# Patient Record
Sex: FEMALE | Race: WHITE | NOT HISPANIC OR LATINO | Employment: OTHER | URBAN - METROPOLITAN AREA
[De-identification: names, ages, dates, MRNs, and addresses within clinical notes are randomized per-mention and may not be internally consistent; named-entity substitution may affect disease eponyms.]

---

## 2017-01-30 ENCOUNTER — HOSPITAL ENCOUNTER (OUTPATIENT)
Dept: OTHER | Facility: HOSPITAL | Age: 77
Discharge: HOME | End: 2017-01-30
Attending: FAMILY MEDICINE | Admitting: FAMILY MEDICINE

## 2017-03-09 ENCOUNTER — HOSPITAL ENCOUNTER (OUTPATIENT)
Dept: ORTHOPEDIC SURGERY | Facility: CLINIC | Age: 77
Discharge: HOME | End: 2017-03-09
Attending: ORTHOPAEDIC SURGERY | Admitting: ORTHOPAEDIC SURGERY

## 2017-04-27 ENCOUNTER — HOSPITAL ENCOUNTER (OUTPATIENT)
Dept: ORTHOPEDIC SURGERY | Facility: CLINIC | Age: 77
Discharge: HOME | End: 2017-04-27
Attending: PHYSICIAN ASSISTANT | Admitting: PHYSICIAN ASSISTANT

## 2017-05-03 ENCOUNTER — CONVERSION ENCOUNTER (OUTPATIENT)
Dept: FAMILY MEDICINE CLINIC | Facility: CLINIC | Age: 77
End: 2017-05-03

## 2017-05-03 LAB
ALBUMIN SERPL-MCNC: 3.8 G/DL (ref 3.6–5.1)
ALP SERPL-CCNC: 82 U/L (ref 33–130)
AST SERPL-CCNC: 23 U/L (ref 10–35)
BASOPHILS # BLD AUTO: 100 CELLS/UL (ref 0–200)
BILIRUB SERPL-MCNC: 0.4 MG/DL (ref 0.2–1.2)
BUN SERPL-MCNC: 10 MG/DL (ref 7–25)
BUN/CREAT SERPL: 16.7 (CALC) (ref 6–22)
CALCIUM SERPL-MCNC: 9.3 MG/DL (ref 8.6–10.2)
CHLORIDE SERPL-SCNC: 99 MMOL/L (ref 98–110)
CHOLEST SERPL-MCNC: 140 MG/DL (ref 125–200)
CONV CO2: 26 MMOL/L (ref 21–33)
CONV TOTAL PROTEIN: 7 G/DL (ref 6.2–8.3)
CREAT UR-MCNC: 0.6 MG/DL (ref 0.63–1.22)
EOSINOPHIL # BLD AUTO: 2 %
EOSINOPHIL # BLD AUTO: 200 CELLS/UL (ref 15–500)
ERYTHROCYTE [DISTWIDTH] IN BLOOD BY AUTOMATED COUNT: 15.1 % (ref 11–15)
GLOBULIN UR ELPH-MCNC: 3.2 MG/DL (ref 2.2–3.9)
GLUCOSE UR QL: 86 MG/DL (ref 65–99)
HCT VFR BLD AUTO: 29.7 % (ref 35–45)
HDLC SERPL-MCNC: 48 MG/DL
HGB BLD-MCNC: 9.7 G/DL (ref 11.7–15.5)
INSULIN SERPL-ACNC: 1.2 (CALC) (ref 1–2.1)
LDLC SERPL CALC-MCNC: 69 MG/DL
LYMPHOCYTES # BLD AUTO: 800 CELLS/UL (ref 850–3900)
LYMPHOCYTES NFR BLD AUTO: 11 %
MCH RBC QN AUTO: 26.5 PG (ref 27–33)
MCHC RBC AUTO-ENTMCNC: 32.5 G/DL (ref 32–36)
MCV RBC AUTO: 81.6 FL (ref 80–100)
MONOCYTES # BLD AUTO: 600 CELLS/UL (ref 200–950)
MONOCYTES NFR BLD AUTO: 9 %
NEUTROPHILS # BLD AUTO: 5400 CELLS/UL (ref 1500–7800)
NEUTROPHILS NFR BLD AUTO: 77 %
PLATELET # BLD AUTO: 546 10*3/UL (ref 140–400)
PMV BLD AUTO: 8.1 FL (ref 7.5–11.5)
POTASSIUM SERPL-SCNC: 4.4 MMOL/L (ref 3.5–5.3)
RBC # BLD AUTO: 3.64 MILLION/UL (ref 3.8–5.1)
SODIUM SERPL-SCNC: 136 MMOL/L (ref 135–146)
TRIGL SERPL-MCNC: 117 MG/DL
WBC # BLD AUTO: 7 10*3/UL (ref 3.8–10.8)

## 2017-05-05 ENCOUNTER — HOSPITAL ENCOUNTER (OUTPATIENT)
Dept: ORTHOPEDIC SURGERY | Facility: CLINIC | Age: 77
Discharge: HOME | End: 2017-05-05
Attending: ORTHOPAEDIC SURGERY | Admitting: ORTHOPAEDIC SURGERY

## 2017-05-15 ENCOUNTER — CONVERSION ENCOUNTER (OUTPATIENT)
Dept: FAMILY MEDICINE CLINIC | Facility: CLINIC | Age: 77
End: 2017-05-15

## 2017-05-16 LAB
BASOPHILS # BLD AUTO: 43 CELLS/UL (ref 0–200)
BASOPHILS NFR BLD AUTO: 0.6 %
EOSINOPHIL # BLD AUTO: 142 CELLS/UL (ref 15–500)
EOSINOPHIL # BLD AUTO: 2 %
ERYTHROCYTE [DISTWIDTH] IN BLOOD BY AUTOMATED COUNT: 13.7 % (ref 11–15)
FERRITIN SERPL-MCNC: 25 NG/ML (ref 20–288)
FOLATE SERPL-MCNC: >24 NG/ML
HCT VFR BLD AUTO: 28 % (ref 35–45)
HGB BLD-MCNC: 8.9 G/DL (ref 11.7–15.5)
IRON SATN MFR SERPL: 7 % (CALC) (ref 11–50)
IRON SERPL-MCNC: 30 MCG/DL (ref 45–160)
LYMPHOCYTES # BLD AUTO: 1143 CELLS/UL (ref 850–3900)
LYMPHOCYTES NFR BLD AUTO: 16.1 %
MCH RBC QN AUTO: 26.3 PG (ref 27–33)
MCHC RBC AUTO-ENTMCNC: 31.8 G/DL (ref 32–36)
MCV RBC AUTO: 82.6 FL (ref 80–100)
MONOCYTES # BLD AUTO: 604 CELLS/UL (ref 200–950)
MONOCYTES NFR BLD AUTO: 8.5 %
NEUTROPHILS # BLD AUTO: 5169 CELLS/UL (ref 1500–7800)
NEUTROPHILS NFR BLD AUTO: 72.8 %
PLATELET # BLD AUTO: 466 10*3/UL (ref 140–400)
PMV BLD AUTO: 9.2 FL (ref 7.5–12.5)
RBC # BLD AUTO: 3.39 MILLION/UL (ref 3.8–5.1)
RETICS/RBC NFR MANUAL: NORMAL CELLS/UL (ref 20000–80000)
RETICULOCYTES PERCENT: 1.3 %
UIBC SERPL-MCNC: 441 UG/DL (ref 250–450)
VIT B12 SERPL-MCNC: 377 PG/ML (ref 200–1100)
WBC # BLD AUTO: 7.1 10*3/UL (ref 3.8–10.8)

## 2017-07-24 ENCOUNTER — CONVERSION ENCOUNTER (OUTPATIENT)
Dept: FAMILY MEDICINE CLINIC | Facility: CLINIC | Age: 77
End: 2017-07-24

## 2017-07-24 LAB
ALBUMIN SERPL-MCNC: 4.1 G/DL (ref 3.6–5.1)
ALP SERPL-CCNC: 64 U/L (ref 33–130)
AST SERPL-CCNC: 20 U/L (ref 10–35)
BASOPHILS # BLD AUTO: 100 CELLS/UL (ref 0–200)
BILIRUB SERPL-MCNC: 0.5 MG/DL (ref 0.2–1.2)
BUN SERPL-MCNC: 15 MG/DL (ref 7–25)
BUN/CREAT SERPL: 21.4 (CALC) (ref 6–22)
CALCIUM SERPL-MCNC: 9.3 MG/DL (ref 8.6–10.2)
CHLORIDE SERPL-SCNC: 98 MMOL/L (ref 98–110)
CHOLEST SERPL-MCNC: 154 MG/DL (ref 125–200)
CONV CO2: 28 MMOL/L (ref 21–33)
CONV TOTAL PROTEIN: 6.7 G/DL (ref 6.2–8.3)
CREAT UR-MCNC: 0.7 MG/DL (ref 0.63–1.22)
EOSINOPHIL # BLD AUTO: 100 CELLS/UL (ref 15–500)
EOSINOPHIL # BLD AUTO: 3 %
ERYTHROCYTE [DISTWIDTH] IN BLOOD BY AUTOMATED COUNT: 18.6 % (ref 11–15)
GLOBULIN UR ELPH-MCNC: 2.6 MG/DL (ref 2.2–3.9)
GLUCOSE UR QL: 86 MG/DL (ref 65–99)
HCT VFR BLD AUTO: 39.8 % (ref 35–45)
HDLC SERPL-MCNC: 44 MG/DL
HGB BLD-MCNC: 12.8 G/DL (ref 11.7–15.5)
INSULIN SERPL-ACNC: 1.6 (CALC) (ref 1–2.1)
LDLC SERPL CALC-MCNC: 85 MG/DL
LYMPHOCYTES # BLD AUTO: 1100 CELLS/UL (ref 850–3900)
LYMPHOCYTES NFR BLD AUTO: 20 %
MCH RBC QN AUTO: 27.4 PG (ref 27–33)
MCHC RBC AUTO-ENTMCNC: 32.3 G/DL (ref 32–36)
MCV RBC AUTO: 84.8 FL (ref 80–100)
MONOCYTES # BLD AUTO: 400 CELLS/UL (ref 200–950)
MONOCYTES NFR BLD AUTO: 8 %
NEUTROPHILS # BLD AUTO: 3800 CELLS/UL (ref 1500–7800)
NEUTROPHILS NFR BLD AUTO: 68 %
PLATELET # BLD AUTO: 314 10*3/UL (ref 140–400)
PMV BLD AUTO: 8.8 FL (ref 7.5–11.5)
POTASSIUM SERPL-SCNC: 4.6 MMOL/L (ref 3.5–5.3)
RBC # BLD AUTO: 4.69 MILLION/UL (ref 3.8–5.1)
SODIUM SERPL-SCNC: 135 MMOL/L (ref 135–146)
TRIGL SERPL-MCNC: 127 MG/DL
WBC # BLD AUTO: 5.6 10*3/UL (ref 3.8–10.8)

## 2017-10-10 ENCOUNTER — HOSPITAL ENCOUNTER (OUTPATIENT)
Dept: ORTHOPEDIC SURGERY | Facility: CLINIC | Age: 77
Discharge: HOME | End: 2017-10-10
Attending: ORTHOPAEDIC SURGERY | Admitting: ORTHOPAEDIC SURGERY

## 2018-03-19 ENCOUNTER — HOSPITAL ENCOUNTER (OUTPATIENT)
Dept: GENERAL RADIOLOGY | Facility: HOSPITAL | Age: 78
Discharge: HOME | End: 2018-03-19
Attending: FAMILY MEDICINE | Admitting: FAMILY MEDICINE

## 2018-09-18 ENCOUNTER — HOSPITAL ENCOUNTER (OUTPATIENT)
Dept: GENERAL RADIOLOGY | Facility: HOSPITAL | Age: 78
Discharge: HOME | End: 2018-09-18
Attending: FAMILY MEDICINE | Admitting: FAMILY MEDICINE

## 2018-09-26 ENCOUNTER — HOSPITAL ENCOUNTER (OUTPATIENT)
Dept: OTHER | Facility: HOSPITAL | Age: 78
Discharge: HOME | End: 2018-09-26
Attending: FAMILY MEDICINE | Admitting: FAMILY MEDICINE

## 2018-10-15 ENCOUNTER — CONVERSION ENCOUNTER (OUTPATIENT)
Dept: FAMILY MEDICINE CLINIC | Facility: CLINIC | Age: 78
End: 2018-10-15

## 2018-10-16 LAB
ALBUMIN SERPL-MCNC: 4.1 G/DL (ref 3.6–5.1)
ALP SERPL-CCNC: 55 U/L (ref 33–130)
ALT SERPL-CCNC: 13 U/L (ref 6–29)
AST SERPL-CCNC: 22 U/L (ref 10–35)
BILIRUB SERPL-MCNC: 0.9 MG/DL (ref 0.2–1.2)
BUN SERPL-MCNC: 18 MG/DL (ref 7–25)
BUN/CREAT SERPL: ABNORMAL (CALC) (ref 6–22)
CALCIUM SERPL-MCNC: 9.5 MG/DL (ref 8.6–10.4)
CHLORIDE SERPL-SCNC: 98 MMOL/L (ref 98–110)
CHOLEST SERPL-MCNC: 156 MG/DL
CHOLEST/HDLC SERPL: 3.3 (CALC)
CONV CO2: 27 MMOL/L (ref 20–32)
CONV TOTAL PROTEIN: 6.9 G/DL (ref 6.1–8.1)
CREAT UR-MCNC: 0.76 MG/DL (ref 0.6–0.93)
GLOBULIN UR ELPH-MCNC: 2.8 MG/DL (ref 1.9–3.7)
GLUCOSE UR QL: 87 MG/DL (ref 65–99)
HBA1C MFR BLD: 5.6 %
HDLC SERPL-MCNC: 47 MG/DL
INSULIN SERPL-ACNC: 1.5 (CALC) (ref 1–2.5)
LDLC SERPL CALC-MCNC: 88 MG/DL
NONHDLC SERPL-MCNC: 109 MG/DL
POTASSIUM SERPL-SCNC: 4.6 MMOL/L (ref 3.5–5.3)
SODIUM SERPL-SCNC: 134 MMOL/L (ref 135–146)
TRIGL SERPL-MCNC: 118 MG/DL

## 2018-10-31 ENCOUNTER — HOSPITAL ENCOUNTER (OUTPATIENT)
Dept: OTHER | Facility: HOSPITAL | Age: 78
Discharge: HOME | End: 2018-10-31
Attending: FAMILY MEDICINE | Admitting: FAMILY MEDICINE

## 2018-11-19 ENCOUNTER — EPISODE CHANGES (OUTPATIENT)
Dept: CASE MANAGEMENT | Facility: OTHER | Age: 78
End: 2018-11-19

## 2018-11-28 ENCOUNTER — PATIENT OUTREACH (OUTPATIENT)
Dept: CASE MANAGEMENT | Facility: OTHER | Age: 78
End: 2018-11-28

## 2018-11-28 NOTE — OUTREACH NOTE
Skilled Nursing Facility Discharge Flowsheet:     Skilled Nursing Facility Discharge Assessment 11/28/2018   Acute Facility Discharged From [facility]   Acute Discharge Date 11/17/2018   Name of the Skilled Nursing Facility? [facility]   Purpose of SNF Admission SN;PT   Estimated length of stay for the patient? Discharge Planning Incomplete   Who is the insurance provider or payor of patient stay? Medicare   Progression of Patient? Pending

## 2018-12-04 ENCOUNTER — HOSPITAL ENCOUNTER (OUTPATIENT)
Dept: ORTHOPEDIC SURGERY | Facility: CLINIC | Age: 78
Discharge: HOME | End: 2018-12-04
Attending: PHYSICIAN ASSISTANT | Admitting: PHYSICIAN ASSISTANT

## 2018-12-06 ENCOUNTER — PATIENT OUTREACH (OUTPATIENT)
Dept: CASE MANAGEMENT | Facility: OTHER | Age: 78
End: 2018-12-06

## 2018-12-06 NOTE — OUTREACH NOTE
Skilled Nursing Facility Discharge Flowsheet:     Skilled Nursing Facility Discharge Assessment 12/6/2018   Acute Facility Discharged From [facility]   Acute Discharge Date 11/17/2018   Name of the Skilled Nursing Facility? [facility]   Purpose of SNF Admission SN;PT   Estimated length of stay for the patient? Patient has been discharged - presumably to home   Who is the insurance provider or payor of patient stay? -   Progression of Patient? Discharged   Where was the patient discharged to? Home   Home Health Agency at discharge? (No Data)

## 2018-12-06 NOTE — OUTREACH NOTE
Documenting unsuccessful outreach attempt #1.  Patient recently d/c from [facility].  Additional outreach scheduled.

## 2018-12-19 ENCOUNTER — HOSPITAL ENCOUNTER (OUTPATIENT)
Dept: OTHER | Facility: HOSPITAL | Age: 78
Discharge: HOME | End: 2018-12-19
Attending: FAMILY MEDICINE | Admitting: FAMILY MEDICINE

## 2019-03-12 ENCOUNTER — HOSPITAL ENCOUNTER (OUTPATIENT)
Dept: OTHER | Facility: HOSPITAL | Age: 79
Discharge: HOME | End: 2019-03-12
Attending: FAMILY MEDICINE | Admitting: FAMILY MEDICINE

## 2019-04-18 ENCOUNTER — HOSPITAL ENCOUNTER (OUTPATIENT)
Dept: OTHER | Facility: HOSPITAL | Age: 79
Discharge: HOME | End: 2019-04-18
Attending: FAMILY MEDICINE | Admitting: FAMILY MEDICINE

## 2019-04-24 ENCOUNTER — HOSPITAL ENCOUNTER (OUTPATIENT)
Dept: OTHER | Facility: HOSPITAL | Age: 79
Discharge: HOME | End: 2019-04-24
Attending: FAMILY MEDICINE | Admitting: FAMILY MEDICINE

## 2019-05-14 ENCOUNTER — HOSPITAL ENCOUNTER (OUTPATIENT)
Dept: ORTHOPEDIC SURGERY | Facility: CLINIC | Age: 79
Discharge: HOME | End: 2019-05-14
Attending: ORTHOPAEDIC SURGERY | Admitting: ORTHOPAEDIC SURGERY

## 2019-06-24 DIAGNOSIS — I10 ESSENTIAL HYPERTENSION: Primary | ICD-10-CM

## 2019-06-25 RX ORDER — ENALAPRIL MALEATE 10 MG/1
TABLET ORAL
Qty: 180 TABLET | Refills: 1 | Status: SHIPPED | OUTPATIENT
Start: 2019-06-25 | End: 2019-11-13 | Stop reason: SDUPTHER

## 2019-06-25 NOTE — TELEPHONE ENCOUNTER
Future Appointments       Provider Department Center    7/22/2019 8:30 AM [facility] FAMILY MEDICINE [facility]    7/25/2019 1:00 PM Mustapha Gonzales MD [facility]    1/16/2020 1:00 PM Katherine Landa PA [facility] ORTHOPEDICS         Pt. Last seen 4-24-19, last labs 4-16-19 and 4-18-19

## 2019-07-18 RX ORDER — PANTOPRAZOLE SODIUM 40 MG/1
TABLET, DELAYED RELEASE ORAL
Qty: 90 TABLET | Refills: 3 | Status: SHIPPED | OUTPATIENT
Start: 2019-07-18 | End: 2020-05-07 | Stop reason: SDUPTHER

## 2019-07-18 RX ORDER — MONTELUKAST SODIUM 10 MG/1
TABLET ORAL
Qty: 90 TABLET | Refills: 3 | Status: SHIPPED | OUTPATIENT
Start: 2019-07-18 | End: 2020-05-07 | Stop reason: SDUPTHER

## 2019-07-22 DIAGNOSIS — R73.9 HYPERGLYCEMIA: ICD-10-CM

## 2019-07-22 DIAGNOSIS — D50.8 OTHER IRON DEFICIENCY ANEMIA: ICD-10-CM

## 2019-07-22 DIAGNOSIS — M19.90 ARTHRITIS: ICD-10-CM

## 2019-07-22 DIAGNOSIS — E78.2 MIXED HYPERLIPIDEMIA: Primary | ICD-10-CM

## 2019-07-23 LAB
ALBUMIN SERPL-MCNC: 4.3 G/DL (ref 3.5–4.8)
ALBUMIN/GLOB SERPL: 1.9 {RATIO} (ref 1.2–2.2)
ALP SERPL-CCNC: 64 IU/L (ref 39–117)
ALT SERPL-CCNC: 13 IU/L (ref 0–32)
AST SERPL-CCNC: 21 IU/L (ref 0–40)
BASOPHILS # BLD AUTO: 0 X10E3/UL (ref 0–0.2)
BASOPHILS NFR BLD AUTO: 0 %
BILIRUB SERPL-MCNC: 0.8 MG/DL (ref 0–1.2)
BUN SERPL-MCNC: 19 MG/DL (ref 8–27)
BUN/CREAT SERPL: 25 (ref 12–28)
CALCIUM SERPL-MCNC: 9.1 MG/DL (ref 8.7–10.3)
CHLORIDE SERPL-SCNC: 97 MMOL/L (ref 96–106)
CHOLEST SERPL-MCNC: 152 MG/DL (ref 100–199)
CHOLEST/HDLC SERPL: 3.5 RATIO (ref 0–4.4)
CO2 SERPL-SCNC: 25 MMOL/L (ref 20–29)
CREAT SERPL-MCNC: 0.75 MG/DL (ref 0.57–1)
CRP SERPL-MCNC: 3 MG/L (ref 0–10)
EOSINOPHIL # BLD AUTO: 0.1 X10E3/UL (ref 0–0.4)
EOSINOPHIL NFR BLD AUTO: 2 %
ERYTHROCYTE [DISTWIDTH] IN BLOOD BY AUTOMATED COUNT: 14.5 % (ref 12.3–15.4)
GLOBULIN SER CALC-MCNC: 2.3 G/DL (ref 1.5–4.5)
GLUCOSE SERPL-MCNC: 91 MG/DL (ref 65–99)
HBA1C MFR BLD: 5.9 % (ref 4.8–5.6)
HCT VFR BLD AUTO: 36.1 % (ref 34–46.6)
HDLC SERPL-MCNC: 43 MG/DL
HGB BLD-MCNC: 12.1 G/DL (ref 11.1–15.9)
IMM GRANULOCYTES # BLD AUTO: 0 X10E3/UL (ref 0–0.1)
IMM GRANULOCYTES NFR BLD AUTO: 0 %
LDLC SERPL CALC-MCNC: 90 MG/DL (ref 0–99)
LYMPHOCYTES # BLD AUTO: 1.1 X10E3/UL (ref 0.7–3.1)
LYMPHOCYTES NFR BLD AUTO: 19 %
MCH RBC QN AUTO: 28.9 PG (ref 26.6–33)
MCHC RBC AUTO-ENTMCNC: 33.5 G/DL (ref 31.5–35.7)
MCV RBC AUTO: 86 FL (ref 79–97)
MONOCYTES # BLD AUTO: 0.4 X10E3/UL (ref 0.1–0.9)
MONOCYTES NFR BLD AUTO: 8 %
NEUTROPHILS # BLD AUTO: 4 X10E3/UL (ref 1.4–7)
NEUTROPHILS NFR BLD AUTO: 71 %
PLATELET # BLD AUTO: 336 X10E3/UL (ref 150–450)
POTASSIUM SERPL-SCNC: 4.4 MMOL/L (ref 3.5–5.2)
PROT SERPL-MCNC: 6.6 G/DL (ref 6–8.5)
RBC # BLD AUTO: 4.19 X10E6/UL (ref 3.77–5.28)
SODIUM SERPL-SCNC: 137 MMOL/L (ref 134–144)
TRIGL SERPL-MCNC: 97 MG/DL (ref 0–149)
VLDLC SERPL CALC-MCNC: 19 MG/DL (ref 5–40)
WBC # BLD AUTO: 5.7 X10E3/UL (ref 3.4–10.8)

## 2019-07-25 ENCOUNTER — OFFICE VISIT (OUTPATIENT)
Dept: FAMILY MEDICINE CLINIC | Facility: CLINIC | Age: 79
End: 2019-07-25

## 2019-07-25 VITALS
HEIGHT: 66 IN | TEMPERATURE: 98.7 F | BODY MASS INDEX: 23.4 KG/M2 | HEART RATE: 85 BPM | WEIGHT: 145.6 LBS | RESPIRATION RATE: 18 BRPM | OXYGEN SATURATION: 96 % | DIASTOLIC BLOOD PRESSURE: 60 MMHG | SYSTOLIC BLOOD PRESSURE: 116 MMHG

## 2019-07-25 DIAGNOSIS — E78.2 MIXED HYPERLIPIDEMIA: ICD-10-CM

## 2019-07-25 DIAGNOSIS — D63.8 ANEMIA IN OTHER CHRONIC DISEASES CLASSIFIED ELSEWHERE: ICD-10-CM

## 2019-07-25 DIAGNOSIS — R73.9 HYPERGLYCEMIA: ICD-10-CM

## 2019-07-25 DIAGNOSIS — I10 HYPERTENSION, BENIGN: ICD-10-CM

## 2019-07-25 DIAGNOSIS — G89.4 PAIN SYNDROME, CHRONIC: ICD-10-CM

## 2019-07-25 DIAGNOSIS — Z96.649 LOOSE TOTAL HIP ARTHROPLASTY, SUBSEQUENT ENCOUNTER: ICD-10-CM

## 2019-07-25 DIAGNOSIS — T84.038D LOOSE TOTAL HIP ARTHROPLASTY, SUBSEQUENT ENCOUNTER: ICD-10-CM

## 2019-07-25 DIAGNOSIS — G56.01 CARPAL TUNNEL SYNDROME OF RIGHT WRIST: Primary | ICD-10-CM

## 2019-07-25 DIAGNOSIS — M05.79 RHEUMATOID ARTHRITIS INVOLVING MULTIPLE SITES WITH POSITIVE RHEUMATOID FACTOR (HCC): ICD-10-CM

## 2019-07-25 PROBLEM — T84.038A LOOSE TOTAL HIP ARTHROPLASTY (HCC): Status: ACTIVE | Noted: 2019-04-09

## 2019-07-25 PROBLEM — M97.02XA PERIPROSTHETIC FRACTURE AROUND INTERNAL PROSTHETIC LEFT HIP JOINT: Status: ACTIVE | Noted: 2019-07-25

## 2019-07-25 PROBLEM — T84.84XA PAIN IN HIP REGION AFTER TOTAL HIP REPLACEMENT: Status: ACTIVE | Noted: 2019-07-25

## 2019-07-25 PROBLEM — IMO0001 MYALGIA AND MYOSITIS: Status: ACTIVE | Noted: 2019-07-25

## 2019-07-25 PROBLEM — M51.369 LUMBAR DEGENERATIVE DISC DISEASE: Status: ACTIVE | Noted: 2019-07-25

## 2019-07-25 PROBLEM — I73.9 PERIPHERAL VASCULAR DISEASE: Status: ACTIVE | Noted: 2019-07-25

## 2019-07-25 PROBLEM — M48.061 SPINAL STENOSIS OF LUMBAR REGION: Status: ACTIVE | Noted: 2017-01-03

## 2019-07-25 PROBLEM — I70.219 ATHEROSCLEROTIC PERIPHERAL VASCULAR DISEASE WITH INTERMITTENT CLAUDICATION: Status: ACTIVE | Noted: 2019-07-25

## 2019-07-25 PROBLEM — M51.36 LUMBAR DEGENERATIVE DISC DISEASE: Status: ACTIVE | Noted: 2019-07-25

## 2019-07-25 PROCEDURE — 99214 OFFICE O/P EST MOD 30 MIN: CPT | Performed by: FAMILY MEDICINE

## 2019-07-25 RX ORDER — DULOXETIN HYDROCHLORIDE 30 MG/1
30 CAPSULE, DELAYED RELEASE ORAL DAILY
Qty: 30 CAPSULE | Refills: 5 | Status: SHIPPED | OUTPATIENT
Start: 2019-07-25 | End: 2019-08-19 | Stop reason: SDUPTHER

## 2019-07-25 RX ORDER — ATORVASTATIN CALCIUM 10 MG/1
1 TABLET, FILM COATED ORAL DAILY
COMMUNITY
Start: 2016-08-18 | End: 2019-11-13 | Stop reason: SDUPTHER

## 2019-07-25 RX ORDER — DIPHENHYDRAMINE HCL 25 MG
1 CAPSULE ORAL DAILY
COMMUNITY
Start: 2016-04-26

## 2019-07-25 RX ORDER — ENALAPRIL MALEATE 10 MG/1
TABLET ORAL
COMMUNITY
Start: 2019-03-07 | End: 2019-09-05 | Stop reason: SDUPTHER

## 2019-07-25 RX ORDER — ORPHENADRINE CITRATE 100 MG/1
TABLET, EXTENDED RELEASE ORAL DAILY
COMMUNITY
Start: 2018-09-18 | End: 2019-09-05

## 2019-07-25 RX ORDER — CELECOXIB 200 MG/1
200 CAPSULE ORAL DAILY
Qty: 30 CAPSULE | Refills: 5 | Status: SHIPPED | OUTPATIENT
Start: 2019-07-25 | End: 2019-08-19 | Stop reason: SDUPTHER

## 2019-07-25 RX ORDER — LORATADINE 10 MG/1
10 TABLET ORAL DAILY
COMMUNITY

## 2019-07-25 RX ORDER — BACLOFEN 10 MG/1
1 TABLET ORAL DAILY
COMMUNITY
Start: 2016-08-18 | End: 2019-09-05 | Stop reason: SDUPTHER

## 2019-07-25 RX ORDER — FLUTICASONE PROPIONATE 50 MCG
1 SPRAY, SUSPENSION (ML) NASAL DAILY
COMMUNITY
Start: 2018-10-23 | End: 2019-11-12

## 2019-07-25 RX ORDER — MELOXICAM 15 MG/1
1 TABLET ORAL DAILY
COMMUNITY
Start: 2017-06-01 | End: 2019-07-25 | Stop reason: SDUPTHER

## 2019-07-25 RX ORDER — AMITRIPTYLINE HYDROCHLORIDE 75 MG/1
1 TABLET, FILM COATED ORAL NIGHTLY
COMMUNITY
Start: 2016-08-30 | End: 2019-09-17 | Stop reason: SDUPTHER

## 2019-07-25 RX ORDER — BACLOFEN 10 MG/1
1 TABLET ORAL NIGHTLY
COMMUNITY
Start: 2016-08-30 | End: 2019-09-05

## 2019-07-25 RX ORDER — ORPHENADRINE CITRATE 100 MG/1
1 TABLET, EXTENDED RELEASE ORAL DAILY
COMMUNITY
Start: 2018-10-23 | End: 2019-09-05

## 2019-07-25 NOTE — PROGRESS NOTES
"Subjective   Vida Jamison is a 78 y.o. female.   Chief Complaint   Patient presents with   • Arthritis   • Hyperglycemia   • Anemia   • Hyperlipidemia     Left hip pain      Arthritis   Pertinent negatives include no weight loss.   Hyperglycemia   Pertinent negatives include no myalgias or nausea.   Anemia   There has been no weight loss.   Hyperlipidemia   Pertinent negatives include no myalgias.   Hand Pain    The incident occurred more than 1 week ago. There was no injury mechanism. The pain is present in the right hand. The quality of the pain is described as aching. Associated symptoms include tingling.        The following portions of the patient's history were reviewed and updated as appropriate: allergies, current medications, past family history, past medical history, past social history, past surgical history and problem list.    Review of Systems   Constitutional: Negative for unexpected weight gain and unexpected weight loss.   HENT: Negative for nosebleeds.    Gastrointestinal: Negative for blood in stool, nausea and indigestion.   Genitourinary: Negative for hematuria.   Musculoskeletal: Positive for arthritis. Negative for myalgias.   Skin: Negative for dry skin.   Neurological: Positive for tingling.       Objective   Visit Vitals  /60 (BP Location: Right arm, Patient Position: Sitting, Cuff Size: Large Adult)   Pulse 85   Temp 98.7 °F (37.1 °C) (Oral)   Resp 18   Ht 167.6 cm (66\")   Wt 66 kg (145 lb 9.6 oz)   SpO2 96%   BMI 23.50 kg/m²     Physical Exam   Constitutional: She is oriented to person, place, and time. She appears well-developed and well-nourished. She is cooperative.   HENT:   Head: Normocephalic.   Neck: Trachea normal. Neck supple. Carotid bruit is not present. No thyromegaly present.   Cardiovascular: Normal rate and regular rhythm. Exam reveals no gallop and no friction rub.   No murmur heard.  Musculoskeletal:        Arms:  Neurological: She is alert and oriented to person, " place, and time.   Skin: Skin is dry. No rash noted. Nails show no clubbing.       Assessment/Plan    Problem List Items Addressed This Visit        Cardiovascular and Mediastinum    Mixed hyperlipidemia    Current Assessment & Plan     Lipid abnormalities are improving with lifestyle modifications.  Pharmacotherapy as ordered.  Lipids will be reassessed in 6 months.         Relevant Medications    atorvastatin (LIPITOR) 10 MG tablet    Hypertension, benign    Overview     Doing well;   Encouraged to watch salt, exercise more and lose weight         Relevant Medications    metoprolol tartrate (LOPRESSOR) 25 MG tablet    enalapril (VASOTEC) 10 MG tablet       Nervous and Auditory    Carpal tunnel syndrome of right wrist - Primary    Overview     Declines more cary or treatment         Pain syndrome, chronic    Overview     Trial of cymbalta            Musculoskeletal and Integument    Loose total hip arthroplasty (CMS/HCC)    Current Assessment & Plan     Sees janell next week         Rheumatoid arthritis involving multiple sites with positive rheumatoid factor (CMS/HCC)    Current Assessment & Plan     crp is nl but she want to try something different than mobic thus we will try celebrex                Hematopoietic and Hemostatic    Anemia    Current Assessment & Plan     resolved            Other    Hyperglycemia    Current Assessment & Plan     Improved with A1c down to 5.9

## 2019-07-25 NOTE — ASSESSMENT & PLAN NOTE
Lipid abnormalities are improving with lifestyle modifications.  Pharmacotherapy as ordered.  Lipids will be reassessed in 6 months.

## 2019-07-27 ENCOUNTER — RESULTS ENCOUNTER (OUTPATIENT)
Dept: FAMILY MEDICINE CLINIC | Facility: CLINIC | Age: 79
End: 2019-07-27

## 2019-07-27 DIAGNOSIS — E78.2 MIXED HYPERLIPIDEMIA: ICD-10-CM

## 2019-07-27 DIAGNOSIS — M19.90 ARTHRITIS: ICD-10-CM

## 2019-07-27 DIAGNOSIS — D50.8 OTHER IRON DEFICIENCY ANEMIA: ICD-10-CM

## 2019-07-27 DIAGNOSIS — R73.9 HYPERGLYCEMIA: ICD-10-CM

## 2019-08-19 ENCOUNTER — OFFICE VISIT (OUTPATIENT)
Dept: FAMILY MEDICINE CLINIC | Facility: CLINIC | Age: 79
End: 2019-08-19

## 2019-08-19 VITALS
HEART RATE: 94 BPM | DIASTOLIC BLOOD PRESSURE: 74 MMHG | WEIGHT: 145.4 LBS | OXYGEN SATURATION: 97 % | BODY MASS INDEX: 25.76 KG/M2 | SYSTOLIC BLOOD PRESSURE: 139 MMHG | HEIGHT: 63 IN | RESPIRATION RATE: 18 BRPM | TEMPERATURE: 96.4 F

## 2019-08-19 DIAGNOSIS — R73.9 HYPERGLYCEMIA: ICD-10-CM

## 2019-08-19 DIAGNOSIS — D63.8 ANEMIA IN OTHER CHRONIC DISEASES CLASSIFIED ELSEWHERE: ICD-10-CM

## 2019-08-19 DIAGNOSIS — M05.79 RHEUMATOID ARTHRITIS INVOLVING MULTIPLE SITES WITH POSITIVE RHEUMATOID FACTOR (HCC): ICD-10-CM

## 2019-08-19 DIAGNOSIS — G89.4 PAIN SYNDROME, CHRONIC: Primary | ICD-10-CM

## 2019-08-19 DIAGNOSIS — E78.2 MIXED HYPERLIPIDEMIA: ICD-10-CM

## 2019-08-19 PROCEDURE — 99213 OFFICE O/P EST LOW 20 MIN: CPT | Performed by: FAMILY MEDICINE

## 2019-08-19 RX ORDER — CELECOXIB 200 MG/1
200 CAPSULE ORAL DAILY
Qty: 90 CAPSULE | Refills: 3 | Status: SHIPPED | OUTPATIENT
Start: 2019-08-19 | End: 2020-12-09 | Stop reason: SDUPTHER

## 2019-08-19 RX ORDER — DULOXETIN HYDROCHLORIDE 30 MG/1
30 CAPSULE, DELAYED RELEASE ORAL DAILY
Qty: 90 CAPSULE | Refills: 3 | Status: SHIPPED | OUTPATIENT
Start: 2019-08-19 | End: 2020-06-01

## 2019-08-19 NOTE — PROGRESS NOTES
Subjective   Vida Jamison is a 78 y.o. female.     Pain   This is a chronic problem. The current episode started more than 1 year ago. The problem occurs constantly. The problem has been gradually improving. Associated symptoms include arthralgias and joint swelling. Pertinent negatives include no chills, diaphoresis or fatigue. The symptoms are aggravated by walking. The treatment provided significant relief.   Osteoarthritis   Presents for follow-up visit. She complains of joint swelling. The symptoms have been improving. Her pain is at a severity of 6/10. Pertinent negatives include no fatigue. Her past medical history is significant for osteoarthritis. Compliance with medications is %.        The following portions of the patient's history were reviewed and updated as appropriate: allergies, current medications, past family history, past medical history, past social history, past surgical history and problem list.    Family History   Problem Relation Age of Onset   • Ovarian cancer Mother    • Arthritis Mother    • Heart failure Father    • Suicidality Brother    • Heart disease Maternal Aunt    • Cancer Other         malignant neoplasm of gastrointestinal tract- in her 50's   • Arthritis Other    • Cervical cancer Other    • Arthritis Other    • Diabetes Other        Social History     Tobacco Use   • Smoking status: Never Smoker   • Smokeless tobacco: Never Used   Substance Use Topics   • Alcohol use: No   • Drug use: No       Past Surgical History:   Procedure Laterality Date   • BREAST BIOPSY Right 1974    Dr Briseida Alonso   • CATARACT EXTRACTION W/ INTRAOCULAR LENS IMPLANT      7/20/10-rt. eye-Dr. Leon 7/13/10- Lt. eye-Dr. Leon   • FINGER SURGERY  2001    Revision of Finger joints. Dr. Brumfield w/Drs. Kleinert & Marlon   • HIP ENDOPROSTHESIS Left 05/22/2014    Osteonics endoprostehetic replacement of left hip. Dr. Reza Choudhury.   • JOINT REPLACEMENT     • KNEE ARTHROSCOPY Left     [1987] Dr Poon-torn medial  meniscus [1995] Dr. Schaffer -torn medial meniscus   • LEEP  2000    Biopsy of cervix. for MAJOR-1 by Dr. Knight   • LUMBAR DISCECTOMY  1994    Hemilaminectomy, foraminectomy & discectomy. Dr. Velasquez, L4-L5 w/posterior lateral fusion & screw fixatoin   • LUMBAR LAMINECTOMY  12/19/2011    foraminotomies, medial facetectomies L5-U5axbyn redo. left L5-S1 lumbar discectomy. [facility]-Dr. De Oliveira- 5 units of blood given to pt.   • POSTERIOR LUMBAR/THORACIC SPINE FUSION  2002    Fusion T-5 through L-1. Had T-11 burst fracture. Recuperated at [facility]   • SPINAL FUSION      Lower Back. 7/11/2011-[facility] - Hardware removal from L3-L5, Dr. De Oliveira surgeon, Dr. Booker [facility]-Fusion of spine at multi-levels 12/14/11 - [facility] - Dr Gomez and Dr. Momin, Anterior approach Spinal Fusion L5-S1 3--[facility]. Dr. De Oliveira and Dr. Dean. Failed posterior fusion with loose instrumentation. L-4 thru L-5. No complications.   • TONSILLECTOMY  1951    Dr Mcginnis   • TOTAL HIP ARTHROPLASTY Right 04/10/2017    [facility]. Dr. Reinoso   • TOTAL KNEE ARTHROPLASTY Left 2002    Dr Sue @ [facility]       Patient Active Problem List   Diagnosis   • Atherosclerotic peripheral vascular disease with intermittent claudication (CMS/HCC)   • Gastroesophageal reflux disease without esophagitis   • Pain in hip region after total hip replacement (CMS/HCC)   • Loose total hip arthroplasty (CMS/HCC)   • Lumbar degenerative disc disease   • Mixed hyperlipidemia   • Myalgia and myositis   • Hypertension, benign   • Peripheral vascular disease (CMS/HCC)   • Periprosthetic fracture around internal prosthetic left hip joint (CMS/HCC)   • Rheumatoid arthritis involving multiple sites with positive rheumatoid factor (CMS/HCC)   • S/P lumbar fusion   • Spinal stenosis of lumbar region   • Carpal tunnel syndrome of right wrist   • Anemia   • Hyperglycemia   • Pain syndrome, chronic       Current  Outpatient Medications on File Prior to Visit   Medication Sig Dispense Refill   • Acetaminophen (TYLENOL) 325 MG capsule 2 (Two) Times a Day.     • amitriptyline (ELAVIL) 75 MG tablet Take 1 tablet by mouth Every Night.     • atorvastatin (LIPITOR) 10 MG tablet Take 1 tablet by mouth Daily.     • baclofen (LIORESAL) 10 MG tablet 1 capsule Every Night.     • Calcium Carbonate-Vitamin D (CALCIUM 600+D) 600-200 MG-UNIT tablet Take 2 tablets by mouth Daily.     • celecoxib (CELEBREX) 200 MG capsule Take 1 capsule by mouth Daily. 30 capsule 5   • diphenhydrAMINE (BENADRYL ALLERGY) 25 mg capsule Take 1 capsule by mouth Daily.     • DULoxetine (CYMBALTA) 30 MG capsule Take 1 capsule by mouth Daily. 30 capsule 5   • enalapril (VASOTEC) 10 MG tablet TAKE 1 TABLET TWICE DAILY 180 tablet 1   • fluticasone ([employer] FLUTICASONE PROPIONATE) 50 MCG/ACT nasal spray 1 spray Daily.     • loratadine (CLARITIN) 10 MG tablet Take 10 mg by mouth Daily.     • metoprolol tartrate (LOPRESSOR) 25 MG tablet Take 1 tablet by mouth 2 (Two) Times a Day.     • montelukast (SINGULAIR) 10 MG tablet TAKE 1 TABLET EVERY DAY 90 tablet 3   • orphenadrine (NORFLEX) 100 MG 12 hr tablet 1 tablet Daily.     • pantoprazole (PROTONIX) 40 MG EC tablet TAKE 1 TABLET DAILY 90 tablet 3   • Psyllium 100 % powder METAMUCIL POWD     • baclofen (LIORESAL) 10 MG tablet Take 1 tablet by mouth Daily.     • enalapril (VASOTEC) 10 MG tablet Take  by mouth.     • orphenadrine (NORFLEX) 100 MG 12 hr tablet Take  by mouth Daily.       No current facility-administered medications on file prior to visit.        Allergies   Allergen Reactions   • Morphine And Related Other (See Comments) and Hallucinations     HALLUCINATIONS         Review of Systems   Constitutional: Negative for chills, diaphoresis and fatigue.   Musculoskeletal: Positive for arthralgias and joint swelling.       Objective   Visit Vitals  /74 (BP Location: Left arm, Patient Position: Sitting, Cuff Size:  "Adult)   Pulse 94   Temp 96.4 °F (35.8 °C) (Oral)   Resp 18   Ht 160 cm (63\")   Wt 66 kg (145 lb 6.4 oz)   SpO2 97%   BMI 25.76 kg/m²     Physical Exam   Constitutional: She is oriented to person, place, and time. She appears well-developed and well-nourished. She is cooperative.   HENT:   Head: Normocephalic.   Neck: Trachea normal. Neck supple. Carotid bruit is not present. No thyromegaly present.   Cardiovascular: Normal rate and regular rhythm. Exam reveals no gallop and no friction rub.   No murmur heard.  Neurological: She is alert and oriented to person, place, and time.   Skin: Skin is dry. No rash noted. Nails show no clubbing.         Assessment/Plan .  Problem List Items Addressed This Visit        Nervous and Auditory    Pain syndrome, chronic - Primary    Overview     Trial of cymbalta         Current Assessment & Plan     Greatly improved with Cymbalta            Musculoskeletal and Integument    Rheumatoid arthritis involving multiple sites with positive rheumatoid factor (CMS/HCC)    Current Assessment & Plan     Greatly improved with Cymbalta                    "

## 2019-09-05 ENCOUNTER — OFFICE VISIT (OUTPATIENT)
Dept: FAMILY MEDICINE CLINIC | Facility: CLINIC | Age: 79
End: 2019-09-05

## 2019-09-05 ENCOUNTER — HOSPITAL ENCOUNTER (OUTPATIENT)
Dept: GENERAL RADIOLOGY | Facility: HOSPITAL | Age: 79
Discharge: HOME | End: 2019-09-05
Admitting: FAMILY MEDICINE

## 2019-09-05 VITALS
WEIGHT: 147 LBS | BODY MASS INDEX: 26.05 KG/M2 | OXYGEN SATURATION: 97 % | HEIGHT: 63 IN | RESPIRATION RATE: 18 BRPM | DIASTOLIC BLOOD PRESSURE: 68 MMHG | SYSTOLIC BLOOD PRESSURE: 112 MMHG | HEART RATE: 78 BPM | TEMPERATURE: 98.3 F

## 2019-09-05 DIAGNOSIS — M54.2 NECK PAIN: Primary | ICD-10-CM

## 2019-09-05 PROCEDURE — 72050 X-RAY EXAM NECK SPINE 4/5VWS: CPT

## 2019-09-05 PROCEDURE — 99213 OFFICE O/P EST LOW 20 MIN: CPT | Performed by: FAMILY MEDICINE

## 2019-09-05 RX ORDER — BACLOFEN 20 MG/1
20 TABLET ORAL 2 TIMES DAILY
Qty: 60 TABLET | Refills: 0 | Status: SHIPPED | OUTPATIENT
Start: 2019-09-05 | End: 2019-10-09 | Stop reason: SDUPTHER

## 2019-09-05 RX ORDER — NAPROXEN 500 MG/1
500 TABLET ORAL 2 TIMES DAILY WITH MEALS
Qty: 14 TABLET | Refills: 0 | Status: SHIPPED | OUTPATIENT
Start: 2019-09-05 | End: 2019-09-12

## 2019-09-05 NOTE — PROGRESS NOTES
"Chief Complaint   Patient presents with   • Neck Pain       Subjective   Vida Jamison is a 79 y.o. female.     Patient reports that when exposed to sunlight it tends to burn more. Patient reports pain radiates into the left shoulder.       Neck Pain    This is a recurrent problem. The current episode started more than 1 month ago. The problem occurs intermittently. The problem has been gradually worsening. The pain is associated with nothing. The pain is present in the left side. The quality of the pain is described as burning. The pain is at a severity of 10/10. The pain is same all the time. Stiffness is present at night. Associated symptoms include numbness (right hand from CTS). Pertinent negatives include no chest pain, fever or trouble swallowing. She has tried acetaminophen for the symptoms. The treatment provided no relief.      Current treatment for chronic pain includes celecoxib, duloxetine and muscle relaxers (there are 2 on her medication list).  She has specifically tried Aspercreme and and tylenol.  They help some.  Neck muscle sore.  It just \"hurts.\"      She has numbness/tinging of the right hand due to CTS.  She will be getting hip surgery and CTS release in the coming weeks.      I have reviewed and updated her medications, medical history and problem list during today's office visit.     Current Outpatient Medications on File Prior to Visit   Medication Sig Dispense Refill   • Acetaminophen (TYLENOL) 325 MG capsule 2 (Two) Times a Day.     • amitriptyline (ELAVIL) 75 MG tablet Take 1 tablet by mouth Every Night.     • atorvastatin (LIPITOR) 10 MG tablet Take 1 tablet by mouth Daily.     • Calcium Carbonate-Vitamin D (CALCIUM 600+D) 600-200 MG-UNIT tablet Take 2 tablets by mouth Daily.     • celecoxib (CELEBREX) 200 MG capsule Take 1 capsule by mouth Daily. 90 capsule 3   • diphenhydrAMINE (BENADRYL ALLERGY) 25 mg capsule Take 1 capsule by mouth Daily.     • DULoxetine (CYMBALTA) 30 MG capsule " "Take 1 capsule by mouth Daily. 90 capsule 3   • enalapril (VASOTEC) 10 MG tablet TAKE 1 TABLET TWICE DAILY 180 tablet 1   • fluticasone ([employer] FLUTICASONE PROPIONATE) 50 MCG/ACT nasal spray 1 spray Daily.     • loratadine (CLARITIN) 10 MG tablet Take 10 mg by mouth Daily.     • metoprolol tartrate (LOPRESSOR) 25 MG tablet Take 1 tablet by mouth 2 (Two) Times a Day.     • montelukast (SINGULAIR) 10 MG tablet TAKE 1 TABLET EVERY DAY 90 tablet 3   • pantoprazole (PROTONIX) 40 MG EC tablet TAKE 1 TABLET DAILY 90 tablet 3   • Psyllium 100 % powder METAMUCIL POWD     • [DISCONTINUED] baclofen (LIORESAL) 10 MG tablet 1 capsule Every Night.     • [DISCONTINUED] baclofen (LIORESAL) 10 MG tablet Take 1 tablet by mouth Daily.     • [DISCONTINUED] enalapril (VASOTEC) 10 MG tablet Take  by mouth.     • [DISCONTINUED] orphenadrine (NORFLEX) 100 MG 12 hr tablet 1 tablet Daily.     • [DISCONTINUED] orphenadrine (NORFLEX) 100 MG 12 hr tablet Take  by mouth Daily.       No current facility-administered medications on file prior to visit.          Social History     Tobacco Use   • Smoking status: Never Smoker   • Smokeless tobacco: Never Used   Substance Use Topics   • Alcohol use: No       Review of Systems   Constitutional: Negative for fever.   HENT: Negative for congestion, dental problem, ear discharge, ear pain, sore throat, swollen glands and trouble swallowing.    Eyes: Negative for visual disturbance.   Cardiovascular: Negative for chest pain.   Gastrointestinal: Negative for abdominal pain and vomiting.   Musculoskeletal: Positive for arthralgias, back pain, gait problem, neck pain and neck stiffness.   Neurological: Positive for numbness (right hand from CTS). Negative for dizziness, facial asymmetry and headache.       Objective   Vitals:    09/05/19 1309   BP: 112/68   Pulse: 78   Resp: 18   Temp: 98.3 °F (36.8 °C)   TempSrc: Oral   SpO2: 97%   Weight: 66.7 kg (147 lb)   Height: 160 cm (63\")     Body mass index is 26.04 " kg/m².  Physical Exam   Constitutional: She appears well-developed and well-nourished. No distress.   HENT:   Head: Normocephalic and atraumatic.   Right Ear: External ear normal.   Left Ear: External ear normal.   Mouth/Throat: Oropharynx is clear and moist.   Eyes: Conjunctivae and EOM are normal.   Neck: Neck supple. No JVD present. Muscular tenderness present. No neck rigidity. Decreased range of motion (slight) present. No edema and no erythema present.       Cardiovascular: Normal rate and regular rhythm.   No murmur heard.  Pulmonary/Chest: Effort normal and breath sounds normal.   Musculoskeletal: She exhibits tenderness and deformity. She exhibits no edema.   Scoliosis, kyphosis and hand deformities noted.  Antalgic gait, uses cane.   Neurological: She is alert. No cranial nerve deficit.   Skin: Skin is warm and dry. Capillary refill takes less than 2 seconds. No rash noted.   Psychiatric: She has a normal mood and affect. Her behavior is normal.         Lab Results   Component Value Date    GLU 91 07/22/2019    BUN 19 07/22/2019    CREATININE 0.75 07/22/2019    EGFRIFNONA 78 07/22/2019    EGFRIFAFRI 90 07/22/2019     07/22/2019    K 4.4 07/22/2019    CL 97 07/22/2019    CALCIUM 9.1 07/22/2019    ALBUMIN 4.3 07/22/2019    BILITOT 0.8 07/22/2019    ALKPHOS 64 07/22/2019    AST 21 07/22/2019    ALT 13 07/22/2019    CHLPL 152 07/22/2019    TRIG 97 07/22/2019    HDL 43 07/22/2019    VLDL 19 07/22/2019    LDL 90 07/22/2019    WBC 5.7 07/22/2019    RBC 4.19 07/22/2019    HCT 36.1 07/22/2019    MCV 86 07/22/2019    MCH 28.9 07/22/2019          Assessment/Plan       Diagnoses and all orders for this visit:    1. Neck pain (Primary)  -     XR Spine Cervical Complete 4 or 5 View  -     baclofen (LIORESAL) 20 MG tablet; Take 1 tablet by mouth 2 (Two) Times a Day.  Dispense: 60 tablet; Refill: 0  -     naproxen (NAPROSYN) 500 MG tablet; Take 1 tablet by mouth 2 (Two) Times a Day With Meals for 7 days. Hold  celecoxib while taking naproxen.  Dispense: 14 tablet; Refill: 0  -     Ambulatory Referral to Physical Therapy Evaluate and treat    I suspect DDD.  Buring pain may be radiculopathy.  Muscle tension noted on exam.   I will temporarily increase the baclofen.  She denies any medication side effect of baclofen at her current dosage of 10 mg at bedtime.  I have asked her to watch for sedation and to reduce dosage if this occurs.  Additionally, I will have her temporarily switch her NSAID for 1 week to Naproxen.    She will start PT and f/u with Dr. Gonzales in 4-6 weeks if not improving.      Return if symptoms worsen or fail to improve.

## 2019-09-06 ENCOUNTER — OFFICE VISIT (OUTPATIENT)
Dept: PHYSICAL THERAPY | Facility: CLINIC | Age: 79
End: 2019-09-06

## 2019-09-06 DIAGNOSIS — M54.2 PAIN, NECK: Primary | ICD-10-CM

## 2019-09-06 PROCEDURE — 97140 MANUAL THERAPY 1/> REGIONS: CPT | Performed by: PHYSICAL THERAPIST

## 2019-09-06 PROCEDURE — 97162 PT EVAL MOD COMPLEX 30 MIN: CPT | Performed by: PHYSICAL THERAPIST

## 2019-09-06 PROCEDURE — G0283 ELEC STIM OTHER THAN WOUND: HCPCS | Performed by: PHYSICAL THERAPIST

## 2019-09-06 NOTE — PROGRESS NOTES
Physical Therapy Initial Evaluation and Plan of Care    Patient: Vida Jamison   : 1940  Diagnosis/ICD-10 Code:  Pain, neck [M54.2]  Referring practitioner: Gerda Zavala*  Date of Initial Visit: 2019  Today's Date: 2019  Patient seen for 1 sessions           Subjective Questionnaire: NDI: 34%      Subjective Evaluation    History of Present Illness  Mechanism of injury: Patient reports insidious onset of L lateral neck pain in the evenings several months ago.  Patient reports her neck pain was intermittent but has been worsening over the past few weeks with increased pain and a burning sensation noted.  Pain is now constant.  Patient has no complaints of NT in neck or UEs.  Patient has trouble falling asleep but pain does not wake her at night.      Quality of life: good    Pain  Current pain ratin  At best pain rating: 3  At worst pain rating: 10  Location: L lateral neck  Quality: dull ache and burning  Alleviating factors: aspercreme, left sidelying   Exacerbated by: as the day progresses.    Hand dominance: right    Patient Goals  Patient goals for therapy: decreased pain, independence with ADLs/IADLs and increased motion         Objective     Special Questions  Patient is experiencing headaches.     Additional Special Questions  No complaints of vision changes, dizziness, tinnitus      Static Posture     Head  Forward.    Postural Observations  Seated posture: poor  Standing posture: poor        Palpation   Left   No palpable tenderness to the lower trapezius.   Tenderness of the cervical paraspinals, levator scapulae, middle trapezius, suboccipitals and upper trapezius.     Active Range of Motion   Cervical/Thoracic Spine   Cervical    Flexion: WFL and with pain  Extension: with pain  Left lateral flexion: with pain  Right lateral flexion: with pain    Additional Active Range of Motion Details  Cervical extension limited by 50% limited   B lateral cervical flexion and rotation  limited by ~70%    Strength/Myotome Testing     Left Shoulder     Planes of Motion   Flexion: 3+   Abduction: 3+     Right Shoulder     Planes of Motion   Flexion: 3+   Abduction: 3+     Tests   Cervical     Left   Positive Spurling's sign.     Right   Negative Spurling's sign.     Left Shoulder   Negative active compression ([place]).     Right Shoulder   Negative active compression ([place]).     Additional Tests Details  Increased pain with cervical distraction         Assessment & Plan     Assessment  Impairments: abnormal or restricted ROM, activity intolerance, impaired physical strength, lacks appropriate home exercise program and pain with function  Assessment details: Patient is a 79 year old female with complaints of L lateral neck pain with insidious onset of symptoms several months ago.  Patient presents with tenderness to palpation of L upper trap and levator scap, decreased cervical mobility, weakness of BUEs and scapular musculature, impaired posture, and difficulty with household chores.    Prognosis: fair  Functional Limitations: lifting  Goals  Plan Goals: STG:  -Patient will report a reduction in L lateral neck pain by >/=20% for improved sleeping tolerance in 2-3 weeks.  -Patient will require less than 3 cues for correction of posture in 3 weeks.    LTG:  -Patient will report a reduction in L lateral neck pain by >/=50% for improved sleeping tolerance and ability to perform household chores by discharge.  -Patient will demonstrate improved cervical B lateral flexion and rotation by >/=25% for improved ability to turn head while driving by discharge.  -Patient will demonstrate decreased shoulder hiking when sitting and with ambulation by discharge.    Plan  Therapy options: will be seen for skilled physical therapy services  Planned modality interventions: cryotherapy, electrical stimulation/Russian stimulation, TENS, thermotherapy (hydrocollator packs), ultrasound and traction  Planned therapy  interventions: manual therapy, soft tissue mobilization, spinal/joint mobilization, strengthening, stretching, therapeutic activities, joint mobilization, IADL retraining, home exercise program, functional ROM exercises and flexibility  Plan details: Plan of care to include 30 visits in duration        Timed:         Manual Therapy:    10     mins  66421;     Therapeutic Exercise:         mins  76078;     Neuromuscular Galina:        mins  14727;    Therapeutic Activity:          mins  44139;     Gait Training:           mins  52098;     Ultrasound:          mins  38825;    Ionto                                   mins   56733  Self-care  ____ mins 51318    Un-Timed:  Electrical Stimulation:    15     mins  05436 ( );  Dry Needling          mins self-pay  Traction          mins 73307  Low Eval          Mins  95583  Mod Eval     25     Mins  80260  High Eval                            Mins  64208        Timed Treatment:   10   mins   Total Treatment:     50   mins    PT SIGNATURE: Marielena Eugene PT   [place] License # [id number]  Physical Therapist  DATE TREATMENT INITIATED: 9/6/2019    Initial Certification  Certification Period: 12/5/2019  I certify that the therapy services are furnished while this patient is under my care.  The services outlined above are required by this patient, and will be reviewed every 90 days.     PHYSICIAN: Gerda Donnelly MD      DATE:     Please sign and return via fax to  .. Thank you, [facility].

## 2019-09-10 ENCOUNTER — OFFICE VISIT (OUTPATIENT)
Dept: PHYSICAL THERAPY | Facility: CLINIC | Age: 79
End: 2019-09-10

## 2019-09-10 DIAGNOSIS — M54.2 PAIN, NECK: Primary | ICD-10-CM

## 2019-09-10 PROCEDURE — 97110 THERAPEUTIC EXERCISES: CPT | Performed by: PHYSICAL THERAPIST

## 2019-09-10 PROCEDURE — 97140 MANUAL THERAPY 1/> REGIONS: CPT | Performed by: PHYSICAL THERAPIST

## 2019-09-10 PROCEDURE — G0283 ELEC STIM OTHER THAN WOUND: HCPCS | Performed by: PHYSICAL THERAPIST

## 2019-09-10 NOTE — PROGRESS NOTES
"     Physical Therapy Daily Progress Note    Patient: Vida Jamison   : 1940  Diagnosis/ICD-10 Code:  Pain, neck [M54.2]  Referring practitioner: Gerda Zavala*  Date of Initial Visit: Type: THERAPY  Noted: 2019  Today's Date: 9/10/2019  Patient seen for 2 sessions             Subjective Patient reports she was a little sore following her evaluation but it \"wasn't too bad.\"  Patient reports the estim and heat helped her neck feel better.    Objective   See Exercise, Manual, and Modality Logs for complete treatment.       Assessment/Plan Initiated exercise today with patient reporting no increased pain with activity.  Required cues to prevent shoulder elevation during scapular retractions.  Patient continues to report TTP of L upper trap and levator scap at the muscle bellies with moderate guarding noted.    Progress per Plan of Care           Timed:         Manual Therapy:    12     mins  91279;     Therapeutic Exercise:    14     mins  13946;     Neuromuscular Galina:        mins  84886;    Therapeutic Activity:          mins  48960;     Gait Training:           mins  76384;     Ultrasound:          mins  19592;    Ionto                                   mins   83822  Self Care                            mins   47667      Un-Timed:  Electrical Stimulation:    15     mins  93513 ( );  Dry Needling          mins self-pay  Traction          mins 98026  Low Eval          Mins  59861  Mod Eval          Mins  97494  High Eval                            Mins  24466    Timed Treatment:   26   mins   Total Treatment:     41   mins    Marielena Eugene PT  [place] License # [id number]  Physical Therapist  "

## 2019-09-13 ENCOUNTER — OFFICE VISIT (OUTPATIENT)
Dept: PHYSICAL THERAPY | Facility: CLINIC | Age: 79
End: 2019-09-13

## 2019-09-13 DIAGNOSIS — M54.2 PAIN, NECK: Primary | ICD-10-CM

## 2019-09-13 PROCEDURE — G0283 ELEC STIM OTHER THAN WOUND: HCPCS | Performed by: PHYSICAL THERAPIST

## 2019-09-13 PROCEDURE — 97140 MANUAL THERAPY 1/> REGIONS: CPT | Performed by: PHYSICAL THERAPIST

## 2019-09-13 PROCEDURE — 97110 THERAPEUTIC EXERCISES: CPT | Performed by: PHYSICAL THERAPIST

## 2019-09-13 NOTE — PROGRESS NOTES
Physical Therapy Daily Progress Note      Patient: Vida Jamison   : 1940  Diagnosis/ICD-10 Code:  Pain, neck [M54.2]  Referring practitioner: Gerda Zavala*  Date of Initial Visit: Type: THERAPY  Noted: 2019  Today's Date: 2019  Patient seen for 3 sessions         Vida Jamison reports: she still has pain in the L side of her neck but it does seem to be improving with exercise and stretches. Pt. Reports she will be getting carpal tunnel release sx done but did not provide a date.    Objective   See Exercise, Manual, and Modality Logs for complete treatment.       Assessment/Plan   Pt. Tolerated treatment well. Pt has good passive mobility in scapula but minimal control over the scapular movement actively. Pt. responds well to manual intervention relaxing more progressively throughout treatment.    Progress per Plan of Care           Timed:         Manual Therapy:    22     mins  61650;     Therapeutic Exercise:    10     mins  07054;     Neuromuscular Galina:        mins  63510;    Therapeutic Activity:          mins  29814;     Gait Training:           mins  47143;     Ultrasound:          mins  08271;    Ionto                                   mins   66790  Self Care                            mins   32573  Canalith Repos                   mins  4209    Un-Timed:  Electrical Stimulation:    15     mins  70304 ( );  Dry Needling          mins self-pay  Traction          mins 32883  Low Eval          Mins  76081  Mod Eval          Mins  28635  High Eval                            Mins  46591    Timed Treatment:   32   mins   Total Treatment:     47   mins    Maty Bernard PTA  Physical Therapist Assistant License #[id number]

## 2019-09-17 ENCOUNTER — TREATMENT (OUTPATIENT)
Dept: PHYSICAL THERAPY | Facility: CLINIC | Age: 79
End: 2019-09-17

## 2019-09-17 DIAGNOSIS — M54.2 PAIN, NECK: Primary | ICD-10-CM

## 2019-09-17 PROCEDURE — 97035 APP MDLTY 1+ULTRASOUND EA 15: CPT | Performed by: PHYSICAL THERAPIST

## 2019-09-17 PROCEDURE — 97140 MANUAL THERAPY 1/> REGIONS: CPT | Performed by: PHYSICAL THERAPIST

## 2019-09-17 NOTE — PROGRESS NOTES
"     Physical Therapy Daily Progress Note    Patient: Vida Jamison   : 1940  Diagnosis/ICD-10 Code:  Pain, neck [M54.2]  Referring practitioner: Gerda Zavala*  Date of Initial Visit: Type: THERAPY  Noted: 2019  Today's Date: 2019  Patient seen for 4 sessions             Subjective Patient reports no significant change in pain levels since evaluation.  Continues to report pain and burning at L upper trap and is concerned that the issue is from the \"arthritis in her neck.\"    Objective   See Exercise, Manual, and Modality Logs for complete treatment.       Assessment/Plan Ultrasound performed today for deep heat to L upper trap with patient reporting decreased symptoms after.  Also discussed possible dry needling due to significant muscle tightness noted and no significant change in pain since evaluation.    Progress per Plan of Care           Timed:         Manual Therapy:    15     mins  09824;     Therapeutic Exercise:    4     mins  07574;     Neuromuscular Galina:        mins  62458;    Therapeutic Activity:          mins  39562;     Gait Training:           mins  23454;     Ultrasound:     10     mins  03617;    Ionto                                   mins   54811  Self Care                            mins   54450      Un-Timed:  Electrical Stimulation:         mins  78107 ( );  Dry Needling          mins self-pay  Traction          mins 21817  Low Eval          Mins  97977  Mod Eval          Mins  10247  High Eval                            Mins  07843    Timed Treatment:   29   mins   Total Treatment:     29   mins    Marielena Eugene PT  [place] License # [id number]  Physical Therapist  "

## 2019-09-18 RX ORDER — AMITRIPTYLINE HYDROCHLORIDE 75 MG/1
TABLET, FILM COATED ORAL
Qty: 90 TABLET | Refills: 0 | Status: SHIPPED | OUTPATIENT
Start: 2019-09-18 | End: 2020-01-30

## 2019-09-20 ENCOUNTER — TREATMENT (OUTPATIENT)
Dept: PHYSICAL THERAPY | Facility: CLINIC | Age: 79
End: 2019-09-20

## 2019-09-20 DIAGNOSIS — M54.2 PAIN, NECK: Primary | ICD-10-CM

## 2019-09-20 PROCEDURE — 97140 MANUAL THERAPY 1/> REGIONS: CPT | Performed by: PHYSICAL THERAPIST

## 2019-09-20 PROCEDURE — 97035 APP MDLTY 1+ULTRASOUND EA 15: CPT | Performed by: PHYSICAL THERAPIST

## 2019-09-20 PROCEDURE — G0283 ELEC STIM OTHER THAN WOUND: HCPCS | Performed by: PHYSICAL THERAPIST

## 2019-09-20 PROCEDURE — 97110 THERAPEUTIC EXERCISES: CPT | Performed by: PHYSICAL THERAPIST

## 2019-09-20 NOTE — PROGRESS NOTES
Physical Therapy Daily Progress Note      Patient: Vida Jamison   : 1940  Diagnosis/ICD-10 Code:  Pain, neck [M54.2]  Referring practitioner: Gerda Zavala*  Date of Initial Visit: Type: THERAPY  Noted: 2019  Today's Date: 2019  Patient seen for 5 sessions         Vida Jamison reports: the ultrasound took away the burning sensation int her L UT however did not relieve all of the soreness. Pt. States she would like to incorporate both exercise and ultrasound this visit rather than holding off stretches and exercise.    Objective   See Exercise, Manual, and Modality Logs for complete treatment.     Assessment/Plan   Pt. Tolerated treatment well this visit no reports of increased pain or discomfort throughout and tolerated all exercise and stretches without pain.    Progress per Plan of Care           Timed:         Manual Therapy:    15     mins  34602;     Therapeutic Exercise:    16     mins  65361;     Neuromuscular Galina:        mins  13369;    Therapeutic Activity:          mins  89864;     Gait Training:           mins  99920;     Ultrasound:     8     mins  22705;    Ionto                                   mins   02678  Self Care                            mins   49541  Canalith Repos                   mins  4209    Un-Timed:  Electrical Stimulation:    15     mins  75374 ( );  Dry Needling          mins self-pay  Traction          mins 27170  Low Eval          Mins  76409  Mod Eval          Mins  28634  High Eval                            Mins  45206    Timed Treatment:   39   mins   Total Treatment:     54   mins    Maty Bernard PTA  Physical Therapist Assistant License #[id number]

## 2019-09-24 ENCOUNTER — TREATMENT (OUTPATIENT)
Dept: PHYSICAL THERAPY | Facility: CLINIC | Age: 79
End: 2019-09-24

## 2019-09-24 DIAGNOSIS — M54.2 PAIN, NECK: Primary | ICD-10-CM

## 2019-09-24 PROCEDURE — 97140 MANUAL THERAPY 1/> REGIONS: CPT | Performed by: PHYSICAL THERAPIST

## 2019-09-24 PROCEDURE — 97035 APP MDLTY 1+ULTRASOUND EA 15: CPT | Performed by: PHYSICAL THERAPIST

## 2019-09-24 PROCEDURE — 97110 THERAPEUTIC EXERCISES: CPT | Performed by: PHYSICAL THERAPIST

## 2019-09-24 PROCEDURE — G0283 ELEC STIM OTHER THAN WOUND: HCPCS | Performed by: PHYSICAL THERAPIST

## 2019-09-24 NOTE — PROGRESS NOTES
Physical Therapy Daily Progress Note      Patient: Vida Jamison   : 1940  Diagnosis/ICD-10 Code:  Pain, neck [M54.2]  Referring practitioner: Gerda Zavala*  Date of Initial Visit: Type: THERAPY  Noted: 2019  Today's Date: 2019  Patient seen for 6 sessions         Vida Jamison reports: she is still having consistent improvement in pain and she has had no burning however the L UT is still sore and stiff and is consistently more significant In the L.    Objective   See Exercise, Manual, and Modality Logs for complete treatment.     Assessment/Plan   Pt. Continues to report reduced burning sensation in the L UT after ultrasound treatment in therapy. Pt. Continues to improve with cervical mobility however improvements are minimal.    Progress per Plan of Care           Timed:         Manual Therapy:    15     mins  58146;     Therapeutic Exercise:    15     mins  39373;     Neuromuscular Galina:        mins  23698;    Therapeutic Activity:          mins  54561;     Gait Training:           mins  59558;     Ultrasound:    8      mins  13968;    Ionto                                   mins   29621  Self Care                            mins   24942  Canalith Repos                   mins  4209    Un-Timed:  Electrical Stimulation:    15     mins  04361 ( );  Dry Needling          mins self-pay  Traction          mins 23981  Low Eval          Mins  75355  Mod Eval          Mins  23304  High Eval                            Mins  97240    Timed Treatment:   38   mins   Total Treatment:     53   mins    Maty Bernard PTA  Physical Therapist Assistant License #[id number]

## 2019-09-27 ENCOUNTER — TREATMENT (OUTPATIENT)
Dept: PHYSICAL THERAPY | Facility: CLINIC | Age: 79
End: 2019-09-27

## 2019-09-27 DIAGNOSIS — M54.2 PAIN, NECK: Primary | ICD-10-CM

## 2019-09-27 PROCEDURE — G0283 ELEC STIM OTHER THAN WOUND: HCPCS | Performed by: PHYSICAL THERAPIST

## 2019-09-27 PROCEDURE — 97035 APP MDLTY 1+ULTRASOUND EA 15: CPT | Performed by: PHYSICAL THERAPIST

## 2019-09-27 PROCEDURE — 97110 THERAPEUTIC EXERCISES: CPT | Performed by: PHYSICAL THERAPIST

## 2019-09-27 PROCEDURE — 97140 MANUAL THERAPY 1/> REGIONS: CPT | Performed by: PHYSICAL THERAPIST

## 2019-09-27 NOTE — PROGRESS NOTES
Physical Therapy Daily Progress Note      Patient: Vida Jamison   : 1940  Diagnosis/ICD-10 Code:  Pain, neck [M54.2]  Referring practitioner: Gerda Zavala*  Date of Initial Visit: Type: THERAPY  Noted: 2019  Today's Date: 2019  Patient seen for 7 sessions         Vida Jamison reports: her pain has improved and she continues to do better and she states she only has 4 more visits before she has her surgery.    Objective   See Exercise, Manual, and Modality Logs for complete treatment.       Assessment/Plan   Pt. Presents with less tension B with palpation to B UT's. Pt. Continues to improve with exercise and states she feels the best when she has exercise and stretching alongside modalities.    Progress per Plan of Care           Timed:         Manual Therapy:    15     mins  45273;     Therapeutic Exercise:    16     mins  35417;     Neuromuscular Galina:        mins  73095;    Therapeutic Activity:          mins  59481;     Gait Training:           mins  36586;     Ultrasound:    8      mins  83816;    Ionto                                   mins   76292  Self Care                            mins   74148  Canalith Repos                   mins  4209    Un-Timed:  Electrical Stimulation:    15     mins  29927 ( );  Dry Needling          mins self-pay  Traction          mins 57713  Low Eval          Mins  83088  Mod Eval          Mins  98807  High Eval                            Mins  33248    Timed Treatment:   39   mins   Total Treatment:     54   mins    Maty Bernard PTA  Physical Therapist Assistant License #[id number]

## 2019-10-02 ENCOUNTER — OFFICE VISIT (OUTPATIENT)
Dept: FAMILY MEDICINE CLINIC | Facility: CLINIC | Age: 79
End: 2019-10-02

## 2019-10-02 VITALS
SYSTOLIC BLOOD PRESSURE: 130 MMHG | OXYGEN SATURATION: 97 % | HEART RATE: 85 BPM | DIASTOLIC BLOOD PRESSURE: 70 MMHG | WEIGHT: 148.8 LBS | HEIGHT: 63 IN | BODY MASS INDEX: 26.36 KG/M2 | RESPIRATION RATE: 20 BRPM | TEMPERATURE: 97.9 F

## 2019-10-02 DIAGNOSIS — M25.552 LEFT HIP PAIN: Primary | ICD-10-CM

## 2019-10-02 DIAGNOSIS — M05.79 RHEUMATOID ARTHRITIS INVOLVING MULTIPLE SITES WITH POSITIVE RHEUMATOID FACTOR (HCC): ICD-10-CM

## 2019-10-02 DIAGNOSIS — I10 HYPERTENSION, BENIGN: ICD-10-CM

## 2019-10-02 DIAGNOSIS — I70.219 ATHEROSCLEROTIC PERIPHERAL VASCULAR DISEASE WITH INTERMITTENT CLAUDICATION (HCC): ICD-10-CM

## 2019-10-02 DIAGNOSIS — Z96.649 LOOSE TOTAL HIP ARTHROPLASTY, SUBSEQUENT ENCOUNTER: ICD-10-CM

## 2019-10-02 DIAGNOSIS — M97.02XD PERIPROSTHETIC FRACTURE AROUND INTERNAL PROSTHETIC LEFT HIP JOINT, SUBSEQUENT ENCOUNTER: ICD-10-CM

## 2019-10-02 DIAGNOSIS — T84.038D LOOSE TOTAL HIP ARTHROPLASTY, SUBSEQUENT ENCOUNTER: ICD-10-CM

## 2019-10-02 PROCEDURE — 99214 OFFICE O/P EST MOD 30 MIN: CPT | Performed by: FAMILY MEDICINE

## 2019-10-02 NOTE — ASSESSMENT & PLAN NOTE
[facility]  [facility] given.  Paperwork faxed.

## 2019-10-02 NOTE — PROGRESS NOTES
Subjective   Vida Jamison is a 79 y.o. female.     Medical clearance for surgery:  Left hip, [facility] 10/14/19.  Dr. Vergara.      Hip Pain    The incident occurred more than 1 week ago. There was no injury mechanism. The pain is present in the left hip. The quality of the pain is described as aching. The pain is at a severity of 5/10. The pain is moderate. The symptoms are aggravated by movement and weight bearing. She has tried rest for the symptoms. The treatment provided mild relief.   Hypertension   This is a chronic problem. The current episode started more than 1 year ago. The problem is unchanged. The problem is controlled. Pertinent negatives include no chest pain, palpitations or shortness of breath. There are no associated agents to hypertension. Risk factors for coronary artery disease include sedentary lifestyle. Current antihypertension treatment includes beta blockers. The current treatment provides moderate improvement. There are no compliance problems.         The following portions of the patient's history were reviewed and updated as appropriate: allergies, current medications, past family history, past medical history, past social history, past surgical history and problem list.    Family History   Problem Relation Age of Onset   • Ovarian cancer Mother    • Arthritis Mother    • Heart failure Father    • Suicidality Brother    • Heart disease Maternal Aunt    • Cancer Other         malignant neoplasm of gastrointestinal tract- in her 50's   • Arthritis Other    • Cervical cancer Other    • Arthritis Other    • Diabetes Other        Social History     Tobacco Use   • Smoking status: Never Smoker   • Smokeless tobacco: Never Used   Substance Use Topics   • Alcohol use: No   • Drug use: No       Past Surgical History:   Procedure Laterality Date   • BREAST BIOPSY Right 1974    Dr Briseida Alonso   • CATARACT EXTRACTION W/ INTRAOCULAR LENS IMPLANT      7/20/10-rt. eye-Dr. Leon 7/13/10- Lt. eye-Dr. Leon    • FINGER SURGERY  2001    Revision of Finger joints. Dr. Brumfield w/Drs. Kleinert & Marlon   • HIP ENDOPROSTHESIS Left 05/22/2014    [employer] endoprostehetic replacement of left hip. Dr. Reza Choudhury.   • JOINT REPLACEMENT     • KNEE ARTHROSCOPY Left     [1987] Dr Poon-torn medial meniscus [1995] Dr. Schaffer -torn medial meniscus   • LEEP  2000    Biopsy of cervix. for MAJOR-1 by Dr. Knight   • LUMBAR DISCECTOMY  1994    Hemilaminectomy, foraminectomy & discectomy. Dr. Velasquez, L4-L5 w/posterior lateral fusion & screw fixatoin   • LUMBAR LAMINECTOMY  12/19/2011    foraminotomies, medial facetectomies L5-Z7mmtht redo. left L5-S1 lumbar discectomy. [facility]-Dr. De Oliveira- 5 units of blood given to pt.   • POSTERIOR LUMBAR/THORACIC SPINE FUSION  2002    Fusion T-5 through L-1. Had T-11 burst fracture. Recuperated at [facility]   • SPINAL FUSION      Lower Back. 7/11/2011-[facility] - Hardware removal from L3-L5, Dr. De Oliveira surgeon, Dr. Booker [facility]-Fusion of spine at multi-levels 12/14/11 - [facility] - Dr Gomez and Dr. Momin, Anterior approach Spinal Fusion L5-S1 3--[facility]. Dr. De Oliveira and Dr. Dean. Failed posterior fusion with loose instrumentation. L-4 thru L-5. No complications.   • TONSILLECTOMY  1951    Dr Mcginnis   • TOTAL HIP ARTHROPLASTY Right 04/10/2017    [facility] Inpatient. Dr. Reinoso   • TOTAL KNEE ARTHROPLASTY Left 2002    Dr Sue @ [facility]       Patient Active Problem List   Diagnosis   • Atherosclerotic peripheral vascular disease with intermittent claudication (CMS/HCC)   • Gastroesophageal reflux disease without esophagitis   • Pain in hip region after total hip replacement (CMS/HCC)   • Loose total hip arthroplasty (CMS/HCC)   • Lumbar degenerative disc disease   • Mixed hyperlipidemia   • Myalgia and myositis   • Hypertension, benign   • Peripheral vascular disease (CMS/HCC)   • Periprosthetic fracture around internal prosthetic left hip joint  (CMS/[facility])   • Rheumatoid arthritis involving multiple sites with positive rheumatoid factor (CMS/[facility])   • S/P lumbar fusion   • Spinal stenosis of lumbar region   • Carpal tunnel syndrome of right wrist   • Anemia   • Hyperglycemia   • Pain syndrome, chronic   • Left hip pain   • Vitamin D deficiency   • 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     • amitriptyline (ELAVIL) 75 MG tablet TAKE 1 TABLET AT BEDTIME 90 tablet 0   • atorvastatin (LIPITOR) 10 MG tablet Take 1 tablet by mouth Daily.     • baclofen (LIORESAL) 20 MG tablet Take 1 tablet by mouth 2 (Two) Times a Day. 60 tablet 0   • Calcium Carbonate-Vitamin D (CALCIUM 600+D) 600-200 MG-UNIT tablet Take 2 tablets by mouth Daily.     • celecoxib (CELEBREX) 200 MG capsule Take 1 capsule by mouth Daily. 90 capsule 3   • diphenhydrAMINE (BENADRYL ALLERGY) 25 mg capsule Take 1 capsule by mouth Daily.     • DULoxetine (CYMBALTA) 30 MG capsule Take 1 capsule by mouth Daily. 90 capsule 3   • enalapril (VASOTEC) 10 MG tablet TAKE 1 TABLET TWICE DAILY 180 tablet 1   • fluticasone ([employer] FLUTICASONE PROPIONATE) 50 MCG/ACT nasal spray 1 spray Daily.     • loratadine (CLARITIN) 10 MG tablet Take 10 mg by mouth Daily.     • metoprolol tartrate (LOPRESSOR) 25 MG tablet Take 1 tablet by mouth 2 (Two) Times a Day.     • montelukast (SINGULAIR) 10 MG tablet TAKE 1 TABLET EVERY DAY 90 tablet 3   • pantoprazole (PROTONIX) 40 MG EC tablet TAKE 1 TABLET DAILY 90 tablet 3   • Psyllium 100 % powder METAMUCIL POWD       No current facility-administered medications on file prior to visit.        Allergies   Allergen Reactions   • Morphine And Related Other (See  "Comments) and Hallucinations     HALLUCINATIONS         Review of Systems   Constitutional: Negative for fatigue, unexpected weight gain and unexpected weight loss.   Respiratory: Negative for shortness of breath.    Cardiovascular: Negative for chest pain, palpitations and leg swelling.   Musculoskeletal:        Left hip pain     Neurological: Negative for headache.       Objective   Visit Vitals  /70 (BP Location: Right arm, Patient Position: Sitting, Cuff Size: Large Adult)   Pulse 85   Temp 97.9 °F (36.6 °C) (Oral)   Resp 20   Ht 160 cm (63\")   Wt 67.5 kg (148 lb 12.8 oz)   SpO2 97%   BMI 26.36 kg/m²     Physical Exam   Constitutional: She is oriented to person, place, and time. She appears well-developed and well-nourished. She is cooperative.   HENT:   Head: Normocephalic.   Neck: Trachea normal. Neck supple. Carotid bruit is not present. No thyromegaly present.   Cardiovascular: Normal rate and regular rhythm. Exam reveals no gallop and no friction rub.   No murmur heard.  Neurological: She is alert and oriented to person, place, and time.   Skin: Skin is dry. No rash noted. Nails show no clubbing.         Assessment/Plan .  Problem List Items Addressed This Visit        Cardiovascular and Mediastinum    Atherosclerotic peripheral vascular disease with intermittent claudication (CMS/HCC)    Overview     Doing well- last work up 7-23-18 at [facility] showed normal arterial studies--Follow conservatively.         Hypertension, benign    Current Assessment & Plan     Doing well, good control.  Encouraged to watch salt, exercise more and lose weight.              Nervous and Auditory    Left hip pain - Primary    Current Assessment & Plan     Pt scheduled for left hip replacement 10/14/19 at Dr. Ursula Sheikh.  Medical clearance given.  Paperwork faxed.            Musculoskeletal and Integument    Loose total hip arthroplasty (CMS/HCC)    Current Assessment & Plan     Followed by janell         Periprosthetic " fracture around internal prosthetic left hip joint (CMS/HCC)    Overview     Patient was seen by Dr. Joseph who referred to Linette Weaver.         Rheumatoid arthritis involving multiple sites with positive rheumatoid factor (CMS/HCC)    Current Assessment & Plan     Doing well

## 2019-10-03 ENCOUNTER — TREATMENT (OUTPATIENT)
Dept: PHYSICAL THERAPY | Facility: CLINIC | Age: 79
End: 2019-10-03

## 2019-10-03 ENCOUNTER — FLU SHOT (OUTPATIENT)
Dept: FAMILY MEDICINE CLINIC | Facility: CLINIC | Age: 79
End: 2019-10-03

## 2019-10-03 DIAGNOSIS — M54.2 PAIN, NECK: Primary | ICD-10-CM

## 2019-10-03 PROCEDURE — 90662 IIV NO PRSV INCREASED AG IM: CPT | Performed by: FAMILY MEDICINE

## 2019-10-03 PROCEDURE — G0008 ADMIN INFLUENZA VIRUS VAC: HCPCS | Performed by: FAMILY MEDICINE

## 2019-10-03 PROCEDURE — G0283 ELEC STIM OTHER THAN WOUND: HCPCS | Performed by: PHYSICAL THERAPIST

## 2019-10-03 PROCEDURE — 97140 MANUAL THERAPY 1/> REGIONS: CPT | Performed by: PHYSICAL THERAPIST

## 2019-10-03 PROCEDURE — 97110 THERAPEUTIC EXERCISES: CPT | Performed by: PHYSICAL THERAPIST

## 2019-10-03 NOTE — PROGRESS NOTES
Physical Therapy Daily Progress Note    Patient: Vida Jamison   : 1940  Diagnosis/ICD-10 Code:  Pain, neck [M54.2]  Referring practitioner: Gerda Zavala*  Date of Initial Visit: Type: THERAPY  Noted: 2019  Today's Date: 10/3/2019  Patient seen for 8 sessions             Subjective Patient reports the burning sensation in her neck/shoulder has resolved but continues to complain of stiffness and soreness.  Patient states she is having her hip surgery soon and will have to take a break from therapy.    Objective   See Exercise, Manual, and Modality Logs for complete treatment.       Assessment & Plan       Goals  Plan Goals: STG:  -Patient will report a reduction in L lateral neck pain by >/=20% for improved sleeping tolerance in 2-3 weeks. MET  -Patient will require less than 3 cues for correction of posture in 3 weeks. MET    LTG:  -Patient will report a reduction in L lateral neck pain by >/=50% for improved sleeping tolerance and ability to perform household chores by discharge. NOT MET  -Patient will demonstrate improved cervical B lateral flexion and rotation by >/=25% for improved ability to turn head while driving by discharge. NOT MET  -Patient will demonstrate decreased shoulder hiking when sitting and with ambulation by discharge. NOT MET      Patient currently met 2/2 STGs and is progressing slowly towards LTGs.  Patient with decreased tenderness and muscle guarding noted of L upper trap and suboccipitals today.      Progress per Plan of Care           Timed:         Manual Therapy:    16     mins  63714;     Therapeutic Exercise:    10     mins  88329;     Neuromuscular Galina:        mins  37237;    Therapeutic Activity:          mins  78001;     Gait Training:           mins  91221;     Ultrasound:          mins  48309;    Ionto                                   mins   41045  Self Care                            mins   10476      Un-Timed:  Electrical Stimulation:    15      mins  18240 ( );  Dry Needling          mins self-pay  Traction          mins 48202  Low Eval          Mins  40992  Mod Eval          Mins  14468  High Eval                            Mins  94749    Timed Treatment:   26   mins   Total Treatment:     41   mins    Marielena Eugene PT  [place] License # [id number]  Physical Therapist

## 2019-10-04 ENCOUNTER — TREATMENT (OUTPATIENT)
Dept: PHYSICAL THERAPY | Facility: CLINIC | Age: 79
End: 2019-10-04

## 2019-10-04 DIAGNOSIS — M54.2 PAIN, NECK: Primary | ICD-10-CM

## 2019-10-04 PROCEDURE — G0283 ELEC STIM OTHER THAN WOUND: HCPCS | Performed by: PHYSICAL THERAPIST

## 2019-10-04 PROCEDURE — 97035 APP MDLTY 1+ULTRASOUND EA 15: CPT | Performed by: PHYSICAL THERAPIST

## 2019-10-04 PROCEDURE — 97110 THERAPEUTIC EXERCISES: CPT | Performed by: PHYSICAL THERAPIST

## 2019-10-04 NOTE — PROGRESS NOTES
Physical Therapy Daily Progress Note    Patient: Vida Jamison   : 1940  Diagnosis/ICD-10 Code:  Pain, neck [M54.2]  Referring practitioner: Gerda Zavala*  Date of Initial Visit: Type: THERAPY  Noted: 2019  Today's Date: 10/4/2019  Patient seen for 9 sessions             Subjective Patient reports she continues to experience soreness in L lateral neck and shoulder but is encouraged the burning sensation has improved.     Objective   See Exercise, Manual, and Modality Logs for complete treatment.       Assessment/Plan Patient reported decreased pain at L upper trap following ultrasound today.  Patient required decreased verbal cueing today to prevent shoulder elevation with exercise and during ambulation.     Progress per Plan of Care           Timed:         Manual Therapy:    5     mins  99898;     Therapeutic Exercise:    14     mins  66466;     Neuromuscular Galina:        mins  03093;    Therapeutic Activity:          mins  13799;     Gait Training:           mins  29089;     Ultrasound:     10     mins  94252;    Ionto                                   mins   13831  Self Care                            mins   56812      Un-Timed:  Electrical Stimulation:    15     mins  67066 ( );  Dry Needling          mins self-pay  Traction          mins 27098  Low Eval          Mins  12038  Mod Eval          Mins  03839  High Eval                            Mins  26698    Timed Treatment:   29   mins   Total Treatment:     44   mins    Marielena Eugene PT  [place] License # [id number]  Physical Therapist

## 2019-10-08 ENCOUNTER — TREATMENT (OUTPATIENT)
Dept: PHYSICAL THERAPY | Facility: CLINIC | Age: 79
End: 2019-10-08

## 2019-10-08 DIAGNOSIS — M54.2 PAIN, NECK: Primary | ICD-10-CM

## 2019-10-08 PROCEDURE — 97110 THERAPEUTIC EXERCISES: CPT | Performed by: PHYSICAL THERAPIST

## 2019-10-08 PROCEDURE — G0283 ELEC STIM OTHER THAN WOUND: HCPCS | Performed by: PHYSICAL THERAPIST

## 2019-10-08 PROCEDURE — 97035 APP MDLTY 1+ULTRASOUND EA 15: CPT | Performed by: PHYSICAL THERAPIST

## 2019-10-08 NOTE — PROGRESS NOTES
Physical Therapy Daily Progress Note    Patient: Vida Jamison   : 1940  Diagnosis/ICD-10 Code:  Pain, neck [M54.2]  Referring practitioner: Gerda Zavala*  Date of Initial Visit: Type: THERAPY  Noted: 2019  Today's Date: 10/9/2019  Patient seen for 10 sessions             Subjective Patient reports her neck is feeling much better with decreased pain reported by ~30% and complete resolution of the burning sensation at L upper trap.  Patient     Objective       Active Range of Motion   Cervical/Thoracic Spine   Cervical    Flexion: WFL  Left lateral flexion: WFL  Left rotation: WFL  Right rotation: WFL    Additional Active Range of Motion Details  R lateral flexion ~50% limited      See Exercise, Manual, and Modality Logs for complete treatment.       Assessment & Plan       Goals  Plan Goals: STG:  -Patient will report a reduction in L lateral neck pain by >/=20% for improved sleeping tolerance in 2-3 weeks. MET  -Patient will require less than 3 cues for correction of posture in 3 weeks. MET    LTG:  -Patient will report a reduction in L lateral neck pain by >/=50% for improved sleeping tolerance and ability to perform household chores by discharge. PARTIALLY MET  -Patient will demonstrate improved cervical B lateral flexion and rotation by >/=25% for improved ability to turn head while driving by discharge. PARTIALLY MET  -Patient will demonstrate decreased shoulder hiking when sitting and with ambulation by discharge. PARTIALLY MET      Patient has attended 10 PT visits with slow but steady progress towards goals.  Patient demonstrates improved B rotation and L lateral flexion to WFL without complaints of pain but continues to demonstrate significant limitations in R lateral cervical flexion.  Patient reports pain has improved by ~30%.  Patient to discharge at next visit per patient to undergo L hip surgery with subsequent stay at a rehab facility.           Timed:         Manual  Therapy:    6     mins  91248;     Therapeutic Exercise:    14     mins  16362;     Neuromuscular Galina:        mins  55580;    Therapeutic Activity:          mins  09324;     Gait Training:           mins  70816;     Ultrasound:     10     mins  81435;    Ionto                                   mins   48392  Self Care                            mins   08638      Un-Timed:  Electrical Stimulation:    15     mins  03609 ( );  Dry Needling          mins self-pay  Traction          mins 07060  Low Eval          Mins  38940  Mod Eval          Mins  66997  High Eval                            Mins  36711    Timed Treatment:   30   mins   Total Treatment:     45   mins    Marielena Eugene PT  [place] License # [id number]  Physical Therapist

## 2019-10-09 RX ORDER — BACLOFEN 10 MG/1
TABLET ORAL
Qty: 90 TABLET | Refills: 1 | Status: SHIPPED | OUTPATIENT
Start: 2019-10-09 | End: 2019-12-10

## 2019-10-11 ENCOUNTER — TREATMENT (OUTPATIENT)
Dept: PHYSICAL THERAPY | Facility: CLINIC | Age: 79
End: 2019-10-11

## 2019-10-11 DIAGNOSIS — M54.2 PAIN, NECK: Primary | ICD-10-CM

## 2019-10-11 PROCEDURE — 97140 MANUAL THERAPY 1/> REGIONS: CPT | Performed by: PHYSICAL THERAPIST

## 2019-10-11 PROCEDURE — 97035 APP MDLTY 1+ULTRASOUND EA 15: CPT | Performed by: PHYSICAL THERAPIST

## 2019-10-11 PROCEDURE — 97110 THERAPEUTIC EXERCISES: CPT | Performed by: PHYSICAL THERAPIST

## 2019-10-11 PROCEDURE — G0283 ELEC STIM OTHER THAN WOUND: HCPCS | Performed by: PHYSICAL THERAPIST

## 2019-10-11 NOTE — PROGRESS NOTES
Physical Therapy Daily Progress Note      Patient: Vida Jamison   : 1940  Diagnosis/ICD-10 Code:  Pain, neck [M54.2]  Referring practitioner: Gerda Zavala*  Date of Initial Visit: Type: THERAPY  Noted: 2019  Today's Date: 10/11/2019  Patient seen for 11 sessions         Vida Jamison reports: she is feeling much better since she started therapy and her neck no longer has the pain like it had when she started. Pt. Reports she will consult us in the future for her therapy needs.     Objective   See Exercise, Manual, and Modality Logs for complete treatment.   NDI  = 26% disability    Assessment/Plan   Pt. Tolerated treatment well this visit with good response and relief from manual intervention, therex and modalities. Pt. appropriate for discharge to [facility] for self management at this time.    D/C at this time per Marielena Eugene PT approval of D/C.           Timed:         Manual Therapy:    14     mins  23451;     Therapeutic Exercise:    16     mins  98010;     Neuromuscular Galina:        mins  36814;    Therapeutic Activity:          mins  47180;     Gait Training:           mins  56939;     Ultrasound:    10      mins  90401;    Ionto                                   mins   89259  Self Care                            mins   82766  Canalith Repos                   mins  4209    Un-Timed:  Electrical Stimulation:    15     mins  77192 ( );  Dry Needling          mins self-pay  Traction          mins 75682  Low Eval          Mins  71939  Mod Eval          Mins  75605  High Eval                            Mins  97601    Timed Treatment:   40   mins   Total Treatment:     55   mins    Maty Bernard PTA  Physical Therapist Assistant License #[id number]

## 2019-10-18 ENCOUNTER — TELEPHONE (OUTPATIENT)
Dept: FAMILY MEDICINE CLINIC | Facility: CLINIC | Age: 79
End: 2019-10-18

## 2019-10-22 ENCOUNTER — DOCUMENTATION (OUTPATIENT)
Dept: PHYSICAL THERAPY | Facility: CLINIC | Age: 79
End: 2019-10-22

## 2019-10-22 NOTE — PROGRESS NOTES
Discharge Summary  Discharge Summary from Physical Therapy Report      Dates  PT visit: 9/6/19 - 10/11/19  Number of Visits: 11     Discharge Status of Patient: See MD Note dated 10/8/19    Goals: Partially Met    Discharge Plan: Continue with current home exercise program as instructed    Comments Patient was seen for 11 PT sessions with steady progress towards goals.  Patient reported improved neck pain by 30% with complete resolution of the burning sensation.  Mild improvements noted in cervical mobility as well.  Patient had hip surgery and to have inpatient rehab stay.  D/C from PT at this time.    Date of Discharge 10/11/19        Marielena Eugene, PT  Physical Therapist

## 2019-10-25 ENCOUNTER — TELEPHONE (OUTPATIENT)
Dept: FAMILY MEDICINE CLINIC | Facility: CLINIC | Age: 79
End: 2019-10-25

## 2019-10-25 ENCOUNTER — DOCUMENTATION (OUTPATIENT)
Dept: FAMILY MEDICINE CLINIC | Facility: CLINIC | Age: 79
End: 2019-10-25

## 2019-10-25 ENCOUNTER — OUTSIDE FACILITY SERVICE (OUTPATIENT)
Dept: FAMILY MEDICINE CLINIC | Facility: CLINIC | Age: 79
End: 2019-10-25

## 2019-10-25 DIAGNOSIS — M97.02XD PERIPROSTHETIC FRACTURE AROUND INTERNAL PROSTHETIC LEFT HIP JOINT, SUBSEQUENT ENCOUNTER: ICD-10-CM

## 2019-10-25 DIAGNOSIS — I95.2 HYPOTENSION DUE TO DRUGS: ICD-10-CM

## 2019-10-25 DIAGNOSIS — I10 HYPERTENSION, BENIGN: Primary | ICD-10-CM

## 2019-10-25 DIAGNOSIS — D69.1 THROMBOCYTOPATHIA (HCC): ICD-10-CM

## 2019-10-25 DIAGNOSIS — T84.84XA PAIN IN HIP REGION AFTER TOTAL HIP REPLACEMENT, INITIAL ENCOUNTER (HCC): ICD-10-CM

## 2019-10-25 DIAGNOSIS — I87.303 STASIS EDEMA OF BOTH LOWER EXTREMITIES: ICD-10-CM

## 2019-10-25 DIAGNOSIS — Z71.89 ADVANCED DIRECTIVES, COUNSELING/DISCUSSION: ICD-10-CM

## 2019-10-25 DIAGNOSIS — Z96.649 PAIN IN HIP REGION AFTER TOTAL HIP REPLACEMENT, INITIAL ENCOUNTER (HCC): ICD-10-CM

## 2019-10-25 DIAGNOSIS — M05.79 RHEUMATOID ARTHRITIS INVOLVING MULTIPLE SITES WITH POSITIVE RHEUMATOID FACTOR (HCC): ICD-10-CM

## 2019-10-25 DIAGNOSIS — I73.9 PERIPHERAL VASCULAR DISEASE (HCC): ICD-10-CM

## 2019-10-25 RX ORDER — GABAPENTIN 300 MG/1
300 CAPSULE ORAL 2 TIMES DAILY
COMMUNITY
End: 2019-12-10 | Stop reason: SDUPTHER

## 2019-10-25 RX ORDER — HYDROCODONE BITARTRATE AND ACETAMINOPHEN 5; 325 MG/1; MG/1
1 TABLET ORAL EVERY 6 HOURS PRN
COMMUNITY
End: 2019-11-12

## 2019-10-25 RX ORDER — OXYCODONE HYDROCHLORIDE AND ACETAMINOPHEN 5; 325 MG/1; MG/1
1 TABLET ORAL 4 TIMES DAILY PRN
COMMUNITY
End: 2019-11-12

## 2019-10-25 RX ORDER — TAMSULOSIN HYDROCHLORIDE 0.4 MG/1
1 CAPSULE ORAL DAILY
COMMUNITY
End: 2019-12-04 | Stop reason: SDUPTHER

## 2019-10-25 RX ORDER — CEFADROXIL 500 MG/1
500 CAPSULE ORAL 2 TIMES DAILY
COMMUNITY
End: 2019-11-12

## 2019-10-25 RX ORDER — POLYETHYLENE GLYCOL 3350 17 G/17G
17 POWDER, FOR SOLUTION ORAL DAILY
COMMUNITY
End: 2020-02-11

## 2019-10-25 RX ORDER — ONDANSETRON 4 MG/1
4 TABLET, FILM COATED ORAL EVERY 8 HOURS PRN
COMMUNITY
End: 2019-11-12

## 2019-10-25 RX ORDER — ASPIRIN 81 MG/1
81 TABLET ORAL DAILY
COMMUNITY
End: 2020-02-11

## 2019-10-25 NOTE — ASSESSMENT & PLAN NOTE
1st visit since repeat hip surg by rosi at [facility]. Seen by him in the office this week and is improving

## 2019-10-25 NOTE — PROGRESS NOTES
PATIENT NAME: Vida Jamison                                                                          YOB: 1940            DATE OF SERVICE: 10/25/2019  FACILITY:   [] [facility]    [x] [facility]    [] [facility]     [] [facility]    [] Ronni Raman ______________________________________________________________________     CHIEF COMPLAINT:   Admission History and physical after hip surgery at [facility]      HISTORY OF PRESENT ILLNESS:   Hypertension   This is a chronic problem. The current episode started more than 1 year ago. The problem has been waxing and waning since onset. The problem is controlled. Pertinent negatives include no anxiety, blurred vision, chest pain, palpitations or shortness of breath.       PAST MEDICAL & SURGICAL HISTORY:   Past Medical History:   Diagnosis Date   • Advanced directives, counseling/discussion     Impression: Discussion w/ pt regarding Advanced directives. POST/Living Will form discussed at length & reviewed with pt. Pt states she does want CPR. Reviewed Medical interventions w/ pt & differences between each Comfort, Limited & Full. Pt opted for full. Discussed use of antibiotics at the end of life, pt chose to use antibiotics consistent w/ treatment goals.   • Allergic    • Allergic contact dermatitis due to plants, except food     Impression: Worsening-probably due to poison ivy. Steroid injection given to patient. If not improving, return to office for re-evaluation.   • Anemia     unspecified type. Impression: worse Hgb down to 10.7 from 11.5 -denies epitaxis, hemoptisis, heartburn, hematura, blood in stool or black tarry stool; Advised to start Iron-OTC 1 a day. she declines more aggressive work up at the present but should have a colonoscopy. she wants her hip fixed 1st   • Anemia of chronic disease     Impression: resolved   • Aortic valve insufficiency     etiology of cardiac valve disease unspecified. Impression: Echo done 12/2015 - Dino  Improved.   • Asthmatic bronchitis, mild persistent, uncomplicated     Impression: flare currently continue pulm toilet   • Atherosclerosis of native arteries of extremities with intermittent claudication, unspecified extremity (CMS/HCC)     Impression: New dx. JEROME's done today, read by me, reviewed with pt. JEROME's showed normal on the right and Mild-moderate on the Lt. keep risk factors low and repeat next year.   • Atherosclerotic peripheral vascular disease with intermittent claudication (CMS/HCC)     Impression: Doing well- Follow conservatively.   • Atypical squamous cells of undetermined significance (ASCUS) on Papanicolaou smear of cervix     Impression: Advised to repeat pap smear yearly. she is reluctent due to cost but I advised her it was covered with a G and Q code but she wanted me to gurentee no charge which I cannot promise. I also encouraged breast exam and mammo gram but again she wanted me to gurentee no cost. I advised her she might consider exam with gyn but that they probably would use the same codes   • Breast cancer screening     Impression: agreed to schedule mammo but advised ideally should also have a breast exam - she did not want to schedule at the present   • Cervical cancer screening     Impression: Doing excellent. Follow conservatively.   • Chronic pain syndrome     Impression: Doing well. Continue with Elavil as presently prescribed, Discussed benifits and side effect of medicine. Patient aware of high risk medication. Follow conservatively. Discussed decreasing celexa from 10mg BID to daily.   • Contusion of forehead     initial encounter. Impression: head contusion sheet given to look for neuro signs   • Dependent edema     Impression: New dx. Advised patient to elevate lower extremities above the level of the heart as much as possible, patient states she will try as possible. States she probably isn't able to.   • Edema extremities     Impression: mild. Patient to return if  symptoms worsen or change. Advised to elevate legs above level of heart as needed.   • Elevated diaphragm     Impression: New dx. shown on chest xray from 2012, will follow conservatively   • Elevated platelet count     Impression: Improved, Labs done 5-30-17, read by me, reviewed with pt. Platelet count was 402 down from 466   • Esophageal hernia     Impression: Doing well. Follow conservatively.   • First degree atrioventricular block     Impression: Doing excellent. Follow conservatively.   • Forehead laceration     Impression: Upper mid forehead laceration measuring 1.8cm, wound closed with 5-0 nylon #5   • Gastroesophageal reflux disease without esophagitis     Impression: no sx at present but this could be the cause of her anemia   • Heart murmur     Impression: Stable   • Hyperglycemia     Impression: Worse: hgb a1c 6.0 was 5.6. Encouraged to watch sugar intake, exercise more and lose weight.   • Hypertension, benign     Impression: Doing well; Encouraged to watch salt, exercise more and lose weight   • Hypertensive left ventricular hypertrophy, without heart failure     Impression: Echo done 12/2015 - Kylahty Improved.   • Hyponatremia     Impression: worse at 134 - repeat with next labs   • Insomnia, persistent     Impression: New dx. Worsening. Advised patient she may try increasing her elavil from 50mg to 75mg. Discussed benefits and side effect of medicine. Patient to return if symptoms worsen or change.   • Irritable bowel syndrome without diarrhea     Impression: Doing well. Follow conservatively.   • Left hip pain     Impression: Worsening; reviewed xray of the left hip with patient. Advised patient that she needs to see here surgeon, Dr. Lane. Patient prefers to call and make her appointment.   • Left knee pain     Impression: Worse, will try to schedule ortho appt. sooner than 10-16-18   • Left leg pain     Impression: Worse - discussed addiction potential of ativan femi with narcotic and muscle  relax combination   • Left shoulder pain     Impression: New dx. Left shoulder pain after fall, pt. sent to Xray. Will call pt. with xray results if broken will schedule pt. with DR. Arredondo or Dr. Fleming. Pt. placed in a small sling.   • Lumbar degenerative disc disease     Story: Spinal fusion done 3/24/10 and 8/12/2011. Impression: Worsening; Offered further testing, patient prefers to hold on this until after hip has been repaired.   • Medicare annual wellness visit, subsequent     with abnormal findings   • Mitral valve disease     Impression: Echo done 12/2015 - Kylahty Improved.   • Mixed hyperlipidemia     Impression: Labs done 04/16/19 Tot 172 up from 156, HDL 46 down from 47, Trig 152 up from 118,  up from 88 Worsening Encouraged to watch fatty intake, exercise more, and lose weight . Compliant with meds Goals developed at last visit were not met because Is not getting adequate diet and exercise( due to hip pain) Follow up in 3 months Care management needs are self addressed.   • Onychomycosis     Impression: Stable, pt. declines tx   • Other constipation     Impression: New dx. Pain probably due to IBS, Start Citrucel 1tbsp mixed in 8oz of fluid daily, may gradually increase up to three times daily for a goal of 2 BM that float in the commode daily. Advised to increase fiber, beans, rice, fruits, veggies.   • Other hypotension     Impression: Improving- Patient states that while in Rehab b/p readings were low and she did not recieve medications on some days.   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   • Peripheral vascular disease (CMS/HCC)     Impression: JEROME last year was abnorm on the left last year thus will send for vasc studies   • Periprosthetic fracture around internal prosthetic left hip joint (CMS/HCC)     subsequent encounter. Impression: Patient was seen by Dr. Joseph who referred to Dr  Linette.   • Rheumatoid arthritis involving multiple sites with positive rheumatoid factor (CMS/HCC)     Impression: stable dc mobic ndue to anemia   • Right hip pain    • S/P spinal fusion     Impression: Doing well since surgery on 1-9-17.   • Screening for alcoholism     Impression: Low risk.   • Screening for depression     Negative Depression Screening (4 or less) ()   • Seasonal allergic rhinitis due to pollen     Impression: Doing well.   • Serous otitis media     unspecified chronicity, unspecified laterality. Impression: Right-continue claritin 10mg daily.   • Sinusitis     unspecified chronicity, unspecified location.    • Status post hip surgery     Impression: Doing well from surgery on 11-14-18- Total left hip prosthesis.   • Thrombocytopathia (CMS/HCC)     Impression: Stable, Platelet level normal at 318. Will follow conservatively.   • Tongue ulceration     Impression: Greatly improved, almost resolved.   • Tooth abscess     Impression: Recommended that she contact her dentist for an appt.   • Upper respiratory infection, acute     Impression: Findings discussed. All questions answered. Medication and medication adverse effects discussed. Drug education given and explained to patient. Patient verbalized understanding. Follow-up in 2 weeks if not better. Follow-up sooner for worsening symptoms or for any concerns.   • Urinary incontinence     unspecified type. Impression: New dx. probable cause of sotting on underwear examined by myself and appeared to be urine not blood, Advised kegal maneuvers   • Uterine leiomyoma     unspecified location. Impression: Discussed following with GYN for a scope, will discuss further after hip surgery   • Vaginal bleeding     Impression: Discussed following with GYN for a scope, will discuss further after hip surgery. discussed us results - she wants to put off further work up until after hip surg   • Vitamin D deficiency     Impression: Doing well, patient gets  plenty of time in the Sun. Vitamin D. level normal. Follow conservatively.   • Wound, open, forehead, sequela     Impression: Healing well, Sutures removed.      Past Surgical History:   Procedure Laterality Date   • BREAST BIOPSY Right 1974    Dr Briseida Alonso   • CATARACT EXTRACTION W/ INTRAOCULAR LENS IMPLANT      7/20/10-rt. eye-Dr. Leon 7/13/10- Lt. eye-Dr. Leon   • FINGER SURGERY  2001    Revision of Finger joints. Dr. Brumfield w/Drs. Kleinert & Marlon   • HIP ENDOPROSTHESIS Left 05/22/2014    Osteonics endoprostehetic replacement of left hip. Dr. Reza Choudhury.   • JOINT REPLACEMENT     • KNEE ARTHROSCOPY Left     [1987] Dr Poon-torn medial meniscus [1995] Dr. Schaffer -torn medial meniscus   • LEEP  2000    Biopsy of cervix. for MAJOR-1 by Dr. Knight   • LUMBAR DISCECTOMY  1994    Hemilaminectomy, foraminectomy & discectomy. Dr. Velasquez, L4-L5 w/posterior lateral fusion & screw fixatoin   • LUMBAR LAMINECTOMY  12/19/2011    foraminotomies, medial facetectomies L5-Q2iqfpd redo. left L5-S1 lumbar discectomy. [facility]-Dr. De Oliveira- 5 units of blood given to pt.   • POSTERIOR LUMBAR/THORACIC SPINE FUSION  2002    Fusion T-5 through L-1. Had T-11 burst fracture. Recuperated at [facility]   • SPINAL FUSION      Lower Back. 7/11/2011-[facility] - Hardware removal from L3-L5, Dr. De Oliveira surgeon, Dr. Booker [facility]-Fusion of spine at multi-levels 12/14/11 - [facility] - Dr Gomez and Dr. Momin, Anterior approach Spinal Fusion L5-S1 3--[facility]. Dr. De Oliveira and Dr. Dean. Failed posterior fusion with loose instrumentation. L-4 thru L-5. No complications.   • TONSILLECTOMY  1951    Dr Mcginnis   • TOTAL HIP ARTHROPLASTY Right 04/10/2017    [facility] Inpatient. Dr. Reinoso   • TOTAL KNEE ARTHROPLASTY Left 2002    Dr Sue @ [facility]          MEDICATIONS:  I have reviewed and reconciled the patients medication list in the patients chart at the skilled nursing facility today.       ALLERGIES:    Allergies   Allergen Reactions   • Morphine And Related Other (See Comments) and Hallucinations     HALLUCINATIONS           SOCIAL HISTORY:    Social History     Socioeconomic History   • Marital status: Single     Spouse name: Not on file   • Number of children: Not on file   • Years of education: Not on file   • Highest education level: Not on file   Tobacco Use   • Smoking status: Never Smoker   • Smokeless tobacco: Never Used   Substance and Sexual Activity   • Alcohol use: No   • Drug use: No   • Sexual activity: No       FAMILY HISTORY:    Family History   Problem Relation Age of Onset   • Ovarian cancer Mother    • Arthritis Mother    • Heart failure Father    • Suicidality Brother    • Heart disease Maternal Aunt    • Cancer Other         malignant neoplasm of gastrointestinal tract- in her 50's   • Arthritis Other    • Cervical cancer Other    • Arthritis Other    • Diabetes Other        REVIEW OF SYSTEMS:    Review of Systems   Constitutional: Negative for appetite change and fever.   HENT: Negative for ear pain, postnasal drip and swollen glands.    Eyes: Negative for blurred vision.   Respiratory: Negative for cough and shortness of breath.    Cardiovascular: Positive for leg swelling (no calf pain). Negative for chest pain and palpitations.   Gastrointestinal: Negative for anorexia, change in bowel habit, nausea and vomiting.   Musculoskeletal: Negative for joint swelling.   Neurological: Negative for weakness, numbness and headache.   Hematological: Negative for adenopathy. Does not bruise/bleed easily.   Psychiatric/Behavioral: Negative for sleep disturbance. The patient is not nervous/anxious.          PHYSICAL EXAMINATION:   VITAL SIGNS      Physical Exam   Constitutional: She is oriented to person, place, and time. She appears well-developed and well-nourished. She is cooperative. No distress.   bp 122/67  Pulse 106  Temp 98.6  o2 sat 95   HENT:   Head: Normocephalic and  atraumatic.   Right Ear: External ear normal.   Left Ear: External ear normal.   Mouth/Throat: Oropharynx is clear and moist. No oropharyngeal exudate.   Eyes: Conjunctivae and EOM are normal. Pupils are equal, round, and reactive to light. Right eye exhibits no discharge. Left eye exhibits no discharge.   Neck: Trachea normal and normal range of motion. Neck supple. Carotid bruit is not present. No thyromegaly present.   Cardiovascular: Normal rate, regular rhythm, normal heart sounds and intact distal pulses. Exam reveals no gallop and no friction rub.   No murmur heard.  2 plus edema bilat but holmans sign neg bilat   Pulmonary/Chest: Effort normal and breath sounds normal. No respiratory distress. She has no wheezes. She exhibits no tenderness.   Abdominal: Soft. Bowel sounds are normal. She exhibits no distension. There is no tenderness. There is no rebound and no guarding. No hernia.   Musculoskeletal: Normal range of motion. She exhibits no edema, tenderness or deformity.   Lymphadenopathy:     She has no cervical adenopathy.        Right cervical: No superficial cervical adenopathy present.       Left cervical: No superficial cervical adenopathy present.   Neurological: She is alert and oriented to person, place, and time. She displays normal reflexes. No cranial nerve deficit or sensory deficit. She exhibits normal muscle tone. Coordination normal.   Skin: Skin is warm and dry. No rash noted. She is not diaphoretic. No erythema. Nails show no clubbing.   Psychiatric: She has a normal mood and affect. Her behavior is normal. Judgment and thought content normal.   Nursing note and vitals reviewed.      RECORDS REVIEW:   I have reviewed and interpreted the following lab test results  and ECG report  obtained at the time of the visit today.   There are no hospital problems to display for this patient.    Problem List Items Addressed This Visit        Cardiovascular and Mediastinum    Hypertension, benign -  Primary    Current Assessment & Plan     Too tight of control thus metaprolol and vasotec have been held.         Hypotension due to drugs    Current Assessment & Plan     Resolved off vasotec and metaprolol - watch closely on hctz            Nervous and Auditory    Pain in hip region after total hip replacement (CMS/HCC)    Current Assessment & Plan     1st visit since repeat hip surg by rosi at [facility]. Seen by him in the office this week and is improving            Hematopoietic and Hemostatic    Thrombocytopathia (CMS/[facility])    Overview     Impression: Stable, Platelet level normal at 318. Will follow conservatively.               PLAN    [x]  Discussed Patient in detail with nursing/staff, addressed all needs today.     [x]  Plan of Care Reviewed   [x]  PT/OT Reviewed   []  Order Changes  []  Discharge Plans Reviewed  []  Advance Directive on file with Nursing Home.   []  POA on file with Nursing Home.   [x]  Code Status listed: [x]  Full Code   []  DNR          Total face to face time spent with patient .. Of which  . where in counseling the patient and family.     Mustapha Gonzales MD    Nursing Home Codes: 15176 INITIAL COMP HX COMP EXAM, MOD

## 2019-10-27 PROBLEM — I87.309 STASIS EDEMA: Status: ACTIVE | Noted: 2019-10-27

## 2019-10-27 PROBLEM — I95.2 HYPOTENSION DUE TO DRUGS: Status: ACTIVE | Noted: 2019-10-27

## 2019-10-27 RX ORDER — DOCUSATE SODIUM 100 MG/1
100 CAPSULE, LIQUID FILLED ORAL DAILY
COMMUNITY
End: 2020-02-11

## 2019-11-06 ENCOUNTER — TRANSCRIBE ORDERS (OUTPATIENT)
Dept: PHYSICAL THERAPY | Facility: CLINIC | Age: 79
End: 2019-11-06

## 2019-11-06 DIAGNOSIS — M25.552 LEFT HIP PAIN: Primary | ICD-10-CM

## 2019-11-08 ENCOUNTER — TREATMENT (OUTPATIENT)
Dept: PHYSICAL THERAPY | Facility: CLINIC | Age: 79
End: 2019-11-08

## 2019-11-08 DIAGNOSIS — T84.031D FEMORAL LOOSENING OF PROSTHETIC LEFT HIP, SUBSEQUENT ENCOUNTER: ICD-10-CM

## 2019-11-08 DIAGNOSIS — Z96.642 PRESENCE OF LEFT ARTIFICIAL HIP JOINT: ICD-10-CM

## 2019-11-08 DIAGNOSIS — M25.552 PAIN OF LEFT HIP JOINT: Primary | ICD-10-CM

## 2019-11-08 PROCEDURE — 97162 PT EVAL MOD COMPLEX 30 MIN: CPT | Performed by: PHYSICAL THERAPIST

## 2019-11-08 PROCEDURE — 97110 THERAPEUTIC EXERCISES: CPT | Performed by: PHYSICAL THERAPIST

## 2019-11-08 NOTE — PROGRESS NOTES
Physical Therapy Initial Evaluation and Plan of Care    Patient: Vida Jamison   : 1940  Diagnosis/ICD-10 Code:  Pain of left hip joint [M25.552]  Referring practitioner: Mustapha Gonzales MD  Date of Initial Visit: 2019  Today's Date: 2019  Patient seen for 1 sessions           Subjective Questionnaire: [place] Hip:       Subjective Evaluation    History of Present Illness  Mechanism of injury: Pt is a 80 yo female s/p L total hip revision 10/14/19.   To follow up with surgeon 19.  Current pain in LLE rated 3/10 at rest.  Has been walking without about 50lbs weight on LLE.  Had difficulty initially when she was non-weightbearing.  Returned home from rehab 19.  Personal goals - return to driving, being able to do her housework, return to working out at the [employer], return to walking with cane.      Quality of life: good    Pain  Current pain rating: 3  Quality: dull ache and pressure  Relieving factors: rest  Aggravating factors: ambulation and prolonged positioning  Progression: improved    Treatments  Previous treatment: physical therapy  Current treatment: physical therapy  Discharged from (in last 30 days): skilled nursing facility  Patient Goals  Patient goals for therapy: decreased pain, improved balance, increased motion, increased strength and independence with ADLs/IADLs             Objective       Passive Range of Motion     Additional Passive Range of Motion Details  B hip flex to 90    Strength/Myotome Testing     Left Hip   Planes of Motion   Flexion: 3  Abduction: 3-  Adduction: 3+    Right Hip   Planes of Motion   Flexion: 4  Abduction: 4  Adduction: 4    Left Knee   Flexion: 4  Extension: 4    Right Knee   Flexion: 4  Extension: 4    Left Ankle/Foot   Dorsiflexion: 4+    Right Ankle/Foot   Dorsiflexion: 4+    Additional Strength Details  Moderate L hip weakness (poor viviana to hip flex, abd, knee ext)     Ambulation     Comments   Pt amb with reported 50# weight restriction  on LLE using 2 wheel rolling walker         Assessment & Plan     Assessment  Impairments: abnormal coordination, abnormal gait, abnormal or restricted ROM, activity intolerance, impaired balance, impaired physical strength, lacks appropriate home exercise program, pain with function and safety issue  Assessment details: Pt presents with moderate L hip/LLE pain.  Decreased LLE strength.  Limited weightbearing viviana LLE.  Difficulty walking.  Unable to drive currently.  Difficulty with transfers.    Prognosis: good  Functional Limitations: walking, moving in bed and standing  Goals  Plan Goals: STG  Pt I with HEP in 2 weeks  To dec pain in LLE 2-3/10 max in 2-3 weeks  LTG  To dem safe I amb with cane in 6-8 weeks  To improve BLE strength 4/5 or greater grossly in 6-8 weeks  To dec pain LLE 0-1/10 max in 6-8 weeks.     Plan  Therapy options: will be seen for skilled physical therapy services  Planned modality interventions: electrical stimulation/Russian stimulation, thermotherapy (hydrocollator packs) and cryotherapy  Planned therapy interventions: balance/weight-bearing training, flexibility, functional ROM exercises, gait training, manual therapy, neuromuscular re-education, postural training, spinal/joint mobilization, strengthening, stretching and therapeutic activities  Duration in visits: 20  Treatment plan discussed with: patient  Plan details: Began with gentle exercise.  Will progress ambulation, strength and endurance as able.          Timed:         Manual Therapy:         mins  23370;     Therapeutic Exercise:    15     mins  07807;     Neuromuscular Galina:        mins  02641;    Therapeutic Activity:          mins  22860;     Gait Training:           mins  38851;     Ultrasound:          mins  34567;    Ionto                                   mins   00712    Un-Timed:  Electrical Stimulation:         mins  11888 ( );  Dry Needling          mins self-pay  Traction          mins 36406  Low Eval         Mins  28965  Mod Eval     40     Mins  58324  High Eval                            Mins  46462        Timed Treatment:   15   mins   Total Treatment:     55   mins    PT SIGNATURE: Domitila Jackson, PT   DATE TREATMENT INITIATED: 11/8/2019    Medicare Initial Certification  Certification Period: 2/6/2020  I certify that the therapy services are furnished while this patient is under my care.  The services outlined above are required by this patient, and will be reviewed every 90 days.     PHYSICIAN: Mustapha Gonzales MD      DATE:     Please sign and return via fax to [phone].. Thank you, [facility].

## 2019-11-12 ENCOUNTER — OFFICE VISIT (OUTPATIENT)
Dept: FAMILY MEDICINE CLINIC | Facility: CLINIC | Age: 79
End: 2019-11-12

## 2019-11-12 ENCOUNTER — TREATMENT (OUTPATIENT)
Dept: PHYSICAL THERAPY | Facility: CLINIC | Age: 79
End: 2019-11-12

## 2019-11-12 VITALS
DIASTOLIC BLOOD PRESSURE: 62 MMHG | WEIGHT: 152.4 LBS | SYSTOLIC BLOOD PRESSURE: 150 MMHG | HEART RATE: 88 BPM | BODY MASS INDEX: 27 KG/M2 | HEIGHT: 63 IN | OXYGEN SATURATION: 97 % | RESPIRATION RATE: 18 BRPM | TEMPERATURE: 97.6 F

## 2019-11-12 DIAGNOSIS — D69.1 THROMBOCYTOPATHIA (HCC): ICD-10-CM

## 2019-11-12 DIAGNOSIS — I10 HYPERTENSION, BENIGN: ICD-10-CM

## 2019-11-12 DIAGNOSIS — Z96.642 PRESENCE OF LEFT ARTIFICIAL HIP JOINT: ICD-10-CM

## 2019-11-12 DIAGNOSIS — M25.552 PAIN OF LEFT HIP JOINT: Primary | ICD-10-CM

## 2019-11-12 DIAGNOSIS — I87.303 STASIS EDEMA OF BOTH LOWER EXTREMITIES: ICD-10-CM

## 2019-11-12 DIAGNOSIS — T84.031D FEMORAL LOOSENING OF PROSTHETIC LEFT HIP, SUBSEQUENT ENCOUNTER: ICD-10-CM

## 2019-11-12 DIAGNOSIS — M25.552 LEFT HIP PAIN: ICD-10-CM

## 2019-11-12 DIAGNOSIS — G89.4 CHRONIC PAIN SYNDROME: Primary | ICD-10-CM

## 2019-11-12 PROCEDURE — 97140 MANUAL THERAPY 1/> REGIONS: CPT | Performed by: PHYSICAL THERAPIST

## 2019-11-12 PROCEDURE — 97110 THERAPEUTIC EXERCISES: CPT | Performed by: PHYSICAL THERAPIST

## 2019-11-12 PROCEDURE — 99214 OFFICE O/P EST MOD 30 MIN: CPT | Performed by: FAMILY MEDICINE

## 2019-11-12 RX ORDER — HYDROCHLOROTHIAZIDE 25 MG/1
TABLET ORAL
Refills: 0 | COMMUNITY
Start: 2019-11-07 | End: 2020-01-28

## 2019-11-12 NOTE — ASSESSMENT & PLAN NOTE
Pt doing well, had hardware reconstruction at [facility] 10/14/19.  Wound healing well.  Advised pt she could return to driving and therapy pool at [facility].

## 2019-11-12 NOTE — PROGRESS NOTES
Physical Therapy Daily Progress Note    Patient: Vida Jamison   : 1940  Diagnosis/ICD-10 Code:  Pain of left hip joint [M25.552]  Referring practitioner: Mustapha Gonzales MD  Date of Initial Visit: Type: THERAPY  Noted: 2019  Today's Date: 2019  Patient seen for 2 sessions             Subjective Patient reports her L lateral hip is sore today.  Expresses her desire to improve her gait pattern and wanting to get back to using her cane.     Objective   See Exercise, Manual, and Modality Logs for complete treatment.       Assessment/Plan Patient with significant tightness noted in L hip flexor with improved hip extension noted following manual therapy today.  Patient unable to attain neutral hip extension but was much improved compared to start of session.  Patient also reported tightness at L lateral hip so instructed in standing IT band stretch within restrictions with mild stretch felt by patient.      Progress per Plan of Care           Timed:         Manual Therapy:    12     mins  95139;     Therapeutic Exercise:    26     mins  67396;     Neuromuscular Galina:        mins  86710;    Therapeutic Activity:          mins  56477;     Gait Training:           mins  34803;     Ultrasound:          mins  21799;    Ionto                                   mins   23087  Self Care                            mins   16387      Un-Timed:  Electrical Stimulation:         mins  81054 ( );  Dry Needling          mins self-pay  Traction        mins 90880  Low Eval          Mins  48972  Mod Eval          Mins  13609  High Eval                            Mins  96847    Timed Treatment:   38   mins   Total Treatment:     38   mins    Marielena Eugene PT  [place] License # [id number]  Physical Therapist

## 2019-11-12 NOTE — PROGRESS NOTES
Subjective 2  Vida Jamison is a 79 y.o. female.     Hip Pain    The incident occurred more than 1 week ago (Left hip surgery 10/14/19). The pain is present in the left hip. The quality of the pain is described as aching. The pain is at a severity of 2/10. The pain is mild. The pain has been improving since onset. Pertinent negatives include no inability to bear weight, numbness or tingling. The symptoms are aggravated by weight bearing. She has tried elevation and rest for the symptoms. The treatment provided significant relief.   Leg Swelling   This is a recurrent problem. The current episode started more than 1 month ago. The problem occurs daily. The problem has been gradually improving. Pertinent negatives include no numbness. The symptoms are aggravated by standing. Treatments tried: elevate lower extremities. The treatment provided moderate relief.   Hypertension   This is a chronic problem. The current episode started more than 1 year ago. The problem is unchanged. The problem is controlled. There are no associated agents to hypertension. There are no known risk factors for coronary artery disease. Current antihypertension treatment includes ACE inhibitors and beta blockers. The current treatment provides moderate improvement. There are no compliance problems.         The following portions of the patient's history were reviewed and updated as appropriate: allergies, current medications, past family history, past medical history, past social history, past surgical history and problem list.    Family History   Problem Relation Age of Onset   • Ovarian cancer Mother    • Arthritis Mother    • Heart failure Father    • Suicidality Brother    • Heart disease Maternal Aunt    • Cancer Other         malignant neoplasm of gastrointestinal tract- in her 50's   • Arthritis Other    • Cervical cancer Other    • Arthritis Other    • Diabetes Other        Social History     Tobacco Use   • Smoking status: Never Smoker    • Smokeless tobacco: Never Used   Substance Use Topics   • Alcohol use: No   • Drug use: No       Past Surgical History:   Procedure Laterality Date   • BREAST BIOPSY Right 1974    Dr Briseida Alonso   • CATARACT EXTRACTION W/ INTRAOCULAR LENS IMPLANT      7/20/10-rt. eye-Dr. Leon 7/13/10- Lt. eye-Dr. Leon   • FINGER SURGERY  2001    Revision of Finger joints. Dr. Brumfield w/Drs. Kleinert & Marlon   • HIP ENDOPROSTHESIS Left 05/22/2014    [facility] endoprostehetic replacement of left hip. Dr. Reza Choudhury.   • JOINT REPLACEMENT     • KNEE ARTHROSCOPY Left     [1987] Dr Poon-torn medial meniscus [1995] Dr. Schaffer -torn medial meniscus   • LEEP  2000    Biopsy of cervix. for MAJOR-1 by Dr. Knight   • LUMBAR DISCECTOMY  1994    Hemilaminectomy, foraminectomy & discectomy. Dr. Velasquez, L4-L5 w/posterior lateral fusion & screw fixatoin   • LUMBAR LAMINECTOMY  12/19/2011    foraminotomies, medial facetectomies L5-H2yqlbs redo. left L5-S1 lumbar discectomy. [facility]-Dr. De Oliveira- 5 units of blood given to pt.   • POSTERIOR LUMBAR/THORACIC SPINE FUSION  2002    Fusion T-5 through L-1. Had T-11 burst fracture. Recuperated at [facility]   • SPINAL FUSION      Lower Back. [facility]11[facility] - Hardware removal from L3-L5, Dr. De Oliveira surgeon, Dr. Booker [facility]-Fusion of spine at multi-levels 12/14/11 - [facility] - Dr Gomez and Dr. Momin, Anterior approach Spinal Fusion L5-S1 3--[facility]. Dr. De Oliveira and Dr. Dean. Failed posterior fusion with loose instrumentation. L-4 thru L-5. No complications.   • TONSILLECTOMY  1951    Dr Mcginnis   • TOTAL HIP ARTHROPLASTY Right 04/10/2017    [facility], Inpatient. Dr. Reinoso   • TOTAL KNEE ARTHROPLASTY Left 2002    Dr Sue @ [facility]       Patient Active Problem List   Diagnosis   • Gastroesophageal reflux disease without esophagitis   • Pain in hip region after total hip replacement (CMS/HCC)   • Loose total hip arthroplasty (CMS/HCC)   •  Lumbar degenerative disc disease   • Mixed hyperlipidemia   • Myalgia and myositis   • Hypertension, benign   • Peripheral vascular disease (CMS/HCC)   • Periprosthetic fracture around internal prosthetic left hip joint (CMS/HCC)   • Rheumatoid arthritis involving multiple sites with positive rheumatoid factor (CMS/[facility])   • S/P lumbar fusion   • Spinal stenosis of lumbar region   • Carpal tunnel syndrome of right wrist   • Anemia   • Hyperglycemia   • Pain syndrome, chronic   • Left hip pain   • Vitamin D deficiency   • 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     • amitriptyline (ELAVIL) 75 MG tablet TAKE 1 TABLET AT BEDTIME 90 tablet 0   • aspirin 81 MG EC tablet Take 81 mg by mouth Daily.     • baclofen (LIORESAL) 10 MG tablet TAKE 1 TABLET AT BEDTIME 90 tablet 1   • Calcium Carbonate-Vitamin D (CALCIUM 600+D) 600-200 MG-UNIT tablet Take 2 tablets by mouth Daily.     • Calcium Carbonate-Vitamin D (CALCIUM 600/VITAMIN D PO)      • celecoxib (CELEBREX) 200 MG capsule Take 1 capsule by mouth Daily. 90 capsule 3   • diphenhydrAMINE (BENADRYL ALLERGY) 25 mg capsule Take 1 capsule by mouth Daily.     • docusate sodium (COLACE) 100 MG capsule Take 100 mg by mouth Daily.     • DULoxetine (CYMBALTA) 30 MG capsule Take 1 capsule by mouth Daily. 90 capsule 3   • gabapentin (NEURONTIN) 300 MG capsule Take 300 mg by mouth 2 (Two) Times a Day.     • Glycerin-Hypromellose- (ARTIFICIAL TEARS) 0.2-0.2-1 % solution ophthalmic solution      • hydroCHLOROthiazide (HYDRODIURIL) 25 MG tablet TAKE 1 TABLET BY MOUTH DAILY; HOLD IF SYSTOLIC 100 OR DIASTOLIC 50  0   • loratadine (CLARITIN) 10 MG tablet Take 10 mg by mouth Daily.     •  "metoprolol tartrate (LOPRESSOR) 25 MG tablet Take 1 tablet by mouth As Needed (ordered 1/2 bid but currently held for hyptension).     • montelukast (SINGULAIR) 10 MG tablet TAKE 1 TABLET EVERY DAY 90 tablet 3   • pantoprazole (PROTONIX) 40 MG EC tablet TAKE 1 TABLET DAILY 90 tablet 3   • polyethylene glycol (MIRALAX) packet Take 17 g by mouth Daily.     • Psyllium 100 % powder METAMUCIL POWD     • tamsulosin (FLOMAX) 0.4 MG capsule 24 hr capsule Take 1 capsule by mouth Daily.       No current facility-administered medications on file prior to visit.        Allergies   Allergen Reactions   • Morphine And Related Other (See Comments) and Hallucinations     HALLUCINATIONS         Review of Systems   Musculoskeletal:        Edema, left hip pain     Neurological: Negative for tingling and numbness.       Objective   Visit Vitals  /62 (BP Location: Right arm, Patient Position: Sitting, Cuff Size: Adult)   Pulse 88   Temp 97.6 °F (36.4 °C) (Oral)   Resp 18   Ht 160 cm (63\")   Wt 69.1 kg (152 lb 6.4 oz)   SpO2 97%   BMI 27.00 kg/m²     Physical Exam   Constitutional: She is oriented to person, place, and time. She appears well-developed and well-nourished. She is cooperative.   HENT:   Head: Normocephalic.   Neck: Trachea normal. Neck supple. Carotid bruit is not present. No thyromegaly present.   Cardiovascular: Normal rate and regular rhythm. Exam reveals no gallop and no friction rub.   No murmur heard.  Pulmonary/Chest: Effort normal and breath sounds normal.   Neurological: She is alert and oriented to person, place, and time.   Skin: Skin is dry. No rash noted. Nails show no clubbing.         Assessment/Plan .  Problem List Items Addressed This Visit        Cardiovascular and Mediastinum    Hypertension, benign    Current Assessment & Plan     Advised to discontinue Vasotec and Metoprolol temporarily due to hypotension.  Will follow conservatively. Continue HCTZ         Relevant Medications    " hydroCHLOROthiazide (HYDRODIURIL) 25 MG tablet       Nervous and Auditory    Chronic pain syndrome - Primary    Current Assessment & Plan     Improved with surg         Left hip pain    Current Assessment & Plan     Pt doing well, had hardware reconstruction at [facility] 10/14/19.  Wound healing well.  Advised pt she could return to driving and therapy pool at [facility].            Hematopoietic and Hemostatic    Thrombocytopathia (CMS/HCC)    Current Assessment & Plan     Needs cbc with next labs            Other    Stasis edema    Current Assessment & Plan     Advised to keep lower extremities elevated as much as possible and to take HCTZ .

## 2019-11-12 NOTE — ASSESSMENT & PLAN NOTE
Advised to discontinue Vasotec and Metoprolol temporarily due to hypotension.  Will follow conservatively. Continue HCTZ

## 2019-11-12 NOTE — ASSESSMENT & PLAN NOTE
Pt had reconstruction surgery of left hip 10/14/19 at [facility].  Doing very well, excision healing well.  Advised pt she could return to driving and therapy pool.

## 2019-11-13 DIAGNOSIS — E78.2 MIXED HYPERLIPIDEMIA: Primary | ICD-10-CM

## 2019-11-13 DIAGNOSIS — I10 ESSENTIAL HYPERTENSION: ICD-10-CM

## 2019-11-14 ENCOUNTER — TREATMENT (OUTPATIENT)
Dept: PHYSICAL THERAPY | Facility: CLINIC | Age: 79
End: 2019-11-14

## 2019-11-14 DIAGNOSIS — Z96.642 PRESENCE OF LEFT ARTIFICIAL HIP JOINT: ICD-10-CM

## 2019-11-14 DIAGNOSIS — M25.552 PAIN OF LEFT HIP JOINT: Primary | ICD-10-CM

## 2019-11-14 DIAGNOSIS — T84.031D FEMORAL LOOSENING OF PROSTHETIC LEFT HIP, SUBSEQUENT ENCOUNTER: ICD-10-CM

## 2019-11-14 PROCEDURE — 97110 THERAPEUTIC EXERCISES: CPT | Performed by: PHYSICAL THERAPIST

## 2019-11-14 PROCEDURE — 97140 MANUAL THERAPY 1/> REGIONS: CPT | Performed by: PHYSICAL THERAPIST

## 2019-11-14 RX ORDER — ATORVASTATIN CALCIUM 10 MG/1
TABLET, FILM COATED ORAL
Qty: 90 TABLET | Refills: 0 | Status: SHIPPED | OUTPATIENT
Start: 2019-11-14 | End: 2020-04-06

## 2019-11-14 RX ORDER — ENALAPRIL MALEATE 10 MG/1
TABLET ORAL
Qty: 180 TABLET | Refills: 0 | Status: SHIPPED | OUTPATIENT
Start: 2019-11-14 | End: 2019-12-10 | Stop reason: SDUPTHER

## 2019-11-14 NOTE — PROGRESS NOTES
Physical Therapy Daily Progress Note      Patient: Vida Jamison   : 1940  Diagnosis/ICD-10 Code:  Pain of left hip joint [M25.552]  Referring practitioner: Mustapha Gonzales MD  Date of Initial Visit: Type: THERAPY  Noted: 2019  Today's Date: 2019  Patient seen for 3 sessions         Vida Jamison reports: she is feeling better but still cannot walk the greatest due to weakness. Pt. Reports she feels like we aren't working her that hard in therapy and would like to be doing more.    Objective   See Exercise, Manual, and Modality Logs for complete treatment.     Assessment/Plan   Pt. Performs exercise well but with limited range at hip due to forward flexed posture and tight hip flexors B. Pt. Has limited ability to perform LAQ and hip flexion seated. Pt. Will tolerate from gross hip strengthening.    Progress per Plan of Care           Timed:         Manual Therapy:    15     mins  32474;     Therapeutic Exercise:    23     mins  84471;     Neuromuscular Galina:        mins  02343;    Therapeutic Activity:          mins  19326;     Gait Training:           mins  56652;     Ultrasound:          mins  08142;    Ionto                                   mins   11330  Self Care                            mins   18405  Canalith Repos                   mins  4209    Un-Timed:  Electrical Stimulation:         mins  99456 ( );  Dry Needling          mins self-pay  Traction          mins 11654  Low Eval          Mins  69833  Mod Eval          Mins  39865  High Eval                            Mins  24848    Timed Treatment:   38   mins   Total Treatment:     38   mins    Maty Bernard PTA  Physical Therapist Assistant License #[id number]

## 2019-11-18 ENCOUNTER — TREATMENT (OUTPATIENT)
Dept: PHYSICAL THERAPY | Facility: CLINIC | Age: 79
End: 2019-11-18

## 2019-11-18 DIAGNOSIS — Z96.642 PRESENCE OF LEFT ARTIFICIAL HIP JOINT: ICD-10-CM

## 2019-11-18 DIAGNOSIS — M25.552 PAIN OF LEFT HIP JOINT: Primary | ICD-10-CM

## 2019-11-18 DIAGNOSIS — M54.2 PAIN, NECK: ICD-10-CM

## 2019-11-18 DIAGNOSIS — T84.031D FEMORAL LOOSENING OF PROSTHETIC LEFT HIP, SUBSEQUENT ENCOUNTER: ICD-10-CM

## 2019-11-18 PROCEDURE — 97110 THERAPEUTIC EXERCISES: CPT | Performed by: PHYSICAL THERAPIST

## 2019-11-18 PROCEDURE — 97140 MANUAL THERAPY 1/> REGIONS: CPT | Performed by: PHYSICAL THERAPIST

## 2019-11-18 NOTE — PROGRESS NOTES
Physical Therapy Daily Progress Note      Patient: Vida Jamison   : 1940  Diagnosis/ICD-10 Code:  Pain of left hip joint [M25.552]  Referring practitioner: Mustapha Gonzales MD  Date of Initial Visit: Type: THERAPY  Noted: 2019  Today's Date: 2019  Patient seen for 4 sessions         Vida Jamison reports: she felt sore and painful yesterday in posterior aspect of hip and belly of hamstring region. Pt. States today she has felt better but been moving slowly due to stiffness.    Objective   See Exercise, Manual, and Modality Logs for complete treatment.     Assessment/Plan   Pt. Tolerated exercise well however manual intervention was challenging today due to hamstring soreness in L LE and cramping onset by stretch. Pt. Reported feeling muscle soreness and fatigue following treatment today.    Progress per Plan of Care           Timed:         Manual Therapy:    12     mins  19635;     Therapeutic Exercise:    18     mins  47701;     Neuromuscular Galina:        mins  64375;    Therapeutic Activity:          mins  93431;     Gait Training:           mins  89810;     Ultrasound:          mins  71625;    Ionto                                   mins   65807  Self Care                            mins   25664  Canalith Repos                   mins  4209    Un-Timed:  Electrical Stimulation:         mins  29859 ( );  Dry Needling          mins self-pay  Traction          mins 43807  Low Eval          Mins  05695  Mod Eval          Mins  77576  High Eval                            Mins  58264    Timed Treatment:   30   mins   Total Treatment:     30   mins    Maty Bernard PTA  Physical Therapist Assistant License #[id number]

## 2019-11-20 ENCOUNTER — TREATMENT (OUTPATIENT)
Dept: PHYSICAL THERAPY | Facility: CLINIC | Age: 79
End: 2019-11-20

## 2019-11-20 DIAGNOSIS — Z96.642 PRESENCE OF LEFT ARTIFICIAL HIP JOINT: ICD-10-CM

## 2019-11-20 DIAGNOSIS — M54.2 PAIN, NECK: ICD-10-CM

## 2019-11-20 DIAGNOSIS — M25.552 PAIN OF LEFT HIP JOINT: Primary | ICD-10-CM

## 2019-11-20 DIAGNOSIS — T84.031D FEMORAL LOOSENING OF PROSTHETIC LEFT HIP, SUBSEQUENT ENCOUNTER: ICD-10-CM

## 2019-11-20 PROCEDURE — 97110 THERAPEUTIC EXERCISES: CPT | Performed by: PHYSICAL THERAPIST

## 2019-11-20 PROCEDURE — 97140 MANUAL THERAPY 1/> REGIONS: CPT | Performed by: PHYSICAL THERAPIST

## 2019-11-20 NOTE — PROGRESS NOTES
Physical Therapy Daily Progress Note      Patient: Vida Jamison   : 1940  Diagnosis/ICD-10 Code:  Pain of left hip joint [M25.552]  Referring practitioner: Mustapha Gonzales MD  Date of Initial Visit: Type: THERAPY  Noted: 2019  Today's Date: 2019  Patient seen for 5 sessions         Vida Jamison reports: she has a painfully tight L hip flexor today and states she took a weird step and twist earlier today and since then she has had pain and discomfort in the hip.    Objective   See Exercise, Manual, and Modality Logs for complete treatment.     Assessment/Plan   Pt. Had severely tight L hip flexor and quadriceps musculature today. Pt. Had difficulty laying supine without support of LE due to train in the quad. Pt. tolerated heat well and performed exercise in pain free ranges to attempt and get some strengthening and stretch.    Progress per Plan of Care           Timed:         Manual Therapy:    12     mins  78554;     Therapeutic Exercise:    16     mins  65960;     Neuromuscular Galina:        mins  33699;    Therapeutic Activity:          mins  67561;     Gait Training:           mins  12106;     Ultrasound:          mins  34513;    Ionto                                   mins   78525  Self Care                            mins   94335  Canalith Repos                   mins  4209    Un-Timed:  Electrical Stimulation:         mins  42069 ( );  Dry Needling          mins self-pay  Traction          mins 23428  Low Eval          Mins  77475  Mod Eval          Mins  00080  High Eval                            Mins  82838    Timed Treatment:   28   mins   Total Treatment:     38   mins    Maty Bernard PTA  Physical Therapist Assistant License #[id number]

## 2019-11-25 ENCOUNTER — TREATMENT (OUTPATIENT)
Dept: PHYSICAL THERAPY | Facility: CLINIC | Age: 79
End: 2019-11-25

## 2019-11-25 ENCOUNTER — HOSPITAL ENCOUNTER (OUTPATIENT)
Dept: GENERAL RADIOLOGY | Facility: HOSPITAL | Age: 79
Discharge: HOME | End: 2019-11-25
Admitting: FAMILY MEDICINE

## 2019-11-25 ENCOUNTER — HOSPITAL ENCOUNTER (OUTPATIENT)
Dept: GENERAL RADIOLOGY | Facility: HOSPITAL | Age: 79
Discharge: HOME | End: 2019-11-25

## 2019-11-25 ENCOUNTER — OFFICE VISIT (OUTPATIENT)
Dept: FAMILY MEDICINE CLINIC | Facility: CLINIC | Age: 79
End: 2019-11-25

## 2019-11-25 VITALS
HEART RATE: 92 BPM | BODY MASS INDEX: 26.93 KG/M2 | OXYGEN SATURATION: 97 % | WEIGHT: 152 LBS | DIASTOLIC BLOOD PRESSURE: 72 MMHG | HEIGHT: 63 IN | RESPIRATION RATE: 18 BRPM | SYSTOLIC BLOOD PRESSURE: 149 MMHG | TEMPERATURE: 97.6 F

## 2019-11-25 DIAGNOSIS — R60.9 DEPENDENT EDEMA: ICD-10-CM

## 2019-11-25 DIAGNOSIS — M25.552 PAIN OF LEFT HIP JOINT: Primary | ICD-10-CM

## 2019-11-25 DIAGNOSIS — I10 HYPERTENSION, BENIGN: ICD-10-CM

## 2019-11-25 DIAGNOSIS — M48.061 SPINAL STENOSIS OF LUMBAR REGION, UNSPECIFIED WHETHER NEUROGENIC CLAUDICATION PRESENT: ICD-10-CM

## 2019-11-25 DIAGNOSIS — T84.031D FEMORAL LOOSENING OF PROSTHETIC LEFT HIP, SUBSEQUENT ENCOUNTER: ICD-10-CM

## 2019-11-25 DIAGNOSIS — Z96.642 PRESENCE OF LEFT ARTIFICIAL HIP JOINT: ICD-10-CM

## 2019-11-25 DIAGNOSIS — M25.552 LEFT HIP PAIN: Primary | ICD-10-CM

## 2019-11-25 PROCEDURE — 97140 MANUAL THERAPY 1/> REGIONS: CPT | Performed by: PHYSICAL THERAPIST

## 2019-11-25 PROCEDURE — 97110 THERAPEUTIC EXERCISES: CPT | Performed by: PHYSICAL THERAPIST

## 2019-11-25 PROCEDURE — 99214 OFFICE O/P EST MOD 30 MIN: CPT | Performed by: FAMILY MEDICINE

## 2019-11-25 PROCEDURE — 73552 X-RAY EXAM OF FEMUR 2/>: CPT

## 2019-11-25 PROCEDURE — 73502 X-RAY EXAM HIP UNI 2-3 VIEWS: CPT

## 2019-11-25 NOTE — PROGRESS NOTES
Subjective   Vida Jamison is a 79 y.o. female.     Hip Pain    There was no injury mechanism (left hip surgery 10/14/19). The pain is present in the left hip. The quality of the pain is described as aching. The pain is at a severity of 7/10. The pain is moderate. The pain has been intermittent since onset. The symptoms are aggravated by weight bearing and palpation. She has tried rest, heat and elevation for the symptoms. The treatment provided mild relief.   Leg Swelling   This is a recurrent problem. The current episode started more than 1 month ago. The problem occurs daily. The problem has been gradually improving. Pertinent negatives include no chest pain or fatigue. The symptoms are aggravated by walking. She has tried rest for the symptoms. The treatment provided mild relief.   Hypertension   This is a chronic problem. The current episode started more than 1 year ago. The problem has been waxing and waning since onset. The problem is uncontrolled. Pertinent negatives include no chest pain, palpitations or shortness of breath. There are no associated agents to hypertension. Risk factors for coronary artery disease include sedentary lifestyle and post-menopausal state. Current antihypertension treatment includes beta blockers. The current treatment provides mild improvement. There are no compliance problems.         The following portions of the patient's history were reviewed and updated as appropriate: allergies, current medications, past family history, past medical history, past social history, past surgical history and problem list.    Family History   Problem Relation Age of Onset   • Ovarian cancer Mother    • Arthritis Mother    • Heart failure Father    • Suicidality Brother    • Heart disease Maternal Aunt    • Cancer Other         malignant neoplasm of gastrointestinal tract- in her 50's   • Arthritis Other    • Cervical cancer Other    • Arthritis Other    • Diabetes Other        Social History      Tobacco Use   • Smoking status: Never Smoker   • Smokeless tobacco: Never Used   Substance Use Topics   • Alcohol use: No   • Drug use: No       Past Surgical History:   Procedure Laterality Date   • BREAST BIOPSY Right 1974    Dr Briseida Alonso   • CATARACT EXTRACTION W/ INTRAOCULAR LENS IMPLANT      7/20/10-rt. eye-Dr. Leon 7/13/10- Lt. eye-Dr. Leon   • FINGER SURGERY  2001    Revision of Finger joints. Dr. Brumfield w/Drs. Kleinert & Marlon   • HIP ENDOPROSTHESIS Left 05/22/2014    [facility] endoprostehetic replacement of left hip. Dr. Reza Choudhury.   • JOINT REPLACEMENT     • KNEE ARTHROSCOPY Left     [1987] Dr Poon-torn medial meniscus [1995] Dr. Schaffer -torn medial meniscus   • LEEP  2000    Biopsy of cervix. for MAJOR-1 by Dr. Knight   • LUMBAR DISCECTOMY  1994    Hemilaminectomy, foraminectomy & discectomy. Dr. Velasquez, L4-L5 w/posterior lateral fusion & screw fixatoin   • LUMBAR LAMINECTOMY  12/19/2011    foraminotomies, medial facetectomies L5-[id number] redo. left L5-S1 lumbar discectomy. [facility]-Dr. De Oliveira- 5 units of blood given to pt.   • POSTERIOR LUMBAR/THORACIC SPINE FUSION  2002    Fusion T-5 through L-1. Had T-11 burst fracture. Recuperated at [facility]   • SPINAL FUSION      Lower Back. 7/11/2011-[facility] - Hardware removal from L3-L5, Dr. De Oliveira surgeon, Dr. Booker [facility]-Fusion of spine at multi-levels 12/14/11 - [facility] - Dr Gomez and Dr. Momin, Anterior approach Spinal Fusion L5-S1 3--[facility]. Dr. De Oliveira and Dr. Dean. Failed posterior fusion with loose instrumentation. L-4 thru L-5. No complications.   • TONSILLECTOMY  1951    Dr Mcginnis   • TOTAL HIP ARTHROPLASTY Right 04/10/2017    [facility] Inpatient. Dr. Reinoso   • TOTAL KNEE ARTHROPLASTY Left 2002    Dr Sue @ [facility]       Patient Active Problem List   Diagnosis   • Gastroesophageal reflux disease without esophagitis   • Pain in hip region after total hip replacement (CMS/[facility])    • Loose total hip arthroplasty (CMS/[facility])   • Lumbar degenerative disc disease   • Mixed hyperlipidemia   • Myalgia and myositis   • Hypertension, benign   • Peripheral vascular disease (CMS/[facility])   • Periprosthetic fracture around internal prosthetic left hip joint (CMS/[facility])   • Rheumatoid arthritis involving multiple sites with positive rheumatoid factor (CMS/[facility])   • S/P lumbar fusion   • Spinal stenosis of lumbar region   • Carpal tunnel syndrome of right wrist   • Anemia   • Hyperglycemia   • Pain syndrome, chronic   • Left hip pain   • Vitamin D deficiency   • Thrombocythemia (CMS/[facility])   • First degree atrioventricular block   • Chronic pain syndrome   • Hypotension due to drugs   • Dependent edema   • Advanced directives, counseling/discussion   • Aortic valve regurgitation   • ASCUS (atypical squamous cells of undetermined significance) on Pap smear   • Asthmatic bronchitis   • Elevated diaphragm   • Esophageal hernia   • Family history of diabetes mellitus   • Fusion of spine of lumbar region   • Hypertensive left ventricular hypertrophy, without heart failure   • Hyponatremia   • Mitral valve disease   • Status post hip replacement   • Thrombocytopenia (CMS/[facility])       Current Outpatient Medications on File Prior to Visit   Medication Sig Dispense Refill   • Acetaminophen (TYLENOL) 325 MG capsule 2 (Two) Times a Day.     • amitriptyline (ELAVIL) 75 MG tablet TAKE 1 TABLET AT BEDTIME 90 tablet 0   • aspirin 81 MG EC tablet Take 81 mg by mouth Daily.     • atorvastatin (LIPITOR) 10 MG tablet TAKE 1 TABLET EVERY DAY 90 tablet 0   • baclofen (LIORESAL) 10 MG tablet TAKE 1 TABLET AT BEDTIME 90 tablet 1   • Calcium Carbonate-Vitamin D (CALCIUM 600+D) 600-200 MG-UNIT tablet Take 2 tablets by mouth Daily.     • Calcium Carbonate-Vitamin D (CALCIUM 600/VITAMIN D PO)      • celecoxib (CELEBREX) 200 MG capsule Take 1 capsule by mouth Daily. 90 capsule 3   • diphenhydrAMINE (BENADRYL ALLERGY) 25 mg capsule Take 1  "capsule by mouth Daily.     • docusate sodium (COLACE) 100 MG capsule Take 100 mg by mouth Daily.     • DULoxetine (CYMBALTA) 30 MG capsule Take 1 capsule by mouth Daily. 90 capsule 3   • enalapril (VASOTEC) 10 MG tablet TAKE 1 TABLET TWICE DAILY 180 tablet 0   • gabapentin (NEURONTIN) 300 MG capsule Take 300 mg by mouth 2 (Two) Times a Day.     • Glycerin-Hypromellose- (ARTIFICIAL TEARS) 0.2-0.2-1 % solution ophthalmic solution      • hydroCHLOROthiazide (HYDRODIURIL) 25 MG tablet 2 tabs daily  0   • loratadine (CLARITIN) 10 MG tablet Take 10 mg by mouth Daily.     • metoprolol tartrate (LOPRESSOR) 25 MG tablet Take 1 tablet by mouth As Needed (ordered 1/2 bid but currently held for hyptension).     • montelukast (SINGULAIR) 10 MG tablet TAKE 1 TABLET EVERY DAY 90 tablet 3   • pantoprazole (PROTONIX) 40 MG EC tablet TAKE 1 TABLET DAILY 90 tablet 3   • polyethylene glycol (MIRALAX) packet Take 17 g by mouth Daily.     • Psyllium 100 % powder METAMUCIL POWD     • tamsulosin (FLOMAX) 0.4 MG capsule 24 hr capsule Take 1 capsule by mouth Daily.       No current facility-administered medications on file prior to visit.        Allergies   Allergen Reactions   • Morphine And Related Hallucinations     HALLUCINATIONS         Review of Systems   Constitutional: Negative for fatigue, unexpected weight gain and unexpected weight loss.   Respiratory: Negative for shortness of breath.    Cardiovascular: Negative for chest pain, palpitations and leg swelling.   Musculoskeletal:        Left hip pain , stasis edema lower extremities.     Neurological: Negative for headache.       Objective   Visit Vitals  /72 (BP Location: Left arm, Patient Position: Sitting, Cuff Size: Large Adult)   Pulse 92   Temp 97.6 °F (36.4 °C) (Oral)   Resp 18   Ht 160 cm (63\")   Wt 68.9 kg (152 lb)   SpO2 97%   BMI 26.93 kg/m²     Physical Exam   Constitutional: She is oriented to person, place, and time. She appears well-developed and " well-nourished. She is cooperative.   HENT:   Head: Normocephalic.   Neck: Trachea normal. Neck supple. Carotid bruit is not present. No thyromegaly present.   Cardiovascular: Normal rate and regular rhythm. Exam reveals no gallop and no friction rub.   No murmur heard.  Pulmonary/Chest: Effort normal and breath sounds normal.   Musculoskeletal:        Left hip: Tenderness: mild.   Neurological: She is alert and oriented to person, place, and time.   Skin: Skin is dry. No rash noted. Nails show no clubbing.              Assessment/Plan .  Problem List Items Addressed This Visit        Cardiovascular and Mediastinum    Hypertension, benign    Current Assessment & Plan     Elevated today, increased HCTZ to 50mg, will take two 25mg daily.            Nervous and Auditory    Left hip pain - Primary    Current Assessment & Plan     X-ray of left hip w/pelvis and left femur ordered.           Relevant Orders    XR Hip With or Without Pelvis 2 - 3 View Left (Completed)    XR Femur 2 View Left (Completed)       Other    Dependent edema    Current Assessment & Plan     Watch salt, elevate legs and increase hctz to 50 mg         Spinal stenosis of lumbar region    Current Assessment & Plan     Probable cause of left hip pain

## 2019-11-25 NOTE — PROGRESS NOTES
"     Physical Therapy Daily Progress Note    Patient: Vida Jamison   : 1940  Diagnosis/ICD-10 Code:  Pain of left hip joint [M25.552]  Referring practitioner: Mustapha Gonzales MD  Date of Initial Visit: Type: THERAPY  Noted: 2019  Today's Date: 2019  Patient seen for 6 sessions             Subjective Patient reports her sacrum and L posterior thigh have been painful for about 1 week.  Patient states she \"twisted\" when she was picking something up last week and then \"overdid it\" two days ago.  Had an x-ray of L hip this morning with imaging negative for fracture or pathology per patient report.     Objective   See Exercise, Manual, and Modality Logs for complete treatment.       Assessment/Plan Patient continues to demonstrate significant limitations in L hip extension.  Performed hip flexor stretches in supine and sidelying with patient tolerating stretches well.  Progressed strengthening with addition of leg press with patient requiring min A from therapist to stand from leg press due to low height of machine.     Progress per Plan of Care           Timed:         Manual Therapy:    12     mins  72481;     Therapeutic Exercise:    30     mins  54873;     Neuromuscular Galina:        mins  09439;    Therapeutic Activity:          mins  94317;     Gait Training:           mins  89165;     Ultrasound:          mins  65894;    Ionto                                   mins   84837  Self Care                            mins   97201      Un-Timed:  Electrical Stimulation:         mins  32932 ( );  Dry Needling          mins self-pay  Traction          mins 72586  Low Eval          Mins  31142  Mod Eval          Mins  56597  High Eval                            Mins  60645    Timed Treatment:   42   mins   Total Treatment:     42   mins    Marielena Eugene PT  [place] License # [id number]  Physical Therapist  "

## 2019-11-29 ENCOUNTER — TELEPHONE (OUTPATIENT)
Dept: FAMILY MEDICINE CLINIC | Facility: CLINIC | Age: 79
End: 2019-11-29

## 2019-11-29 PROBLEM — J98.6 ELEVATED DIAPHRAGM: Status: ACTIVE | Noted: 2019-11-29

## 2019-11-29 PROBLEM — D69.6 THROMBOCYTOPENIA: Status: ACTIVE | Noted: 2019-11-29

## 2019-11-29 PROBLEM — I35.1 AORTIC VALVE REGURGITATION: Status: ACTIVE | Noted: 2019-11-29

## 2019-11-29 PROBLEM — IMO0001 ASCUS (ATYPICAL SQUAMOUS CELLS OF UNDETERMINED SIGNIFICANCE) ON PAP SMEAR: Status: ACTIVE | Noted: 2019-11-29

## 2019-11-29 PROBLEM — K44.9 ESOPHAGEAL HERNIA: Status: ACTIVE | Noted: 2019-11-29

## 2019-11-29 PROBLEM — Z83.3 FAMILY HISTORY OF DIABETES MELLITUS: Status: ACTIVE | Noted: 2019-11-29

## 2019-11-29 PROBLEM — J45.909 ASTHMATIC BRONCHITIS: Status: ACTIVE | Noted: 2019-11-29

## 2019-11-29 PROBLEM — E87.1 HYPONATREMIA: Status: ACTIVE | Noted: 2019-11-29

## 2019-11-29 PROBLEM — I05.9 MITRAL VALVE DISEASE: Status: ACTIVE | Noted: 2019-11-29

## 2019-11-29 PROBLEM — R60.9 DEPENDENT EDEMA: Status: ACTIVE | Noted: 2019-10-27

## 2019-11-29 PROBLEM — Z96.649 STATUS POST HIP REPLACEMENT: Status: ACTIVE | Noted: 2017-04-27

## 2019-11-29 PROBLEM — I11.9 HYPERTENSIVE LEFT VENTRICULAR HYPERTROPHY, WITHOUT HEART FAILURE: Status: ACTIVE | Noted: 2019-11-29

## 2019-12-04 RX ORDER — TAMSULOSIN HYDROCHLORIDE 0.4 MG/1
1 CAPSULE ORAL DAILY
Qty: 30 CAPSULE | Refills: 1 | Status: SHIPPED | OUTPATIENT
Start: 2019-12-04 | End: 2021-11-03

## 2019-12-05 ENCOUNTER — TREATMENT (OUTPATIENT)
Dept: PHYSICAL THERAPY | Facility: CLINIC | Age: 79
End: 2019-12-05

## 2019-12-05 DIAGNOSIS — M25.552 PAIN OF LEFT HIP JOINT: Primary | ICD-10-CM

## 2019-12-05 DIAGNOSIS — Z96.642 PRESENCE OF LEFT ARTIFICIAL HIP JOINT: ICD-10-CM

## 2019-12-05 DIAGNOSIS — T84.031D FEMORAL LOOSENING OF PROSTHETIC LEFT HIP, SUBSEQUENT ENCOUNTER: ICD-10-CM

## 2019-12-05 PROCEDURE — 97140 MANUAL THERAPY 1/> REGIONS: CPT | Performed by: PHYSICAL THERAPIST

## 2019-12-05 PROCEDURE — 97110 THERAPEUTIC EXERCISES: CPT | Performed by: PHYSICAL THERAPIST

## 2019-12-05 NOTE — PROGRESS NOTES
Physical Therapy Daily Progress Note      Patient: Vida Jamison   : 1940  Diagnosis/ICD-10 Code:  Pain of left hip joint [M25.552]  Referring practitioner: Mustapha Gonzales MD  Date of Initial Visit: Type: THERAPY  Noted: 2019  Today's Date: 2019  Patient seen for 7 sessions         Vida Jamison reports: she can now hav full weightbearing through L LE and is excited that she can exercise without fear of hip injury confirmed by imaging she had done for the hip.    Objective   See Exercise, Manual, and Modality Logs for complete treatment.     Assessment/Plan   Pt. Continues to present with forward lean and shortened quadriceps B along with hip flexor tightness in the L LE that responds minimally to manual intervention this date.    Progress per Plan of Care           Timed:         Manual Therapy:    15     mins  44546;     Therapeutic Exercise:    20     mins  65306;     Neuromuscular Galina:        mins  73995;    Therapeutic Activity:          mins  69446;     Gait Training:           mins  04838;     Ultrasound:          mins  01585;    Ionto                                   mins   53513  Self Care                            mins   66912  Canalith Repos                   mins  4209    Un-Timed:  Electrical Stimulation:        mins  59416 ( );  Dry Needling          mins self-pay  Traction          mins 24404  Low Eval          Mins  54293  Mod Eval          Mins  84207  High Eval                            Mins  13315    Timed Treatment:   35   mins   Total Treatment:     45   mins    Maty Bernard PTA  Physical Therapist Assistant License #[id number]

## 2019-12-10 ENCOUNTER — OFFICE VISIT (OUTPATIENT)
Dept: FAMILY MEDICINE CLINIC | Facility: CLINIC | Age: 79
End: 2019-12-10

## 2019-12-10 ENCOUNTER — TREATMENT (OUTPATIENT)
Dept: PHYSICAL THERAPY | Facility: CLINIC | Age: 79
End: 2019-12-10

## 2019-12-10 VITALS
HEIGHT: 63 IN | HEART RATE: 84 BPM | WEIGHT: 148.8 LBS | DIASTOLIC BLOOD PRESSURE: 69 MMHG | SYSTOLIC BLOOD PRESSURE: 145 MMHG | RESPIRATION RATE: 16 BRPM | OXYGEN SATURATION: 99 % | TEMPERATURE: 97.3 F | BODY MASS INDEX: 26.36 KG/M2

## 2019-12-10 DIAGNOSIS — Z96.642 PRESENCE OF LEFT ARTIFICIAL HIP JOINT: ICD-10-CM

## 2019-12-10 DIAGNOSIS — I10 HYPERTENSION, BENIGN: ICD-10-CM

## 2019-12-10 DIAGNOSIS — T84.031D FEMORAL LOOSENING OF PROSTHETIC LEFT HIP, SUBSEQUENT ENCOUNTER: ICD-10-CM

## 2019-12-10 DIAGNOSIS — M25.552 LEFT HIP PAIN: ICD-10-CM

## 2019-12-10 DIAGNOSIS — M25.552 PAIN OF LEFT HIP JOINT: Primary | ICD-10-CM

## 2019-12-10 DIAGNOSIS — R60.9 DEPENDENT EDEMA: Primary | ICD-10-CM

## 2019-12-10 PROCEDURE — 97140 MANUAL THERAPY 1/> REGIONS: CPT | Performed by: PHYSICAL THERAPIST

## 2019-12-10 PROCEDURE — 99213 OFFICE O/P EST LOW 20 MIN: CPT | Performed by: FAMILY MEDICINE

## 2019-12-10 PROCEDURE — 97110 THERAPEUTIC EXERCISES: CPT | Performed by: PHYSICAL THERAPIST

## 2019-12-10 RX ORDER — ORPHENADRINE CITRATE 100 MG/1
100 TABLET, EXTENDED RELEASE ORAL DAILY
Qty: 30 TABLET | Refills: 1 | Status: SHIPPED | OUTPATIENT
Start: 2019-12-10 | End: 2020-02-04 | Stop reason: SDUPTHER

## 2019-12-10 RX ORDER — GABAPENTIN 300 MG/1
300 CAPSULE ORAL 2 TIMES DAILY
Qty: 60 CAPSULE | Refills: 1 | Status: SHIPPED | OUTPATIENT
Start: 2019-12-10 | End: 2020-02-04 | Stop reason: SDUPTHER

## 2019-12-10 NOTE — ASSESSMENT & PLAN NOTE
Stable; Patient saw specialist and was advised he felt it is muscular pain.  I recommended using ice massages and continue with physical therapy.

## 2019-12-10 NOTE — PROGRESS NOTES
Physical Therapy Daily Progress Note      Patient: Vida Jamison   : 1940  Diagnosis/ICD-10 Code:  Pain of left hip joint [M25.552]  Referring practitioner: Mustapha Gonzales MD  Date of Initial Visit: Type: THERAPY  Noted: 2019  Today's Date: 12/10/2019  Patient seen for 8 sessions         Vida Jamison reports: her hip continues to be sore and tight and she feels like she can't quite get her strength back. Pt. States she still has to to rely on her Rolator for ambulation and cannot go with a cane yet due to fatigue.    Objective   See Exercise, Manual, and Modality Logs for complete treatment.   [place] Hip Score  = 46% ability    Assessment/Plan   Pt. Continues to tolerate therex well however due to prolonged lifestyle of forward flexed posture quadriceps and hip flexors are tight and restricted causing gait and posture abnormalities that improve with stretching and exercise. Pt. Continues to benefit from PT for increased tolerance to activity and return to gait with less restrictive AD.    Progress per Plan of Care           Timed:         Manual Therapy:    10     mins  45257;     Therapeutic Exercise:    20     mins  71262;     Neuromuscular Galina:        mins  25024;    Therapeutic Activity:          mins  21181;     Gait Training:           mins  05290;     Ultrasound:          mins  11466;    Ionto                                   mins   40504  Self Care                            mins   16717  Canalith Repos                   mins  4209    Un-Timed:  Electrical Stimulation:         mins  07494 ( );  Dry Needling          mins self-pay  Traction          mins 85718  Low Eval          Mins  91673  Mod Eval          Mins  97636  High Eval                            Mins  52351    Timed Treatment:   30   mins   Total Treatment:     40   mins    Maty Bernard PTA  Physical Therapist Assistant License #[id number]

## 2019-12-10 NOTE — PROGRESS NOTES
Subjective   Vida Jamison is a 79 y.o. female.     Leg Swelling   This is a recurrent problem. The current episode started more than 1 month ago. The problem occurs intermittently. The problem has been gradually improving. Associated symptoms include myalgias. Nothing aggravates the symptoms. The treatment provided moderate relief.   Muscle Pain   This is a chronic problem. The current episode started more than 1 year ago. The problem has been gradually improving since onset. The pain is medium. The symptoms are aggravated by exercise.   Hypertension   This is a chronic problem. The current episode started more than 1 year ago. The problem has been gradually improving since onset. The problem is controlled.        The following portions of the patient's history were reviewed and updated as appropriate: current medications, past family history, past medical history, past social history, past surgical history and problem list.    Family History   Problem Relation Age of Onset   • Ovarian cancer Mother    • Arthritis Mother    • Heart failure Father    • Suicidality Brother    • Heart disease Maternal Aunt    • Cancer Other         malignant neoplasm of gastrointestinal tract- in her 50's   • Arthritis Other    • Cervical cancer Other    • Arthritis Other    • Diabetes Other        Social History     Tobacco Use   • Smoking status: Never Smoker   • Smokeless tobacco: Never Used   Substance Use Topics   • Alcohol use: No   • Drug use: No       Past Surgical History:   Procedure Laterality Date   • BREAST BIOPSY Right 1974    Dr Goins Jr   • CATARACT EXTRACTION W/ INTRAOCULAR LENS IMPLANT      7/20/10-rt. eye-Dr. Leon 7/13/10- Lt. eye-Dr. Leon   • FINGER SURGERY  2001    Revision of Finger joints. Dr. Brumfield w/Drs. Kleinert & Marlon   • HIP ENDOPROSTHESIS Left 05/22/2014    [employer] endoprostehetic replacement of left hip. Dr. Reza Choudhury.   • JOINT REPLACEMENT     • KNEE ARTHROSCOPY Left     [1987] Dr Poon-brandon  medial meniscus [1995] Dr. Schaffer -torn medial meniscus   • LEEP  2000    Biopsy of cervix. for MAJOR-1 by Dr. Knight   • LUMBAR DISCECTOMY  1994    Hemilaminectomy, foraminectomy & discectomy. Dr. Velasquez, L4-L5 w/posterior lateral fusion & screw fixatoin   • LUMBAR LAMINECTOMY  12/19/2011    foraminotomies, medial facetectomies L5-[id number] redo. left L5-S1 lumbar discectomy. [facility]-Dr. De Oliveira- 5 units of blood given to pt.   • POSTERIOR LUMBAR/THORACIC SPINE FUSION  2002    Fusion T-5 through L-1. Had T-11 burst fracture. Recuperated at [facility]   • SPINAL FUSION      Lower Back. 7/11/2011-[facility] - Hardware removal from L3-L5, Dr. De Oliveira surgeon, Dr. Booker [facility]-Fusion of spine at multi-levels 12/14/11 - [facility] - Dr Gomez and Dr. Momin, Anterior approach Spinal Fusion L5-S1 3--[facility]. Dr. De Oliveira and Dr. Dean. Failed posterior fusion with loose instrumentation. L-4 thru L-5. No complications.   • TONSILLECTOMY  1951    Dr Mcginnis   • TOTAL HIP ARTHROPLASTY Right 04/10/2017    [facility]. Dr. Reinoso   • TOTAL KNEE ARTHROPLASTY Left 2002    Dr Sue @ [facility]       Patient Active Problem List   Diagnosis   • Gastroesophageal reflux disease without esophagitis   • Pain in hip region after total hip replacement (CMS/HCC)   • Loose total hip arthroplasty (CMS/[facility])   • Lumbar degenerative disc disease   • Mixed hyperlipidemia   • Myalgia and myositis   • Hypertension, benign   • Peripheral vascular disease (CMS/[facility])   • Periprosthetic fracture around internal prosthetic left hip joint (CMS/[facility])   • Rheumatoid arthritis involving multiple sites with positive rheumatoid factor (CMS/[facility])   • S/P lumbar fusion   • Spinal stenosis of lumbar region   • Carpal tunnel syndrome of right wrist   • Anemia   • Hyperglycemia   • Pain syndrome, chronic   • Left hip pain   • Vitamin D deficiency   • Thrombocythemia (CMS/HCC)   • First degree  atrioventricular block   • Chronic pain syndrome   • Hypotension due to drugs   • Dependent edema   • Advanced directives, counseling/discussion   • Aortic valve regurgitation   • ASCUS (atypical squamous cells of undetermined significance) on Pap smear   • Asthmatic bronchitis   • Elevated diaphragm   • Esophageal hernia   • Family history of diabetes mellitus   • Fusion of spine of lumbar region   • Hypertensive left ventricular hypertrophy, without heart failure   • Hyponatremia   • Mitral valve disease   • Status post hip replacement   • Thrombocytopenia (CMS/HCC)       Current Outpatient Medications on File Prior to Visit   Medication Sig Dispense Refill   • Acetaminophen (TYLENOL) 325 MG capsule 2 (Two) Times a Day.     • amitriptyline (ELAVIL) 75 MG tablet TAKE 1 TABLET AT BEDTIME 90 tablet 0   • aspirin 81 MG EC tablet Take 81 mg by mouth Daily.     • atorvastatin (LIPITOR) 10 MG tablet TAKE 1 TABLET EVERY DAY 90 tablet 0   • Calcium Carbonate-Vitamin D (CALCIUM 600+D) 600-200 MG-UNIT tablet Take 2 tablets by mouth Daily.     • celecoxib (CELEBREX) 200 MG capsule Take 1 capsule by mouth Daily. 90 capsule 3   • diphenhydrAMINE (BENADRYL ALLERGY) 25 mg capsule Take 1 capsule by mouth Daily.     • DULoxetine (CYMBALTA) 30 MG capsule Take 1 capsule by mouth Daily. 90 capsule 3   • Glycerin-Hypromellose- (ARTIFICIAL TEARS) 0.2-0.2-1 % solution ophthalmic solution      • hydroCHLOROthiazide (HYDRODIURIL) 25 MG tablet 2 tabs daily  0   • loratadine (CLARITIN) 10 MG tablet Take 10 mg by mouth Daily.     • montelukast (SINGULAIR) 10 MG tablet TAKE 1 TABLET EVERY DAY 90 tablet 3   • pantoprazole (PROTONIX) 40 MG EC tablet TAKE 1 TABLET DAILY 90 tablet 3   • polyethylene glycol (MIRALAX) packet Take 17 g by mouth Daily.     • tamsulosin (FLOMAX) 0.4 MG capsule 24 hr capsule Take 1 capsule by mouth Daily. 30 capsule 1   • Calcium Carbonate-Vitamin D (CALCIUM 600/VITAMIN D PO)      • docusate sodium (COLACE) 100  "MG capsule Take 100 mg by mouth Daily.     • Psyllium 100 % powder METAMUCIL POWD       No current facility-administered medications on file prior to visit.        Allergies   Allergen Reactions   • Morphine And Related Hallucinations     HALLUCINATIONS         Review of Systems   Cardiovascular: Positive for leg swelling.   Musculoskeletal: Positive for myalgias.       Objective   Visit Vitals  /69 (BP Location: Left arm, Patient Position: Sitting, Cuff Size: Adult)   Pulse 84   Temp 97.3 °F (36.3 °C)   Resp 16   Ht 160 cm (63\")   Wt 67.5 kg (148 lb 12.8 oz)   SpO2 99%   BMI 26.36 kg/m²     Physical Exam   Constitutional: She is oriented to person, place, and time. She appears well-developed and well-nourished. She is cooperative.   HENT:   Head: Normocephalic.   Neck: Trachea normal. Neck supple. Carotid bruit is not present. No thyromegaly present.   Cardiovascular: Normal rate and regular rhythm. Exam reveals no gallop and no friction rub.   No murmur heard.  Pulmonary/Chest: Effort normal and breath sounds normal. No respiratory distress. She has no wheezes. She exhibits no tenderness.   Neurological: She is alert and oriented to person, place, and time.   Skin: Skin is dry. No rash noted. Nails show no clubbing.   Nursing note and vitals reviewed.        Assessment/Plan .  Problem List Items Addressed This Visit        Cardiovascular and Mediastinum    Hypertension, benign    Current Assessment & Plan     Doing well with hctz continue.            Nervous and Auditory    Left hip pain    Current Assessment & Plan     Stable; Patient saw specialist and was advised he felt it is muscular pain.  I recommended using ice massages and continue with physical therapy.            Other    Dependent edema - Primary    Current Assessment & Plan     Improving but will remain on HCTZ.                    "

## 2019-12-12 ENCOUNTER — TREATMENT (OUTPATIENT)
Dept: PHYSICAL THERAPY | Facility: CLINIC | Age: 79
End: 2019-12-12

## 2019-12-12 DIAGNOSIS — M25.552 PAIN OF LEFT HIP JOINT: Primary | ICD-10-CM

## 2019-12-12 DIAGNOSIS — Z96.642 PRESENCE OF LEFT ARTIFICIAL HIP JOINT: ICD-10-CM

## 2019-12-12 DIAGNOSIS — T84.031D FEMORAL LOOSENING OF PROSTHETIC LEFT HIP, SUBSEQUENT ENCOUNTER: ICD-10-CM

## 2019-12-12 PROCEDURE — 97140 MANUAL THERAPY 1/> REGIONS: CPT | Performed by: PHYSICAL THERAPIST

## 2019-12-12 PROCEDURE — 97110 THERAPEUTIC EXERCISES: CPT | Performed by: PHYSICAL THERAPIST

## 2019-12-12 NOTE — PROGRESS NOTES
Physical Therapy Daily Progress Note      Patient: Vida Jamison   : 1940  Diagnosis/ICD-10 Code:  Pain of left hip joint [M25.552]  Referring practitioner: Mustapha Gonzales MD  Date of Initial Visit: Type: THERAPY  Noted: 2019  Today's Date: 2019  Patient seen for 9 sessions         Vida Jamison reports: she is slowly improving and would like to be better soon but states she feels like it is going to take a long time to get better.    Objective   See Exercise, Manual, and Modality Logs for complete treatment.     Assessment/Plan   Pt. Tolerated exercise and stretch well this visit still presenting with severe hip flexor and quadriceps tightness restricting hip mobility due to history of prolonged forward posturing. Pt. Was educated to allow for a proper amount of healing time and to continue stretching as much as possible.    Progress per Plan of Care           Timed:         Manual Therapy:    10     mins  36195;     Therapeutic Exercise:    20     mins  63894;     Neuromuscular Galina:        mins  02498;    Therapeutic Activity:          mins  00068;     Gait Training:           mins  86244;     Ultrasound:          mins  33159;    Ionto                                   mins   18166  Self Care                            mins   50220  Canalith Repos                   mins  4209    Un-Timed:  Electrical Stimulation:         mins  29572 ( );  Dry Needling          mins self-pay  Traction          mins 07812  Low Eval          Mins  96246  Mod Eval          Mins  31393  High Eval                            Mins  10164    Timed Treatment:   30   mins   Total Treatment:     40   mins    Maty Bernard PTA  Physical Therapist Assistant License #[id number]

## 2019-12-17 ENCOUNTER — TREATMENT (OUTPATIENT)
Dept: PHYSICAL THERAPY | Facility: CLINIC | Age: 79
End: 2019-12-17

## 2019-12-17 DIAGNOSIS — T84.031D FEMORAL LOOSENING OF PROSTHETIC LEFT HIP, SUBSEQUENT ENCOUNTER: ICD-10-CM

## 2019-12-17 DIAGNOSIS — Z96.642 PRESENCE OF LEFT ARTIFICIAL HIP JOINT: ICD-10-CM

## 2019-12-17 DIAGNOSIS — M25.552 PAIN OF LEFT HIP JOINT: Primary | ICD-10-CM

## 2019-12-17 PROCEDURE — 97140 MANUAL THERAPY 1/> REGIONS: CPT | Performed by: PHYSICAL THERAPIST

## 2019-12-17 PROCEDURE — 97110 THERAPEUTIC EXERCISES: CPT | Performed by: PHYSICAL THERAPIST

## 2019-12-18 NOTE — PROGRESS NOTES
Physical Therapy Daily Progress Note      Patient: Vida Jamison   : 1940  Diagnosis/ICD-10 Code:  Pain of left hip joint [M25.552]  Referring practitioner: Mustapha Gonzales MD  Date of Initial Visit: Type: THERAPY  Noted: 2019  Today's Date: 2019  Patient seen for 10 sessions         Vida Jamison reports: she is feeling very stiff in the L hip today and states she has still been feeling too weak to transition to cane at home. Pt. Feels like her strength and healing should be going faster than it is.    Objective   See Exercise, Manual, and Modality Logs for complete treatment.     Assessment/Plan  Pt. is still unable to straighten L LE due to hip flexor tightness and tenderness. Pt. Presents with forward bend while walking upright with Rolator. Pt. Is able to ambulate with cane in clinic but states she has fear when she isn't in clinic that she will fatigue and become unstable.    Progress per Plan of Care           Timed:         Manual Therapy:    10     mins  80736;     Therapeutic Exercise:    20     mins  19982;     Neuromuscular Galina:        mins  79354;    Therapeutic Activity:          mins  76983;     Gait Training:           mins  10189;     Ultrasound:          mins  54260;    Ionto                                   mins   44070  Self Care                            mins   88084  Canalith Repos                   mins  4209    Un-Timed:  Electrical Stimulation:         mins  75325 ( );  Dry Needling          mins self-pay  Traction          mins 15022  Low Eval          Mins  31992  Mod Eval          Mins  79422  High Eval                            Mins  40693    Timed Treatment:   30   mins   Total Treatment:     40   mins    Maty Bernard PTA  Physical Therapist Assistant License #[id number]

## 2019-12-19 ENCOUNTER — TREATMENT (OUTPATIENT)
Dept: PHYSICAL THERAPY | Facility: CLINIC | Age: 79
End: 2019-12-19

## 2019-12-19 DIAGNOSIS — M25.552 PAIN OF LEFT HIP JOINT: Primary | ICD-10-CM

## 2019-12-19 DIAGNOSIS — Z96.642 PRESENCE OF LEFT ARTIFICIAL HIP JOINT: ICD-10-CM

## 2019-12-19 DIAGNOSIS — T84.031D FEMORAL LOOSENING OF PROSTHETIC LEFT HIP, SUBSEQUENT ENCOUNTER: ICD-10-CM

## 2019-12-19 PROCEDURE — 97110 THERAPEUTIC EXERCISES: CPT | Performed by: PHYSICAL THERAPIST

## 2019-12-19 PROCEDURE — 97140 MANUAL THERAPY 1/> REGIONS: CPT | Performed by: PHYSICAL THERAPIST

## 2019-12-19 NOTE — PROGRESS NOTES
"     Physical Therapy Daily Progress Note    Patient: Vida Jamison   : 1940  Diagnosis/ICD-10 Code:  Pain of left hip joint [M25.552]  Referring practitioner: Mustapha Gonzales MD  Date of Initial Visit: Type: THERAPY  Noted: 2019  Today's Date: 2019  Patient seen for 11 sessions             Subjective Patient reports she is sore in BLEs today but unsure why.  States she hasn't changed her activity level.  Patient reports frustration she continues to rely on rollator and would like to transition to cane but feels like she is not strong or steady enough yet.  Patient reports her nephew who lives with her had a BKA and is possibly to return home next week.  Patient states she will \"do what she has to\" in order to assist him with his ADLs.     Objective       Passive Range of Motion     Additional Passive Range of Motion Details  L hip extension lacking 30 degrees from neutral    Strength/Myotome Testing     Left Hip   Planes of Motion   Abduction: 3-     See Exercise, Manual, and Modality Logs for complete treatment.       Assessment & Plan       Goals  Plan Goals: STG  Pt I with HEP in 2 weeks - MET  To dec pain in LLE 2-3/10 max in 2-3 weeks - NOT MET    LTG  To dem safe I amb with cane in 6-8 weeks - NOT MET  To improve BLE strength 4/5 or greater grossly in 6-8 weeks - NOT MET  To dec pain LLE 0-1/10 max in 6-8 weeks. - NOT MET      Patient has attended 11 PT visits including initial evaluation.  Patient with slow progress toward goals with 1/2 STGs met and no LTGs currently met.  Patient demonstrates significant limitations in L hip extension ROM due to L hip flexor contracture of 30 degrees, decreased BLE strength, and difficulty with bed mobility, impaired gait mechanics with reliance on rollator at this time.  Patient previously was using SPC for ambulation but requires CGA for safety with gait in the clinic at this time.  Recommending patient continue with OPPT for 2x/week for an additional " 12-16 visits.     Progress per Plan of Care           Timed:         Manual Therapy:   12      mins  58618;     Therapeutic Exercise:    18     mins  60609;     Neuromuscular Galina:        mins  62409;    Therapeutic Activity:          mins  40209;     Gait Training:           mins  04126;     Ultrasound:          mins  49354;    Ionto                                   mins   58357  Self Care                            mins   11745      Un-Timed:  Electrical Stimulation:         mins  38489 ( );  Dry Needling          mins self-pay  Traction          mins 46990  Low Eval          Mins  99024  Mod Eval          Mins  57166  High Eval                            Mins  20811    Timed Treatment:   30   mins   Total Treatment:     42   mins    Marielena Eugene PT  [place] License # [id number]  Physical Therapist

## 2019-12-23 ENCOUNTER — TREATMENT (OUTPATIENT)
Dept: PHYSICAL THERAPY | Facility: CLINIC | Age: 79
End: 2019-12-23

## 2019-12-23 DIAGNOSIS — Z96.642 PRESENCE OF LEFT ARTIFICIAL HIP JOINT: ICD-10-CM

## 2019-12-23 DIAGNOSIS — T84.031D FEMORAL LOOSENING OF PROSTHETIC LEFT HIP, SUBSEQUENT ENCOUNTER: ICD-10-CM

## 2019-12-23 DIAGNOSIS — M25.552 PAIN OF LEFT HIP JOINT: Primary | ICD-10-CM

## 2019-12-23 PROCEDURE — 97110 THERAPEUTIC EXERCISES: CPT | Performed by: PHYSICAL THERAPIST

## 2019-12-23 PROCEDURE — 97140 MANUAL THERAPY 1/> REGIONS: CPT | Performed by: PHYSICAL THERAPIST

## 2019-12-23 NOTE — PROGRESS NOTES
Physical Therapy Daily Progress Note      Patient: Vida Jamison   : 1940  Diagnosis/ICD-10 Code:  Pain of left hip joint [M25.552]  Referring practitioner: Mustapha Gonzales MD  Date of Initial Visit: Type: THERAPY  Noted: 2019  Today's Date: 2019  Patient seen for 12 sessions         Vida Jamison reports: she has still not improved as much after this recent operation as she feels she should have. Pt. States she is still having difficulty ambulating with just a cane but is using it as much as possible to get better at this point.    Objective   See Exercise, Manual, and Modality Logs for complete treatment.     Assessment/Plan  Pt. continues to have extreme tightness in hip flexors B however is showing greater tolerance to side lying stretch to the hip flexor and is able to stand straighter standing at TM supported today. Pt. Tolerates supine SLR and marches well today with no complaints of pain. Pt. Progresses well with gait when given VC's to use equal weight through LE's and to stand up straighter.    Progress per Plan of Care           Timed:         Manual Therapy:    12     mins  00327;     Therapeutic Exercise:    31     mins  59282;     Neuromuscular Galina:        mins  47225;    Therapeutic Activity:          mins  75788;     Gait Training:           mins  49998;     Ultrasound:          mins  31855;    Ionto                                   mins   62902  Self Care                            mins   32629  Canalith Repos                   mins  4209    Un-Timed:  Electrical Stimulation:         mins  98398 ( );  Dry Needling          mins self-pay  Traction          mins 32424  Low Eval          Mins  76849  Mod Eval          Mins  05309  High Eval                            Mins  03731    Timed Treatment:   43   mins   Total Treatment:     53   mins    Maty Bernard PTA  Physical Therapist Assistant License #[id number]

## 2019-12-26 ENCOUNTER — TREATMENT (OUTPATIENT)
Dept: PHYSICAL THERAPY | Facility: CLINIC | Age: 79
End: 2019-12-26

## 2019-12-26 DIAGNOSIS — M25.552 PAIN OF LEFT HIP JOINT: Primary | ICD-10-CM

## 2019-12-26 DIAGNOSIS — T84.031D FEMORAL LOOSENING OF PROSTHETIC LEFT HIP, SUBSEQUENT ENCOUNTER: ICD-10-CM

## 2019-12-26 DIAGNOSIS — M54.2 PAIN, NECK: ICD-10-CM

## 2019-12-26 DIAGNOSIS — Z96.642 PRESENCE OF LEFT ARTIFICIAL HIP JOINT: ICD-10-CM

## 2019-12-26 PROCEDURE — 97110 THERAPEUTIC EXERCISES: CPT | Performed by: PHYSICAL THERAPIST

## 2019-12-26 PROCEDURE — 97140 MANUAL THERAPY 1/> REGIONS: CPT | Performed by: PHYSICAL THERAPIST

## 2019-12-26 NOTE — PROGRESS NOTES
Physical Therapy Daily Progress Note      Patient: Vida Jamison   : 1940  Diagnosis/ICD-10 Code:  Pain of left hip joint [M25.552]  Referring practitioner: Mustapha Gonzales MD  Date of Initial Visit: Type: THERAPY  Noted: 2019  Today's Date: 2019  Patient seen for 13 sessions         Vida Jamison reports: She feels she is doing about the same today however she is ambulating with her cane more so reports she might be doing some better.    Objective   See Exercise, Manual, and Modality Logs for complete treatment.     Assessment/Plan   Pt. presents more upright to therapy this visit and ambulating with a cane rather than rolling walker. Pt. Performs all exercise well and is only lacking in hip extension ROM due to tight hip flexors. Pt. Continues to have improved hip flexor however contracture is still noted and neutral is unachievable at this time.    Progress per Plan of Care           Timed:         Manual Therapy:    12     mins  02918;     Therapeutic Exercise:    18     mins  86386;     Neuromuscular Galina:        mins  11719;    Therapeutic Activity:          mins  76164;     Gait Training:           mins  72282;     Ultrasound:          mins  26380;    Ionto                                   mins   17188  Self Care                            mins   01636  Canalith Repos                   mins  4209    Un-Timed:  Electrical Stimulation:         mins  55643 ( );  Dry Needling          mins self-pay  Traction          mins 74560  Low Eval          Mins  25902  Mod Eval          Mins  37247  High Eval                            Mins  09044    Timed Treatment:   30   mins   Total Treatment:     45   mins    Maty Bernard PTA  Physical Therapist Assistant License #[id number]

## 2019-12-30 ENCOUNTER — TREATMENT (OUTPATIENT)
Dept: PHYSICAL THERAPY | Facility: CLINIC | Age: 79
End: 2019-12-30

## 2019-12-30 DIAGNOSIS — T84.031D FEMORAL LOOSENING OF PROSTHETIC LEFT HIP, SUBSEQUENT ENCOUNTER: Primary | ICD-10-CM

## 2019-12-30 DIAGNOSIS — M25.552 PAIN OF LEFT HIP JOINT: ICD-10-CM

## 2019-12-30 DIAGNOSIS — Z96.642 PRESENCE OF LEFT ARTIFICIAL HIP JOINT: ICD-10-CM

## 2019-12-30 PROCEDURE — 97140 MANUAL THERAPY 1/> REGIONS: CPT | Performed by: PHYSICAL THERAPIST

## 2019-12-30 PROCEDURE — 97110 THERAPEUTIC EXERCISES: CPT | Performed by: PHYSICAL THERAPIST

## 2019-12-30 NOTE — PROGRESS NOTES
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    Objective   See Exercise, Manual, and Modality Logs for complete treatment.     Assessment/Plan   Pt. Shows greater ability to stand up straighter however still very abnormal gait and posturing due to forward lean and hip flexor tightness. Pt. Shows improved tolerance to hip flexor stretch this date showing greater range before reports of tension. Pt. Requires frequent cues to stand straight while walking     Progress per Plan of Care           Timed:         Manual Therapy:    10     mins  93120;     Therapeutic Exercise:    20     mins  16437;     Neuromuscular Galina:        mins  54557;    Therapeutic Activity:          mins  24178;     Gait Training:           mins  79747;     Ultrasound:          mins  52697;    Ionto                                   mins   21835  Self Care                            mins   40029  Canalith Repos                   mins  4209    Un-Timed:  Electrical Stimulation:         mins  20662 ( );  Dry Needling          mins self-pay  Traction          mins 45411  Low Eval          Mins  83955  Mod Eval          Mins  06532  High Eval                            Mins  44006    Timed Treatment:   30   mins   Total Treatment:     40   mins    Maty Bernard PTA  Physical Therapist Assistant License #[id number]

## 2020-01-02 ENCOUNTER — TREATMENT (OUTPATIENT)
Dept: PHYSICAL THERAPY | Facility: CLINIC | Age: 80
End: 2020-01-02

## 2020-01-02 DIAGNOSIS — T84.031D FEMORAL LOOSENING OF PROSTHETIC LEFT HIP, SUBSEQUENT ENCOUNTER: Primary | ICD-10-CM

## 2020-01-02 DIAGNOSIS — Z96.642 PRESENCE OF LEFT ARTIFICIAL HIP JOINT: ICD-10-CM

## 2020-01-02 DIAGNOSIS — M25.552 PAIN OF LEFT HIP JOINT: ICD-10-CM

## 2020-01-02 PROCEDURE — 97140 MANUAL THERAPY 1/> REGIONS: CPT | Performed by: PHYSICAL THERAPIST

## 2020-01-02 PROCEDURE — 97110 THERAPEUTIC EXERCISES: CPT | Performed by: PHYSICAL THERAPIST

## 2020-01-02 NOTE — PROGRESS NOTES
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    Objective   See Exercise, Manual, and Modality Logs for complete treatment.     Assessment/Plan   Pt. continues to improve with stretching and overpressure however L hip has very limited range in the hip flexor and quadriceps musculature. Pt. Presents to walk more upright with cane however still has severely forward flexed posture and R knee valgus. Pt. Continues to benefit from manual intervention and exercise.    Progress per Plan of Care           Timed:         Manual Therapy:    11     mins  37793;     Therapeutic Exercise:    19     mins  21331;     Neuromuscular Galina:        mins  42887;    Therapeutic Activity:          mins  35051;     Gait Training:           mins  37737;     Ultrasound:          mins  70276;    Ionto                                   mins   72830  Self Care                            mins   85110  Canalith Repos                   mins  4209    Un-Timed:  Electrical Stimulation:         mins  23304 ( );  Dry Needling          mins self-pay  Traction          mins 96631  Low Eval          Mins  27016  Mod Eval          Mins  54858  High Eval                            Mins  61174    Timed Treatment:   30   mins   Total Treatment:     45   mins    Maty Bernard PTA  Physical Therapist Assistant License #[id number]

## 2020-01-07 ENCOUNTER — TREATMENT (OUTPATIENT)
Dept: PHYSICAL THERAPY | Facility: CLINIC | Age: 80
End: 2020-01-07

## 2020-01-07 DIAGNOSIS — M25.552 PAIN OF LEFT HIP JOINT: ICD-10-CM

## 2020-01-07 DIAGNOSIS — T84.031D FEMORAL LOOSENING OF PROSTHETIC LEFT HIP, SUBSEQUENT ENCOUNTER: Primary | ICD-10-CM

## 2020-01-07 DIAGNOSIS — Z96.642 PRESENCE OF LEFT ARTIFICIAL HIP JOINT: ICD-10-CM

## 2020-01-07 PROCEDURE — 97140 MANUAL THERAPY 1/> REGIONS: CPT | Performed by: PHYSICAL THERAPIST

## 2020-01-07 PROCEDURE — 97110 THERAPEUTIC EXERCISES: CPT | Performed by: PHYSICAL THERAPIST

## 2020-01-07 NOTE — PROGRESS NOTES
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  She cancelled her therapy session for Thursday to see ortho MD concerning R knee pain.  She also transitioned back to the walker to help with pain relief for the R knee but believes her L hip is improving.     Objective   See Exercise, Manual, and Modality Logs for complete treatment.       Assessment & Plan       Goals  Plan Goals: STG  Pt I with HEP in 2 weeks MET  To dec pain in LLE 2-3/10 max in 2-3 weeks NOT MET  LTG  To dem safe I amb with cane in 6-8 weeks NOT MET  To improve BLE strength 4/5 or greater grossly in 6-8 weeks NOT MET  To dec pain LLE 0-1/10 max in 6-8 weeks.  NOT MET      Patient demonstrated improved L hip flexor extensibility today with ability to tolerate increased hip extension with standing and manual stretches.  Patient reported no pain at L hip with exercise but required increased rest breaks today due to R knee pain.   Cold pack applied to R knee at end of session for pain relief.          Other: Patient to be placed on hold until she has appointment with MD concerning R knee pain           Timed:         Manual Therapy:    12     mins  07026;     Therapeutic Exercise:    20     mins  23057;     Neuromuscular Galina:        mins  65407;    Therapeutic Activity:          mins  22955;     Gait Training:           mins  49262;     Ultrasound:          mins  22406;    Ionto                                   mins   89362  Self Care                            mins   68715      Un-Timed:  Electrical Stimulation:         mins  98197 ( );  Dry  Needling          mins self-pay  Traction          mins 41051  Low Eval          Mins  92310  Mod Eval          Mins  18548  High Eval                            Mins  67356    Timed Treatment:   32   mins   Total Treatment:     47   mins    Marielena Eugene PT  [place] License # [id number]  Physical Therapist

## 2020-01-14 ENCOUNTER — TREATMENT (OUTPATIENT)
Dept: PHYSICAL THERAPY | Facility: CLINIC | Age: 80
End: 2020-01-14

## 2020-01-14 DIAGNOSIS — T84.031D FEMORAL LOOSENING OF PROSTHETIC LEFT HIP, SUBSEQUENT ENCOUNTER: Primary | ICD-10-CM

## 2020-01-14 DIAGNOSIS — M25.552 PAIN OF LEFT HIP JOINT: ICD-10-CM

## 2020-01-14 DIAGNOSIS — Z96.642 PRESENCE OF LEFT ARTIFICIAL HIP JOINT: ICD-10-CM

## 2020-01-14 PROCEDURE — 97140 MANUAL THERAPY 1/> REGIONS: CPT | Performed by: PHYSICAL THERAPIST

## 2020-01-14 PROCEDURE — 97110 THERAPEUTIC EXERCISES: CPT | Performed by: PHYSICAL THERAPIST

## 2020-01-14 NOTE — PROGRESS NOTES
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  Patient states her cane slipped on the wet ground and she fell onto her R knee.  Patient reports her knee was painful for a few days with mild soreness remaining but denies serious injury.  Also states her L hip feels fine.      Objective   See Exercise, Manual, and Modality Logs for complete treatment.       Assessment/Plan Exercises not progressed today per increased discomfort at R knee due to recent fall.  Patient continues to demonstrate improvements in L hip flexor extensibility.  Cold pack applied to R knee at end of session for pain relief.       Progress per Plan of Care           Timed:         Manual Therapy:    12     mins  37959;     Therapeutic Exercise:    16     mins  50550;     Neuromuscular Galina:        mins  72240;    Therapeutic Activity:          mins  53640;     Gait Training:           mins  37070;     Ultrasound:          mins  75555;    Ionto                                   mins   50943  Self Care                            mins   92642      Un-Timed:  Electrical Stimulation:         mins  67905 ( );  Dry Needling          mins self-pay  Traction          mins 63058  Low Eval          Mins  30312  Mod Eval          Mins  26098  High Eval                            Mins  20845    Timed Treatment:   28   mins   Total Treatment:     43   mins    Marielena Eugene PT  [place] License # [id number]  Physical Therapist

## 2020-01-16 ENCOUNTER — TREATMENT (OUTPATIENT)
Dept: PHYSICAL THERAPY | Facility: CLINIC | Age: 80
End: 2020-01-16

## 2020-01-16 DIAGNOSIS — M25.552 PAIN OF LEFT HIP JOINT: ICD-10-CM

## 2020-01-16 DIAGNOSIS — T84.031D FEMORAL LOOSENING OF PROSTHETIC LEFT HIP, SUBSEQUENT ENCOUNTER: Primary | ICD-10-CM

## 2020-01-16 DIAGNOSIS — Z96.642 PRESENCE OF LEFT ARTIFICIAL HIP JOINT: ICD-10-CM

## 2020-01-16 PROCEDURE — 97110 THERAPEUTIC EXERCISES: CPT | Performed by: PHYSICAL THERAPIST

## 2020-01-16 PROCEDURE — 97140 MANUAL THERAPY 1/> REGIONS: CPT | Performed by: PHYSICAL THERAPIST

## 2020-01-16 NOTE — PROGRESS NOTES
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  Patient reports her L hip continues to improve but feels most limited at this time due to her recent fall.    Subjective Questionnaire: Oxford hip 64.5% function  Clinical Progress: improved  Home Program Compliance: Yes  Treatment has included: therapeutic exercise, neuromuscular re-education, manual therapy, therapeutic activity, gait training, electrical stimulation, moist heat and cryotherapy      Objective       Active Range of Motion     Additional Active Range of Motion Details  L hip flexion AROM to approximately 90 degrees    Passive Range of Motion     Additional Passive Range of Motion Details  L hip flexion PROM to approximately 105 degrees  L hip extension to lacking 7 degrees from neutral when measured in supine     Strength/Myotome Testing     Left Hip   Planes of Motion   Flexion: 3+  Abduction: 3+    Right Hip   Planes of Motion   Flexion: 4  Abduction: 4    Left Knee   Flexion: 4  Extension: 4    Right Knee   Flexion: 4  Extension: 4+     See Exercise, Manual, and Modality Logs for complete treatment.       Assessment & Plan     Assessment  Assessment details: Patient has attended 17 PT sessions with slow, but steady progress towards goals.  Patient demonstrates improvements in L hip flexor extensibility with improved hip extension noted.  Patient also demonstrates increased L hip strength but limitations continue to be evident in abduction and flexion, as well as L knee strength.  Patient has returned to driving and is able to perform household chores but requires  increased time.  Patient with one fall in the past week while using a cane on wet ground.  Encouraged patient to use rollator with wet weather conditions due to increased fall risk but safe to use SPC at other times .  Patient verbalized understanding.  Recommending patient continue with OPPT for 2x/week for an additional 4-5 weeks for up to 10 visits.      Interventions: gait, manual therapy, neuro re-ed, therapeutic exercise, estim, moist heat, cryotherapy, therapeutic activity, balance, strengthening, flexibility, functional ROM    Goals  Plan Goals: STG  Pt I with HEP in 2 weeks MET  To dec pain in LLE 2-3/10 max in 2-3 weeks MET    LTG  To dem safe I amb with cane in 6-8 weeks NOT MET  To improve BLE strength 4/5 or greater grossly in 6-8 weeks NOT MET  To dec pain LLE 0-1/10 max in 6-8 weeks. MET    Progress toward previous goals: Partially Met      Recommendations: Continue as planned  Timeframe: 1 month  Prognosis to achieve goals: good    PT Signature: Marielena Eugene, PT      Based upon review of the patient's progress and continued therapy plan, it is my medical opinion that Vida Jamison should continue physical therapy treatment at [facility]  [address] [place]  [place] [address] [phone].    Signature: __________________________________  Mustapha Gonzales MD    Timed:         Manual Therapy:    12     mins  52855;     Therapeutic Exercise:    32     mins  21091;     Neuromuscular Galina:        mins  52903;    Therapeutic Activity:          mins  46330;     Gait Training:           mins  87295;     Ultrasound:          mins  37614;    Ionto                                   mins   36955  Self Care                            mins   49128    Un-Timed:  Electrical Stimulation:         mins  99587 ( );  Dry Needling          mins self-pay  Traction          mins 35512  Low Eval          Mins  03674  Mod Eval          Mins  49366  High Eval                             Mins  69917  Re-Eval                               mins  98496      Timed Treatment:   44   mins   Total Treatment:     59   mins

## 2020-01-21 ENCOUNTER — TREATMENT (OUTPATIENT)
Dept: PHYSICAL THERAPY | Facility: CLINIC | Age: 80
End: 2020-01-21

## 2020-01-21 DIAGNOSIS — Z96.642 PRESENCE OF LEFT ARTIFICIAL HIP JOINT: ICD-10-CM

## 2020-01-21 DIAGNOSIS — M25.552 PAIN OF LEFT HIP JOINT: ICD-10-CM

## 2020-01-21 DIAGNOSIS — T84.031D FEMORAL LOOSENING OF PROSTHETIC LEFT HIP, SUBSEQUENT ENCOUNTER: Primary | ICD-10-CM

## 2020-01-21 PROCEDURE — 97110 THERAPEUTIC EXERCISES: CPT | Performed by: PHYSICAL THERAPIST

## 2020-01-21 NOTE — PROGRESS NOTES
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  Patient states her L hip continues to improve.     Objective   See Exercise, Manual, and Modality Logs for complete treatment.       Assessment/Plan Progressed mobility exercises with addition of seated hamstring stretch and calf stretch at incline with patient reporting tightness at R posterior LE.  Cued to perform within tolerable ROM to minimize soreness or increase in pain. Moist heat applied to low back and posterior LEs at end of session with decreased soreness reported after.    Progress per Plan of Care           Timed:         Manual Therapy:         mins  37264;     Therapeutic Exercise:    34     mins  78458;     Neuromuscular Galina:        mins  38304;    Therapeutic Activity:          mins  50898;     Gait Training:           mins  57999;     Ultrasound:          mins  34764;    Ionto                                   mins   39200  Self Care                            mins   80039      Un-Timed:  Electrical Stimulation:         mins  67274 ( );  Dry Needling          mins self-pay  Traction          mins 92985  Low Eval          Mins  94430  Mod Eval          Mins  00563  High Eval                            Mins  58845    Timed Treatment:   34   mins   Total Treatment:     49   mins    Marielena Eugene PT  [place] License # [id number]  Physical Therapist

## 2020-01-23 ENCOUNTER — TREATMENT (OUTPATIENT)
Dept: PHYSICAL THERAPY | Facility: CLINIC | Age: 80
End: 2020-01-23

## 2020-01-23 ENCOUNTER — RESULTS ENCOUNTER (OUTPATIENT)
Dept: FAMILY MEDICINE CLINIC | Facility: CLINIC | Age: 80
End: 2020-01-23

## 2020-01-23 DIAGNOSIS — R73.9 HYPERGLYCEMIA: ICD-10-CM

## 2020-01-23 DIAGNOSIS — M25.552 PAIN OF LEFT HIP JOINT: ICD-10-CM

## 2020-01-23 DIAGNOSIS — T84.031D FEMORAL LOOSENING OF PROSTHETIC LEFT HIP, SUBSEQUENT ENCOUNTER: Primary | ICD-10-CM

## 2020-01-23 DIAGNOSIS — Z96.642 PRESENCE OF LEFT ARTIFICIAL HIP JOINT: ICD-10-CM

## 2020-01-23 DIAGNOSIS — E78.2 MIXED HYPERLIPIDEMIA: ICD-10-CM

## 2020-01-23 DIAGNOSIS — D63.8 ANEMIA IN OTHER CHRONIC DISEASES CLASSIFIED ELSEWHERE: ICD-10-CM

## 2020-01-23 PROCEDURE — 97110 THERAPEUTIC EXERCISES: CPT | Performed by: PHYSICAL THERAPIST

## 2020-01-23 NOTE — PROGRESS NOTES
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 Pt. Reports her R side continues to be uncomfortable to walk on.    Objective   See Exercise, Manual, and Modality Logs for complete treatment.     Assessment/Plan   Pt. Tolerates exercise well this date however has discomfort with side lying and supine position exercises so standing and seated exercises were performed while others were held to avoid pain. Pt. Reports the back of her R knee continues to remain sore at all times.    Progress per Plan of Care           Timed:         Manual Therapy:         mins  60343;     Therapeutic Exercise:    26     mins  96833;     Neuromuscular Galina:        mins  69276;    Therapeutic Activity:          mins  99559;     Gait Training:           mins  04048;     Ultrasound:          mins  12298;    Ionto                                   mins   15597  Self Care                            mins   40736  Canalith Repos                   mins  4209    Un-Timed:  Electrical Stimulation:         mins  02421 ( );  Dry Needling          mins self-pay  Traction          mins 24414  Low Eval          Mins  58818  Mod Eval          Mins  78793  High Eval                            Mins  35108    Timed Treatment:   26   mins   Total Treatment:     36   mins    Maty Bernard PTA  Physical Therapist Assistant License #[id number]

## 2020-01-24 LAB
ALBUMIN SERPL-MCNC: 4.2 G/DL (ref 3.7–4.7)
ALBUMIN/GLOB SERPL: 1.6 {RATIO} (ref 1.2–2.2)
ALP SERPL-CCNC: 130 IU/L (ref 39–117)
ALT SERPL-CCNC: 9 IU/L (ref 0–32)
AST SERPL-CCNC: 19 IU/L (ref 0–40)
BASOPHILS # BLD AUTO: 0 X10E3/UL (ref 0–0.2)
BASOPHILS NFR BLD AUTO: 1 %
BILIRUB SERPL-MCNC: 0.3 MG/DL (ref 0–1.2)
BUN SERPL-MCNC: 16 MG/DL (ref 8–27)
BUN/CREAT SERPL: 20 (ref 12–28)
CALCIUM SERPL-MCNC: 9.2 MG/DL (ref 8.7–10.3)
CHLORIDE SERPL-SCNC: 97 MMOL/L (ref 96–106)
CHOLEST SERPL-MCNC: 159 MG/DL (ref 100–199)
CHOLEST/HDLC SERPL: 3.7 RATIO (ref 0–4.4)
CO2 SERPL-SCNC: 25 MMOL/L (ref 20–29)
CREAT SERPL-MCNC: 0.79 MG/DL (ref 0.57–1)
EOSINOPHIL # BLD AUTO: 0.3 X10E3/UL (ref 0–0.4)
EOSINOPHIL NFR BLD AUTO: 3 %
ERYTHROCYTE [DISTWIDTH] IN BLOOD BY AUTOMATED COUNT: 16.4 % (ref 11.7–15.4)
GLOBULIN SER CALC-MCNC: 2.6 G/DL (ref 1.5–4.5)
GLUCOSE SERPL-MCNC: 86 MG/DL (ref 65–99)
HBA1C MFR BLD: 5.8 % (ref 4.8–5.6)
HCT VFR BLD AUTO: 36.2 % (ref 34–46.6)
HDLC SERPL-MCNC: 43 MG/DL
HGB BLD-MCNC: 11 G/DL (ref 11.1–15.9)
IMM GRANULOCYTES # BLD AUTO: 0 X10E3/UL (ref 0–0.1)
IMM GRANULOCYTES NFR BLD AUTO: 0 %
LDLC SERPL CALC-MCNC: 86 MG/DL (ref 0–99)
LYMPHOCYTES # BLD AUTO: 1.2 X10E3/UL (ref 0.7–3.1)
LYMPHOCYTES NFR BLD AUTO: 16 %
MCH RBC QN AUTO: 24.6 PG (ref 26.6–33)
MCHC RBC AUTO-ENTMCNC: 30.4 G/DL (ref 31.5–35.7)
MCV RBC AUTO: 81 FL (ref 79–97)
MONOCYTES # BLD AUTO: 0.5 X10E3/UL (ref 0.1–0.9)
MONOCYTES NFR BLD AUTO: 6 %
NEUTROPHILS # BLD AUTO: 5.6 X10E3/UL (ref 1.4–7)
NEUTROPHILS NFR BLD AUTO: 74 %
PLATELET # BLD AUTO: 421 X10E3/UL (ref 150–450)
POTASSIUM SERPL-SCNC: 4.7 MMOL/L (ref 3.5–5.2)
PROT SERPL-MCNC: 6.8 G/DL (ref 6–8.5)
RBC # BLD AUTO: 4.48 X10E6/UL (ref 3.77–5.28)
SODIUM SERPL-SCNC: 139 MMOL/L (ref 134–144)
TRIGL SERPL-MCNC: 149 MG/DL (ref 0–149)
VLDLC SERPL CALC-MCNC: 30 MG/DL (ref 5–40)
WBC # BLD AUTO: 7.7 X10E3/UL (ref 3.4–10.8)

## 2020-01-28 ENCOUNTER — OFFICE VISIT (OUTPATIENT)
Dept: FAMILY MEDICINE CLINIC | Facility: CLINIC | Age: 80
End: 2020-01-28

## 2020-01-28 VITALS
OXYGEN SATURATION: 98 % | BODY MASS INDEX: 26.79 KG/M2 | SYSTOLIC BLOOD PRESSURE: 128 MMHG | HEIGHT: 63 IN | RESPIRATION RATE: 16 BRPM | DIASTOLIC BLOOD PRESSURE: 64 MMHG | HEART RATE: 78 BPM | WEIGHT: 151.2 LBS | TEMPERATURE: 97.8 F

## 2020-01-28 DIAGNOSIS — R74.8 ELEVATED ALKALINE PHOSPHATASE LEVEL: ICD-10-CM

## 2020-01-28 DIAGNOSIS — M25.561 ACUTE PAIN OF RIGHT KNEE: ICD-10-CM

## 2020-01-28 DIAGNOSIS — E78.2 MIXED HYPERLIPIDEMIA: ICD-10-CM

## 2020-01-28 DIAGNOSIS — D50.8 OTHER IRON DEFICIENCY ANEMIA: ICD-10-CM

## 2020-01-28 DIAGNOSIS — R73.9 HYPERGLYCEMIA: ICD-10-CM

## 2020-01-28 DIAGNOSIS — I10 HYPERTENSION, BENIGN: ICD-10-CM

## 2020-01-28 PROCEDURE — 99214 OFFICE O/P EST MOD 30 MIN: CPT | Performed by: FAMILY MEDICINE

## 2020-01-28 RX ORDER — ENALAPRIL MALEATE 10 MG/1
10 TABLET ORAL DAILY
COMMUNITY
End: 2020-04-06

## 2020-01-29 PROBLEM — R74.8 ELEVATED ALKALINE PHOSPHATASE LEVEL: Status: ACTIVE | Noted: 2020-01-29

## 2020-01-29 PROBLEM — M25.561 ACUTE PAIN OF RIGHT KNEE: Status: ACTIVE | Noted: 2020-01-29

## 2020-01-29 PROBLEM — D50.9 IRON DEFICIENCY ANEMIA: Status: ACTIVE | Noted: 2020-01-29

## 2020-01-29 LAB
BASOPHILS # BLD AUTO: 0 X10E3/UL (ref 0–0.2)
BASOPHILS NFR BLD AUTO: 1 %
EOSINOPHIL # BLD AUTO: 0.2 X10E3/UL (ref 0–0.4)
EOSINOPHIL NFR BLD AUTO: 3 %
ERYTHROCYTE [DISTWIDTH] IN BLOOD BY AUTOMATED COUNT: 16.7 % (ref 11.7–15.4)
FERRITIN SERPL-MCNC: 29 NG/ML (ref 15–150)
HCT VFR BLD AUTO: 30.9 % (ref 34–46.6)
HGB BLD-MCNC: 9.7 G/DL (ref 11.1–15.9)
IMM GRANULOCYTES # BLD AUTO: 0 X10E3/UL (ref 0–0.1)
IMM GRANULOCYTES NFR BLD AUTO: 0 %
IRON SATN MFR SERPL: 9 % (ref 15–55)
IRON SERPL-MCNC: 36 UG/DL (ref 27–139)
LYMPHOCYTES # BLD AUTO: 1 X10E3/UL (ref 0.7–3.1)
LYMPHOCYTES NFR BLD AUTO: 17 %
MCH RBC QN AUTO: 24.3 PG (ref 26.6–33)
MCHC RBC AUTO-ENTMCNC: 31.4 G/DL (ref 31.5–35.7)
MCV RBC AUTO: 77 FL (ref 79–97)
MONOCYTES # BLD AUTO: 0.4 X10E3/UL (ref 0.1–0.9)
MONOCYTES NFR BLD AUTO: 7 %
NEUTROPHILS # BLD AUTO: 4.3 X10E3/UL (ref 1.4–7)
NEUTROPHILS NFR BLD AUTO: 72 %
PLATELET # BLD AUTO: 433 X10E3/UL (ref 150–450)
RBC # BLD AUTO: 3.99 X10E6/UL (ref 3.77–5.28)
RETICS/RBC NFR AUTO: 0.6 % (ref 0.6–2.6)
TIBC SERPL-MCNC: 379 UG/DL (ref 250–450)
UIBC SERPL-MCNC: 343 UG/DL (ref 118–369)
WBC # BLD AUTO: 6 X10E3/UL (ref 3.4–10.8)

## 2020-01-30 ENCOUNTER — TREATMENT (OUTPATIENT)
Dept: PHYSICAL THERAPY | Facility: CLINIC | Age: 80
End: 2020-01-30

## 2020-01-30 DIAGNOSIS — T84.031D FEMORAL LOOSENING OF PROSTHETIC LEFT HIP, SUBSEQUENT ENCOUNTER: Primary | ICD-10-CM

## 2020-01-30 DIAGNOSIS — M54.2 PAIN, NECK: ICD-10-CM

## 2020-01-30 DIAGNOSIS — Z96.642 PRESENCE OF LEFT ARTIFICIAL HIP JOINT: ICD-10-CM

## 2020-01-30 DIAGNOSIS — M25.552 PAIN OF LEFT HIP JOINT: ICD-10-CM

## 2020-01-30 PROCEDURE — 97110 THERAPEUTIC EXERCISES: CPT | Performed by: PHYSICAL THERAPIST

## 2020-01-30 RX ORDER — AMITRIPTYLINE HYDROCHLORIDE 75 MG/1
TABLET, FILM COATED ORAL
Qty: 90 TABLET | Refills: 0 | Status: SHIPPED | OUTPATIENT
Start: 2020-01-30 | End: 2020-03-30 | Stop reason: SDUPTHER

## 2020-01-30 NOTE — PROGRESS NOTES
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  Not having any hip pain.  Still some weakness but plans to continue with aquatic exercise and HEP.  Did have a recent fall on R knee and is wearing ace wrap to help.      Objective Pt amb with 4 wheel rolling walker.  Able to complete ex as tolerated.  Applied ice to R knee this visit but removed after 5 minutes per pt request.    See Exercise, Manual, and Modality Logs for complete treatment.     Goals  Plan Goals: STG  Pt I with HEP in 2 weeks MET  To dec pain in LLE 2-3/10 max in 2-3 weeks MET    LTG  To dem safe I amb with cane in 6-8 weeks NOT MET  To improve BLE strength 4/5 or greater grossly in 6-8 weeks NOT MET  To dec pain LLE 0-1/10 max in 6-8 weeks. MET     Assessment/Plan Pt prepared for DC and plans to continue with exercise at [facility] and home.               Timed:         Manual Therapy:    5     mins  08427;     Therapeutic Exercise:    35     mins  93497;     Neuromuscular Galina:        mins  43934;    Therapeutic Activity:          mins  87683;     Gait Training:           mins  49503;     Ultrasound:          mins  46962;    Ionto                                   mins   20293  Self Care                            mins   15798  Canalith Repos                   mins  4209    Un-Timed:  Electrical Stimulation:         mins  17493 ( );  Dry Needling          mins self-pay  Traction          mins 98337  Low Eval          Mins  86775  Mod Eval          Mins  23210  High Eval                            Mins  88727    Timed Treatment:   40    mins   Total Treatment:     50   mins    Domitila Jackson, PT  Physical Therapist

## 2020-02-04 DIAGNOSIS — M51.36 LUMBAR DEGENERATIVE DISC DISEASE: Primary | ICD-10-CM

## 2020-02-04 RX ORDER — ORPHENADRINE CITRATE 100 MG/1
100 TABLET, EXTENDED RELEASE ORAL DAILY
Qty: 30 TABLET | Refills: 1 | Status: SHIPPED | OUTPATIENT
Start: 2020-02-04 | End: 2020-04-06 | Stop reason: SDUPTHER

## 2020-02-04 RX ORDER — GABAPENTIN 300 MG/1
300 CAPSULE ORAL 2 TIMES DAILY
Qty: 60 CAPSULE | Refills: 1 | Status: SHIPPED | OUTPATIENT
Start: 2020-02-04 | End: 2020-04-06 | Stop reason: SDUPTHER

## 2020-02-04 NOTE — TELEPHONE ENCOUNTER
PATIENT NEEDS REFILL OF  GABAPENTIN 300MG  AND ORPHANORINE 100MG    SEND TO MAIL ORDER  [facility] mail order and needs 90 days

## 2020-02-11 ENCOUNTER — OFFICE VISIT (OUTPATIENT)
Dept: FAMILY MEDICINE CLINIC | Facility: CLINIC | Age: 80
End: 2020-02-11

## 2020-02-11 VITALS
WEIGHT: 153 LBS | BODY MASS INDEX: 27.11 KG/M2 | SYSTOLIC BLOOD PRESSURE: 118 MMHG | OXYGEN SATURATION: 99 % | HEART RATE: 85 BPM | HEIGHT: 63 IN | DIASTOLIC BLOOD PRESSURE: 60 MMHG | RESPIRATION RATE: 18 BRPM | TEMPERATURE: 98.1 F

## 2020-02-11 DIAGNOSIS — D50.8 OTHER IRON DEFICIENCY ANEMIA: Primary | ICD-10-CM

## 2020-02-11 PROCEDURE — G0439 PPPS, SUBSEQ VISIT: HCPCS | Performed by: FAMILY MEDICINE

## 2020-02-11 PROCEDURE — 99497 ADVNCD CARE PLAN 30 MIN: CPT | Performed by: FAMILY MEDICINE

## 2020-02-11 PROCEDURE — 99212 OFFICE O/P EST SF 10 MIN: CPT | Performed by: FAMILY MEDICINE

## 2020-02-11 RX ORDER — HYDROCODONE BITARTRATE AND ACETAMINOPHEN 5; 325 MG/1; MG/1
TABLET ORAL
COMMUNITY
Start: 2020-01-28 | End: 2020-10-08

## 2020-02-11 RX ORDER — FERROUS SULFATE 325(65) MG
325 TABLET ORAL
COMMUNITY
End: 2021-04-15

## 2020-02-11 RX ORDER — SENNOSIDES 8.6 MG
650 CAPSULE ORAL 2 TIMES DAILY
COMMUNITY

## 2020-02-11 NOTE — PROGRESS NOTES
Subjective   Vida Jamison is a 79 y.o. female.     Anemia   Presents for follow-up visit. There are no compliance problems.  Compliance with medications is % (Ferrous sulfate 325mg).        The following portions of the patient's history were reviewed and updated as appropriate: allergies, current medications, past family history, past medical history, past social history, past surgical history and problem list.        Current Outpatient Medications on File Prior to Visit   Medication Sig Dispense Refill   • acetaminophen (TYLENOL) 650 MG 8 hr tablet Take 650 mg by mouth 2 (Two) Times a Day.     • amitriptyline (ELAVIL) 75 MG tablet TAKE 1 TABLET AT BEDTIME 90 tablet 0   • atorvastatin (LIPITOR) 10 MG tablet TAKE 1 TABLET EVERY DAY 90 tablet 0   • Calcium Carbonate-Vitamin D (CALCIUM 600+D) 600-200 MG-UNIT tablet Take 2 tablets by mouth Daily.     • Calcium Carbonate-Vitamin D (CALCIUM 600/VITAMIN D PO)      • celecoxib (CELEBREX) 200 MG capsule Take 1 capsule by mouth Daily. 90 capsule 3   • diphenhydrAMINE (BENADRYL ALLERGY) 25 mg capsule Take 1 capsule by mouth Daily.     • DULoxetine (CYMBALTA) 30 MG capsule Take 1 capsule by mouth Daily. 90 capsule 3   • enalapril (VASOTEC) 10 MG tablet Take 10 mg by mouth Daily.     • ferrous sulfate 325 (65 FE) MG tablet Take 325 mg by mouth Daily With Breakfast.     • gabapentin (NEURONTIN) 300 MG capsule Take 1 capsule by mouth 2 (Two) Times a Day. 60 capsule 1   • Glycerin-Hypromellose- (ARTIFICIAL TEARS) 0.2-0.2-1 % solution ophthalmic solution      • HYDROcodone-acetaminophen (NORCO) 5-325 MG per tablet      • loratadine (CLARITIN) 10 MG tablet Take 10 mg by mouth Daily.     • metoprolol tartrate (LOPRESSOR) 25 MG tablet Take 25 mg by mouth Daily.     • montelukast (SINGULAIR) 10 MG tablet TAKE 1 TABLET EVERY DAY 90 tablet 3   • orphenadrine (NORFLEX) 100 MG 12 hr tablet Take 1 tablet by mouth Daily. 30 tablet 1   • pantoprazole (PROTONIX) 40 MG EC tablet  "TAKE 1 TABLET DAILY 90 tablet 3   • Psyllium 100 % powder METAMUCIL POWD     • tamsulosin (FLOMAX) 0.4 MG capsule 24 hr capsule Take 1 capsule by mouth Daily. 30 capsule 1     No current facility-administered medications on file prior to visit.        Allergies   Allergen Reactions   • Morphine And Related Hallucinations     HALLUCINATIONS         Review of Systems   HENT: Negative for nosebleeds.    Gastrointestinal: Negative for blood in stool and indigestion.   Genitourinary: Negative for hematuria.       Objective   Visit Vitals  /60 (BP Location: Right arm, Patient Position: Sitting, Cuff Size: Large Adult)   Pulse 85   Temp 98.1 °F (36.7 °C) (Oral)   Resp 18   Ht 160 cm (63\")   Wt 69.4 kg (153 lb)   SpO2 99%   BMI 27.10 kg/m²     Physical Exam   Constitutional: She is oriented to person, place, and time. She appears well-developed and well-nourished. She is cooperative.   HENT:   Head: Normocephalic.   Neck: Trachea normal. Neck supple. Carotid bruit is not present. No thyromegaly present.   Cardiovascular: Normal rate and regular rhythm. Exam reveals no gallop and no friction rub.   No murmur heard.  Pulmonary/Chest: Effort normal and breath sounds normal. No respiratory distress. She has no wheezes. She exhibits no tenderness.   Abdominal: Soft. Normal appearance. There is no tenderness.   Neurological: She is alert and oriented to person, place, and time.   Skin: Skin is dry. No rash noted. Nails show no clubbing.   Nursing note and vitals reviewed.        Assessment/Plan .  Problem List Items Addressed This Visit        Unprioritized    Anemia - Primary    Current Assessment & Plan     Iron deficiency anemia probable secondary to blood loss:  Iron 36 down from 42, Iron saturation 9 down from 11.  Will repeat CBC and retic count in 1 month.  Will follow conservatively.         Relevant Medications    ferrous sulfate 325 (65 FE) MG tablet    Other Relevant Orders    CBC w AUTO Differential    " Reticulocytes

## 2020-02-11 NOTE — PROGRESS NOTES
Subjective   Vida Jamison is a 79 y.o. female.     Anemia   Presents for follow-up visit.        The following portions of the patient's history were reviewed and updated as appropriate: current medications, past family history, past medical history, past social history, past surgical history and problem list.            Current Outpatient Medications on File Prior to Visit   Medication Sig Dispense Refill   • Acetaminophen (TYLENOL) 325 MG capsule 2 (Two) Times a Day.     • amitriptyline (ELAVIL) 75 MG tablet TAKE 1 TABLET AT BEDTIME 90 tablet 0   • aspirin 81 MG EC tablet Take 81 mg by mouth Daily.     • atorvastatin (LIPITOR) 10 MG tablet TAKE 1 TABLET EVERY DAY 90 tablet 0   • Calcium Carbonate-Vitamin D (CALCIUM 600+D) 600-200 MG-UNIT tablet Take 2 tablets by mouth Daily.     • Calcium Carbonate-Vitamin D (CALCIUM 600/VITAMIN D PO)      • celecoxib (CELEBREX) 200 MG capsule Take 1 capsule by mouth Daily. 90 capsule 3   • diphenhydrAMINE (BENADRYL ALLERGY) 25 mg capsule Take 1 capsule by mouth Daily.     • docusate sodium (COLACE) 100 MG capsule Take 100 mg by mouth Daily.     • DULoxetine (CYMBALTA) 30 MG capsule Take 1 capsule by mouth Daily. 90 capsule 3   • enalapril (VASOTEC) 10 MG tablet Take 10 mg by mouth Daily.     • gabapentin (NEURONTIN) 300 MG capsule Take 1 capsule by mouth 2 (Two) Times a Day. 60 capsule 1   • Glycerin-Hypromellose- (ARTIFICIAL TEARS) 0.2-0.2-1 % solution ophthalmic solution      • loratadine (CLARITIN) 10 MG tablet Take 10 mg by mouth Daily.     • metoprolol tartrate (LOPRESSOR) 25 MG tablet Take 25 mg by mouth 2 (Two) Times a Day.     • montelukast (SINGULAIR) 10 MG tablet TAKE 1 TABLET EVERY DAY 90 tablet 3   • orphenadrine (NORFLEX) 100 MG 12 hr tablet Take 1 tablet by mouth Daily. 30 tablet 1   • pantoprazole (PROTONIX) 40 MG EC tablet TAKE 1 TABLET DAILY 90 tablet 3   • polyethylene glycol (MIRALAX) packet Take 17 g by mouth Daily.     • Psyllium 100 % powder  METAMUCIL POWD     • tamsulosin (FLOMAX) 0.4 MG capsule 24 hr capsule Take 1 capsule by mouth Daily. 30 capsule 1     No current facility-administered medications on file prior to visit.        Allergies   Allergen Reactions   • Morphine And Related Hallucinations     HALLUCINATIONS         Review of Systems   HENT: Negative for nosebleeds.    Gastrointestinal: Negative for blood in stool and indigestion.   Genitourinary: Negative for hematuria.       Objective   There were no vitals taken for this visit.  Physical Exam      Assessment/Plan .  Problem List Items Addressed This Visit        Unprioritized    Anemia - Primary

## 2020-02-11 NOTE — PROGRESS NOTES
QUICK REFERENCE INFORMATION:  The ABCs of the Annual Wellness Visit    Medicare visit type: Subsequent     HEALTH RISK ASSESSMENT    : 1940    Recent Hospitalizations:  Recently treated at the following:  Other: [facility]  10/19.    Current Medical Providers:  Patient Care Team:  Mustapha Gonzales MD as PCP - General (Family Medicine)  Mustapha Gonzales MD as PCP - Claims Sam Quezada (General Surgery)  Ulices Sue/MD Johnny as Surgeon (Orthopedic Surgery)    Smoking Status:  Social History     Tobacco Use   Smoking Status Never Smoker   Smokeless Tobacco Never Used       Alcohol Consumption:  Social History     Substance and Sexual Activity   Alcohol Use No       Depression Screen:   PHQ-2/PHQ-9 Depression Screening 2020   Little interest or pleasure in doing things 0   Feeling down, depressed, or hopeless 0   Trouble falling or staying asleep, or sleeping too much 0   Feeling tired or having little energy 0   Poor appetite or overeating 0   Feeling bad about yourself - or that you are a failure or have let yourself or your family down 0   Trouble concentrating on things, such as reading the newspaper or watching television 0   Moving or speaking so slowly that other people could have noticed. Or the opposite - being so fidgety or restless that you have been moving around a lot more than usual 0   Thoughts that you would be better off dead, or of hurting yourself in some way 0   Total Score 0   If you checked off any problems, how difficult have these problems made it for you to do your work, take care of things at home, or get along with other people? Not difficult at all     We spent 5 minutes asking patient questions, counseling and documenting in the chart patients risk of depression.    Health Habits and Functional and Cognitive Screening:  Functional & Cognitive Status 2020   Do you have difficulty preparing food and eating? No   Do you have difficulty bathing  yourself, getting dressed or grooming yourself? No   Do you have difficulty using the toilet? No   Do you have difficulty moving around from place to place? No   Do you have trouble with steps or getting out of a bed or a chair? No   Current Diet Well Balanced Diet   Dental Exam Not up to date   Eye Exam Up to date   Exercise (times per week) 4 times per week   Current Exercise Activities Include Walking   Do you need help using the phone?  No   Are you deaf or do you have serious difficulty hearing?  No   Do you need help with transportation? No   Do you need help shopping? No   Do you need help preparing meals?  No   Do you need help with housework?  No   Do you need help with laundry? No   Do you need help taking your medications? No   Do you need help managing money? No   Do you ever drive or ride in a car without wearing a seat belt? No   Have you felt unusual stress, anger or loneliness in the last month? No   Who do you live with? Other   If you need help, do you have trouble finding someone available to you? No   Have you been bothered in the last four weeks by sexual problems? No   Do you have difficulty concentrating, remembering or making decisions? No       Does the patient have evidence of cognitive impairment? No    Asiprin use counseling: Does not need ASA (and currently is not on it)    Recent Lab Results:    Lab Results   Component Value Date    GLU 86 01/23/2020     Lab Results   Component Value Date    HGBA1C 5.8 (H) 01/23/2020     Lab Results   Component Value Date    CHOL 172 04/16/2019    TRIG 149 01/23/2020    HDL 43 01/23/2020    VLDL 30 01/23/2020       Age-appropriate Screening Schedule:  Refer to the list below for future screening recommendations based on patient's age, sex and/or medical conditions. Orders for these recommended tests are listed in the plan section. The patient has been provided with a written plan.    Health Maintenance   Topic Date Due   • ZOSTER VACCINE (1 of 2)  09/04/1992   • LIPID PANEL  01/23/2021   • TDAP/TD VACCINES (2 - Td) 10/26/2025   • INFLUENZA VACCINE  Completed       Immunizations reviewed and the patient was encouraged to update.  The patient agrees to contact insurance company for coverage of Shingrix.       Subjective   History of Present Illness    Vida Jamison is a 79 y.o. female who presents for an Annual Wellness Visit.  Medicare Wellness:  Pt here today for Subsequent Wellness:    Advanced Directive:  Pt here today to discuss Advanced Directive.      The following portions of the patient's history were reviewed and updated as appropriate: allergies, current medications, past family history, past medical history, past social history, past surgical history and problem list.    Outpatient Medications Prior to Visit   Medication Sig Dispense Refill   • acetaminophen (TYLENOL) 650 MG 8 hr tablet Take 650 mg by mouth 2 (Two) Times a Day.     • amitriptyline (ELAVIL) 75 MG tablet TAKE 1 TABLET AT BEDTIME 90 tablet 0   • atorvastatin (LIPITOR) 10 MG tablet TAKE 1 TABLET EVERY DAY 90 tablet 0   • Calcium Carbonate-Vitamin D (CALCIUM 600+D) 600-200 MG-UNIT tablet Take 2 tablets by mouth Daily.     • Calcium Carbonate-Vitamin D (CALCIUM 600/VITAMIN D PO)      • celecoxib (CELEBREX) 200 MG capsule Take 1 capsule by mouth Daily. 90 capsule 3   • diphenhydrAMINE (BENADRYL ALLERGY) 25 mg capsule Take 1 capsule by mouth Daily.     • DULoxetine (CYMBALTA) 30 MG capsule Take 1 capsule by mouth Daily. 90 capsule 3   • enalapril (VASOTEC) 10 MG tablet Take 10 mg by mouth Daily.     • ferrous sulfate 325 (65 FE) MG tablet Take 325 mg by mouth Daily With Breakfast.     • gabapentin (NEURONTIN) 300 MG capsule Take 1 capsule by mouth 2 (Two) Times a Day. 60 capsule 1   • Glycerin-Hypromellose- (ARTIFICIAL TEARS) 0.2-0.2-1 % solution ophthalmic solution      • HYDROcodone-acetaminophen (NORCO) 5-325 MG per tablet      • loratadine (CLARITIN) 10 MG tablet Take 10 mg by  mouth Daily.     • metoprolol tartrate (LOPRESSOR) 25 MG tablet Take 25 mg by mouth Daily.     • montelukast (SINGULAIR) 10 MG tablet TAKE 1 TABLET EVERY DAY 90 tablet 3   • orphenadrine (NORFLEX) 100 MG 12 hr tablet Take 1 tablet by mouth Daily. 30 tablet 1   • pantoprazole (PROTONIX) 40 MG EC tablet TAKE 1 TABLET DAILY 90 tablet 3   • Psyllium 100 % powder METAMUCIL POWD     • tamsulosin (FLOMAX) 0.4 MG capsule 24 hr capsule Take 1 capsule by mouth Daily. 30 capsule 1   • Acetaminophen (TYLENOL) 325 MG capsule 2 (Two) Times a Day.     • aspirin 81 MG EC tablet Take 81 mg by mouth Daily.     • docusate sodium (COLACE) 100 MG capsule Take 100 mg by mouth Daily.     • polyethylene glycol (MIRALAX) packet Take 17 g by mouth Daily.       No facility-administered medications prior to visit.        Patient Active Problem List   Diagnosis   • Gastroesophageal reflux disease without esophagitis   • Pain in hip region after total hip replacement (CMS/HCC)   • Loose total hip arthroplasty (CMS/HCC)   • Lumbar degenerative disc disease   • Mixed hyperlipidemia   • Myalgia and myositis   • Hypertension, benign   • Peripheral vascular disease (CMS/HCC)   • Periprosthetic fracture around internal prosthetic left hip joint (CMS/HCC)   • Rheumatoid arthritis involving multiple sites with positive rheumatoid factor (CMS/HCC)   • S/P lumbar fusion   • Spinal stenosis of lumbar region   • Carpal tunnel syndrome of right wrist   • Anemia   • Hyperglycemia   • Pain syndrome, chronic   • Left hip pain   • Vitamin D deficiency   • Thrombocythemia (CMS/HCC)   • First degree atrioventricular block   • Chronic pain syndrome   • Hypotension due to drugs   • Dependent edema   • Advanced directives, counseling/discussion   • Aortic valve regurgitation   • ASCUS (atypical squamous cells of undetermined significance) on Pap smear   • Asthmatic bronchitis   • Elevated diaphragm   • Esophageal hernia   • Family history of diabetes mellitus   •  Fusion of spine of lumbar region   • Hypertensive left ventricular hypertrophy, without heart failure   • Hyponatremia   • Mitral valve disease   • Status post hip replacement   • Thrombocytopenia (CMS/HCC)   • Elevated alkaline phosphatase level   • Acute pain of right knee       Advance Care Planning:  ACP discussion was held with the patient during this visit.    Discussion with Patient regarding advanced directives. Advanced Care Planning form discussed at length and reviewed with patient. Patient states she does want CPR. Reviewed medical interventions with patient and the differences between each: Comfort, Limited and Full. Patient opted for Full. Discussed the use of antibiotics at the end of life. She chose to use antibiotics consistent with treatment goals. Discussed artificially administered nutrition, patient is aware that if she is alert and oriented they can change their mind at any time. However, they have elected to have no artificial nutrition. Patient has identified her sister in law  Jannie Diaz as her healthcare representative. Patient encouraged to have a family meeting to discuss her decision regarding advanced care directives and goals of care.    In regard to the POST form:The patient opted to complete POST while in the office and copy scanned into the chart.  We spent 19 minutes discussing Advanced Care Planning information and documenting in the chart.    Identification of Risk Factors:  Risk factors include: Immunizations Discussed/Encouraged (specific immunizations; Shingrix ).    Review of Systems       Compared to one year ago, the patient feels her physical health is the same.  Compared to one year ago, the patient feels her mental health is the same.    Objective     Physical Exam    Vitals:    02/11/20 1456   BP: 118/60   BP Location: Right arm   Patient Position: Sitting   Cuff Size: Large Adult   Pulse: 85   Resp: 18   Temp: 98.1 °F (36.7 °C)   TempSrc: Oral   SpO2: 99%   Weight:  "69.4 kg (153 lb)   Height: 160 cm (63\")   PainSc: 0-No pain       Patient's Body mass index is 27.1 kg/m². BMI is within normal parameters. No follow-up required..      Assessment/Plan   Patient Self-Management and Personalized Health Advice  The patient has been provided with information about: diet, exercise, weight management and designing advance directives and preventive services including:   · Annual Wellness Visit (AWV)  · Depression Screening (15 minutes face to face, Code ).    Visit Diagnoses:  Problem List Items Addressed This Visit        Unprioritized    Anemia - Primary    Current Assessment & Plan     Iron deficiency anemia probable secondary to blood loss:  Iron 36 down from 42, Iron saturation 9 down from 11.  Will repeat CBC and retic count in 1 month.  Will follow conservatively.         Relevant Medications    ferrous sulfate 325 (65 FE) MG tablet    Other Relevant Orders    CBC w AUTO Differential    Reticulocytes        Past Medical History:   Diagnosis Date   • Advanced directives, counseling/discussion     Impression: Discussion w/ pt regarding Advanced directives. POST/Living Will form discussed at length & reviewed with pt. Pt states she does want CPR. Reviewed Medical interventions w/ pt & differences between each Comfort, Limited & Full. Pt opted for full. Discussed use of antibiotics at the end of life, pt chose to use antibiotics consistent w/ treatment goals.   • Allergic    • Allergic contact dermatitis due to plants, except food     Impression: Worsening-probably due to poison ivy. Steroid injection given to patient. If not improving, return to office for re-evaluation.   • Anemia     unspecified type. Impression: worse Hgb down to 10.7 from 11.5 -denies epitaxis, hemoptisis, heartburn, hematura, blood in stool or black tarry stool; Advised to start Iron-OTC 1 a day. she declines more aggressive work up at the present but should have a colonoscopy. she wants her hip fixed 1st "   • Anemia of chronic disease     Impression: resolved   • Aortic valve insufficiency     etiology of cardiac valve disease unspecified. Impression: Echo done 12/2015 - [facility].   • Atherosclerosis of native arteries of extremities with intermittent claudication, unspecified extremity (CMS/HCC)     Impression: New dx. JEROME's done today, read by me, reviewed with pt. JEROME's showed normal on the right and Mild-moderate on the Lt. keep risk factors low and repeat next year.   • Atherosclerotic peripheral vascular disease with intermittent claudication (CMS/HCC)     Impression: Doing well- Follow conservatively.   • Atypical squamous cells of undetermined significance (ASCUS) on Papanicolaou smear of cervix     Impression: Advised to repeat pap smear yearly. she is reluctent due to cost but I advised her it was covered with a G and Q code but she wanted me to gurentee no charge which I cannot promise. I also encouraged breast exam and mammo gram but again she wanted me to gurentee no cost. I advised her she might consider exam with gyn but that they probably would use the same codes   • Breast cancer screening     Impression: agreed to schedule mammo but advised ideally should also have a breast exam - she did not want to schedule at the present   • Cervical cancer screening     Impression: Doing excellent. Follow conservatively.   • Chronic pain syndrome     Impression: Doing well. Continue with Elavil as presently prescribed, Discussed benifits and side effect of medicine. Patient aware of high risk medication. Follow conservatively. Discussed decreasing celexa from 10mg BID to daily.   • Dependent edema     Impression: New dx. Advised patient to elevate lower extremities above the level of the heart as much as possible, patient states she will try as possible. States she probably isn't able to.   • Edema extremities     Impression: mild. Patient to return if symptoms worsen or change. Advised to elevate legs  above level of heart as needed.   • Elevated diaphragm     Impression: New dx. shown on chest xray from 2012, will follow conservatively   • Elevated platelet count     Impression: Improved, Labs done 5-30-17, read by me, reviewed with pt. Platelet count was 402 down from 466   • Esophageal hernia     Impression: Doing well. Follow conservatively.   • First degree atrioventricular block     Impression: Doing excellent. Follow conservatively.   • Gastroesophageal reflux disease without esophagitis     Impression: no sx at present but this could be the cause of her anemia   • Heart murmur     Impression: Stable   • Hyperglycemia     Impression: Worse: hgb a1c 6.0 was 5.6. Encouraged to watch sugar intake, exercise more and lose weight.   • Hypertension, benign     Impression: Doing well; Encouraged to watch salt, exercise more and lose weight   • Hypertensive left ventricular hypertrophy, without heart failure     Impression: Echo done 12/2015 - [facility].   • Hyponatremia     Impression: worse at 134 - repeat with next labs   • Insomnia, persistent     Impression: New dx. Worsening. Advised patient she may try increasing her elavil from 50mg to 75mg. Discussed benefits and side effect of medicine. Patient to return if symptoms worsen or change.   • Irritable bowel syndrome without diarrhea     Impression: Doing well. Follow conservatively.   • Left hip pain     Impression: Worsening; reviewed xray of the left hip with patient. Advised patient that she needs to see here surgeon, Dr. Lane. Patient prefers to call and make her appointment.   • Left knee pain     Impression: Worse, will try to schedule ortho appt. sooner than 10-16-18   • Left leg pain     Impression: Worse - discussed addiction potential of ativan femi with narcotic and muscle relax combination   • Left shoulder pain     Impression: New dx. Left shoulder pain after fall, pt. sent to Xray. Will call pt. with xray results if broken will schedule  pt. with DR. Arredondo or Dr. Fleming. Pt. placed in a small sling.   • Lumbar degenerative disc disease     Story: Spinal fusion done 3/24/10 and 8/12/2011. Impression: Worsening; Offered further testing, patient prefers to hold on this until after hip has been repaired.   • Medicare annual wellness visit, subsequent     with abnormal findings   • Mitral valve disease     Impression: Echo done 12/2015 - [facility].   • Mixed hyperlipidemia     Impression: Labs done 04/16/19 Tot 172 up from 156, HDL 46 down from 47, Trig 152 up from 118,  up from 88 Worsening Encouraged to watch fatty intake, exercise more, and lose weight . Compliant with meds Goals developed at last visit were not met because Is not getting adequate diet and exercise( due to hip pain) Follow up in 3 months Care management needs are self addressed.   • Onychomycosis     Impression: Stable, pt. declines tx   • Other constipation     Impression: New dx. Pain probably due to IBS, Start Citrucel 1tbsp mixed in 8oz of fluid daily, may gradually increase up to three times daily for a goal of 2 BM that float in the commode daily. Advised to increase fiber, beans, rice, fruits, veggies.   • Other hypotension     Impression: Improving- Patient states that while in Rehab b/p readings were low and she did not recieve medications on some days.   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   • Peripheral vascular disease (CMS/HCC)     Impression: JEROME last year was abnorm on the left last year thus will send for vasc studies   • Periprosthetic fracture around internal prosthetic left hip joint (CMS/HCC)     subsequent encounter. Impression: Patient was seen by Dr. Joseph who referred to Linette Weaver.   • Rheumatoid arthritis involving multiple sites with positive rheumatoid factor (CMS/HCC)     Impression: stable dc mobic ndue to anemia   • Right hip pain    • S/P  spinal fusion     Impression: Doing well since surgery on 1-9-17.   • Screening for alcoholism     Impression: Low risk.   • Screening for depression     Negative Depression Screening (4 or less) ()   • Seasonal allergic rhinitis due to pollen     Impression: Doing well.   • Serous otitis media     unspecified chronicity, unspecified laterality. Impression: Right-continue claritin 10mg daily.   • Sinusitis     unspecified chronicity, unspecified location.    • Status post hip surgery     Impression: Doing well from surgery on 11-14-18- Total left hip prosthesis.   • Thrombocytopathia (CMS/HCC)     Impression: Stable, Platelet level normal at 318. Will follow conservatively.   • Urinary incontinence     unspecified type. Impression: New dx. probable cause of sotting on underwear examined by myself and appeared to be urine not blood, Advised kegal maneuvers   • Uterine leiomyoma     unspecified location. Impression: Discussed following with GYN for a scope, will discuss further after hip surgery   • Vaginal bleeding     Impression: Discussed following with GYN for a scope, will discuss further after hip surgery. discussed us results - she wants to put off further work up until after hip surg   • Vitamin D deficiency     Impression: Doing well, patient gets plenty of time in the Sun. Vitamin D. level normal. Follow conservatively.   • Wound, open, forehead, sequela     Impression: Healing well, Sutures removed.         Outpatient Encounter Medications as of 2/11/2020   Medication Sig Dispense Refill   • acetaminophen (TYLENOL) 650 MG 8 hr tablet Take 650 mg by mouth 2 (Two) Times a Day.     • amitriptyline (ELAVIL) 75 MG tablet TAKE 1 TABLET AT BEDTIME 90 tablet 0   • atorvastatin (LIPITOR) 10 MG tablet TAKE 1 TABLET EVERY DAY 90 tablet 0   • Calcium Carbonate-Vitamin D (CALCIUM 600+D) 600-200 MG-UNIT tablet Take 2 tablets by mouth Daily.     • Calcium Carbonate-Vitamin D (CALCIUM 600/VITAMIN D PO)      •  celecoxib (CELEBREX) 200 MG capsule Take 1 capsule by mouth Daily. 90 capsule 3   • diphenhydrAMINE (BENADRYL ALLERGY) 25 mg capsule Take 1 capsule by mouth Daily.     • DULoxetine (CYMBALTA) 30 MG capsule Take 1 capsule by mouth Daily. 90 capsule 3   • enalapril (VASOTEC) 10 MG tablet Take 10 mg by mouth Daily.     • ferrous sulfate 325 (65 FE) MG tablet Take 325 mg by mouth Daily With Breakfast.     • gabapentin (NEURONTIN) 300 MG capsule Take 1 capsule by mouth 2 (Two) Times a Day. 60 capsule 1   • Glycerin-Hypromellose- (ARTIFICIAL TEARS) 0.2-0.2-1 % solution ophthalmic solution      • HYDROcodone-acetaminophen (NORCO) 5-325 MG per tablet      • loratadine (CLARITIN) 10 MG tablet Take 10 mg by mouth Daily.     • metoprolol tartrate (LOPRESSOR) 25 MG tablet Take 25 mg by mouth Daily.     • montelukast (SINGULAIR) 10 MG tablet TAKE 1 TABLET EVERY DAY 90 tablet 3   • orphenadrine (NORFLEX) 100 MG 12 hr tablet Take 1 tablet by mouth Daily. 30 tablet 1   • pantoprazole (PROTONIX) 40 MG EC tablet TAKE 1 TABLET DAILY 90 tablet 3   • Psyllium 100 % powder METAMUCIL POWD     • tamsulosin (FLOMAX) 0.4 MG capsule 24 hr capsule Take 1 capsule by mouth Daily. 30 capsule 1   • [DISCONTINUED] Acetaminophen (TYLENOL) 325 MG capsule 2 (Two) Times a Day.     • [DISCONTINUED] aspirin 81 MG EC tablet Take 81 mg by mouth Daily.     • [DISCONTINUED] docusate sodium (COLACE) 100 MG capsule Take 100 mg by mouth Daily.     • [DISCONTINUED] polyethylene glycol (MIRALAX) packet Take 17 g by mouth Daily.       No facility-administered encounter medications on file as of 2/11/2020.        Reviewed use of high risk medication in the elderly: yes  Reviewed for potential of harmful drug interactions in the elderly: yes    Follow Up:  No follow-ups on file.     An After Visit Summary and PPPS with all of these plans were given to the patient.

## 2020-02-12 NOTE — ASSESSMENT & PLAN NOTE
Iron deficiency anemia probable secondary to blood loss:  Iron 36 down from 42, Iron saturation 9 down from 11.    Will repeat CBC and retic count in 1 month.  Will follow conservatively.

## 2020-03-18 ENCOUNTER — RESULTS ENCOUNTER (OUTPATIENT)
Dept: FAMILY MEDICINE CLINIC | Facility: CLINIC | Age: 80
End: 2020-03-18

## 2020-03-18 ENCOUNTER — OFFICE VISIT (OUTPATIENT)
Dept: FAMILY MEDICINE CLINIC | Facility: CLINIC | Age: 80
End: 2020-03-18

## 2020-03-18 VITALS
SYSTOLIC BLOOD PRESSURE: 145 MMHG | WEIGHT: 141.8 LBS | RESPIRATION RATE: 18 BRPM | HEIGHT: 60 IN | DIASTOLIC BLOOD PRESSURE: 77 MMHG | TEMPERATURE: 96.6 F | HEART RATE: 93 BPM | OXYGEN SATURATION: 98 % | BODY MASS INDEX: 27.84 KG/M2

## 2020-03-18 DIAGNOSIS — I10 HYPERTENSION, BENIGN: ICD-10-CM

## 2020-03-18 DIAGNOSIS — M25.561 CHRONIC PAIN OF RIGHT KNEE: Primary | ICD-10-CM

## 2020-03-18 DIAGNOSIS — D50.8 OTHER IRON DEFICIENCY ANEMIA: ICD-10-CM

## 2020-03-18 DIAGNOSIS — G89.29 CHRONIC PAIN OF RIGHT KNEE: Primary | ICD-10-CM

## 2020-03-18 PROCEDURE — 99213 OFFICE O/P EST LOW 20 MIN: CPT | Performed by: FAMILY MEDICINE

## 2020-03-18 NOTE — ASSESSMENT & PLAN NOTE
Worse since seeing Dr. Sue and having injection 2 weeks ago. Pt. Stated injection helped for 1 day and pain is now worse than it has ever been.   Pt. Advised to follow with Dr. Sue, and scheduled for 3-  10:45 with Dr. Sue NP

## 2020-03-18 NOTE — PROGRESS NOTES
Subjective   Vida Jamison is a 79 y.o. female.     Right knee pain worsening since seeing  Dr. Sue and having injection 2 weeks ago.      Knee Pain    There was no injury mechanism. The pain is present in the right knee. The quality of the pain is described as aching. The pain is at a severity of 8/10. The pain is severe. The pain has been intermittent since onset.   Hypertension   This is a chronic problem. The current episode started more than 1 year ago. The problem is unchanged. The problem is controlled. There are no associated agents to hypertension.        The following portions of the patient's history were reviewed and updated as appropriate: allergies, current medications, past family history, past medical history, past social history, past surgical history and problem list.    Family History   Problem Relation Age of Onset   • Ovarian cancer Mother    • Arthritis Mother    • Heart failure Father    • Suicidality Brother    • Heart disease Maternal Aunt    • Cancer Other         malignant neoplasm of gastrointestinal tract- in her 50's   • Arthritis Other    • Cervical cancer Other    • Arthritis Other    • Diabetes Other        Social History     Tobacco Use   • Smoking status: Never Smoker   • Smokeless tobacco: Never Used   Substance Use Topics   • Alcohol use: No   • Drug use: No       Past Surgical History:   Procedure Laterality Date   • BREAST BIOPSY Right 1974    Dr Briseida Alonso   • CATARACT EXTRACTION W/ INTRAOCULAR LENS IMPLANT      7/20/10-rt. eye-Dr. Leon 7/13/10- Lt. eye-Dr. Leon   • FINGER SURGERY  2001    Revision of Finger joints. Dr. Brumfield w/Drs. Kleinert & Marlon   • HIP ENDOPROSTHESIS Left 05/22/2014    [employer]steonics endoprostehetic replacement of left hip. Dr. Reza Choudhury.   • JOINT REPLACEMENT     • KNEE ARTHROSCOPY Left     [1987] Dr Poon-torn medial meniscus [1995] Dr. Schaffer -torn medial meniscus   • LEEP  2000    Biopsy of cervix. for MAJOR-1 by Dr. Knight   • LUMBAR DISCECTOMY  1994     Hemilaminectomy, foraminectomy & discectomy. Dr. Velasquez, L4-L5 w/posterior lateral fusion & screw fixatoin   • LUMBAR LAMINECTOMY  12/19/2011    foraminotomies, medial facetectomies [id number] redo. left L5-S1 lumbar discectomy. [facility]-Dr. De Oliveira- 5 units of blood given to pt.   • POSTERIOR LUMBAR/THORACIC SPINE FUSION  2002    Fusion T-5 through L-1. Had T-11 burst fracture. [facility]   • SPINAL FUSION      Lower Back. 7/11/2011-[facility] - Hardware removal from L3-L5, Dr. De Oliveira surgeon, Dr. Booker [facility]-Fusion of spine at multi-levels 12/14/11 - [facility] - Dr Gomez and Dr. Momin, Anterior approach Spinal Fusion L5-S1 3--[facility]. Dr. De Oliveira and Dr. Dean. Failed posterior fusion with loose instrumentation. L-4 thru L-5. No complications.   • TONSILLECTOMY  1951    Dr Mcginnis   • TOTAL HIP ARTHROPLASTY Right 04/10/2017    [facility], Inpatient. Dr. Reinoso   • TOTAL KNEE ARTHROPLASTY Left 2002    Dr Sue @ [facility]       Patient Active Problem List   Diagnosis   • Gastroesophageal reflux disease without esophagitis   • Pain in hip region after total hip replacement (CMS/HCC)   • Loose total hip arthroplasty (CMS/[facility])   • Lumbar degenerative disc disease   • Mixed hyperlipidemia   • Myalgia and myositis   • Hypertension, benign   • Peripheral vascular disease (CMS/[facility])   • Periprosthetic fracture around internal prosthetic left hip joint (CMS/[facility])   • Rheumatoid arthritis involving multiple sites with positive rheumatoid factor (CMS/[facility])   • S/P lumbar fusion   • Spinal stenosis of lumbar region   • Carpal tunnel syndrome of right wrist   • Anemia   • Hyperglycemia   • Pain syndrome, chronic   • Left hip pain   • Vitamin D deficiency   • Thrombocythemia (CMS/[facility])   • First degree atrioventricular block   • Chronic pain syndrome   • Hypotension due to drugs   • Dependent edema   • Advanced directives, counseling/discussion   • Aortic  valve regurgitation   • ASCUS (atypical squamous cells of undetermined significance) on Pap smear   • Asthmatic bronchitis   • Elevated diaphragm   • Esophageal hernia   • Family history of diabetes mellitus   • Fusion of spine of lumbar region   • Hypertensive left ventricular hypertrophy, without heart failure   • Hyponatremia   • Mitral valve disease   • Status post hip replacement   • Thrombocytopenia (CMS/HCC)   • Elevated alkaline phosphatase level   • Chronic pain of right knee       Current Outpatient Medications on File Prior to Visit   Medication Sig Dispense Refill   • acetaminophen (TYLENOL) 650 MG 8 hr tablet Take 650 mg by mouth 2 (Two) Times a Day.     • amitriptyline (ELAVIL) 75 MG tablet TAKE 1 TABLET AT BEDTIME 90 tablet 0   • atorvastatin (LIPITOR) 10 MG tablet TAKE 1 TABLET EVERY DAY 90 tablet 0   • Calcium Carbonate-Vitamin D (CALCIUM 600+D) 600-200 MG-UNIT tablet Take 2 tablets by mouth Daily.     • Calcium Carbonate-Vitamin D (CALCIUM 600/VITAMIN D PO)      • celecoxib (CELEBREX) 200 MG capsule Take 1 capsule by mouth Daily. 90 capsule 3   • diphenhydrAMINE (BENADRYL ALLERGY) 25 mg capsule Take 1 capsule by mouth Daily.     • DULoxetine (CYMBALTA) 30 MG capsule Take 1 capsule by mouth Daily. 90 capsule 3   • enalapril (VASOTEC) 10 MG tablet Take 10 mg by mouth Daily.     • ferrous sulfate 325 (65 FE) MG tablet Take 325 mg by mouth Daily With Breakfast.     • gabapentin (NEURONTIN) 300 MG capsule Take 1 capsule by mouth 2 (Two) Times a Day. 60 capsule 1   • Glycerin-Hypromellose- (ARTIFICIAL TEARS) 0.2-0.2-1 % solution ophthalmic solution      • HYDROcodone-acetaminophen (NORCO) 5-325 MG per tablet      • loratadine (CLARITIN) 10 MG tablet Take 10 mg by mouth Daily.     • metoprolol tartrate (LOPRESSOR) 25 MG tablet Take 25 mg by mouth Daily.     • montelukast (SINGULAIR) 10 MG tablet TAKE 1 TABLET EVERY DAY 90 tablet 3   • orphenadrine (NORFLEX) 100 MG 12 hr tablet Take 1 tablet by  "mouth Daily. 30 tablet 1   • pantoprazole (PROTONIX) 40 MG EC tablet TAKE 1 TABLET DAILY 90 tablet 3   • Psyllium 100 % powder METAMUCIL POWD     • tamsulosin (FLOMAX) 0.4 MG capsule 24 hr capsule Take 1 capsule by mouth Daily. 30 capsule 1     No current facility-administered medications on file prior to visit.        Allergies   Allergen Reactions   • Morphine And Related Hallucinations     HALLUCINATIONS         Review of Systems   Constitutional: Negative for fever.   Musculoskeletal: Positive for arthralgias (knee pain).       Objective   Visit Vitals  /77 (BP Location: Left arm, Patient Position: Sitting, Cuff Size: Adult)   Pulse 93   Temp 96.6 °F (35.9 °C) (Oral)   Resp 18   Ht 152.4 cm (60\")   Wt 64.3 kg (141 lb 12.8 oz)   SpO2 98%   BMI 27.69 kg/m²     Physical Exam   Constitutional: She is oriented to person, place, and time. She appears well-developed and well-nourished. She is cooperative. No distress.   HENT:   Head: Normocephalic and atraumatic.   Right Ear: External ear normal.   Left Ear: External ear normal.   Mouth/Throat: Oropharynx is clear and moist. No oropharyngeal exudate.   Eyes: Pupils are equal, round, and reactive to light. Conjunctivae and EOM are normal. Right eye exhibits no discharge. Left eye exhibits no discharge. No scleral icterus.   Neck: Trachea normal and normal range of motion. Neck supple. Carotid bruit is not present. No thyromegaly present.   Cardiovascular: Normal rate, regular rhythm and intact distal pulses. Exam reveals no gallop and no friction rub.   No murmur heard.  Pulmonary/Chest: Effort normal and breath sounds normal. No respiratory distress. She has no wheezes. She has no rales. She exhibits no tenderness.   Abdominal: Soft. Bowel sounds are normal. She exhibits no distension. There is no tenderness. There is no guarding. No hernia.   Genitourinary: Vagina normal and uterus normal. No vaginal discharge found.   Musculoskeletal: She exhibits no edema or " deformity.        Right hip: Normal.        Right knee: She exhibits decreased range of motion, swelling and effusion. Tenderness found.   Lymphadenopathy:     She has no cervical adenopathy.   Neurological: She is alert and oriented to person, place, and time. No cranial nerve deficit or sensory deficit. She exhibits normal muscle tone. Coordination normal.   Skin: Skin is warm and dry. No rash noted. She is not diaphoretic. No erythema. Nails show no clubbing.   Psychiatric: She has a normal mood and affect. Her behavior is normal. Judgment and thought content normal.   Nursing note and vitals reviewed.        Assessment/Plan .  Problem List Items Addressed This Visit        Medium    Hypertension, benign    Current Assessment & Plan     Hypertension is improving with treatment.  Continue current treatment regimen.  Dietary sodium restriction.  Regular aerobic exercise.  Blood pressure will be reassessed in 3 months.            Unprioritized    Chronic pain of right knee - Primary    Current Assessment & Plan     Worse since seeing Dr. Sue and having injection 2 weeks ago. Pt. Stated injection helped for 1 day and pain is now worse than it has ever been.   Pt. Advised to follow with Dr. Sue, and scheduled for 3-  10:45 with Dr. Sue NP

## 2020-03-19 LAB
BASOPHILS # BLD AUTO: 0.1 X10E3/UL (ref 0–0.2)
BASOPHILS NFR BLD AUTO: 1 %
EOSINOPHIL # BLD AUTO: 0.2 X10E3/UL (ref 0–0.4)
EOSINOPHIL NFR BLD AUTO: 2 %
ERYTHROCYTE [DISTWIDTH] IN BLOOD BY AUTOMATED COUNT: 19 % (ref 11.7–15.4)
HCT VFR BLD AUTO: 39.6 % (ref 34–46.6)
HGB BLD-MCNC: 12.5 G/DL (ref 11.1–15.9)
IMM GRANULOCYTES # BLD AUTO: 0 X10E3/UL (ref 0–0.1)
IMM GRANULOCYTES NFR BLD AUTO: 0 %
LYMPHOCYTES # BLD AUTO: 1.2 X10E3/UL (ref 0.7–3.1)
LYMPHOCYTES NFR BLD AUTO: 15 %
MCH RBC QN AUTO: 26.6 PG (ref 26.6–33)
MCHC RBC AUTO-ENTMCNC: 31.6 G/DL (ref 31.5–35.7)
MCV RBC AUTO: 84 FL (ref 79–97)
MONOCYTES # BLD AUTO: 0.7 X10E3/UL (ref 0.1–0.9)
MONOCYTES NFR BLD AUTO: 9 %
NEUTROPHILS # BLD AUTO: 6 X10E3/UL (ref 1.4–7)
NEUTROPHILS NFR BLD AUTO: 73 %
PLATELET # BLD AUTO: 400 X10E3/UL (ref 150–450)
RBC # BLD AUTO: 4.7 X10E6/UL (ref 3.77–5.28)
RETICS/RBC NFR AUTO: 1 % (ref 0.6–2.6)
WBC # BLD AUTO: 8.2 X10E3/UL (ref 3.4–10.8)

## 2020-03-22 PROBLEM — M79.605 LEFT LEG PAIN: Status: ACTIVE | Noted: 2020-03-22

## 2020-03-22 PROBLEM — J30.1 SEASONAL ALLERGIC RHINITIS DUE TO POLLEN: Status: ACTIVE | Noted: 2020-03-22

## 2020-03-23 ENCOUNTER — OFFICE VISIT (OUTPATIENT)
Dept: FAMILY MEDICINE CLINIC | Facility: CLINIC | Age: 80
End: 2020-03-23

## 2020-03-23 ENCOUNTER — HOSPITAL ENCOUNTER (OUTPATIENT)
Dept: GENERAL RADIOLOGY | Facility: HOSPITAL | Age: 80
Discharge: HOME | End: 2020-03-23
Admitting: FAMILY MEDICINE

## 2020-03-23 VITALS
BODY MASS INDEX: 27.68 KG/M2 | RESPIRATION RATE: 18 BRPM | HEART RATE: 78 BPM | OXYGEN SATURATION: 97 % | SYSTOLIC BLOOD PRESSURE: 108 MMHG | HEIGHT: 60 IN | DIASTOLIC BLOOD PRESSURE: 60 MMHG | WEIGHT: 141 LBS | TEMPERATURE: 98 F

## 2020-03-23 DIAGNOSIS — D50.8 OTHER IRON DEFICIENCY ANEMIA: Primary | ICD-10-CM

## 2020-03-23 DIAGNOSIS — G89.29 CHRONIC PAIN OF RIGHT KNEE: ICD-10-CM

## 2020-03-23 DIAGNOSIS — M25.561 CHRONIC PAIN OF RIGHT KNEE: ICD-10-CM

## 2020-03-23 PROCEDURE — 73562 X-RAY EXAM OF KNEE 3: CPT

## 2020-03-23 PROCEDURE — 20610 DRAIN/INJ JOINT/BURSA W/O US: CPT | Performed by: FAMILY MEDICINE

## 2020-03-23 PROCEDURE — 99213 OFFICE O/P EST LOW 20 MIN: CPT | Performed by: FAMILY MEDICINE

## 2020-03-23 RX ORDER — METHYLPREDNISOLONE ACETATE 80 MG/ML
80 INJECTION, SUSPENSION INTRA-ARTICULAR; INTRALESIONAL; INTRAMUSCULAR; SOFT TISSUE ONCE
Status: COMPLETED | OUTPATIENT
Start: 2020-03-23 | End: 2020-03-23

## 2020-03-23 RX ADMIN — METHYLPREDNISOLONE ACETATE 80 MG: 80 INJECTION, SUSPENSION INTRA-ARTICULAR; INTRALESIONAL; INTRAMUSCULAR; SOFT TISSUE at 12:46

## 2020-03-23 NOTE — PROGRESS NOTES
Subjective   Vida Jamison is a 79 y.o. female.     Anemia   Presents for follow-up visit. There has been no abdominal pain, confusion, light-headedness or pallor. Signs of blood loss that are not present include hematemesis, hematochezia, melena, menorrhagia and vaginal bleeding. There are no compliance problems.    Knee Pain    The pain is present in the right knee (3 months ago). The quality of the pain is described as aching. The pain is at a severity of 10/10. The pain is severe. The pain has been constant since onset. Pertinent negatives include no numbness or tingling. She reports no foreign bodies present. The symptoms are aggravated by movement, palpation and weight bearing. She has tried rest (Aspercreme) for the symptoms. The treatment provided mild relief.        The following portions of the patient's history were reviewed and updated as appropriate: allergies, current medications, past family history, past medical history, past social history, past surgical history and problem list.    Family History   Problem Relation Age of Onset   • Ovarian cancer Mother    • Arthritis Mother    • Heart failure Father    • Suicidality Brother    • Heart disease Maternal Aunt    • Cancer Other         malignant neoplasm of gastrointestinal tract- in her 50's   • Arthritis Other    • Cervical cancer Other    • Arthritis Other    • Diabetes Other        Social History     Tobacco Use   • Smoking status: Never Smoker   • Smokeless tobacco: Never Used   Substance Use Topics   • Alcohol use: No   • Drug use: No       Past Surgical History:   Procedure Laterality Date   • BREAST BIOPSY Right 1974    Dr Briseida Alonso   • CATARACT EXTRACTION W/ INTRAOCULAR LENS IMPLANT      7/20/10-rt. eye-Dr. Leon 7/13/10- Lt. eye-Dr. Leon   • FINGER SURGERY  2001    Revision of Finger joints. Dr. Brumfield w/Drs. Kleinert & Marlon   • HIP ENDOPROSTHESIS Left 05/22/2014    Osteonics endoprostehetic replacement of left hip. Dr. Reza Choudhury.   • JOINT  REPLACEMENT     • KNEE ARTHROSCOPY Left     [1987] Dr Poon-torn medial meniscus [1995] Dr. Schaffer -torn medial meniscus   • LEEP  2000    Biopsy of cervix. for MAJOR-1 by Dr. Knight   • LUMBAR DISCECTOMY  1994    Hemilaminectomy, foraminectomy & discectomy. Dr. Velasquez, L4-L5 w/posterior lateral fusion & screw fixatoin   • LUMBAR LAMINECTOMY  12/19/2011    foraminotomies, medial facetectomies [id number] redo. left L5-S1 lumbar discectomy. [facility]-Dr. De Oliveira- 5 units of blood given to pt.   • POSTERIOR LUMBAR/THORACIC SPINE FUSION  2002    Fusion T-5 through L-1. Had T-11 burst fracture. Recuperated at [facility]   • SPINAL FUSION      Lower Back. 7/11/2011-[facility] - Hardware removal from L3-L5, Dr. De Oliveira surgeon, Dr. Booker [facility]-Fusion of spine at multi-levels 12/14/11 - [facility] - Dr Gomez and Dr. Momin, Anterior approach Spinal Fusion L5-S1 3--[facility]. Dr. De Oliveira and Dr. Dean. Failed posterior fusion with loose instrumentation. L-4 thru L-5. No complications.   • TONSILLECTOMY  1951    Dr Mcginnis   • TOTAL HIP ARTHROPLASTY Right 04/10/2017    [facility], Inpatient. Dr. Reinoso   • TOTAL KNEE ARTHROPLASTY Left 2002    Dr Sue @ [facility]       Patient Active Problem List   Diagnosis   • Gastroesophageal reflux disease without esophagitis   • Pain in hip region after total hip replacement (CMS/HCC)   • Loose total hip arthroplasty (CMS/[facility])   • Lumbar degenerative disc disease   • Mixed hyperlipidemia   • Myalgia and myositis   • Hypertension, benign   • Peripheral vascular disease (CMS/[facility])   • Periprosthetic fracture around internal prosthetic left hip joint (CMS/[facility])   • Rheumatoid arthritis involving multiple sites with positive rheumatoid factor (CMS/[facility])   • S/P lumbar fusion   • Spinal stenosis of lumbar region   • Carpal tunnel syndrome of right wrist   • Anemia   • Hyperglycemia   • Pain syndrome, chronic   • Left hip pain   • Vitamin D  deficiency   • First degree atrioventricular block   • Chronic pain syndrome   • Hypotension due to drugs   • Dependent edema   • Advanced directives, counseling/discussion   • Aortic valve regurgitation   • ASCUS (atypical squamous cells of undetermined significance) on Pap smear   • Asthmatic bronchitis   • Elevated diaphragm   • Esophageal hernia   • Family history of diabetes mellitus   • Fusion of spine of lumbar region   • Hypertensive left ventricular hypertrophy, without heart failure   • Hyponatremia   • Mitral valve disease   • Status post hip replacement   • Thrombocytopenia (CMS/HCC)   • Elevated alkaline phosphatase level   • Chronic pain of right knee   • Left knee pain   • Left leg pain   • Seasonal allergic rhinitis due to pollen       Current Outpatient Medications on File Prior to Visit   Medication Sig Dispense Refill   • acetaminophen (TYLENOL) 650 MG 8 hr tablet Take 650 mg by mouth 2 (Two) Times a Day.     • amitriptyline (ELAVIL) 75 MG tablet TAKE 1 TABLET AT BEDTIME 90 tablet 0   • atorvastatin (LIPITOR) 10 MG tablet TAKE 1 TABLET EVERY DAY 90 tablet 0   • Calcium Carbonate-Vitamin D (CALCIUM 600+D) 600-200 MG-UNIT tablet Take 2 tablets by mouth Daily.     • Calcium Carbonate-Vitamin D (CALCIUM 600/VITAMIN D PO)      • celecoxib (CELEBREX) 200 MG capsule Take 1 capsule by mouth Daily. 90 capsule 3   • diphenhydrAMINE (BENADRYL ALLERGY) 25 mg capsule Take 1 capsule by mouth Daily.     • DULoxetine (CYMBALTA) 30 MG capsule Take 1 capsule by mouth Daily. 90 capsule 3   • enalapril (VASOTEC) 10 MG tablet Take 10 mg by mouth Daily.     • ferrous sulfate 325 (65 FE) MG tablet Take 325 mg by mouth Daily With Breakfast.     • gabapentin (NEURONTIN) 300 MG capsule Take 1 capsule by mouth 2 (Two) Times a Day. 60 capsule 1   • Glycerin-Hypromellose- (ARTIFICIAL TEARS) 0.2-0.2-1 % solution ophthalmic solution      • HYDROcodone-acetaminophen (NORCO) 5-325 MG per tablet      • loratadine (CLARITIN)  "10 MG tablet Take 10 mg by mouth Daily.     • metoprolol tartrate (LOPRESSOR) 25 MG tablet Take 25 mg by mouth Daily.     • montelukast (SINGULAIR) 10 MG tablet TAKE 1 TABLET EVERY DAY 90 tablet 3   • orphenadrine (NORFLEX) 100 MG 12 hr tablet Take 1 tablet by mouth Daily. 30 tablet 1   • pantoprazole (PROTONIX) 40 MG EC tablet TAKE 1 TABLET DAILY 90 tablet 3   • Psyllium 100 % powder METAMUCIL POWD     • tamsulosin (FLOMAX) 0.4 MG capsule 24 hr capsule Take 1 capsule by mouth Daily. 30 capsule 1     No current facility-administered medications on file prior to visit.        Allergies   Allergen Reactions   • Morphine And Related Hallucinations     HALLUCINATIONS         Review of Systems   HENT: Negative for nosebleeds.    Gastrointestinal: Negative for abdominal pain, blood in stool, hematemesis, hematochezia, melena and indigestion.   Genitourinary: Negative for hematuria, menorrhagia and vaginal bleeding.   Musculoskeletal:        Right knee pain     Skin: Negative for pallor.   Neurological: Negative for tingling, light-headedness, numbness and confusion.       Objective   Visit Vitals  /60 (BP Location: Right arm, Patient Position: Sitting, Cuff Size: Large Adult)   Pulse 78   Temp 98 °F (36.7 °C) (Oral)   Resp 18   Ht 152.4 cm (60\")   Wt 64 kg (141 lb)   SpO2 97%   BMI 27.54 kg/m²     Physical Exam   Constitutional: She is oriented to person, place, and time. She appears well-developed and well-nourished. She is cooperative.   HENT:   Head: Normocephalic.   Neck: Trachea normal. Neck supple. Carotid bruit is not present. No thyromegaly present.   Cardiovascular: Normal rate and regular rhythm. Exam reveals no gallop and no friction rub.   No murmur heard.  Pulmonary/Chest: Effort normal and breath sounds normal. No respiratory distress. She has no wheezes. She exhibits no tenderness.   Musculoskeletal:        Right knee: She exhibits effusion. Tenderness found.   Neurological: She is alert and oriented " to person, place, and time.   Skin: Skin is dry. No rash noted. Nails show no clubbing.   Nursing note and vitals reviewed.        Assessment/Plan .  Problem List Items Addressed This Visit        Medium    Anemia - Primary    Current Assessment & Plan     Resolved:  Hemo 12.5 up from 9.7,  Hema 39.6 up from 30.9.  Advised to discontinue Iron.            Unprioritized    Chronic pain of right knee    Current Assessment & Plan     Ordered right knee x-ray. Per radiology (severe tricompartmental degenerative changes.  Consistant with CPPD.  Moderate joint effusion.  Results discussed with pt.  Large joint arthrocentesis injection done 2cc's Bupivacaine, Depo-medrol 80mg.  Pt to follow up with NP from Dr. Sue's office 03/25/2020.         Relevant Medications    methylPREDNISolone acetate (DEPO-medrol) injection 80 mg (Completed)    Other Relevant Orders    XR Knee 3 View Right (Completed)

## 2020-03-23 NOTE — ASSESSMENT & PLAN NOTE
Ordered right knee x-ray. Per radiology (severe tricompartmental degenerative changes.  Consistant with CPPD.  Moderate joint effusion.  Results discussed with pt.  Large joint arthrocentesis injection done 2cc's Bupivacaine, Depo-medrol 80mg.  Pt to follow up with NP from Dr. Sue's office 03/25/2020.

## 2020-03-30 DIAGNOSIS — G89.4 PAIN SYNDROME, CHRONIC: Primary | ICD-10-CM

## 2020-03-30 RX ORDER — AMITRIPTYLINE HYDROCHLORIDE 75 MG/1
75 TABLET, FILM COATED ORAL
Qty: 90 TABLET | Refills: 0 | Status: SHIPPED | OUTPATIENT
Start: 2020-03-30 | End: 2020-04-01 | Stop reason: SDUPTHER

## 2020-04-01 DIAGNOSIS — G89.4 PAIN SYNDROME, CHRONIC: ICD-10-CM

## 2020-04-02 RX ORDER — AMITRIPTYLINE HYDROCHLORIDE 75 MG/1
75 TABLET, FILM COATED ORAL
Qty: 90 TABLET | Refills: 0 | Status: SHIPPED | OUTPATIENT
Start: 2020-04-02 | End: 2020-04-06

## 2020-04-06 DIAGNOSIS — E78.2 MIXED HYPERLIPIDEMIA: ICD-10-CM

## 2020-04-06 DIAGNOSIS — M51.36 LUMBAR DEGENERATIVE DISC DISEASE: ICD-10-CM

## 2020-04-06 DIAGNOSIS — I10 HYPERTENSION, BENIGN: Primary | ICD-10-CM

## 2020-04-06 DIAGNOSIS — G89.4 PAIN SYNDROME, CHRONIC: ICD-10-CM

## 2020-04-06 RX ORDER — ATORVASTATIN CALCIUM 10 MG/1
TABLET, FILM COATED ORAL
Qty: 90 TABLET | Refills: 0 | Status: SHIPPED | OUTPATIENT
Start: 2020-04-06 | End: 2020-05-07 | Stop reason: SDUPTHER

## 2020-04-06 RX ORDER — GABAPENTIN 300 MG/1
300 CAPSULE ORAL 2 TIMES DAILY
Qty: 60 CAPSULE | Refills: 1 | Status: SHIPPED | OUTPATIENT
Start: 2020-04-06 | End: 2020-05-07 | Stop reason: SDUPTHER

## 2020-04-06 RX ORDER — ENALAPRIL MALEATE 10 MG/1
TABLET ORAL
Qty: 180 TABLET | Refills: 0 | Status: SHIPPED | OUTPATIENT
Start: 2020-04-06 | End: 2020-05-07

## 2020-04-06 RX ORDER — ORPHENADRINE CITRATE 100 MG/1
100 TABLET, EXTENDED RELEASE ORAL DAILY
Qty: 30 TABLET | Refills: 1 | Status: SHIPPED | OUTPATIENT
Start: 2020-04-06 | End: 2020-05-07 | Stop reason: SDUPTHER

## 2020-04-06 RX ORDER — AMITRIPTYLINE HYDROCHLORIDE 75 MG/1
TABLET, FILM COATED ORAL
Qty: 90 TABLET | Refills: 0 | Status: SHIPPED | OUTPATIENT
Start: 2020-04-06 | End: 2020-04-16 | Stop reason: SDUPTHER

## 2020-04-16 DIAGNOSIS — G89.4 PAIN SYNDROME, CHRONIC: ICD-10-CM

## 2020-04-16 RX ORDER — AMITRIPTYLINE HYDROCHLORIDE 75 MG/1
75 TABLET, FILM COATED ORAL
Qty: 90 TABLET | Refills: 0 | Status: SHIPPED | OUTPATIENT
Start: 2020-04-16 | End: 2020-12-09 | Stop reason: SDUPTHER

## 2020-05-04 DIAGNOSIS — E78.2 MIXED HYPERLIPIDEMIA: Primary | ICD-10-CM

## 2020-05-04 DIAGNOSIS — R73.9 HYPERGLYCEMIA: ICD-10-CM

## 2020-05-04 DIAGNOSIS — D50.8 OTHER IRON DEFICIENCY ANEMIA: ICD-10-CM

## 2020-05-05 LAB
ALBUMIN SERPL-MCNC: 4.4 G/DL (ref 3.7–4.7)
ALBUMIN/GLOB SERPL: 1.8 {RATIO} (ref 1.2–2.2)
ALP SERPL-CCNC: 117 IU/L (ref 39–117)
ALT SERPL-CCNC: 11 IU/L (ref 0–32)
AST SERPL-CCNC: 20 IU/L (ref 0–40)
BASOPHILS # BLD AUTO: 0.1 X10E3/UL (ref 0–0.2)
BASOPHILS NFR BLD AUTO: 1 %
BILIRUB SERPL-MCNC: 0.4 MG/DL (ref 0–1.2)
BUN SERPL-MCNC: 16 MG/DL (ref 8–27)
BUN/CREAT SERPL: 24 (ref 12–28)
CALCIUM SERPL-MCNC: 9.3 MG/DL (ref 8.7–10.3)
CHLORIDE SERPL-SCNC: 98 MMOL/L (ref 96–106)
CHOLEST SERPL-MCNC: 179 MG/DL (ref 100–199)
CHOLEST/HDLC SERPL: 3.8 RATIO (ref 0–4.4)
CO2 SERPL-SCNC: 26 MMOL/L (ref 20–29)
CREAT SERPL-MCNC: 0.67 MG/DL (ref 0.57–1)
EOSINOPHIL # BLD AUTO: 0.2 X10E3/UL (ref 0–0.4)
EOSINOPHIL NFR BLD AUTO: 2 %
ERYTHROCYTE [DISTWIDTH] IN BLOOD BY AUTOMATED COUNT: 14.6 % (ref 11.7–15.4)
GLOBULIN SER CALC-MCNC: 2.4 G/DL (ref 1.5–4.5)
GLUCOSE SERPL-MCNC: 88 MG/DL (ref 65–99)
HBA1C MFR BLD: 5.9 % (ref 4.8–5.6)
HCT VFR BLD AUTO: 37.7 % (ref 34–46.6)
HDLC SERPL-MCNC: 47 MG/DL
HGB BLD-MCNC: 12.2 G/DL (ref 11.1–15.9)
IMM GRANULOCYTES # BLD AUTO: 0 X10E3/UL (ref 0–0.1)
IMM GRANULOCYTES NFR BLD AUTO: 0 %
LDLC SERPL CALC-MCNC: 107 MG/DL (ref 0–99)
LYMPHOCYTES # BLD AUTO: 1 X10E3/UL (ref 0.7–3.1)
LYMPHOCYTES NFR BLD AUTO: 15 %
MCH RBC QN AUTO: 28.5 PG (ref 26.6–33)
MCHC RBC AUTO-ENTMCNC: 32.4 G/DL (ref 31.5–35.7)
MCV RBC AUTO: 88 FL (ref 79–97)
MONOCYTES # BLD AUTO: 0.6 X10E3/UL (ref 0.1–0.9)
MONOCYTES NFR BLD AUTO: 9 %
NEUTROPHILS # BLD AUTO: 5.1 X10E3/UL (ref 1.4–7)
NEUTROPHILS NFR BLD AUTO: 73 %
PLATELET # BLD AUTO: 325 X10E3/UL (ref 150–450)
POTASSIUM SERPL-SCNC: 4.7 MMOL/L (ref 3.5–5.2)
PROT SERPL-MCNC: 6.8 G/DL (ref 6–8.5)
RBC # BLD AUTO: 4.28 X10E6/UL (ref 3.77–5.28)
SODIUM SERPL-SCNC: 139 MMOL/L (ref 134–144)
TRIGL SERPL-MCNC: 127 MG/DL (ref 0–149)
VLDLC SERPL CALC-MCNC: 25 MG/DL (ref 5–40)
WBC # BLD AUTO: 6.9 X10E3/UL (ref 3.4–10.8)

## 2020-05-07 ENCOUNTER — OFFICE VISIT (OUTPATIENT)
Dept: FAMILY MEDICINE CLINIC | Facility: CLINIC | Age: 80
End: 2020-05-07

## 2020-05-07 VITALS
BODY MASS INDEX: 27.25 KG/M2 | HEIGHT: 63 IN | SYSTOLIC BLOOD PRESSURE: 159 MMHG | HEART RATE: 67 BPM | OXYGEN SATURATION: 99 % | TEMPERATURE: 98 F | DIASTOLIC BLOOD PRESSURE: 75 MMHG | WEIGHT: 153.8 LBS | RESPIRATION RATE: 18 BRPM

## 2020-05-07 DIAGNOSIS — E78.2 MIXED HYPERLIPIDEMIA: ICD-10-CM

## 2020-05-07 DIAGNOSIS — E87.1 HYPONATREMIA: ICD-10-CM

## 2020-05-07 DIAGNOSIS — J30.1 SEASONAL ALLERGIC RHINITIS DUE TO POLLEN: ICD-10-CM

## 2020-05-07 DIAGNOSIS — M51.36 LUMBAR DEGENERATIVE DISC DISEASE: ICD-10-CM

## 2020-05-07 DIAGNOSIS — D69.6 THROMBOCYTOPENIA (HCC): ICD-10-CM

## 2020-05-07 DIAGNOSIS — D50.8 OTHER IRON DEFICIENCY ANEMIA: ICD-10-CM

## 2020-05-07 DIAGNOSIS — K21.9 GASTROESOPHAGEAL REFLUX DISEASE WITHOUT ESOPHAGITIS: ICD-10-CM

## 2020-05-07 DIAGNOSIS — I10 HYPERTENSION, BENIGN: Primary | ICD-10-CM

## 2020-05-07 DIAGNOSIS — R73.9 HYPERGLYCEMIA: ICD-10-CM

## 2020-05-07 DIAGNOSIS — R74.8 ELEVATED ALKALINE PHOSPHATASE LEVEL: ICD-10-CM

## 2020-05-07 PROCEDURE — 99214 OFFICE O/P EST MOD 30 MIN: CPT | Performed by: FAMILY MEDICINE

## 2020-05-07 RX ORDER — ORPHENADRINE CITRATE 100 MG/1
100 TABLET, EXTENDED RELEASE ORAL DAILY
Qty: 30 TABLET | Refills: 1 | Status: SHIPPED | OUTPATIENT
Start: 2020-05-07 | End: 2020-05-18

## 2020-05-07 RX ORDER — ATORVASTATIN CALCIUM 10 MG/1
10 TABLET, FILM COATED ORAL DAILY
Qty: 90 TABLET | Refills: 0 | Status: SHIPPED | OUTPATIENT
Start: 2020-05-07 | End: 2020-07-06

## 2020-05-07 RX ORDER — PANTOPRAZOLE SODIUM 40 MG/1
40 TABLET, DELAYED RELEASE ORAL DAILY
Qty: 90 TABLET | Refills: 3 | Status: SHIPPED | OUTPATIENT
Start: 2020-05-07 | End: 2020-06-08

## 2020-05-07 RX ORDER — MONTELUKAST SODIUM 10 MG/1
10 TABLET ORAL DAILY
Qty: 90 TABLET | Refills: 3 | Status: SHIPPED | OUTPATIENT
Start: 2020-05-07 | End: 2020-12-09 | Stop reason: SDUPTHER

## 2020-05-07 RX ORDER — ENALAPRIL MALEATE 10 MG/1
10 TABLET ORAL 2 TIMES DAILY
Qty: 180 TABLET | Refills: 0 | Status: SHIPPED | OUTPATIENT
Start: 2020-05-07 | End: 2020-07-06

## 2020-05-07 NOTE — PROGRESS NOTES
Subjective   Vida Jamison is a 79 y.o. female.     Patient in for follow-up on GERD, hyponatremia, thrombocytopenia, elevated alkaline phosphatase and hyperglycemia    Anemia   Presents for follow-up visit. There has been no palpitations or weight loss. There are no compliance problems.  Compliance with medications is %.   Hyperlipidemia   This is a chronic problem. The current episode started more than 1 year ago. The problem is controlled. Recent lipid tests were reviewed and are high. Factors aggravating her hyperlipidemia include fatty foods. Pertinent negatives include no chest pain, myalgias or shortness of breath. Current antihyperlipidemic treatment includes statins. There are no compliance problems.  Risk factors for coronary artery disease include dyslipidemia and hypertension.   Hypertension   This is a chronic problem. The current episode started more than 1 year ago. The problem has been gradually worsening since onset. The problem is controlled. Pertinent negatives include no chest pain, palpitations or shortness of breath. Risk factors for coronary artery disease include dyslipidemia.        The following portions of the patient's history were reviewed and updated as appropriate: allergies, current medications, past family history, past medical history, past social history, past surgical history and problem list.    Family History   Problem Relation Age of Onset   • Ovarian cancer Mother    • Arthritis Mother    • Heart failure Father    • Suicidality Brother    • Heart disease Maternal Aunt    • Cancer Other         malignant neoplasm of gastrointestinal tract- in her 50's   • Arthritis Other    • Cervical cancer Other    • Arthritis Other    • Diabetes Other        Social History     Tobacco Use   • Smoking status: Never Smoker   • Smokeless tobacco: Never Used   Substance Use Topics   • Alcohol use: No   • Drug use: No       Past Surgical History:   Procedure Laterality Date   • BREAST  BIOPSY Right 1974    Dr Briseida Alonso   • CATARACT EXTRACTION W/ INTRAOCULAR LENS IMPLANT      7/20/10-rt. eye-Dr. Leon 7/13/10- Lt. eye-Dr. Leon   • FINGER SURGERY  2001    Revision of Finger joints. Dr. Brumfield w/Drs. Kleinert & Marlon   • HIP ENDOPROSTHESIS Left 05/22/2014    Osteonics endoprostehetic replacement of left hip. Dr. Reza Choudhury.   • JOINT REPLACEMENT     • KNEE ARTHROSCOPY Left     [1987] Dr Poon-torn medial meniscus [1995] Dr. Schaffer -torn medial meniscus   • LEEP  2000    Biopsy of cervix. for MAJOR-1 by Dr. Knight   • LUMBAR DISCECTOMY  1994    Hemilaminectomy, foraminectomy & discectomy. Dr. Velasquez, L4-L5 w/posterior lateral fusion & screw fixatoin   • LUMBAR LAMINECTOMY  12/19/2011    foraminotomies, medial facetectomies L5-[id number] redo. left L5-S1 lumbar discectomy. [facility]-Dr. De Oliveira- 5 units of blood given to pt.   • POSTERIOR LUMBAR/THORACIC SPINE FUSION  2002    Fusion T-5 through L-1. Had T-11 burst fracture. Recuperated at [facility]   • SPINAL FUSION      Lower Back. 7/11/2011-[facility] - Hardware removal from L3-L5, Dr. De Oliveira surgeon, Dr. Booker [facility]-Fusion of spine at multi-levels 12/14/11 - [facility] - Dr Gomez and Dr. Momin, Anterior approach Spinal Fusion L5-S1 3--[facility]. Dr. De Oliveira and Dr. Dean. Failed posterior fusion with loose instrumentation. L-4 thru L-5. No complications.   • TONSILLECTOMY  1951    Dr Mcginnis   • TOTAL HIP ARTHROPLASTY Right 04/10/2017    [facility], Inpatient. Dr. Reinoso   • TOTAL KNEE ARTHROPLASTY Left 2002    Dr Sue @ [facility]       Patient Active Problem List   Diagnosis   • Gastroesophageal reflux disease without esophagitis   • Pain in hip region after total hip replacement (CMS/HCC)   • Loose total hip arthroplasty (CMS/HCC)   • Lumbar degenerative disc disease   • Mixed hyperlipidemia   • Myalgia and myositis   • Hypertension, benign   • Peripheral vascular disease (CMS/[facility])   •  Periprosthetic fracture around internal prosthetic left hip joint (CMS/HCC)   • Rheumatoid arthritis involving multiple sites with positive rheumatoid factor (CMS/HCC)   • S/P lumbar fusion   • Spinal stenosis of lumbar region   • Carpal tunnel syndrome of right wrist   • Anemia   • Hyperglycemia   • Pain syndrome, chronic   • Left hip pain   • Vitamin D deficiency   • First degree atrioventricular block   • Chronic pain syndrome   • Hypotension due to drugs   • Dependent edema   • Advanced directives, counseling/discussion   • Aortic valve regurgitation   • ASCUS (atypical squamous cells of undetermined significance) on Pap smear   • Asthmatic bronchitis   • Elevated diaphragm   • Esophageal hernia   • Family history of diabetes mellitus   • Fusion of spine of lumbar region   • Hypertensive left ventricular hypertrophy, without heart failure   • Hyponatremia   • Mitral valve disease   • Status post hip replacement   • Thrombocytopenia (CMS/HCC)   • Elevated alkaline phosphatase level   • Chronic pain of right knee   • Left knee pain   • Left leg pain   • Seasonal allergic rhinitis due to pollen       Current Outpatient Medications on File Prior to Visit   Medication Sig Dispense Refill   • acetaminophen (TYLENOL) 650 MG 8 hr tablet Take 650 mg by mouth 2 (Two) Times a Day.     • amitriptyline (ELAVIL) 75 MG tablet Take 1 tablet by mouth every night at bedtime. 90 tablet 0   • Calcium Carbonate-Vitamin D (CALCIUM 600+D) 600-200 MG-UNIT tablet Take 2 tablets by mouth Daily.     • celecoxib (CELEBREX) 200 MG capsule Take 1 capsule by mouth Daily. 90 capsule 3   • diphenhydrAMINE (BENADRYL ALLERGY) 25 mg capsule Take 1 capsule by mouth Daily.     • DULoxetine (CYMBALTA) 30 MG capsule Take 1 capsule by mouth Daily. 90 capsule 3   • Glycerin-Hypromellose- (ARTIFICIAL TEARS) 0.2-0.2-1 % solution ophthalmic solution      • loratadine (CLARITIN) 10 MG tablet Take 10 mg by mouth Daily.     • metoprolol tartrate  "(LOPRESSOR) 25 MG tablet TAKE 1 TABLET TWICE DAILY 180 tablet 0   • Psyllium 100 % powder METAMUCIL POWD     • Calcium Carbonate-Vitamin D (CALCIUM 600/VITAMIN D PO)      • ferrous sulfate 325 (65 FE) MG tablet Take 325 mg by mouth Daily With Breakfast.     • HYDROcodone-acetaminophen (NORCO) 5-325 MG per tablet      • tamsulosin (FLOMAX) 0.4 MG capsule 24 hr capsule Take 1 capsule by mouth Daily. 30 capsule 1     No current facility-administered medications on file prior to visit.        Allergies   Allergen Reactions   • Morphine And Related Hallucinations     HALLUCINATIONS         Review of Systems   Constitutional: Negative for fatigue, unexpected weight gain and unexpected weight loss.   Respiratory: Negative for shortness of breath.    Cardiovascular: Negative for chest pain, palpitations and leg swelling.   Gastrointestinal: Negative for nausea.   Musculoskeletal: Negative for myalgias.   Skin: Negative for dry skin.   Neurological: Negative for headache.       Objective   Visit Vitals  /75 (BP Location: Left arm, Patient Position: Sitting, Cuff Size: Adult)   Pulse 67   Temp 98 °F (36.7 °C) (Oral)   Resp 18   Ht 160 cm (63\")   Wt 69.8 kg (153 lb 12.8 oz)   SpO2 99%   BMI 27.24 kg/m²     Physical Exam   Constitutional: She is oriented to person, place, and time. She appears well-developed and well-nourished. She is cooperative.   HENT:   Head: Normocephalic.   Neck: Trachea normal. Neck supple. Carotid bruit is not present. No thyromegaly present.   Cardiovascular: Normal rate and regular rhythm. Exam reveals no gallop and no friction rub.   No murmur heard.  Pulmonary/Chest: Effort normal and breath sounds normal. No respiratory distress. She has no wheezes. She exhibits no tenderness.   Abdominal: Soft. Normal appearance. There is no tenderness.   Neurological: She is alert and oriented to person, place, and time.   Skin: Skin is dry. No rash noted. Nails show no clubbing.   Nursing note and vitals " reviewed.        Assessment/Plan .  Problem List Items Addressed This Visit        Medium    Anemia    Current Assessment & Plan     Resolved.  Labs done 5-4-2020, read by me reviewed with pt.  CBC was normal.         Gastroesophageal reflux disease without esophagitis    Overview     no sx  at present but this could be the cause of her anemia         Relevant Medications    pantoprazole (PROTONIX) 40 MG EC tablet    Hypertension, benign - Primary    Current Assessment & Plan     Worse.  Increase Enalapril to BID.  Encouraged to watch salt, exercise more and lose weight.           Relevant Medications    enalapril (VASOTEC) 10 MG tablet    Mixed hyperlipidemia    Current Assessment & Plan     Labs done 5-4-2020, read by me reviewed with pt.  Trig. 127 down from 149, Tot. Chol. 179 up from 159, HDL 47 up from 43,  up from 89.  Worsening.  Encouraged to watch fatty intake, exercise more, and lose weight.  Is not getting adequate diet and exercise  Goals developed at last visit were not met   Follow up in 3 months  Care management needs are self-addressed.  Self-management abilities addressed and patient is capable of managing her own disease.             Relevant Medications    atorvastatin (LIPITOR) 10 MG tablet    Other Relevant Orders    Lipid Panel w/ Chol/HDL Ratio    Comprehensive metabolic panel    Thrombocytopenia (CMS/HCC)    Current Assessment & Plan     Resolved.  Labs done 5-4-2020, read by me reviewed with pt.  CBC was normal            Unprioritized    Elevated alkaline phosphatase level    Current Assessment & Plan     Resolved.  Labs done 5-4-2020, read by me reviewed with pt.  Alk. Phos. 117 down from 130.         Hyperglycemia    Current Assessment & Plan     Worse.  Labs done 5-4-2020, read by me reviewed with pt.  A1c was 5.9 up from 5.8         Relevant Orders    Hemoglobin A1c    Hyponatremia    Current Assessment & Plan     Resolved.  Labs done 5-4-2020, read by me reviewed with pt.   Sodium was 139 same as last.         Lumbar degenerative disc disease    Overview     Worsening; Offered further testing, patient prefers to hold on this until after hip has been repaired.  Spinal fusion done 3/24/10 and 8/12/2011         Relevant Medications    orphenadrine (NORFLEX) 100 MG 12 hr tablet    gabapentin (NEURONTIN) 300 MG capsule    Seasonal allergic rhinitis due to pollen    Overview     Doing well.         Relevant Medications    montelukast (SINGULAIR) 10 MG tablet

## 2020-05-07 NOTE — ASSESSMENT & PLAN NOTE
Labs done 5-4-2020, read by me reviewed with pt.  Trig. 127 down from 149, Tot. Chol. 179 up from 159, HDL 47 up from 43,  up from 89.  Worsening.  Encouraged to watch fatty intake, exercise more, and lose weight.  Is not getting adequate diet and exercise  Goals developed at last visit were not met   Follow up in 3 months  Care management needs are self-addressed.  Self-management abilities addressed and patient is capable of managing her own disease.

## 2020-05-09 RX ORDER — GABAPENTIN 300 MG/1
300 CAPSULE ORAL 2 TIMES DAILY
Qty: 60 CAPSULE | Refills: 1 | Status: SHIPPED | OUTPATIENT
Start: 2020-05-09 | End: 2020-07-29 | Stop reason: SDUPTHER

## 2020-05-15 DIAGNOSIS — M51.36 LUMBAR DEGENERATIVE DISC DISEASE: ICD-10-CM

## 2020-05-18 RX ORDER — ORPHENADRINE CITRATE 100 MG/1
TABLET, EXTENDED RELEASE ORAL
Qty: 60 TABLET | Refills: 2 | Status: SHIPPED | OUTPATIENT
Start: 2020-05-18 | End: 2020-06-08 | Stop reason: SDUPTHER

## 2020-05-29 DIAGNOSIS — G89.4 CHRONIC PAIN SYNDROME: Primary | ICD-10-CM

## 2020-06-01 RX ORDER — DULOXETIN HYDROCHLORIDE 30 MG/1
CAPSULE, DELAYED RELEASE ORAL
Qty: 90 CAPSULE | Refills: 3 | Status: SHIPPED | OUTPATIENT
Start: 2020-06-01 | End: 2021-01-19 | Stop reason: SDUPTHER

## 2020-06-04 DIAGNOSIS — K21.9 GASTROESOPHAGEAL REFLUX DISEASE WITHOUT ESOPHAGITIS: ICD-10-CM

## 2020-06-08 DIAGNOSIS — T84.84XA PAIN IN HIP REGION AFTER TOTAL HIP REPLACEMENT, INITIAL ENCOUNTER (HCC): Primary | ICD-10-CM

## 2020-06-08 DIAGNOSIS — Z96.649 PAIN IN HIP REGION AFTER TOTAL HIP REPLACEMENT, INITIAL ENCOUNTER (HCC): Primary | ICD-10-CM

## 2020-06-08 RX ORDER — METHOCARBAMOL 500 MG/1
500 TABLET, FILM COATED ORAL DAILY
Qty: 90 TABLET | Refills: 0 | Status: SHIPPED | OUTPATIENT
Start: 2020-06-08 | End: 2021-11-03

## 2020-06-08 RX ORDER — PANTOPRAZOLE SODIUM 40 MG/1
TABLET, DELAYED RELEASE ORAL
Qty: 90 TABLET | Refills: 0 | Status: SHIPPED | OUTPATIENT
Start: 2020-06-08 | End: 2020-12-09 | Stop reason: SDUPTHER

## 2020-07-02 DIAGNOSIS — E78.2 MIXED HYPERLIPIDEMIA: ICD-10-CM

## 2020-07-02 DIAGNOSIS — I10 HYPERTENSION, BENIGN: ICD-10-CM

## 2020-07-06 RX ORDER — ATORVASTATIN CALCIUM 10 MG/1
TABLET, FILM COATED ORAL
Qty: 90 TABLET | Refills: 0 | Status: SHIPPED | OUTPATIENT
Start: 2020-07-06 | End: 2020-08-27

## 2020-07-06 RX ORDER — ENALAPRIL MALEATE 10 MG/1
TABLET ORAL
Qty: 180 TABLET | Refills: 0 | Status: SHIPPED | OUTPATIENT
Start: 2020-07-06 | End: 2020-08-27

## 2020-07-23 ENCOUNTER — OUTSIDE FACILITY SERVICE (OUTPATIENT)
Dept: FAMILY MEDICINE CLINIC | Facility: CLINIC | Age: 80
End: 2020-07-23

## 2020-07-23 PROCEDURE — 99305 1ST NF CARE MODERATE MDM 35: CPT | Performed by: FAMILY MEDICINE

## 2020-07-29 ENCOUNTER — OFFICE VISIT (OUTPATIENT)
Dept: FAMILY MEDICINE CLINIC | Facility: CLINIC | Age: 80
End: 2020-07-29

## 2020-07-29 VITALS
WEIGHT: 158 LBS | OXYGEN SATURATION: 95 % | DIASTOLIC BLOOD PRESSURE: 68 MMHG | TEMPERATURE: 98.4 F | SYSTOLIC BLOOD PRESSURE: 117 MMHG | BODY MASS INDEX: 29.08 KG/M2 | HEART RATE: 90 BPM | RESPIRATION RATE: 18 BRPM | HEIGHT: 62 IN
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
## 2020-07-29 DIAGNOSIS — G89.4 CHRONIC PAIN SYNDROME: ICD-10-CM

## 2020-07-29 DIAGNOSIS — I10 HYPERTENSION, BENIGN: ICD-10-CM

## 2020-07-29 DIAGNOSIS — R73.9 HYPERGLYCEMIA: ICD-10-CM

## 2020-07-29 DIAGNOSIS — S70.02XA CONTUSION OF LEFT HIP, INITIAL ENCOUNTER: ICD-10-CM

## 2020-07-29 DIAGNOSIS — R60.0 LOCALIZED EDEMA: ICD-10-CM

## 2020-07-29 DIAGNOSIS — Z96.651 STATUS POST RIGHT KNEE REPLACEMENT: Primary | ICD-10-CM

## 2020-07-29 DIAGNOSIS — M51.36 LUMBAR DEGENERATIVE DISC DISEASE: ICD-10-CM

## 2020-07-29 DIAGNOSIS — E78.2 MIXED HYPERLIPIDEMIA: ICD-10-CM

## 2020-07-29 PROBLEM — Z96.659 STATUS POST KNEE REPLACEMENT: Status: ACTIVE | Noted: 2020-07-29

## 2020-07-29 PROBLEM — S70.00XA CONTUSION OF HIP: Status: ACTIVE | Noted: 2020-07-29

## 2020-07-29 PROCEDURE — 99214 OFFICE O/P EST MOD 30 MIN: CPT | Performed by: FAMILY MEDICINE

## 2020-07-29 RX ORDER — TRAMADOL HYDROCHLORIDE 50 MG/1
50 TABLET ORAL EVERY 8 HOURS PRN
COMMUNITY
End: 2021-04-15

## 2020-07-29 RX ORDER — OXYCODONE HYDROCHLORIDE AND ACETAMINOPHEN 5; 325 MG/1; MG/1
1 TABLET ORAL EVERY 8 HOURS PRN
COMMUNITY
End: 2020-09-08 | Stop reason: SDUPTHER

## 2020-08-01 ENCOUNTER — NURSING HOME (OUTPATIENT)
Dept: FAMILY MEDICINE CLINIC | Facility: CLINIC | Age: 80
End: 2020-08-01

## 2020-08-01 DIAGNOSIS — K21.9 GASTROESOPHAGEAL REFLUX DISEASE WITHOUT ESOPHAGITIS: ICD-10-CM

## 2020-08-01 DIAGNOSIS — I10 HYPERTENSION, BENIGN: ICD-10-CM

## 2020-08-01 DIAGNOSIS — I73.9 PERIPHERAL VASCULAR DISEASE (HCC): ICD-10-CM

## 2020-08-01 DIAGNOSIS — D50.8 OTHER IRON DEFICIENCY ANEMIA: ICD-10-CM

## 2020-08-01 DIAGNOSIS — Z96.651 STATUS POST RIGHT KNEE REPLACEMENT: Primary | ICD-10-CM

## 2020-08-01 DIAGNOSIS — M48.061 SPINAL STENOSIS OF LUMBAR REGION, UNSPECIFIED WHETHER NEUROGENIC CLAUDICATION PRESENT: ICD-10-CM

## 2020-08-01 DIAGNOSIS — D69.6 THROMBOCYTOPENIA (HCC): ICD-10-CM

## 2020-08-01 RX ORDER — GABAPENTIN 300 MG/1
300 CAPSULE ORAL 2 TIMES DAILY
Qty: 180 CAPSULE | Refills: 1 | Status: SHIPPED | OUTPATIENT
Start: 2020-08-01 | End: 2021-03-15

## 2020-08-01 NOTE — PROGRESS NOTES
PATIENT NAME: Vida Jamison                                                                          YOB: 1940            DATE OF SERVICE: 07/23/2020  FACILITY:   [] [facility]    [x] [facility]    [] [facility]     [] [facility]    [] Ronni Raman ______________________________________________________________________     CHIEF COMPLAINT:   Admission history and physical for nursing home for right knee replacement      HISTORY OF PRESENT ILLNESS:   Patient is progressing excellent and wants to go home today  PAST MEDICAL & SURGICAL HISTORY:   Past Medical History:   Diagnosis Date   • Advanced directives, counseling/discussion     Impression: Discussion w/ pt regarding Advanced directives. POST/Living Will form discussed at length & reviewed with pt. Pt states she does want CPR. Reviewed Medical interventions w/ pt & differences between each Comfort, Limited & Full. Pt opted for full. Discussed use of antibiotics at the end of life, pt chose to use antibiotics consistent w/ treatment goals.   • Allergic contact dermatitis due to plants, except food     Impression: Worsening-probably due to poison ivy. Steroid injection given to patient. If not improving, return to office for re-evaluation.   • Anemia     unspecified type. Impression: worse Hgb down to 10.7 from 11.5 -denies epitaxis, hemoptisis, heartburn, hematura, blood in stool or black tarry stool; Advised to start Iron-OTC 1 a day. she declines more aggressive work up at the present but should have a colonoscopy. she wants her hip fixed 1st   • Aortic valve insufficiency     etiology of cardiac valve disease unspecified. Impression: Echo done 12/2015 - [facility].   • Atherosclerosis of native arteries of extremities with intermittent claudication, unspecified extremity (CMS/HCC)     Impression: New dx. JEROME's done today, read by me, reviewed with pt. JEROME's showed normal on the right and Mild-moderate on the Lt. keep risk  factors low and repeat next year.   • Atypical squamous cells of undetermined significance (ASCUS) on Papanicolaou smear of cervix     Impression: Advised to repeat pap smear yearly. she is reluctent due to cost but I advised her it was covered with a G and Q code but she wanted me to gurentee no charge which I cannot promise. I also encouraged breast exam and mammo gram but again she wanted me to gurentee no cost. I advised her she might consider exam with gyn but that they probably would use the same codes   • Breast cancer screening     Impression: agreed to schedule mammo but advised ideally should also have a breast exam - she did not want to schedule at the present   • Cervical cancer screening     Impression: Doing excellent. Follow conservatively.   • Chronic pain syndrome     Impression: Doing well. Continue with Elavil as presently prescribed, Discussed benifits and side effect of medicine. Patient aware of high risk medication. Follow conservatively. Discussed decreasing celexa from 10mg BID to daily.   • Dependent edema     Impression: New dx. Advised patient to elevate lower extremities above the level of the heart as much as possible, patient states she will try as possible. States she probably isn't able to.   • Edema extremities     Impression: mild. Patient to return if symptoms worsen or change. Advised to elevate legs above level of heart as needed.   • Elevated diaphragm     Impression: New dx. shown on chest xray from 2012, will follow conservatively   • Elevated platelet count     Impression: Improved, Labs done 5-30-17, read by me, reviewed with pt. Platelet count was 402 down from 466   • Esophageal hernia     Impression: Doing well. Follow conservatively.   • First degree atrioventricular block     Impression: Doing excellent. Follow conservatively.   • Gastroesophageal reflux disease without esophagitis     Impression: no sx at present but this could be the cause of her anemia   • Heart  murmur     Impression: Stable   • Hyperglycemia     Impression: Worse: hgb a1c 6.0 was 5.6. Encouraged to watch sugar intake, exercise more and lose weight.   • Hypertension, benign     Impression: Doing well; Encouraged to watch salt, exercise more and lose weight   • Hypertensive left ventricular hypertrophy, without heart failure     Impression: Echo done 12/2015 - [facility].   • Hyponatremia     Impression: worse at 134 - repeat with next labs   • Insomnia, persistent     Impression: New dx. Worsening. Advised patient she may try increasing her elavil from 50mg to 75mg. Discussed benefits and side effect of medicine. Patient to return if symptoms worsen or change.   • Irritable bowel syndrome without diarrhea     Impression: Doing well. Follow conservatively.   • Left hip pain     Impression: Worsening; reviewed xray of the left hip with patient. Advised patient that she needs to see here surgeon, Dr. Lane. Patient prefers to call and make her appointment.   • Left knee pain     Impression: Worse, will try to schedule ortho appt. sooner than 10-16-18   • Left leg pain     Impression: Worse - discussed addiction potential of ativan femi with narcotic and muscle relax combination   • Left shoulder pain     Impression: New dx. Left shoulder pain after fall, pt. sent to Xray. Will call pt. with xray results if broken will schedule pt. with DR. Arredondo or Dr. Fleming. Pt. placed in a small sling.   • Lumbar degenerative disc disease     Story: Spinal fusion done 3/24/10 and 8/12/2011. Impression: Worsening; Offered further testing, patient prefers to hold on this until after hip has been repaired.   • Medicare annual wellness visit, subsequent     with abnormal findings   • Mitral valve disease     Impression: Echo done 12/2015 - [facility].   • Mixed hyperlipidemia     Impression: Labs done 04/16/19 Tot 172 up from 156, HDL 46 down from 47, Trig 152 up from 118,  up from 88 Worsening Encouraged to  watch fatty intake, exercise more, and lose weight . Compliant with meds Goals developed at last visit were not met because Is not getting adequate diet and exercise( due to hip pain) Follow up in 3 months Care management needs are self addressed.   • Onychomycosis     Impression: Stable, pt. declines tx   • Other constipation     Impression: New dx. Pain probably due to IBS, Start Citrucel 1tbsp mixed in 8oz of fluid daily, may gradually increase up to three times daily for a goal of 2 BM that float in the commode daily. Advised to increase fiber, beans, rice, fruits, veggies.   • Other hypotension     Impression: Improving- Patient states that while in Rehab b/p readings were low and she did not recieve medications on some days.   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   • Peripheral vascular disease (CMS/HCC)     Impression: JEROME last year was abnorm on the left last year thus will send for vasc studies   • Periprosthetic fracture around internal prosthetic left hip joint (CMS/HCC)     subsequent encounter. Impression: Patient was seen by Dr. Joseph who referred to Linette Weaver.   • Rheumatoid arthritis involving multiple sites with positive rheumatoid factor (CMS/HCC)     Impression: stable dc mobic ndue to anemia   • Right hip pain    • S/P spinal fusion     Impression: Doing well since surgery on 1-9-17.   • Screening for alcoholism     Impression: Low risk.   • Screening for depression     Negative Depression Screening (4 or less) ()   • Seasonal allergic rhinitis due to pollen     Impression: Doing well.   • Serous otitis media     unspecified chronicity, unspecified laterality. Impression: Right-continue claritin 10mg daily.   • Status post hip surgery     Impression: Doing well from surgery on 11-14-18- Total left hip prosthesis.   • Thrombocytopathia (CMS/HCC)     Impression: Stable, Platelet level normal  at 318. Will follow conservatively.   • Urinary incontinence     unspecified type. Impression: New dx. probable cause of sotting on underwear examined by myself and appeared to be urine not blood, Advised kegal maneuvers   • Uterine leiomyoma     unspecified location. Impression: Discussed following with GYN for a scope, will discuss further after hip surgery   • Vaginal bleeding     Impression: Discussed following with GYN for a scope, will discuss further after hip surgery. discussed us results - she wants to put off further work up until after hip surg   • Vitamin D deficiency     Impression: Doing well, patient gets plenty of time in the Sun. Vitamin D. level normal. Follow conservatively.      Past Surgical History:   Procedure Laterality Date   • BREAST BIOPSY Right 1974    Dr Briseida Alonso   • CATARACT EXTRACTION W/ INTRAOCULAR LENS IMPLANT      7/20/10-rt. eye-Dr. Leon 7/13/10- Lt. eye-Dr. Leon   • FINGER SURGERY  2001    Revision of Finger joints. Dr. Brumfield w/Drs. Kleinert & Marlon   • HIP ENDOPROSTHESIS Left 05/22/2014    Osteonics endoprostehetic replacement of left hip. Dr. Reza Choudhury.   • JOINT REPLACEMENT     • KNEE ARTHROSCOPY Left     [1987] Dr Poon-torn medial meniscus [1995] Dr. Schaffer -torn medial meniscus   • LEEP  2000    Biopsy of cervix. for MAJOR-1 by Dr. Knight   • LUMBAR DISCECTOMY  1994    Hemilaminectomy, foraminectomy & discectomy. Dr. Velasquez, L4-L5 w/posterior lateral fusion & screw fixatoin   • LUMBAR LAMINECTOMY  12/19/2011    foraminotomies, medial facetectomies L5-G2otogi redo. left L5-S1 lumbar discectomy. [facility]-Dr. De Oliveira- 5 units of blood given to pt.   • POSTERIOR LUMBAR/THORACIC SPINE FUSION  2002    Fusion T-5 through L-1. Had T-11 burst fracture. Recuperated at [facility]   • SPINAL FUSION      Lower Back. 7/11/2011-[facility] - Hardware removal from L3-L5, Dr. De Oliveira surgeon, Dr. Booker [facility]-Fusion of spine at multi-levels 12/14/11 - [facility] -   Patricia and Dr. Momin, Anterior approach Spinal Fusion L5-S1 3--[facility]. Dr. De Oliveira and Dr. Dean. Failed posterior fusion with loose instrumentation. L-4 thru L-5. No complications.   • TONSILLECTOMY  1951    Dr Mcginnis   • TOTAL HIP ARTHROPLASTY Right 04/10/2017    [facility], Inpatient. Dr. Reinoso   • TOTAL KNEE ARTHROPLASTY Left 2002    Dr Sue @ [facility]          MEDICATIONS:  I have reviewed and reconciled the patients medication list in the patients chart at the skilled nursing facility today.      ALLERGIES:    Allergies   Allergen Reactions   • Morphine And Related Hallucinations     HALLUCINATIONS           SOCIAL HISTORY:  Lives with a nephew has had a leg amputation but her sister-in-law checks on both of them daily  Social History     Socioeconomic History   • Marital status: Single     Spouse name: Not on file   • Number of children: Not on file   • Years of education: Not on file   • Highest education level: Not on file   Tobacco Use   • Smoking status: Never Smoker   • Smokeless tobacco: Never Used   Substance and Sexual Activity   • Alcohol use: No   • Drug use: No   • Sexual activity: Never       FAMILY HISTORY:    Family History   Problem Relation Age of Onset   • Ovarian cancer Mother    • Arthritis Mother    • Heart failure Father    • Suicidality Brother    • Heart disease Maternal Aunt    • Cancer Other         malignant neoplasm of gastrointestinal tract- in her 50's   • Arthritis Other    • Cervical cancer Other    • Arthritis Other    • Diabetes Other        REVIEW OF SYSTEMS:  Review of Systems   Constitutional: Negative for appetite change.   HENT: Negative for ear pain and postnasal drip.    Respiratory: Negative for cough and shortness of breath.    Cardiovascular: Negative for chest pain and palpitations.   Gastrointestinal: Negative for nausea and vomiting.   Musculoskeletal: Negative for joint swelling.   Neurological: Negative for weakness and  headache.   Hematological: Negative for adenopathy. Does not bruise/bleed easily.   Psychiatric/Behavioral: Negative for sleep disturbance. The patient is not nervous/anxious.          PHYSICAL EXAMINATION:   VITAL SIGNS: see nursing home vital signs      Physical Exam   Constitutional: She is oriented to person, place, and time. She appears well-developed and well-nourished. No distress.   HENT:   Head: Normocephalic and atraumatic.   Right Ear: External ear normal.   Left Ear: External ear normal.   Mouth/Throat: Oropharynx is clear and moist. No oropharyngeal exudate.   Eyes: Pupils are equal, round, and reactive to light. Conjunctivae, EOM and lids are normal. Lids are everted and swept, no foreign bodies found. Right eye exhibits no discharge and no exudate. Left eye exhibits no discharge and no exudate. Right conjunctiva is not injected. Left conjunctiva is not injected.   Fundoscopic exam:       The right eye shows no AV nicking, no exudate, no hemorrhage and no papilledema.        The left eye shows no AV nicking, no exudate, no hemorrhage and no papilledema.   Neck: Normal range of motion. Neck supple. No thyromegaly present.   Cardiovascular: Normal rate, regular rhythm, normal heart sounds and intact distal pulses. Exam reveals no gallop and no friction rub.   No murmur heard.  Pulmonary/Chest: Effort normal and breath sounds normal. No respiratory distress. She has no wheezes. She exhibits no tenderness.   Abdominal: Soft. Bowel sounds are normal. She exhibits no distension. There is no tenderness. There is no rebound and no guarding. No hernia.   Musculoskeletal: Normal range of motion. She exhibits no edema or deformity.        Right knee: Tenderness found.   Lymphadenopathy:     She has no cervical adenopathy.        Right cervical: No superficial cervical adenopathy present.       Left cervical: No superficial cervical adenopathy present.   Neurological: She is alert and oriented to person, place,  and time. She displays normal reflexes. No cranial nerve deficit or sensory deficit. She exhibits normal muscle tone. Coordination normal.   Skin: Skin is warm and dry. No rash noted. She is not diaphoretic. No erythema.        Psychiatric: She has a normal mood and affect. Her behavior is normal. Judgment and thought content normal.       RECORDS REVIEW:   I have reviewed and interpreted the following lab test results  obtained at the time of the visit today.     ASSESSMENT   Problem List Items Addressed This Visit        High    Peripheral vascular disease (CMS/HCC)    Current Assessment & Plan     Stable            Medium    Anemia    Current Assessment & Plan     Doing well  considering recent surgery see hospital labs         Gastroesophageal reflux disease without esophagitis    Current Assessment & Plan     Dramatic at this time         Hypertension, benign    Current Assessment & Plan     Well see nursing home notes.         Spinal stenosis of lumbar region    Current Assessment & Plan     Proved patient to have outpatient physical therapy         Thrombocytopenia (CMS/HCC)    Current Assessment & Plan     Improved to--see hospital labs            Unprioritized    Status post knee replacement - Primary    Current Assessment & Plan     Recently done by Dr. Silva is doing excellent--until patient is ready for discharge she is to have outpatient physical therapy at this facility             Diagnoses and all orders for this visit:    Status post right knee replacement    Peripheral vascular disease (CMS/HCC)    Thrombocytopenia (CMS/HCC)    Spinal stenosis of lumbar region, unspecified whether neurogenic claudication present    Hypertension, benign    Gastroesophageal reflux disease without esophagitis    Other iron deficiency anemia        PLAN    [x]  Discussed Patient in detail with nursing/staff, addressed all needs today.     [x]  Plan of Care Reviewed   [x]  PT/OT Reviewed   []  Order Changes  [x]   Discharge Plans Reviewed  []  Advance Directive on file with Nursing Home.   []  POA on file with Nursing Home.   []  Code Status listed: []  Full Code   []  DNR          Total face to face time spent with patient 40 minutes. Of which  25 minutes where in counseling the patient and family.     Mustapha Gonzales MD    [facility] Codes: 18342

## 2020-08-01 NOTE — ASSESSMENT & PLAN NOTE
Recently done by Dr. Silva is doing excellent--until patient is ready for discharge she is to have outpatient physical therapy at this facility

## 2020-08-03 ENCOUNTER — TREATMENT (OUTPATIENT)
Dept: PHYSICAL THERAPY | Facility: CLINIC | Age: 80
End: 2020-08-03

## 2020-08-03 DIAGNOSIS — R26.2 DIFFICULTY WALKING: ICD-10-CM

## 2020-08-03 DIAGNOSIS — Z96.651 S/P TKR (TOTAL KNEE REPLACEMENT), RIGHT: Primary | ICD-10-CM

## 2020-08-03 DIAGNOSIS — M25.561 ACUTE PAIN OF RIGHT KNEE: ICD-10-CM

## 2020-08-03 PROCEDURE — 97161 PT EVAL LOW COMPLEX 20 MIN: CPT | Performed by: PHYSICAL THERAPIST

## 2020-08-03 PROCEDURE — G0283 ELEC STIM OTHER THAN WOUND: HCPCS | Performed by: PHYSICAL THERAPIST

## 2020-08-03 PROCEDURE — 97110 THERAPEUTIC EXERCISES: CPT | Performed by: PHYSICAL THERAPIST

## 2020-08-03 NOTE — PROGRESS NOTES
Physical Therapy Initial Evaluation and Plan of Care    Patient: Vida Jamison   : 1940  Diagnosis/ICD-10 Code:  S/P TKR (total knee replacement), right [Z96.651]  Referring practitioner: Ulices Braswell/Johnny Antonio*  Date of Initial Visit: 8/3/2020  Today's Date: 8/3/2020  Patient seen for 1 sessions           Subjective Questionnaire: Oxford Knee Score = 32/48; indicating 67% ability, 33% impairment      Subjective Evaluation    History of Present Illness  Date of surgery: 2020  Mechanism of injury: Pt underwent R TKR on 2020. Pt went to SNF for short stay. Pt states her knee is feeling pretty good, still having some swelling. Pt also had revision of L THR in 10/2019. Pt states she fell a couple Sundays ago and fell onto L hip. States it was a little sore a couple days but is fine now. Uses rollator at all times. Takes percocet 1-2x/day but has not needed any yesterday or today. Taking tylenol prn. States she was in a surgery x3 months leading up to TKR due to knee hurting so bad. Pt with hx lumbar surgery x6 and has walked bent over for >10 yrs. States L LE is weaker due to nerve damage.    Quality of life: good    Pain  Current pain rating: 3  At best pain rating: 3  At worst pain ratin  Location: R knee  Quality: pressure and dull ache  Relieving factors: medications  Aggravating factors: movement  Progression: improved    Social Support  Lives in: one-story house  Lives with: pt's nephew lives with her (CHANCE BKARIAN)    Treatments  Previous treatment: physical therapy  Discharged from (in last 30 days): skilled nursing facility  Patient Goals  Patient goals for therapy: decreased edema, decreased pain, improved balance, increased motion, increased strength and independence with ADLs/IADLs  Patient goal: get back to walking with her cane           Objective          Observations     Right Knee   Positive for edema.     Tenderness     Additional Tenderness Details  Mild tenderness lateral aspect of R  knee.    Active Range of Motion     Right Knee   Flexion: 120 degrees   Extensor lag: 15 degrees     Strength/Myotome Testing     Left Knee   Flexion: 4  Extension: 4  Quadriceps contraction: fair    Right Knee   Flexion: 4  Extension: 4          Assessment & Plan     Assessment  Impairments: abnormal gait, abnormal muscle tone, abnormal or restricted ROM, activity intolerance, impaired balance, impaired physical strength, lacks appropriate home exercise program, pain with function and safety issue  Assessment details: Pt is 78 yo female 4 wk s/p R TKR. Pt D/C home from rehab ~1 wk ago. Pt presents with decreased strength and ROM of R knee and decreased strength B hips. Pt is having difficulty with ambulation and requires use of rollator at all times. Pt hopes to return to ambulation with a cane. Pt is having difficulty with transfers.Difficulty sleeping. Oxford Knee Score indicates 33% impairment.    Patient presents with the impairments listed above and based on the objective findings and the physical therapy evaluation, the patient’s condition has the potential to improve in response to therapy.   The patient’s condition and/or services required are at a level of complexity that necessitates the skill & supervision of a physical therapist.    Prognosis: good  Functional Limitations: carrying objects, sleeping, walking, uncomfortable because of pain, sitting, standing, stooping and unable to perform repetitive tasks  Goals  Plan Goals: STG:  - Improve R knee ext AROM to 0 deg in 3 weeks.  - Pt to transfer sit to stand with equal wt bearing in 3 weeks.  - Pt to be independent with HEP in 3 weeks.  LTG:  - Improve Oxford Knee score to 20% or less impairment by discharge.  - Pt to ambulate safely in community with cane by discharge.  - Increase R knee strength to 4+/5 or greater for improved transfers and ambulation by discharge.    Plan  Therapy options: will be seen for skilled physical therapy services  Planned  modality interventions: TENS, thermotherapy (hydrocollator packs) and cryotherapy  Planned therapy interventions: balance/weight-bearing training, flexibility, functional ROM exercises, gait training, home exercise program, joint mobilization, manual therapy, postural training, soft tissue mobilization, strengthening, stretching and therapeutic activities  Frequency: 2x week  Duration in visits: 16  Treatment plan discussed with: patient        Timed:         Manual Therapy:    5     mins  23183;     Therapeutic Exercise:    15     mins  20939;     Neuromuscular Galina:        mins  10361;    Therapeutic Activity:          mins  17175;     Gait Training:           mins  01265;     Ultrasound:          mins  08319;    Ionto                                   mins   49258  Can Repos          mins 90884    Un-Timed:  Electrical Stimulation:    15     mins  43937 ( );  Dry Needling          mins self-pay  Traction          mins 39861  Low Eval     20     Mins  62302  Mod Eval          Mins  65259  High Eval                            Mins  88932  Self - Care                          mins  21162        Timed Treatment:   20   mins   Total Treatment:     55   mins    PT SIGNATURE: Yumiko Real, PT   DATE TREATMENT INITIATED: 8/3/2020    Medicare Initial Certification  Certification Period: 11/1/2020  I certify that the therapy services are furnished while this patient is under my care.  The services outlined above are required by this patient, and will be reviewed every 90 days.     PHYSICIAN: Ulices Sue/MD Johnny __________________________________________________________     DATE:     Please sign and return via fax to  .. Thank you, [facility].

## 2020-08-05 ENCOUNTER — TREATMENT (OUTPATIENT)
Dept: PHYSICAL THERAPY | Facility: CLINIC | Age: 80
End: 2020-08-05

## 2020-08-05 DIAGNOSIS — R26.2 DIFFICULTY WALKING: ICD-10-CM

## 2020-08-05 DIAGNOSIS — Z96.651 S/P TKR (TOTAL KNEE REPLACEMENT), RIGHT: Primary | ICD-10-CM

## 2020-08-05 DIAGNOSIS — M25.561 ACUTE PAIN OF RIGHT KNEE: ICD-10-CM

## 2020-08-05 PROCEDURE — G0283 ELEC STIM OTHER THAN WOUND: HCPCS | Performed by: PHYSICAL THERAPIST

## 2020-08-05 PROCEDURE — 97140 MANUAL THERAPY 1/> REGIONS: CPT | Performed by: PHYSICAL THERAPIST

## 2020-08-05 PROCEDURE — 97110 THERAPEUTIC EXERCISES: CPT | Performed by: PHYSICAL THERAPIST

## 2020-08-05 NOTE — PROGRESS NOTES
Physical Therapy Daily Progress Note      Patient: Vida Jamison   : 1940  Diagnosis/ICD-10 Code:  S/P TKR (total knee replacement), right [Z96.651]  Referring practitioner: Ulices Braswell/Johnny Antonio*  Date of Initial Visit: Type: THERAPY  Noted: 8/3/2020  Today's Date: 2020  Patient seen for 2 sessions         Vida Jamison reports: she had been improving steadily until performing the stretch where she props her heel on a chair and states her R knee has been very sore and aggravated since making it hard to walk at times.    Objective   See Exercise, Manual, and Modality Logs for complete treatment.     Assessment/Plan   Pt. Is responding well to exercise and manual intervention for strength and mobility at this time and has good response for relief of soreness with Estim and heat application. Pt. Has pleased attitude overall toward knee progress and is anxious for strength improvement.    Progress per Plan of Care           Timed:         Manual Therapy:    15     mins  47293;     Therapeutic Exercise:    20     mins  60331;     Neuromuscular Galina:        mins  65804;    Therapeutic Activity:          mins  46280;     Gait Training:           mins  56794;     Ultrasound:          mins  93969;    Ionto                                   mins   49300  Self Care                            mins   95145  Canalith Repos                   mins  4209    Un-Timed:  Electrical Stimulation:    15     mins  27600 ( );  Dry Needling          mins self-pay  Traction          mins 74076  Low Eval          Mins  08560  Mod Eval          Mins  99251  High Eval                            Mins  79438    Timed Treatment:   35   mins   Total Treatment:     50   mins    Maty Bernard PTA  Physical Therapist Assistant License #[id number]

## 2020-08-11 ENCOUNTER — RESULTS ENCOUNTER (OUTPATIENT)
Dept: FAMILY MEDICINE CLINIC | Facility: CLINIC | Age: 80
End: 2020-08-11

## 2020-08-11 DIAGNOSIS — R73.9 HYPERGLYCEMIA: ICD-10-CM

## 2020-08-11 DIAGNOSIS — E78.2 MIXED HYPERLIPIDEMIA: ICD-10-CM

## 2020-08-11 DIAGNOSIS — Z96.651 STATUS POST RIGHT KNEE REPLACEMENT: ICD-10-CM

## 2020-08-12 ENCOUNTER — RESULTS ENCOUNTER (OUTPATIENT)
Dept: FAMILY MEDICINE CLINIC | Facility: CLINIC | Age: 80
End: 2020-08-12

## 2020-08-12 DIAGNOSIS — R73.9 HYPERGLYCEMIA: ICD-10-CM

## 2020-08-12 DIAGNOSIS — E78.2 MIXED HYPERLIPIDEMIA: ICD-10-CM

## 2020-08-18 ENCOUNTER — TELEPHONE (OUTPATIENT)
Dept: FAMILY MEDICINE CLINIC | Facility: CLINIC | Age: 80
End: 2020-08-18

## 2020-08-18 NOTE — TELEPHONE ENCOUNTER
Called and spoke with Vida.  She was in the emergency room due to unable to walk.  She was xrayed and it showed a small fracture of the heel cap.  Offered a follow up appt. Patient declines at this time. She has a follow up with ortho on 8-26-20

## 2020-08-26 DIAGNOSIS — I10 HYPERTENSION, BENIGN: ICD-10-CM

## 2020-08-26 DIAGNOSIS — E78.2 MIXED HYPERLIPIDEMIA: ICD-10-CM

## 2020-08-27 RX ORDER — ENALAPRIL MALEATE 10 MG/1
TABLET ORAL
Qty: 180 TABLET | Refills: 0 | Status: SHIPPED | OUTPATIENT
Start: 2020-08-27 | End: 2020-11-09

## 2020-08-27 RX ORDER — ATORVASTATIN CALCIUM 10 MG/1
TABLET, FILM COATED ORAL
Qty: 90 TABLET | Refills: 0 | Status: SHIPPED | OUTPATIENT
Start: 2020-08-27 | End: 2020-11-09

## 2020-09-02 ENCOUNTER — TELEPHONE (OUTPATIENT)
Dept: FAMILY MEDICINE CLINIC | Facility: CLINIC | Age: 80
End: 2020-09-02

## 2020-09-02 NOTE — TELEPHONE ENCOUNTER
Fadia from [facility] called and wanted to make sure Dr. Gonzales had received a Medicare Certification on Vida. If you have any questions you can reach Fadia at [phone].

## 2020-09-04 ENCOUNTER — TELEPHONE (OUTPATIENT)
Dept: FAMILY MEDICINE CLINIC | Facility: CLINIC | Age: 80
End: 2020-09-04

## 2020-09-08 DIAGNOSIS — Z96.649 PAIN IN HIP REGION AFTER TOTAL HIP REPLACEMENT, INITIAL ENCOUNTER (HCC): Primary | ICD-10-CM

## 2020-09-08 DIAGNOSIS — T84.84XA PAIN IN HIP REGION AFTER TOTAL HIP REPLACEMENT, INITIAL ENCOUNTER (HCC): Primary | ICD-10-CM

## 2020-09-08 NOTE — TELEPHONE ENCOUNTER
LOC NURSE AT [facility] CALLED TODAY AND STATED THAT ARETHA IS REQUESTING THAT HER PERCOCET 5/325 BE INCREASED TO Q 4 HRS.  PLEASE ADVISE.  THANKS.  HOPE YOUR HAVING A GREAT VACATION, SORRY TO BOTHER YOU.  JORDAN

## 2020-09-09 RX ORDER — OXYCODONE HYDROCHLORIDE AND ACETAMINOPHEN 5; 325 MG/1; MG/1
1 TABLET ORAL EVERY 4 HOURS PRN
Qty: 30 TABLET | Refills: 0 | Status: SHIPPED | OUTPATIENT
Start: 2020-09-09 | End: 2020-10-08

## 2020-10-08 ENCOUNTER — HOSPITAL ENCOUNTER (OUTPATIENT)
Dept: BONE DENSITY | Facility: HOSPITAL | Age: 80
Discharge: HOME | End: 2020-10-08

## 2020-10-08 ENCOUNTER — HOSPITAL ENCOUNTER (OUTPATIENT)
Dept: GENERAL RADIOLOGY | Facility: HOSPITAL | Age: 80
Discharge: HOME | End: 2020-10-08

## 2020-10-08 ENCOUNTER — OFFICE VISIT (OUTPATIENT)
Dept: FAMILY MEDICINE CLINIC | Facility: CLINIC | Age: 80
End: 2020-10-08

## 2020-10-08 VITALS
OXYGEN SATURATION: 96 % | WEIGHT: 155.4 LBS | HEART RATE: 83 BPM | SYSTOLIC BLOOD PRESSURE: 121 MMHG | RESPIRATION RATE: 15 BRPM | HEIGHT: 60 IN | TEMPERATURE: 97.7 F | DIASTOLIC BLOOD PRESSURE: 69 MMHG | BODY MASS INDEX: 30.51 KG/M2

## 2020-10-08 DIAGNOSIS — R73.9 HYPERGLYCEMIA: ICD-10-CM

## 2020-10-08 DIAGNOSIS — I10 HYPERTENSION, BENIGN: ICD-10-CM

## 2020-10-08 DIAGNOSIS — M25.561 CHRONIC PAIN OF RIGHT KNEE: ICD-10-CM

## 2020-10-08 DIAGNOSIS — R53.1 WEAKNESS: ICD-10-CM

## 2020-10-08 DIAGNOSIS — N95.1 MENOPAUSAL SYNDROME: ICD-10-CM

## 2020-10-08 DIAGNOSIS — E78.2 MIXED HYPERLIPIDEMIA: Primary | ICD-10-CM

## 2020-10-08 DIAGNOSIS — G89.29 CHRONIC PAIN OF RIGHT KNEE: ICD-10-CM

## 2020-10-08 DIAGNOSIS — E55.9 VITAMIN D DEFICIENCY: ICD-10-CM

## 2020-10-08 DIAGNOSIS — R06.02 SHORTNESS OF BREATH: ICD-10-CM

## 2020-10-08 PROCEDURE — 99214 OFFICE O/P EST MOD 30 MIN: CPT | Performed by: FAMILY MEDICINE

## 2020-10-08 PROCEDURE — 71046 X-RAY EXAM CHEST 2 VIEWS: CPT

## 2020-10-08 PROCEDURE — 77081 DXA BONE DENSITY APPENDICULR: CPT

## 2020-10-08 NOTE — ASSESSMENT & PLAN NOTE
Doing well; Encouraged to watch salt, exercise more and lose weight.  Advised to decrease Vasotec to 1/2 tablet daily (10mg).

## 2020-10-08 NOTE — PROGRESS NOTES
Subjective   Vida Jamison is a 80 y.o. female.     Fatigue  This is a chronic problem. The current episode started 1 to 4 weeks ago. The problem occurs daily. The problem has been gradually worsening. Associated symptoms include fatigue and weakness. Pertinent negatives include no chest pain or headaches. The symptoms are aggravated by walking.   Shaking  This is a new problem. The current episode started in the past 7 days. The problem occurs daily. The problem has been gradually worsening. Associated symptoms include fatigue and weakness. Pertinent negatives include no chest pain or headaches. The symptoms are aggravated by exertion. She has tried nothing for the symptoms.   Knee Pain   The incident occurred more than 1 week ago. There was no injury mechanism. The pain is present in the right knee. The quality of the pain is described as aching. The pain has been improving since onset.   Hypertension  This is a chronic problem. The current episode started more than 1 year ago. The problem has been rapidly improving since onset. The problem is controlled. Associated symptoms include shortness of breath. Pertinent negatives include no anxiety, blurred vision, chest pain, headaches, orthopnea, palpitations, peripheral edema or PND.        The following portions of the patient's history were reviewed and updated as appropriate: current medications, past family history, past medical history, past social history, past surgical history and problem list.    Family History   Problem Relation Age of Onset   • Ovarian cancer Mother    • Arthritis Mother    • Heart failure Father    • Suicidality Brother    • Heart disease Maternal Aunt    • Cancer Other         malignant neoplasm of gastrointestinal tract- in her 50's   • Arthritis Other    • Cervical cancer Other    • Arthritis Other    • Diabetes Other        Social History     Tobacco Use   • Smoking status: Never Smoker   • Smokeless tobacco: Never Used   Substance Use  Topics   • Alcohol use: No   • Drug use: No       Past Surgical History:   Procedure Laterality Date   • BREAST BIOPSY Right 1974    Dr Briseida Alonso   • CATARACT EXTRACTION W/ INTRAOCULAR LENS IMPLANT      7/20/10-rt. eye-Dr. Leon 7/13/10- Lt. eye-Dr. Leon   • FINGER SURGERY  2001    Revision of Finger joints. Dr. Brumfield w/Drs. Kleinert & Marlon   • HIP ENDOPROSTHESIS Left 05/22/2014    [employer]ics endoprostehetic replacement of left hip. Dr. Reza Choudhury.   • JOINT REPLACEMENT     • KNEE ARTHROSCOPY Left     [1987] Dr Poon-torn medial meniscus [1995] Dr. Schaffer -torn medial meniscus   • LEEP  2000    Biopsy of cervix. for MAJOR-1 by Dr. Knight   • LUMBAR DISCECTOMY  1994    Hemilaminectomy, foraminectomy & discectomy. Dr. Velasquez, L4-L5 w/posterior lateral fusion & screw fixatoin   • LUMBAR LAMINECTOMY  12/19/2011    foraminotomies, medial facetectomies L5-[id number] redo. left L5-S1 lumbar discectomy. [facility]-Dr. De Oliveira- 5 units of blood given to pt.   • POSTERIOR LUMBAR/THORACIC SPINE FUSION  2002    Fusion T-5 through L-1. Had T-11 burst fracture. Recuperated at [facility]   • SPINAL FUSION      Lower Back. 7/11/2011-[facility] - Hardware removal from L3-L5, Dr. De Oliveira surgeon, Dr. Booker [facility]-Fusion of spine at multi-levels 12/14/11 - [facility] - Dr Gomez and Dr. Momin, Anterior approach Spinal Fusion L5-S1 3--[facility]. Dr. De Oliveira and Dr. Dean. Failed posterior fusion with loose instrumentation. L-4 thru L-5. No complications.   • TONSILLECTOMY  1951    Dr Mcginnis   • TOTAL HIP ARTHROPLASTY Right 04/10/2017    [facility], Inpatient. Dr. Reinoso   • TOTAL KNEE ARTHROPLASTY Left 2002    Dr Sue @ [facility]       Patient Active Problem List   Diagnosis   • Gastroesophageal reflux disease without esophagitis   • Pain in hip region after total hip replacement (CMS/HCC)   • Loose total hip arthroplasty (CMS/HCC)   • Lumbar degenerative disc disease   • Mixed  hyperlipidemia   • Myalgia and myositis   • Hypertension, benign   • Peripheral vascular disease (CMS/HCC)   • Periprosthetic fracture around internal prosthetic left hip joint (CMS/HCC)   • Rheumatoid arthritis involving multiple sites with positive rheumatoid factor (CMS/HCC)   • S/P lumbar fusion   • Spinal stenosis of lumbar region   • Carpal tunnel syndrome of right wrist   • Anemia   • Hyperglycemia   • Left hip pain   • Vitamin D deficiency   • First degree atrioventricular block   • Chronic pain syndrome   • Hypotension due to drugs   • Dependent edema   • Advanced directives, counseling/discussion   • Aortic valve regurgitation   • ASCUS (atypical squamous cells of undetermined significance) on Pap smear   • Elevated diaphragm   • Esophageal hernia   • Family history of diabetes mellitus   • Fusion of spine of lumbar region   • Hypertensive left ventricular hypertrophy, without heart failure   • Hyponatremia   • Mitral valve disease   • Status post hip replacement   • Thrombocytopenia (CMS/HCC)   • Elevated alkaline phosphatase level   • Chronic pain of right knee   • Left knee pain   • Left leg pain   • Seasonal allergic rhinitis due to pollen   • Localized edema   • Contusion of hip   • Fall   • Status post knee replacement   • Shortness of breath   • Weakness   • Menopausal syndrome   • Myalgia       Current Outpatient Medications on File Prior to Visit   Medication Sig Dispense Refill   • acetaminophen (TYLENOL) 650 MG 8 hr tablet Take 650 mg by mouth 2 (Two) Times a Day.     • amitriptyline (ELAVIL) 75 MG tablet Take 1 tablet by mouth every night at bedtime. 90 tablet 0   • atorvastatin (LIPITOR) 10 MG tablet TAKE 1 TABLET EVERY DAY 90 tablet 0   • Calcium Carbonate-Vitamin D (CALCIUM 600+D) 600-200 MG-UNIT tablet Take 2 tablets by mouth Daily.     • Calcium Carbonate-Vitamin D (CALCIUM 600/VITAMIN D PO)      • celecoxib (CELEBREX) 200 MG capsule Take 1 capsule by mouth Daily. 90 capsule 3   •  "diphenhydrAMINE (BENADRYL ALLERGY) 25 mg capsule Take 1 capsule by mouth Daily.     • DULoxetine (CYMBALTA) 30 MG capsule TAKE 1 CAPSULE EVERY DAY 90 capsule 3   • enalapril (VASOTEC) 10 MG tablet TAKE 1 TABLET TWICE DAILY (Patient taking differently: Take 10 mg by mouth Daily.) 180 tablet 0   • ferrous sulfate 325 (65 FE) MG tablet Take 325 mg by mouth Daily With Breakfast.     • gabapentin (NEURONTIN) 300 MG capsule Take 1 capsule by mouth 2 (Two) Times a Day. 180 capsule 1   • Glycerin-Hypromellose- (ARTIFICIAL TEARS) 0.2-0.2-1 % solution ophthalmic solution      • loratadine (CLARITIN) 10 MG tablet Take 10 mg by mouth Daily.     • methocarbamol (Robaxin) 500 MG tablet Take 1 tablet by mouth Daily. 90 tablet 0   • metoprolol tartrate (LOPRESSOR) 25 MG tablet TAKE 1 TABLET TWICE DAILY (Patient taking differently: Take 25 mg by mouth Daily.) 180 tablet 0   • montelukast (SINGULAIR) 10 MG tablet Take 1 tablet by mouth Daily. 90 tablet 3   • pantoprazole (PROTONIX) 40 MG EC tablet TAKE 1 TABLET EVERY DAY 90 tablet 0   • Psyllium 100 % powder METAMUCIL POWD     • tamsulosin (FLOMAX) 0.4 MG capsule 24 hr capsule Take 1 capsule by mouth Daily. 30 capsule 1   • traMADol (ULTRAM) 50 MG tablet Take 50 mg by mouth Every 8 (Eight) Hours As Needed for Moderate Pain .       No current facility-administered medications on file prior to visit.        Allergies   Allergen Reactions   • Morphine And Related Hallucinations     HALLUCINATIONS         Review of Systems   Constitutional: Positive for fatigue.   Eyes: Negative for blurred vision.   Respiratory: Positive for shortness of breath.    Cardiovascular: Negative for chest pain, palpitations, orthopnea and PND.   Neurological: Positive for weakness.       Objective   Visit Vitals  /69 (BP Location: Left arm, Patient Position: Sitting, Cuff Size: Large Adult)   Pulse 83   Temp 97.7 °F (36.5 °C)   Resp 15   Ht 152.4 cm (60\")   Wt 70.5 kg (155 lb 6.4 oz)   SpO2 96% "   BMI 30.35 kg/m²     Physical Exam  Vitals signs and nursing note reviewed.   Constitutional:       Appearance: She is well-developed.   HENT:      Head: Normocephalic.   Neck:      Musculoskeletal: Neck supple.      Thyroid: No thyromegaly.      Vascular: No carotid bruit.      Trachea: Trachea normal.   Cardiovascular:      Rate and Rhythm: Normal rate and regular rhythm.      Heart sounds: No murmur. No friction rub. No gallop.    Pulmonary:      Effort: Pulmonary effort is normal. No respiratory distress.      Breath sounds: Normal breath sounds. No wheezing.   Chest:      Chest wall: No tenderness.   Musculoskeletal:      Right knee: She exhibits decreased range of motion. Tenderness found.   Skin:     General: Skin is dry.      Findings: No rash.      Nails: There is no clubbing.     Neurological:      Mental Status: She is alert and oriented to person, place, and time.   Psychiatric:         Behavior: Behavior is cooperative.           Assessment/Plan .  Problem List Items Addressed This Visit        Medium    Hypertension, benign    Current Assessment & Plan     Doing well; Encouraged to watch salt, exercise more and lose weight.  Advised to decrease Vasotec to 1/2 tablet daily (10mg).         Mixed hyperlipidemia - Primary    Current Assessment & Plan     Will order labs and discuss results at next visit.         Relevant Orders    Lipid Panel With / Chol / HDL Ratio (Completed)    Comprehensive Metabolic Panel (Completed)       Low    Hyperglycemia    Current Assessment & Plan     Will order labs and discuss results at next visit.         Relevant Orders    Hemoglobin A1c (Completed)    Vitamin D deficiency    Current Assessment & Plan     Will order labs and discuss results at next visit.         Relevant Orders    Vitamin D 25 Hydroxy (Completed)       Unprioritized    Chronic pain of right knee    Current Assessment & Plan     She is getting therapy at the          Menopausal syndrome    Current  Assessment & Plan     Will order dexa scan and discuss results at next visit.         Shortness of breath    Current Assessment & Plan     Will order a chest xray and will discuss results at next visit.         Relevant Orders    XR Chest PA & Lateral (Completed)    Weakness    Current Assessment & Plan     Probable cause of shakiness--will order labs and discuss results at next visit.         Relevant Orders    TSH (Completed)    CBC & Differential (Completed)

## 2020-10-10 LAB
25(OH)D3+25(OH)D2 SERPL-MCNC: 51.1 NG/ML (ref 30–100)
ALBUMIN SERPL-MCNC: 4.1 G/DL (ref 3.7–4.7)
ALBUMIN/GLOB SERPL: 1.5 {RATIO} (ref 1.2–2.2)
ALP SERPL-CCNC: 104 IU/L (ref 39–117)
ALT SERPL-CCNC: 9 IU/L (ref 0–32)
AST SERPL-CCNC: 19 IU/L (ref 0–40)
BASOPHILS # BLD AUTO: 0.1 X10E3/UL (ref 0–0.2)
BASOPHILS NFR BLD AUTO: 1 %
BILIRUB SERPL-MCNC: 0.5 MG/DL (ref 0–1.2)
BUN SERPL-MCNC: 12 MG/DL (ref 8–27)
BUN/CREAT SERPL: 20 (ref 12–28)
CALCIUM SERPL-MCNC: 9.3 MG/DL (ref 8.7–10.3)
CHLORIDE SERPL-SCNC: 101 MMOL/L (ref 96–106)
CHOLEST SERPL-MCNC: 139 MG/DL (ref 100–199)
CHOLEST/HDLC SERPL: 3.4 RATIO (ref 0–4.4)
CO2 SERPL-SCNC: 22 MMOL/L (ref 20–29)
CREAT SERPL-MCNC: 0.61 MG/DL (ref 0.57–1)
EOSINOPHIL # BLD AUTO: 0.1 X10E3/UL (ref 0–0.4)
EOSINOPHIL NFR BLD AUTO: 2 %
ERYTHROCYTE [DISTWIDTH] IN BLOOD BY AUTOMATED COUNT: 12.9 % (ref 11.7–15.4)
GLOBULIN SER CALC-MCNC: 2.8 G/DL (ref 1.5–4.5)
GLUCOSE SERPL-MCNC: 93 MG/DL (ref 65–99)
HBA1C MFR BLD: 6 % (ref 4.8–5.6)
HCT VFR BLD AUTO: 34.5 % (ref 34–46.6)
HDLC SERPL-MCNC: 41 MG/DL
HGB BLD-MCNC: 11 G/DL (ref 11.1–15.9)
IMM GRANULOCYTES # BLD AUTO: 0 X10E3/UL (ref 0–0.1)
IMM GRANULOCYTES NFR BLD AUTO: 0 %
LDLC SERPL CALC-MCNC: 79 MG/DL (ref 0–99)
LYMPHOCYTES # BLD AUTO: 0.8 X10E3/UL (ref 0.7–3.1)
LYMPHOCYTES NFR BLD AUTO: 12 %
MCH RBC QN AUTO: 26.4 PG (ref 26.6–33)
MCHC RBC AUTO-ENTMCNC: 31.9 G/DL (ref 31.5–35.7)
MCV RBC AUTO: 83 FL (ref 79–97)
MONOCYTES # BLD AUTO: 0.5 X10E3/UL (ref 0.1–0.9)
MONOCYTES NFR BLD AUTO: 8 %
NEUTROPHILS # BLD AUTO: 4.8 X10E3/UL (ref 1.4–7)
NEUTROPHILS NFR BLD AUTO: 77 %
PLATELET # BLD AUTO: 409 X10E3/UL (ref 150–450)
POTASSIUM SERPL-SCNC: 4.6 MMOL/L (ref 3.5–5.2)
PROT SERPL-MCNC: 6.9 G/DL (ref 6–8.5)
RBC # BLD AUTO: 4.16 X10E6/UL (ref 3.77–5.28)
SODIUM SERPL-SCNC: 138 MMOL/L (ref 134–144)
TRIGL SERPL-MCNC: 104 MG/DL (ref 0–149)
TSH SERPL DL<=0.005 MIU/L-ACNC: 1.93 UIU/ML (ref 0.45–4.5)
VLDLC SERPL CALC-MCNC: 19 MG/DL (ref 5–40)
WBC # BLD AUTO: 6.3 X10E3/UL (ref 3.4–10.8)

## 2020-10-13 ENCOUNTER — OFFICE VISIT (OUTPATIENT)
Dept: FAMILY MEDICINE CLINIC | Facility: CLINIC | Age: 80
End: 2020-10-13

## 2020-10-13 VITALS
OXYGEN SATURATION: 95 % | HEIGHT: 62 IN | DIASTOLIC BLOOD PRESSURE: 76 MMHG | SYSTOLIC BLOOD PRESSURE: 150 MMHG | HEART RATE: 80 BPM | TEMPERATURE: 97.9 F | BODY MASS INDEX: 28.49 KG/M2 | RESPIRATION RATE: 18 BRPM | WEIGHT: 154.8 LBS

## 2020-10-13 DIAGNOSIS — R06.02 SHORTNESS OF BREATH: ICD-10-CM

## 2020-10-13 DIAGNOSIS — R73.9 HYPERGLYCEMIA: ICD-10-CM

## 2020-10-13 DIAGNOSIS — E87.1 HYPONATREMIA: ICD-10-CM

## 2020-10-13 DIAGNOSIS — E55.9 VITAMIN D DEFICIENCY: ICD-10-CM

## 2020-10-13 DIAGNOSIS — M79.10 MYALGIA: ICD-10-CM

## 2020-10-13 DIAGNOSIS — R74.8 ELEVATED ALKALINE PHOSPHATASE LEVEL: ICD-10-CM

## 2020-10-13 DIAGNOSIS — D50.8 OTHER IRON DEFICIENCY ANEMIA: ICD-10-CM

## 2020-10-13 DIAGNOSIS — D69.6 THROMBOCYTOPENIA (HCC): Primary | ICD-10-CM

## 2020-10-13 DIAGNOSIS — I10 HYPERTENSION, BENIGN: ICD-10-CM

## 2020-10-13 DIAGNOSIS — E78.2 MIXED HYPERLIPIDEMIA: ICD-10-CM

## 2020-10-13 PROCEDURE — 99214 OFFICE O/P EST MOD 30 MIN: CPT | Performed by: FAMILY MEDICINE

## 2020-10-13 NOTE — ASSESSMENT & PLAN NOTE
Labs done 10-, read by me, reviewed with pt.  Trig. 104 down from 127, tot. Chol. 139 down from 179, HDL 41 down from 47, LDL 79 down from 107   Improved.   Encouraged to watch fatty intake, exercise more, and lose weight.   compliant with medication   Is getting adequate diet and exercise  Goals developed at last visit were  met   Follow up in 3  months  Care management needs are self-addressed. Self-management abilities addressed and patient is capable of managing her own disease.

## 2020-10-13 NOTE — PROGRESS NOTES
Subjective   Vida Jamison is a 80 y.o. female.     Hyperlipidemia  This is a chronic problem. The current episode started more than 1 year ago. The problem is controlled. Recent lipid tests were reviewed and are normal. Current antihyperlipidemic treatment includes exercise. Risk factors for coronary artery disease include dyslipidemia, diabetes mellitus and hypertension.   Anemia  Presents for follow-up visit. Symptoms include malaise/fatigue. There has been no abdominal pain, confusion or fever.   Hypertension  This is a chronic problem. The current episode started more than 1 year ago. The problem has been gradually worsening since onset. The problem is controlled. Associated symptoms include malaise/fatigue. Risk factors for coronary artery disease include diabetes mellitus and dyslipidemia.   Muscle Pain  This is a recurrent problem. The current episode started more than 1 month ago. The problem occurs intermittently. The problem has been gradually worsening since onset. Pertinent negatives include no abdominal pain or fever.        The following portions of the patient's history were reviewed and updated as appropriate: current medications, past family history, past medical history, past social history, past surgical history and problem list.    Family History   Problem Relation Age of Onset   • Ovarian cancer Mother    • Arthritis Mother    • Heart failure Father    • Suicidality Brother    • Heart disease Maternal Aunt    • Cancer Other         malignant neoplasm of gastrointestinal tract- in her 50's   • Arthritis Other    • Cervical cancer Other    • Arthritis Other    • Diabetes Other        Social History     Tobacco Use   • Smoking status: Never Smoker   • Smokeless tobacco: Never Used   Substance Use Topics   • Alcohol use: No   • Drug use: No       Past Surgical History:   Procedure Laterality Date   • BREAST BIOPSY Right 1974    Dr Briseida Alonso   • CATARACT EXTRACTION W/ INTRAOCULAR LENS IMPLANT       7/20/10-rt. eye-Dr. Leon 7/13/10- Lt. eye-Dr. Leon   • FINGER SURGERY  2001    Revision of Finger joints. Dr. Brumfield w/Drs. Kleinert & Marlon   • HIP ENDOPROSTHESIS Left 05/22/2014    [facility]steonics endoprostehetic replacement of left hip. Dr. Reza Choudhury.   • JOINT REPLACEMENT     • KNEE ARTHROSCOPY Left     [1987] Dr Poon-torn medial meniscus [1995] Dr. Schaffer -torn medial meniscus   • LEEP  2000    Biopsy of cervix. for MAJOR-1 by Dr. Knight   • LUMBAR DISCECTOMY  1994    Hemilaminectomy, foraminectomy & discectomy. Dr. Velasquez, L4-L5 w/posterior lateral fusion & screw fixatoin   • LUMBAR LAMINECTOMY  12/19/2011    foraminotomies, medial facetectomies L5-[id number] redo. left L5-S1 lumbar discectomy. [facility]-Dr. De Oliveira- 5 units of blood given to pt.   • POSTERIOR LUMBAR/THORACIC SPINE FUSION  2002    Fusion T-5 through L-1. Had T-11 burst fracture. Recuperated at [facility]   • SPINAL FUSION      Lower Back. 7/11/2011-[facility] - Hardware removal from L3-L5, Dr. De Oliveira surgeon, Dr. Booker [facility]-Fusion of spine at multi-levels 12/14/11 - [facility] - Dr Gomez and Dr. Momin, Anterior approach Spinal Fusion L5-S1 3--[facility]. Dr. De Oliveira and Dr. Dean. Failed posterior fusion with loose instrumentation. L-4 thru L-5. No complications.   • TONSILLECTOMY  1951    Dr Mcginnis   • TOTAL HIP ARTHROPLASTY Right 04/10/2017    [facility] Inpatient. Dr. Reinoso   • TOTAL KNEE ARTHROPLASTY Left 2002    Dr Sue @ [facility]       Patient Active Problem List   Diagnosis   • Gastroesophageal reflux disease without esophagitis   • Pain in hip region after total hip replacement (CMS/HCC)   • Loose total hip arthroplasty (CMS/HCC)   • Lumbar degenerative disc disease   • Mixed hyperlipidemia   • Myalgia and myositis   • Hypertension, benign   • Peripheral vascular disease (CMS/HCC)   • Periprosthetic fracture around internal prosthetic left hip joint (CMS/HCC)   • Rheumatoid arthritis  involving multiple sites with positive rheumatoid factor (CMS/HCC)   • S/P lumbar fusion   • Spinal stenosis of lumbar region   • Carpal tunnel syndrome of right wrist   • Anemia   • Hyperglycemia   • Left hip pain   • Vitamin D deficiency   • First degree atrioventricular block   • Chronic pain syndrome   • Hypotension due to drugs   • Dependent edema   • Advanced directives, counseling/discussion   • Aortic valve regurgitation   • ASCUS (atypical squamous cells of undetermined significance) on Pap smear   • Elevated diaphragm   • Esophageal hernia   • Family history of diabetes mellitus   • Fusion of spine of lumbar region   • Hypertensive left ventricular hypertrophy, without heart failure   • Hyponatremia   • Mitral valve disease   • Status post hip replacement   • Thrombocytopenia (CMS/HCC)   • Elevated alkaline phosphatase level   • Chronic pain of right knee   • Left knee pain   • Left leg pain   • Seasonal allergic rhinitis due to pollen   • Localized edema   • Contusion of hip   • Status post knee replacement   • Shortness of breath   • Weakness   • Menopausal syndrome   • Myalgia       Current Outpatient Medications on File Prior to Visit   Medication Sig Dispense Refill   • acetaminophen (TYLENOL) 650 MG 8 hr tablet Take 650 mg by mouth 2 (Two) Times a Day.     • amitriptyline (ELAVIL) 75 MG tablet Take 1 tablet by mouth every night at bedtime. 90 tablet 0   • atorvastatin (LIPITOR) 10 MG tablet TAKE 1 TABLET EVERY DAY 90 tablet 0   • Calcium Carbonate-Vitamin D (CALCIUM 600+D) 600-200 MG-UNIT tablet Take 2 tablets by mouth Daily.     • celecoxib (CELEBREX) 200 MG capsule Take 1 capsule by mouth Daily. 90 capsule 3   • diphenhydrAMINE (BENADRYL ALLERGY) 25 mg capsule Take 1 capsule by mouth Daily.     • DULoxetine (CYMBALTA) 30 MG capsule TAKE 1 CAPSULE EVERY DAY 90 capsule 3   • enalapril (VASOTEC) 10 MG tablet TAKE 1 TABLET TWICE DAILY (Patient taking differently: Take 10 mg by mouth Daily.) 180 tablet  "0   • gabapentin (NEURONTIN) 300 MG capsule Take 1 capsule by mouth 2 (Two) Times a Day. 180 capsule 1   • Glycerin-Hypromellose- (ARTIFICIAL TEARS) 0.2-0.2-1 % solution ophthalmic solution      • loratadine (CLARITIN) 10 MG tablet Take 10 mg by mouth Daily.     • methocarbamol (Robaxin) 500 MG tablet Take 1 tablet by mouth Daily. 90 tablet 0   • metoprolol tartrate (LOPRESSOR) 25 MG tablet TAKE 1 TABLET TWICE DAILY (Patient taking differently: Take 25 mg by mouth Daily.) 180 tablet 0   • montelukast (SINGULAIR) 10 MG tablet Take 1 tablet by mouth Daily. 90 tablet 3   • pantoprazole (PROTONIX) 40 MG EC tablet TAKE 1 TABLET EVERY DAY 90 tablet 0   • Psyllium 100 % powder METAMUCIL POWD     • Calcium Carbonate-Vitamin D (CALCIUM 600/VITAMIN D PO)      • ferrous sulfate 325 (65 FE) MG tablet Take 325 mg by mouth Daily With Breakfast.     • tamsulosin (FLOMAX) 0.4 MG capsule 24 hr capsule Take 1 capsule by mouth Daily. 30 capsule 1   • traMADol (ULTRAM) 50 MG tablet Take 50 mg by mouth Every 8 (Eight) Hours As Needed for Moderate Pain .       No current facility-administered medications on file prior to visit.        Allergies   Allergen Reactions   • Morphine And Related Hallucinations     HALLUCINATIONS         Review of Systems   Constitutional: Positive for malaise/fatigue. Negative for fever.   Gastrointestinal: Negative for abdominal pain.   Neurological: Negative for confusion.       Objective   Visit Vitals  /76 (BP Location: Right arm, Patient Position: Sitting, Cuff Size: Adult)   Pulse 80   Temp 97.9 °F (36.6 °C) (Temporal)   Resp 18   Ht 157.5 cm (62\")   Wt 70.2 kg (154 lb 12.8 oz)   SpO2 95%   BMI 28.31 kg/m²     Physical Exam  Vitals signs and nursing note reviewed.   Constitutional:       Appearance: She is well-developed.   HENT:      Head: Normocephalic.   Neck:      Musculoskeletal: Neck supple.      Thyroid: No thyromegaly.      Vascular: No carotid bruit.      Trachea: Trachea normal. "   Cardiovascular:      Rate and Rhythm: Normal rate and regular rhythm.      Heart sounds: No murmur. No friction rub. No gallop.    Pulmonary:      Effort: Pulmonary effort is normal. No respiratory distress.      Breath sounds: Normal breath sounds. No wheezing.   Chest:      Chest wall: No tenderness.   Skin:     General: Skin is dry.      Findings: No rash.      Nails: There is no clubbing.     Neurological:      Mental Status: She is alert and oriented to person, place, and time.   Psychiatric:         Behavior: Behavior is cooperative.           Assessment/Plan .  Problem List Items Addressed This Visit        Medium    Anemia    Current Assessment & Plan     Worsening; Labs done 10-, read by me, reviewed with pt. Hgb 11.0 down from 12.2. Offered further workup, patient declines.         Hypertension, benign    Current Assessment & Plan     Borderline controlled with recent decrease of Vasotec to half of the tablet- Continue Enalapril 1/2 tablet daily.         Mixed hyperlipidemia    Current Assessment & Plan     Labs done 10-, read by me, reviewed with pt.  Trig. 104 down from 127, tot. Chol. 139 down from 179, HDL 41 down from 47, LDL 79 down from 107   Improved.   Encouraged to watch fatty intake, exercise more, and lose weight.   compliant with medication   Is getting adequate diet and exercise  Goals developed at last visit were  met   Follow up in 3  months  Care management needs are self-addressed. Self-management abilities addressed and patient is capable of managing her own disease.           Thrombocytopenia (CMS/HCC) - Primary    Current Assessment & Plan     Resolved--labs done 10-, read by me, reviewed with pt.            Low    Hyperglycemia    Current Assessment & Plan     Slightly worse--labs done 10-, read by me, reviewed with pt.  A1c was 6.0 up from 5.9         Hyponatremia    Current Assessment & Plan     Resolved--labs done 10-, read by me, reviewed with  pt.    Sodium was 138 down from 139         Vitamin D deficiency    Current Assessment & Plan     Doing well; Labs done 10-, read by me, reviewed with pt.  Vit. D was 51.1 down from 52            Unprioritized    Elevated alkaline phosphatase level    Current Assessment & Plan     Resolved--labs done 10-, read by me, reviewed with pt.  Alk. Phos. Was 104 down from 117         Myalgia    Current Assessment & Plan     Slightly worse- will draw a CPK with next labs.         Shortness of breath    Current Assessment & Plan     Chest xray done 10-8-2020, results reviewed with pt.

## 2020-10-13 NOTE — ASSESSMENT & PLAN NOTE
Borderline controlled with recent decrease of Vasotec to half of the tablet- Continue Enalapril 1/2 tablet daily.

## 2020-10-13 NOTE — ASSESSMENT & PLAN NOTE
Worsening; Labs done 10-, read by me, reviewed with pt. Hgb 11.0 down from 12.2. Offered further workup, patient declines.

## 2020-10-21 ENCOUNTER — TRANSCRIBE ORDERS (OUTPATIENT)
Dept: PHYSICAL THERAPY | Facility: CLINIC | Age: 80
End: 2020-10-21

## 2020-10-21 DIAGNOSIS — Z98.890 S/P RIGHT KNEE ARTHROSCOPY: Primary | ICD-10-CM

## 2020-10-22 ENCOUNTER — TREATMENT (OUTPATIENT)
Dept: PHYSICAL THERAPY | Facility: CLINIC | Age: 80
End: 2020-10-22

## 2020-10-22 DIAGNOSIS — Z96.651 S/P TKR (TOTAL KNEE REPLACEMENT), RIGHT: Primary | ICD-10-CM

## 2020-10-22 DIAGNOSIS — Z87.81 HISTORY OF PATELLAR FRACTURE: ICD-10-CM

## 2020-10-22 PROCEDURE — 97140 MANUAL THERAPY 1/> REGIONS: CPT | Performed by: PHYSICAL THERAPIST

## 2020-10-22 PROCEDURE — G0283 ELEC STIM OTHER THAN WOUND: HCPCS | Performed by: PHYSICAL THERAPIST

## 2020-10-22 PROCEDURE — 97110 THERAPEUTIC EXERCISES: CPT | Performed by: PHYSICAL THERAPIST

## 2020-10-22 PROCEDURE — 97164 PT RE-EVAL EST PLAN CARE: CPT | Performed by: PHYSICAL THERAPIST

## 2020-10-22 NOTE — PROGRESS NOTES
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  She reports a series of treatments including surgery, casting, bracing.  She now is allowed to resume PT and presents with orders for a dynamic knee extension brace for R knee.  This therapist contacted Aj Xiong, our [employer] rep and faxed him the patient's information.  She lives with her nephew and helps care for him.  Her goal is for her R knee to be \"as good as her L\".      Quality of life: fair    Patient Goals  Patient goals for therapy: decreased pain, improved balance, increased strength and independence with ADLs/IADLs         Objective Oxford knee indicates 79% impairment with limitations in walking.  Ambulates with rollator walker on L hand and R elbow, genu valgum R knee with stance phase, decreased heel strike due to flexion contracture R knee.  Assessment & Plan     Assessment  Impairments: abnormal gait, abnormal muscle tone, abnormal or restricted ROM, activity intolerance, impaired balance, impaired physical strength, lacks appropriate home exercise program and pain with function  Functional Limitations: carrying objects, lifting, sleeping, walking, uncomfortable because of pain, moving in bed and standing  Goals  Plan Goals:   STG:  - Improve R knee ext AROM to 0 deg in 3 weeks.  - Pt to " transfer sit to stand with equal wt bearing in 3 weeks.  - Pt to be independent with HEP in 3 weeks.  LTG:  - Improve Oxford Knee score to 20% or less impairment by discharge.  - Pt to ambulate safely in community with cane by discharge.  - Increase R knee strength to 4+/5 or greater for improved transfers and ambulation by discharge.    Plan  Therapy options: will be seen for skilled physical therapy services  Planned modality interventions: thermotherapy (hydrocollator packs) and electrical stimulation/Russian stimulation  Other planned modality interventions: physical modalities as needed  Planned therapy interventions: manual therapy, balance/weight-bearing training, flexibility, functional ROM exercises, gait training, home exercise program, neuromuscular re-education, postural training, therapeutic activities, stretching, strengthening and orthotic fitting/training  Treatment plan discussed with: patient  Plan details: Plan to continue PT 2x's per week up to 20 visits if needed.      Progress toward previous goals: Not Met    New Goals  Short-term goals (STG): Fitted with dynamic knee extension splint, able to don/doff in 4 weeks  Long-term goals (LTG): Patient voices R knee is at functional level of L knee.      Recommendations: Continue as planned up to 20 visits if needed  Timeframe: 3 months  Prognosis to achieve goals: fair    PT Signature: Robin A Sprigler, COLETTE      Based upon review of the patient's progress and continued therapy plan, it is my medical opinion that Vida Jamison should continue physical therapy treatment at [facility]  [address] [place] [address] [phone].    Signature: __________________________________________________________________________________  Ulices Sue/MD Johnny    Timed:         Manual Therapy:    15     mins  26781;     Therapeutic Exercise:    15     mins  56958;     Neuromuscular Galina:         mins  01559;    Therapeutic Activity:          mins  53069;     Gait Training:           mins  02129;     Ultrasound:          mins  39042;    Ionto                                   mins   09108  Self Care                            mins   60113  Canalith Repos                   mins  4209    Un-Timed:  Electrical Stimulation:    15     mins  45653 ( );  Dry Needling          mins self-pay  Traction          mins 32373  Low Eval          Mins  87562  Mod Eval          Mins  20106  High Eval                            Mins  46129  Re-Eval                           15    mins  69811      Timed Treatment:   30   mins   Total Treatment:     60   mins

## 2020-10-23 ENCOUNTER — TREATMENT (OUTPATIENT)
Dept: PHYSICAL THERAPY | Facility: CLINIC | Age: 80
End: 2020-10-23

## 2020-10-23 DIAGNOSIS — Z96.642 PRESENCE OF LEFT ARTIFICIAL HIP JOINT: Primary | ICD-10-CM

## 2020-10-23 PROCEDURE — G0283 ELEC STIM OTHER THAN WOUND: HCPCS | Performed by: PHYSICAL THERAPIST

## 2020-10-23 PROCEDURE — 97110 THERAPEUTIC EXERCISES: CPT | Performed by: PHYSICAL THERAPIST

## 2020-10-23 PROCEDURE — 97140 MANUAL THERAPY 1/> REGIONS: CPT | Performed by: PHYSICAL THERAPIST

## 2020-10-23 NOTE — PROGRESS NOTES
Physical Therapy Daily Progress Note      Patient: Vida Jamison   : 1940  Diagnosis/ICD-10 Code:  Presence of left artificial hip joint [Z96.642]  Referring practitioner: Ulices Braswell/Johnny Antonio*  Date of Initial Visit: Type: THERAPY  Noted: 8/3/2020  Today's Date: 10/23/2020  Patient seen for 4 sessions         Vida Jamison reports: her R knee has had constant soreness and she still has stiffness with straightening and walking and has been trying to make the knee more mobile. Pt. States she will do whatever it takes to get better motion because she fears more surgery or casting.    Objective   See Exercise, Manual, and Modality Logs for complete treatment.     Assessment/Plan   Pt. continues to lack extension and has limited flexion with ROM today. Pt. Quad is still significantly weak and needs strengthening for better activity tolerance and overall stability. Pt. Will continue to benefit from skilled PT for appropriate stretch and progression of exercise.    Progress per Plan of Care           Timed:         Manual Therapy:    10     mins  01465;     Therapeutic Exercise:    20     mins  04316;     Neuromuscular Galina:        mins  01847;    Therapeutic Activity:          mins  15900;     Gait Training:           mins  75539;     Ultrasound:          mins  65359;    Ionto                                   mins   13090  Self Care                            mins   12828  Canalith Repos                   mins  4209    Un-Timed:  Electrical Stimulation:    15     mins  12235 ( );  Dry Needling          mins self-pay  Traction          mins 58063  Low Eval          Mins  02933  Mod Eval          Mins  89303  High Eval                            Mins  25200    Timed Treatment:   30   mins   Total Treatment:     45   mins    Maty Bernard PTA  Physical Therapist Assistant License #[id number]

## 2020-10-26 ENCOUNTER — TREATMENT (OUTPATIENT)
Dept: PHYSICAL THERAPY | Facility: CLINIC | Age: 80
End: 2020-10-26

## 2020-10-26 DIAGNOSIS — Z96.651 S/P TKR (TOTAL KNEE REPLACEMENT), RIGHT: ICD-10-CM

## 2020-10-26 DIAGNOSIS — Z87.81 HISTORY OF PATELLAR FRACTURE: ICD-10-CM

## 2020-10-26 DIAGNOSIS — Z96.642 PRESENCE OF LEFT ARTIFICIAL HIP JOINT: Primary | ICD-10-CM

## 2020-10-26 PROCEDURE — G0283 ELEC STIM OTHER THAN WOUND: HCPCS | Performed by: PHYSICAL THERAPIST

## 2020-10-26 PROCEDURE — 97140 MANUAL THERAPY 1/> REGIONS: CPT | Performed by: PHYSICAL THERAPIST

## 2020-10-26 PROCEDURE — 97110 THERAPEUTIC EXERCISES: CPT | Performed by: PHYSICAL THERAPIST

## 2020-10-26 NOTE — PROGRESS NOTES
Physical Therapy Daily Progress Note      Patient: Vida Jamison   : 1940  Diagnosis/ICD-10 Code:  Presence of left artificial hip joint [Z96.642]  Referring practitioner: Ulices Braswell/Johnny Antonio*  Date of Initial Visit: Type: THERAPY  Noted: 8/3/2020  Today's Date: 10/26/2020  Patient seen for 5 sessions         Vida Jamison reports: she has been very stiff and sore still and feels like not much is changing at this time. Pt. States she feel slike the knee just won't move more even though she wants it to.    Objective   See Exercise, Manual, and Modality Logs for complete treatment.     Assessment/Plan   Pt. Remains limited into R knee extension at this time and flexion is limited as well however is improving passively. Pt. strengthening continues to go well and needs continued PT for improved mobility at this time.    Progress per Plan of Care           Timed:         Manual Therapy:    10     mins  72570;     Therapeutic Exercise:    20     mins  77925;     Neuromuscular Galina:        mins  58067;    Therapeutic Activity:          mins  69428;     Gait Training:           mins  59213;     Ultrasound:          mins  87026;    Ionto                                   mins   88984  Self Care                            mins   31822  Canalith Repos                  mins  4209    Un-Timed:  Electrical Stimulation:    15     mins  39308 ( );  Dry Needling          mins self-pay  Traction          mins 43609  Low Eval          Mins  21942  Mod Eval          Mins  98829  High Eval                            Mins  56451    Timed Treatment:   30   mins   Total Treatment:     45   mins    Maty Bernard PTA  Physical Therapist Assistant License #[id number]

## 2020-10-28 ENCOUNTER — TREATMENT (OUTPATIENT)
Dept: PHYSICAL THERAPY | Facility: CLINIC | Age: 80
End: 2020-10-28

## 2020-10-28 DIAGNOSIS — Z96.651 S/P TKR (TOTAL KNEE REPLACEMENT), RIGHT: ICD-10-CM

## 2020-10-28 DIAGNOSIS — Z96.642 PRESENCE OF LEFT ARTIFICIAL HIP JOINT: Primary | ICD-10-CM

## 2020-10-28 DIAGNOSIS — Z87.81 HISTORY OF PATELLAR FRACTURE: ICD-10-CM

## 2020-10-28 DIAGNOSIS — M25.561 ACUTE PAIN OF RIGHT KNEE: ICD-10-CM

## 2020-10-28 PROCEDURE — 97110 THERAPEUTIC EXERCISES: CPT | Performed by: PHYSICAL THERAPIST

## 2020-10-28 PROCEDURE — 97140 MANUAL THERAPY 1/> REGIONS: CPT | Performed by: PHYSICAL THERAPIST

## 2020-10-28 PROCEDURE — G0283 ELEC STIM OTHER THAN WOUND: HCPCS | Performed by: PHYSICAL THERAPIST

## 2020-10-28 NOTE — PROGRESS NOTES
Physical Therapy Daily Progress Note      Patient: Vida Jamison   : 1940  Diagnosis/ICD-10 Code:  Presence of left artificial hip joint [Z96.642]  Referring practitioner: Ulices Braswell/Johnny Antonio*  Date of Initial Visit: Type: THERAPY  Noted: 8/3/2020  Today's Date: 10/28/2020  Patient seen for 6 sessions         Vida Jamison reports: she is doing good today and states she wore her estuardo splint for an hour and a half and she states she could feel the stretch the way the rep had said she should. Pt. Reports she is hopeful it will help to straighten the knee so she won't have to wear a cast.    Objective   See Exercise, Manual, and Modality Logs for complete treatment.     Assessment/Plan   Pt. Tolerates treatment well this visit and continues to show improvement with exercise demonstrating appropriate motion and form with HS curls this date and knee flexion stretch at TM.    Progress per Plan of Care           Timed:         Manual Therapy:    10     mins  39810;     Therapeutic Exercise:    20     mins  42276;     Neuromuscular Galina:        mins  27010;    Therapeutic Activity:          mins  77885;     Gait Training:           mins  85113;     Ultrasound:          mins  27860;    Ionto                                   mins   75727  Self Care                            mins   73564  Canalith Repos                   mins  4209    Un-Timed:  Electrical Stimulation:    15     mins  35110 ( );  Dry Needling          mins self-pay  Traction          mins 92033  Low Eval          Mins  65802  Mod Eval          Mins  97381  High Eval                            Mins  61503    Timed Treatment:   30   mins   Total Treatment:     45   mins    Maty Bernard PTA  Physical Therapist Assistant License #[id number]

## 2020-10-30 ENCOUNTER — TREATMENT (OUTPATIENT)
Dept: PHYSICAL THERAPY | Facility: CLINIC | Age: 80
End: 2020-10-30

## 2020-10-30 DIAGNOSIS — Z96.651 S/P TKR (TOTAL KNEE REPLACEMENT), RIGHT: Primary | ICD-10-CM

## 2020-10-30 DIAGNOSIS — M25.561 ACUTE PAIN OF RIGHT KNEE: ICD-10-CM

## 2020-10-30 DIAGNOSIS — Z96.642 PRESENCE OF LEFT ARTIFICIAL HIP JOINT: ICD-10-CM

## 2020-10-30 DIAGNOSIS — Z87.81 HISTORY OF PATELLAR FRACTURE: ICD-10-CM

## 2020-10-30 PROCEDURE — 97110 THERAPEUTIC EXERCISES: CPT | Performed by: PHYSICAL THERAPIST

## 2020-10-30 PROCEDURE — G0283 ELEC STIM OTHER THAN WOUND: HCPCS | Performed by: PHYSICAL THERAPIST

## 2020-10-30 PROCEDURE — 97140 MANUAL THERAPY 1/> REGIONS: CPT | Performed by: PHYSICAL THERAPIST

## 2020-10-30 NOTE — PROGRESS NOTES
Physical Therapy Daily Progress Note      Patient: Vida Jamison   : 1940  Diagnosis/ICD-10 Code:  S/P TKR (total knee replacement), right [Z96.651]  Referring practitioner: Ulices Braswell/Johnny Antonio*  Date of Initial Visit: Type: THERAPY  Noted: 8/3/2020  Today's Date: 10/30/2020  Patient seen for 7 sessions         Vida Jamison reports: she continues to use estuardo splint daily using it for 2 and a half hours yesterday and stating she could feel the stretch behind her knee significantly. Pt. Reports her soreness is the same and unchanging at this time.    Objective   See Exercise, Manual, and Modality Logs for complete treatment.     Assessment/Plan  Pt. Continues to improve with bend and straighten Pt. Is lacking 12 deg from extension in R knee at rest this date and can achieve lacking 10 deg if overpressure is provided. Pt. Shows improving strength with ability to perform 8 of 10 SLR's without assistance this visit.    Progress per Plan of Care           Timed:         Manual Therapy:    15     mins  13835;     Therapeutic Exercise:    20     mins  38271;     Neuromuscular Galina:        mins  71331;    Therapeutic Activity:          mins  38651;     Gait Training:           mins  55001;     Ultrasound:          mins  23775;    Ionto                                   mins   49915  Self Care                            mins   08727  Canalith Repos                   mins  4209    Un-Timed:  Electrical Stimulation:    15     mins  68420 ( );  Dry Needling          mins self-pay  Traction          mins 17532  Low Eval          Mins  10580  Mod Eval          Mins  06087  High Eval                            Mins  82934    Timed Treatment:   35   mins   Total Treatment:     50   mins    Maty Bernard PTA  Physical Therapist Assistant License #[id number]

## 2020-11-02 ENCOUNTER — TREATMENT (OUTPATIENT)
Dept: PHYSICAL THERAPY | Facility: CLINIC | Age: 80
End: 2020-11-02

## 2020-11-02 DIAGNOSIS — Z96.651 S/P TKR (TOTAL KNEE REPLACEMENT), RIGHT: Primary | ICD-10-CM

## 2020-11-02 DIAGNOSIS — M25.561 ACUTE PAIN OF RIGHT KNEE: ICD-10-CM

## 2020-11-02 DIAGNOSIS — Z96.642 PRESENCE OF LEFT ARTIFICIAL HIP JOINT: ICD-10-CM

## 2020-11-02 DIAGNOSIS — Z87.81 HISTORY OF PATELLAR FRACTURE: ICD-10-CM

## 2020-11-02 PROCEDURE — 97110 THERAPEUTIC EXERCISES: CPT | Performed by: PHYSICAL THERAPIST

## 2020-11-02 PROCEDURE — G0283 ELEC STIM OTHER THAN WOUND: HCPCS | Performed by: PHYSICAL THERAPIST

## 2020-11-02 PROCEDURE — 97140 MANUAL THERAPY 1/> REGIONS: CPT | Performed by: PHYSICAL THERAPIST

## 2020-11-02 NOTE — PROGRESS NOTES
Physical Therapy Daily Progress Note      Patient: Vida Jamison   : 1940  Diagnosis/ICD-10 Code:  S/P TKR (total knee replacement), right [Z96.651]  Referring practitioner: Ulices Braswell/Johnny Antonio*  Date of Initial Visit: Type: THERAPY  Noted: 8/3/2020  Today's Date: 2020  Patient seen for 8 sessions         Vida Jamison reports: she feels like she is tight behind the knee today stating she was sore all over this weekend and only did the Saray splint one time for an hour over the weekend.    Objective   See Exercise, Manual, and Modality Logs for complete treatment.     Assessment/Plan   Pt. Tolerates treatment well this visit and continues to show improving ability with strength but continues to have limited motion with no change in measurements since last visit when recorded. Pt. Is encouraged to continue with the Saray Splint as instructed and to up her frequency if she wants good results.    Progress per Plan of Care           Timed:         Manual Therapy:    10     mins  50406;     Therapeutic Exercise:    20     mins  87758;     Neuromuscular Galina:        mins  45507;    Therapeutic Activity:          mins  80465;     Gait Training:           mins  98956;     Ultrasound:          mins  39766;    Ionto                                   mins   05559  Self Care                            mins   87432  Canalith Repos                   mins  4209    Un-Timed:  Electrical Stimulation:    15     mins  90763 ( );  Dry Needling          mins self-pay  Traction          mins 74311  Low Eval          Mins  28866  Mod Eval          Mins  49134  High Eval                            Mins  66771    Timed Treatment:   30   mins   Total Treatment:     45   mins    Maty Bernard PTA  Physical Therapist Assistant License #[id number]

## 2020-11-04 ENCOUNTER — TREATMENT (OUTPATIENT)
Dept: PHYSICAL THERAPY | Facility: CLINIC | Age: 80
End: 2020-11-04

## 2020-11-04 DIAGNOSIS — Z87.81 HISTORY OF PATELLAR FRACTURE: ICD-10-CM

## 2020-11-04 DIAGNOSIS — M25.561 ACUTE PAIN OF RIGHT KNEE: ICD-10-CM

## 2020-11-04 DIAGNOSIS — Z96.642 PRESENCE OF LEFT ARTIFICIAL HIP JOINT: ICD-10-CM

## 2020-11-04 DIAGNOSIS — Z96.651 S/P TKR (TOTAL KNEE REPLACEMENT), RIGHT: Primary | ICD-10-CM

## 2020-11-04 PROCEDURE — G0283 ELEC STIM OTHER THAN WOUND: HCPCS | Performed by: PHYSICAL THERAPIST

## 2020-11-04 PROCEDURE — 97110 THERAPEUTIC EXERCISES: CPT | Performed by: PHYSICAL THERAPIST

## 2020-11-04 PROCEDURE — 97140 MANUAL THERAPY 1/> REGIONS: CPT | Performed by: PHYSICAL THERAPIST

## 2020-11-04 NOTE — PROGRESS NOTES
Physical Therapy Daily Progress Note      Patient: Vida Jamison   : 1940  Diagnosis/ICD-10 Code:  S/P TKR (total knee replacement), right [Z96.651]  Referring practitioner: Ulices Braswell/Johnny Antonio*  Date of Initial Visit: Type: THERAPY  Noted: 8/3/2020  Today's Date: 2020  Patient seen for 9 sessions         Vida Jamison reports: her R knee has still been sore particularly behind the knee following the Saray splint. Pt. Reports she has worn the Saray splint 2 hours at a time twice a day since last visit and continues to try and wear it longer as she can. Pt. Reports she has had two falls over the last week once in a parking lot walking on black top and a second time organizing her garage stating the garage incident she was unable to get up until crawling to the ramp into her home and the neighbor approached her and helped her up. Pt. Reports neither time she was injured but one time did make her L hip sore.    Objective   See Exercise, Manual, and Modality Logs for complete treatment.     Assessment/Plan   Pt. Continues to have improved tolerance to overpressure into flexion and extension however continues to presents with poor active range despite exercise and stretch. Pt. Shows greater ability with SLR at this time managing to keep current level of extension while doing SLR.    Progress per Plan of Care           Timed:         Manual Therapy:    15     mins  83577;     Therapeutic Exercise:    20     mins  87061;     Neuromuscular Galina:        mins  03968;    Therapeutic Activity:          mins  18876;     Gait Training:           mins  27480;     Ultrasound:          mins  04079;    Ionto                                   mins   70155  Self Care                            mins   54177  Canalith Repos                   mins  4209    Un-Timed:  Electrical Stimulation:    15     mins  11702 ( );  Dry Needling          mins self-pay  Traction          mins 79969  Low Eval          Mins   71480  Mod Eval          Mins  54470  High Eval                            Mins  98665    Timed Treatment:  35    mins   Total Treatment:     50   mins    Maty Bernard PTA  Physical Therapist Assistant License #[id number]

## 2020-11-06 ENCOUNTER — TREATMENT (OUTPATIENT)
Dept: PHYSICAL THERAPY | Facility: CLINIC | Age: 80
End: 2020-11-06

## 2020-11-06 DIAGNOSIS — M54.2 PAIN, NECK: ICD-10-CM

## 2020-11-06 DIAGNOSIS — M25.552 PAIN OF LEFT HIP JOINT: Primary | ICD-10-CM

## 2020-11-06 PROCEDURE — 97140 MANUAL THERAPY 1/> REGIONS: CPT | Performed by: PHYSICAL THERAPIST

## 2020-11-06 PROCEDURE — 97110 THERAPEUTIC EXERCISES: CPT | Performed by: PHYSICAL THERAPIST

## 2020-11-06 PROCEDURE — G0283 ELEC STIM OTHER THAN WOUND: HCPCS | Performed by: PHYSICAL THERAPIST

## 2020-11-06 NOTE — PROGRESS NOTES
Physical Therapy Daily Progress Note      Patient: iVda Jamison   : 1940  Diagnosis/ICD-10 Code:  Pain of left hip joint [M25.552]  Referring practitioner: Ulices Braswell/Johnny Antonio*  Date of Initial Visit: Type: THERAPY  Noted: 8/3/2020  Today's Date: 2020  Patient seen for 10 sessions         Vida Jamison reports: she has used her Saray Splint daily and it makes her knee sore and she has a hard time telling if it helps or not. Pt. States the knee is sore consistently but she has been doing more and does feel some stronger.    Oxford Knee 27/48= 56% ability    Objective   See Exercise, Manual, and Modality Logs for complete treatment.   AROM R Knee  deg   PROM R knee 5-105 deg (with overpressure)  MMT R Knee Flex 4/5 , 4/5 Ext    Assessment/Plan   Pt. Continues to improve strength with exercise reporting less fatigue. Pt. Was trialed on 4 inch step ups today B and was able to achieve with both LE's but struggles significantly with L LE. Pt. Is able to step up with greater ease with her R. Pt. Completes all SLR's without assistance this date but extensor lag of 10 deg is present. Pt. rpeorts less fatigue throughout treatment this visit.    Goals  Plan Goals:   STG:  - Improve R knee ext AROM to 0 deg in 3 weeks. Progressing  - Pt to transfer sit to stand with equal wt bearing in 3 weeks. Progressing  - Pt to be independent with HEP in 3 weeks. MET  LTG:  - Improve Oxford Knee score to 20% or less impairment by discharge. Not met Progressing (44% disability current)  - Pt to ambulate safely in community with cane by discharge. Not MET  - Increase R knee strength to 4+/5 or greater for improved transfers and ambulation by discharge. Progressing    Progress per Plan of Care           Timed:         Manual Therapy:    15     mins  65184;     Therapeutic Exercise:    30     mins  22814;     Neuromuscular Galina:        mins  13708;    Therapeutic Activity:          mins  99117;     Gait Training:            mins  68987;     Ultrasound:          mins  67650;    Ionto                                   mins   06632  Self Care                            mins   35562  Canalith Repos                   mins  4209    Un-Timed:  Electrical Stimulation:    15     mins  13666 ( );  Dry Needling          mins self-pay  Traction          mins 20394  Low Eval          Mins  39918  Mod Eval          Mins  11404  High Eval                            Mins  34178    Timed Treatment:   45   mins   Total Treatment:     60   mins    Maty Bernard PTA  Physical Therapist Assistant License #[id number]

## 2020-11-09 ENCOUNTER — TREATMENT (OUTPATIENT)
Dept: PHYSICAL THERAPY | Facility: CLINIC | Age: 80
End: 2020-11-09

## 2020-11-09 DIAGNOSIS — R26.2 DIFFICULTY WALKING: ICD-10-CM

## 2020-11-09 DIAGNOSIS — I10 HYPERTENSION, BENIGN: ICD-10-CM

## 2020-11-09 DIAGNOSIS — Z87.81 HISTORY OF PATELLAR FRACTURE: ICD-10-CM

## 2020-11-09 DIAGNOSIS — M25.561 ACUTE PAIN OF RIGHT KNEE: ICD-10-CM

## 2020-11-09 DIAGNOSIS — E78.2 MIXED HYPERLIPIDEMIA: ICD-10-CM

## 2020-11-09 DIAGNOSIS — Z96.651 S/P TKR (TOTAL KNEE REPLACEMENT), RIGHT: Primary | ICD-10-CM

## 2020-11-09 PROCEDURE — 97140 MANUAL THERAPY 1/> REGIONS: CPT | Performed by: PHYSICAL THERAPIST

## 2020-11-09 PROCEDURE — 97110 THERAPEUTIC EXERCISES: CPT | Performed by: PHYSICAL THERAPIST

## 2020-11-09 PROCEDURE — G0283 ELEC STIM OTHER THAN WOUND: HCPCS | Performed by: PHYSICAL THERAPIST

## 2020-11-09 RX ORDER — ENALAPRIL MALEATE 10 MG/1
TABLET ORAL
Qty: 180 TABLET | Refills: 0 | Status: SHIPPED | OUTPATIENT
Start: 2020-11-09 | End: 2021-06-24

## 2020-11-09 RX ORDER — ATORVASTATIN CALCIUM 10 MG/1
TABLET, FILM COATED ORAL
Qty: 90 TABLET | Refills: 0 | Status: SHIPPED | OUTPATIENT
Start: 2020-11-09 | End: 2021-05-18

## 2020-11-09 NOTE — PROGRESS NOTES
Physical Therapy Daily Progress Note      Patient: Vida Jamison   : 1940  Diagnosis/ICD-10 Code:  S/P TKR (total knee replacement), right [Z96.651]  Referring practitioner: Ulices Braswell/Johnny Antonio*  Date of Initial Visit: Type: THERAPY  Noted: 8/3/2020  Today's Date: 2020  Patient seen for 11 sessions         Vida Jamison reports: she is very stiff and sore in the knee today and states that she has been unable to do the therapy pool over the last few days due to timing issues and going when no  was available.    Objective   See Exercise, Manual, and Modality Logs for complete treatment.     Assessment/Plan  Pt. Tolerates exercise well this visit and completes activities without pain however has pain increase with manual intervention this date with overpressure. Pain has improving extension with continued Saray Splint use but also has increasing soreness behind knee and in hamstring.    Progress per Plan of Care           Timed:         Manual Therapy:    15     mins  43150;     Therapeutic Exercise:    25     mins  83186;     Neuromuscular Galina:        mins  77162;    Therapeutic Activity:          mins  50686;     Gait Training:           mins  71052;     Ultrasound:          mins  21473;    Ionto                                   mins   25460  Self Care                            mins   14966  Canalith Repos                   mins  4209    Un-Timed:  Electrical Stimulation:    15     mins  84532 ( );  Dry Needling          mins self-pay  Traction          mins 35172  Low Eval          Mins  63817  Mod Eval          Mins  36471  High Eval                            Mins  60369    Timed Treatment:   40   mins   Total Treatment:     55   mins    Maty Bernard PTA  Physical Therapist Assistant License #[id number]

## 2020-11-11 ENCOUNTER — TREATMENT (OUTPATIENT)
Dept: PHYSICAL THERAPY | Facility: CLINIC | Age: 80
End: 2020-11-11

## 2020-11-11 DIAGNOSIS — Z87.81 HISTORY OF PATELLAR FRACTURE: ICD-10-CM

## 2020-11-11 DIAGNOSIS — M25.561 ACUTE PAIN OF RIGHT KNEE: ICD-10-CM

## 2020-11-11 DIAGNOSIS — R26.2 DIFFICULTY WALKING: ICD-10-CM

## 2020-11-11 DIAGNOSIS — Z96.651 S/P TKR (TOTAL KNEE REPLACEMENT), RIGHT: Primary | ICD-10-CM

## 2020-11-11 PROCEDURE — 97110 THERAPEUTIC EXERCISES: CPT | Performed by: PHYSICAL THERAPIST

## 2020-11-11 PROCEDURE — 97140 MANUAL THERAPY 1/> REGIONS: CPT | Performed by: PHYSICAL THERAPIST

## 2020-11-11 PROCEDURE — G0283 ELEC STIM OTHER THAN WOUND: HCPCS | Performed by: PHYSICAL THERAPIST

## 2020-11-11 NOTE — PROGRESS NOTES
Physical Therapy Daily Progress Note      Patient: Vida Jamison   : 1940  Diagnosis/ICD-10 Code:  S/P TKR (total knee replacement), right [Z96.651]  Referring practitioner: Ulices Braswell/Johnny Antonio*  Date of Initial Visit: Type: THERAPY  Noted: 8/3/2020  Today's Date: 2020  Patient seen for 12 sessions         Vida Jamison reports: she is feeling a lot better today and states after last therapy session felt much improved pain and stiffness. Pt. States she feels like she had gotten bad because she had missed aquatic therapy.    Oxford Knee 27/48= 56% ability or 44% disability (assesed 2020)    Objective   See Exercise, Manual, and Modality Logs for complete treatment.     AROM R Knee  deg   PROM R knee  deg (with overpressure)  MMT R Knee Flex 4+/5 , 5/5 Ext    Assessment/Plan   Pt. Continues to have improved gait and functional ability as strengthening is progressed. Pt. Uses cane in safe situations and has no fully transitioned from Rolator yet but plans to progressively as she feels more balanced. Pt. Still presents lacking 10 deg for extension at this time despite Saray splint wearing and continuous stretching in clinic and at home. Pt. Still has 10 deg extensor lag with SLR. Pt. Will continue to benefit from PT for increased strength and functionality.    Goals  Plan Goals:   STG:  - Improve R knee ext AROM to 0 deg in 3 weeks. Progressing  - Pt to transfer sit to stand with equal wt bearing in 3 weeks. MET  - Pt to be independent with HEP in 3 weeks. MET  LTG:  - Improve Oxford Knee score to 20% or less impairment by discharge. Not Met Progressing (44% disability current)  - Pt to ambulate safely in community with cane by discharge. Progressing  - Increase R knee strength to 4+/5 or greater for improved transfers and ambulation by discharge. MET    Progress per Plan of Care           Timed:         Manual Therapy:    15     mins  97821;     Therapeutic Exercise:    24     mins   65091;     Neuromuscular Galina:        mins  86347;    Therapeutic Activity:          mins  97567;     Gait Training:           mins  85874;     Ultrasound:          mins  12216;    Ionto                                   mins   47658  Self Care                            mins   61012  Canalith Repos                   mins  4209    Un-Timed:  Electrical Stimulation:    15     mins  31241 ( );  Dry Needling          mins self-pay  Traction          mins 82178  Low Eval          Mins  93213  Mod Eval          Mins  11243  High Eval                            Mins  24945    Timed Treatment:   39   mins   Total Treatment:     54   mins    Maty Bernard PTA  Physical Therapist Assistant License #[id number]

## 2020-11-13 ENCOUNTER — TREATMENT (OUTPATIENT)
Dept: PHYSICAL THERAPY | Facility: CLINIC | Age: 80
End: 2020-11-13

## 2020-11-13 DIAGNOSIS — Z87.81 HISTORY OF PATELLAR FRACTURE: ICD-10-CM

## 2020-11-13 DIAGNOSIS — Z96.651 S/P TKR (TOTAL KNEE REPLACEMENT), RIGHT: Primary | ICD-10-CM

## 2020-11-13 DIAGNOSIS — M25.561 ACUTE PAIN OF RIGHT KNEE: ICD-10-CM

## 2020-11-13 DIAGNOSIS — R26.2 DIFFICULTY WALKING: ICD-10-CM

## 2020-11-13 PROCEDURE — 97140 MANUAL THERAPY 1/> REGIONS: CPT | Performed by: PHYSICAL THERAPIST

## 2020-11-13 PROCEDURE — G0283 ELEC STIM OTHER THAN WOUND: HCPCS | Performed by: PHYSICAL THERAPIST

## 2020-11-13 PROCEDURE — 97110 THERAPEUTIC EXERCISES: CPT | Performed by: PHYSICAL THERAPIST

## 2020-11-13 NOTE — PROGRESS NOTES
Physical Therapy Daily Progress Note      Patient: Vida Jamison   : 1940  Diagnosis/ICD-10 Code:  S/P TKR (total knee replacement), right [Z96.651]  Referring practitioner: Ulices Braswell/Johnny Antonio*  Date of Initial Visit: Type: THERAPY  Noted: 8/3/2020  Today's Date: 2020  Patient seen for 13 sessions         Vida Jamison reports: her legs are sore B today stating she felt sore after exercises the other day and it hasn't gone away yet. Pt. Reports single leg press was very difficult last time and would like to lower the weight.    Objective   See Exercise, Manual, and Modality Logs for complete treatment.     Assessment/Plan   Pt. Tolerated exercise well this visit and is able to safely ambulate with cane throughout treatment. Single leg press was reduced to previous weight of 30 pounds and Pt. Had no pain only fatigue with activity. Pt. Continues to have limited flexion showing minimal change at this time despite continuous stretch and mobilization along with home application of Saray Splint. Pt. Is encouraged to increase frequency of Saray Splint use but denies ability to do it more frequently than she is now.    Progress per Plan of Care           Timed:         Manual Therapy:    20     mins  60220;     Therapeutic Exercise:    24     mins  80003;       Un-Timed:  Electrical Stimulation:    15     mins  22501 ( );    Timed Treatment:   44   mins   Total Treatment:     59   mins    Maty Bernard PTA  Physical Therapist Assistant License #[id number]

## 2020-11-16 ENCOUNTER — TREATMENT (OUTPATIENT)
Dept: PHYSICAL THERAPY | Facility: CLINIC | Age: 80
End: 2020-11-16

## 2020-11-16 DIAGNOSIS — Z96.651 S/P TKR (TOTAL KNEE REPLACEMENT), RIGHT: Primary | ICD-10-CM

## 2020-11-16 DIAGNOSIS — R26.2 DIFFICULTY WALKING: ICD-10-CM

## 2020-11-16 DIAGNOSIS — M25.561 ACUTE PAIN OF RIGHT KNEE: ICD-10-CM

## 2020-11-16 DIAGNOSIS — Z87.81 HISTORY OF PATELLAR FRACTURE: ICD-10-CM

## 2020-11-16 PROCEDURE — 97140 MANUAL THERAPY 1/> REGIONS: CPT | Performed by: PHYSICAL THERAPIST

## 2020-11-16 PROCEDURE — 97110 THERAPEUTIC EXERCISES: CPT | Performed by: PHYSICAL THERAPIST

## 2020-11-16 PROCEDURE — G0283 ELEC STIM OTHER THAN WOUND: HCPCS | Performed by: PHYSICAL THERAPIST

## 2020-11-16 NOTE — PROGRESS NOTES
Physical Therapy Daily Progress Note      Patient: Vida Jamison   : 1940  Diagnosis/ICD-10 Code:  S/P TKR (total knee replacement), right [Z96.651]  Referring practitioner: Ulices Braswell/Johnny Antonio*  Date of Initial Visit: Type: THERAPY  Noted: 8/3/2020  Today's Date: 2020  Patient seen for 14 sessions         Vida Jamison reports: she had another fall at home and was using neither her Rolator or her cane but instead was in her room with her hand on the dresser and went to  a bag that was heavier than anticipated and she stumbled over and was unable to get up until her nephews caregiver returned with him to there house and helped her up. Pt. States she was not hurt but has been somewhat stiff and sore in her L hip. Pt. Reports she continues to wear the Saray splint at home. Pt.  Reports she has gone to wearing the brace for an hour multiple times throughout the day.    Objective   See Exercise, Manual, and Modality Logs for complete treatment.      Assessment/Plan   Pt. Continues to have improving strength however motion is still painful and Pt. Has poor tolerance to overpressure into flexion or extension at this time.     New Goals  Short-term goals (STG): Fitted with dynamic knee extension splint, able to don/doff in 4 weeks MET (Saray Splint)  Long-term goals (LTG): Patient voices R knee is at functional level of L knee. Not MET    Progress per Plan of Care           Timed:         Manual Therapy:    15     mins  59936;     Therapeutic Exercise:    20     mins  12232;       Un-Timed:  Electrical Stimulation:    15     mins  25710 ( );      Timed Treatment:   35   mins   Total Treatment:     50   mins    Maty Bernard PTA  Physical Therapist Assistant License #[id number]

## 2020-11-19 ENCOUNTER — TREATMENT (OUTPATIENT)
Dept: PHYSICAL THERAPY | Facility: CLINIC | Age: 80
End: 2020-11-19

## 2020-11-19 DIAGNOSIS — Z96.651 S/P TKR (TOTAL KNEE REPLACEMENT), RIGHT: Primary | ICD-10-CM

## 2020-11-19 DIAGNOSIS — R26.2 DIFFICULTY WALKING: ICD-10-CM

## 2020-11-19 PROCEDURE — 97110 THERAPEUTIC EXERCISES: CPT | Performed by: PHYSICAL THERAPIST

## 2020-11-19 PROCEDURE — 97140 MANUAL THERAPY 1/> REGIONS: CPT | Performed by: PHYSICAL THERAPIST

## 2020-11-19 PROCEDURE — G0283 ELEC STIM OTHER THAN WOUND: HCPCS | Performed by: PHYSICAL THERAPIST

## 2020-11-19 NOTE — PROGRESS NOTES
Physical Therapy Daily Progress Note    Patient: Vida Jamison   : 1940  Diagnosis/ICD-10 Code:  S/P TKR (total knee replacement), right [Z96.651]  Referring practitioner: Ulices Braswell/Johnny Antonio*  Date of Initial Visit: Type: THERAPY  Noted: 8/3/2020  Today's Date: 2020  Patient seen for 15 sessions             Subjective Patient reports she returns to surgeon in 3 months, he wants to see is she can get 10 more degrees of extension ROM with PT, if not, he'll do an arthroscopic debridement to remove scar tissue.  She is don/doffing her dynasplint independently (but it is difficult) and is wearing it 1 hour twice per day.  She cannot wear it at night.      Objective   See Exercise, Manual, and Modality Logs for complete treatment. Suggested increasing the number of times per day she wears her dynasplint to see if that will make a difference.      Assessment/Plan  New Goals  Short-term goals (STG): Fitted with dynamic knee extension splint, able to don/doff in 4 weeks MET (Saray Splint)  Long-term goals (LTG): Patient voices R knee is at functional level of L knee. NOT MET    Progress per Plan of Care up to 20 visits if needed.           Timed:         Manual Therapy:    15     mins  03141;     Therapeutic Exercise:    20    mins  78928;     Neuromuscular Galina:        mins  28571;    Therapeutic Activity:          mins  89651;     Gait Training:           mins  74201;     Ultrasound:          mins  29741;    Ionto                                   mins   68742  Self Care                            mins   97128      Un-Timed:  Electrical Stimulation:    15     mins  00253 ( );  Dry Needling          mins self-pay  Traction          mins 05336  Low Eval          Mins  24183  Mod Eval          Mins  04718  High Eval                            Mins  27484  Canalith Repos                   mins  33844    Timed Treatment:   35   mins   Total Treatment:     50   mins    Robin A Sprigler, PT  Physical  Therapist

## 2020-11-20 ENCOUNTER — TREATMENT (OUTPATIENT)
Dept: PHYSICAL THERAPY | Facility: CLINIC | Age: 80
End: 2020-11-20

## 2020-11-20 DIAGNOSIS — Z96.651 S/P TKR (TOTAL KNEE REPLACEMENT), RIGHT: Primary | ICD-10-CM

## 2020-11-20 DIAGNOSIS — R26.2 DIFFICULTY WALKING: ICD-10-CM

## 2020-11-20 DIAGNOSIS — M25.561 ACUTE PAIN OF RIGHT KNEE: ICD-10-CM

## 2020-11-20 DIAGNOSIS — Z87.81 HISTORY OF PATELLAR FRACTURE: ICD-10-CM

## 2020-11-20 PROCEDURE — G0283 ELEC STIM OTHER THAN WOUND: HCPCS | Performed by: PHYSICAL THERAPIST

## 2020-11-20 PROCEDURE — 97140 MANUAL THERAPY 1/> REGIONS: CPT | Performed by: PHYSICAL THERAPIST

## 2020-11-20 PROCEDURE — 97110 THERAPEUTIC EXERCISES: CPT | Performed by: PHYSICAL THERAPIST

## 2020-11-20 NOTE — PROGRESS NOTES
Physical Therapy Daily Progress Note      Patient: Vida Jamison   : 1940  Diagnosis/ICD-10 Code:  S/P TKR (total knee replacement), right [Z96.651]  Referring practitioner: Ulices Braswell/Johnny Antonio*  Date of Initial Visit: Type: THERAPY  Noted: 8/3/2020  Today's Date: 2020  Patient seen for 16 sessions         Vida Jamison reports: her knee isn't hurting too bad so far today and she feels like she is walking good. Pt. States she will be attending aquatic exercise at the [facility] as well today. Pt. Provides order for extension of therapy services that will be added upon completion of current POC    Objective   See Exercise, Manual, and Modality Logs for complete treatment.     Assessment/Plan   Pt. Continues to lack extension but tolerates stretch with overpressure well today with no pain and shows improved ability to tolerate stretch but actively is still lacking the ability to extend further. Pt.'s poor gait mechanics also attribute to the lack of extension as they reinforce hamstring tightness ambulating without terminal extension.    Progress per Plan of Care            Timed:         Manual Therapy:    15     mins  99042;     Therapeutic Exercise:    25     mins  80557;         Un-Timed:  Electrical Stimulation:    15     mins  28716 ( );      Timed Treatment:   40   mins   Total Treatment:     55   mins    Maty Bernard PTA  Physical Therapist Assistant License #[id number]

## 2020-11-23 ENCOUNTER — TREATMENT (OUTPATIENT)
Dept: PHYSICAL THERAPY | Facility: CLINIC | Age: 80
End: 2020-11-23

## 2020-11-23 DIAGNOSIS — Z87.81 HISTORY OF PATELLAR FRACTURE: ICD-10-CM

## 2020-11-23 DIAGNOSIS — Z96.651 S/P TKR (TOTAL KNEE REPLACEMENT), RIGHT: Primary | ICD-10-CM

## 2020-11-23 DIAGNOSIS — M25.561 ACUTE PAIN OF RIGHT KNEE: ICD-10-CM

## 2020-11-23 DIAGNOSIS — R26.2 DIFFICULTY WALKING: ICD-10-CM

## 2020-11-23 PROCEDURE — G0283 ELEC STIM OTHER THAN WOUND: HCPCS | Performed by: PHYSICAL THERAPIST

## 2020-11-23 PROCEDURE — 97110 THERAPEUTIC EXERCISES: CPT | Performed by: PHYSICAL THERAPIST

## 2020-11-23 PROCEDURE — 97140 MANUAL THERAPY 1/> REGIONS: CPT | Performed by: PHYSICAL THERAPIST

## 2020-11-23 NOTE — PROGRESS NOTES
Physical Therapy Daily Progress Note      Patient: Vida Jamison   : 1940  Diagnosis/ICD-10 Code:  S/P TKR (total knee replacement), right [Z96.651]  Referring practitioner: Ulices Braswell/Johnny Antonio*  Date of Initial Visit: Type: THERAPY  Noted: 8/3/2020  Today's Date: 2020  Patient seen for 17 sessions         Vida Jamison reports: she isn't feeling much progress lately and states she had actually taken a misstep with her cane at the gas station and fell to the ground. Pt. reports it had been raining and she probably should have used her Rolator. Pt. Reports two men helped her up and she had no pain only a little L hip soreness.    Objective   See Exercise, Manual, and Modality Logs for complete treatment.     Assessment/Plan  Pt. tolerates extension stretch better today and achieves greater passive range however is still experiencing very limited active range into extension and flexion. Pt. R knee had apparent creaking sound with all motion this date and palpable vibration with motion was noted as well in joint. Pt. Continues to show improving upright tolerance in therapy but continues to fall outside of therapy.    Progress per Plan of Care           Timed:         Manual Therapy:    15     mins  92191;     Therapeutic Exercise:    30     mins  19661;       Un-Timed:  Electrical Stimulation:    15     mins  04678 ( );      Timed Treatment:  45    mins   Total Treatment:     60   mins    Maty Bernard PTA  Physical Therapist Assistant License #[id number]

## 2020-11-25 ENCOUNTER — TREATMENT (OUTPATIENT)
Dept: PHYSICAL THERAPY | Facility: CLINIC | Age: 80
End: 2020-11-25

## 2020-11-25 DIAGNOSIS — R26.2 DIFFICULTY WALKING: ICD-10-CM

## 2020-11-25 DIAGNOSIS — M25.561 ACUTE PAIN OF RIGHT KNEE: ICD-10-CM

## 2020-11-25 DIAGNOSIS — Z87.81 HISTORY OF PATELLAR FRACTURE: ICD-10-CM

## 2020-11-25 DIAGNOSIS — Z96.651 S/P TKR (TOTAL KNEE REPLACEMENT), RIGHT: Primary | ICD-10-CM

## 2020-11-25 PROCEDURE — G0283 ELEC STIM OTHER THAN WOUND: HCPCS | Performed by: PHYSICAL THERAPIST

## 2020-11-25 PROCEDURE — 97140 MANUAL THERAPY 1/> REGIONS: CPT | Performed by: PHYSICAL THERAPIST

## 2020-11-25 PROCEDURE — 97110 THERAPEUTIC EXERCISES: CPT | Performed by: PHYSICAL THERAPIST

## 2020-11-25 NOTE — PROGRESS NOTES
Physical Therapy Daily Progress Note      Patient: Vida Jamison   : 1940  Diagnosis/ICD-10 Code:  S/P TKR (total knee replacement), right [Z96.651]  Referring practitioner: Ulices Braswell/Johnny Antonio*  Date of Initial Visit: Type: THERAPY  Noted: 8/3/2020  Today's Date: 2020  Patient seen for 18 sessions         Vida Jamison reports: she continues to have improved soreness but is afraid the bend is still not improving. Pt. Reports she continues to do everything at home as instructed.    Objective   See Exercise, Manual, and Modality Logs for complete treatment.     Assessment/Plan   Pt. Tolerates treatment well this visit and has R knee ROM  this visit passively. Pt. Is able to tolerate greater extension with extensive overpressure. Pt. continues to show improving strength increasing leg press to 35 lbs unilaterally today.    Progress per Plan of Care           Timed:         Manual Therapy:    15     mins  94222;     Therapeutic Exercise:    20     mins  82672;       Un-Timed:  Electrical Stimulation:    15     mins  43214 ( );    Timed Treatment:   35   mins   Total Treatment:     50   mins    Maty Bernard PTA  Physical Therapist Assistant License #[id number]

## 2020-11-30 ENCOUNTER — TREATMENT (OUTPATIENT)
Dept: PHYSICAL THERAPY | Facility: CLINIC | Age: 80
End: 2020-11-30

## 2020-11-30 DIAGNOSIS — R26.2 DIFFICULTY WALKING: ICD-10-CM

## 2020-11-30 DIAGNOSIS — Z96.651 S/P TKR (TOTAL KNEE REPLACEMENT), RIGHT: Primary | ICD-10-CM

## 2020-11-30 DIAGNOSIS — Z87.81 HISTORY OF PATELLAR FRACTURE: ICD-10-CM

## 2020-11-30 DIAGNOSIS — M25.561 ACUTE PAIN OF RIGHT KNEE: ICD-10-CM

## 2020-11-30 PROCEDURE — 97140 MANUAL THERAPY 1/> REGIONS: CPT | Performed by: PHYSICAL THERAPIST

## 2020-11-30 PROCEDURE — 97110 THERAPEUTIC EXERCISES: CPT | Performed by: PHYSICAL THERAPIST

## 2020-11-30 PROCEDURE — G0283 ELEC STIM OTHER THAN WOUND: HCPCS | Performed by: PHYSICAL THERAPIST

## 2020-11-30 NOTE — PROGRESS NOTES
Physical Therapy Daily Progress Note      Patient: Vida Jamison   : 1940  Diagnosis/ICD-10 Code:  S/P TKR (total knee replacement), right [Z96.651]  Referring practitioner: Ulices Braswell/Johnny Antonio*  Date of Initial Visit: Type: THERAPY  Noted: 8/3/2020  Today's Date: 2020  Patient seen for 19 sessions         Vida Jamison reports: she continues to use Saray splint twice a day for ~1hr st a time and has been doing stretches and exercises as she has time for them but gets them at least once a day if not twice. Pt. States her knee continues to improve in pain but states it feels like she still can't straighten often herself.    Objective   See Exercise, Manual, and Modality Logs for complete treatment.     Assessment/Plan  Pt. Continues to tolerate exercise well and has improving tolerance to overpressure into stretches for knee extension and flexion. Pt. Is able to achieve greater PROM into extension but is educated on difficulty of progressing knee extension due to poor gait mechanics and postural mechanics with secondary issues of previous L hip abnormalities and low back deformities. Pt. Is educated on how extension can't improve in day to day life because mechanics do not change to allow for improvement.    Progress per Plan of Care           Timed:         Manual Therapy:    15     mins  07182;     Therapeutic Exercise:    30     mins  72534;       Un-Timed:  Electrical Stimulation:    15     mins  85005 ( );    Timed Treatment:   45   mins   Total Treatment:     60   mins    Maty Bernard PTA  Physical Therapist Assistant License #[id number]

## 2020-11-30 NOTE — PROGRESS NOTES
Re-Certification        Patient: Vida Jamison   : 1940  Diagnosis/ICD-10 Code:  S/P TKR (total knee replacement), right [Z96.651]  Referring practitioner: Ulices Braswell/Johnny Antonio*  Date of Initial Visit: Type: THERAPY  Noted: 8/3/2020  Today's Date: 2020  Patient seen for 19 sessions      Subjective:   Vida Shaheen reports: Dr. Sue would like her to achieve 10 more degrees of extension.    Clinical Progress: improved  Home Program Compliance: Yes  Treatment has included: therapeutic exercise, manual stretch, electrical stimulation, balance activities, Dynasplint set-up.    Subjective Evaluation    Patient Goals  Patient goals for therapy: increased motion and increased strength         Objective   Assessment & Plan     Assessment  Impairments: abnormal or restricted ROM  Functional Limitations: walking and standing  Plan  Frequency: 2x week  Treatment plan discussed with: patient  Plan details: Add 6 visits to original plan of care.  Plan to continue PT 2x's per week up to 26 visits total.      Progress toward previous goals: Partially Met    Remaining Goals    Long-term goals (LTG):   Patient voices R knee is at functional level of L knee. NOT MET  Increase R knee extension lag to 5 degrees      Recommendations: Continue as planned  Timeframe: 3 weeks  Prognosis to achieve goals: fair    PT Signature: Robin Sprigler, PT    Based upon review of the patient's progress and continued therapy plan, it is my medical opinion that Vida Jamison should continue physical therapy treatment at [facility]  [address] [place] [address] [phone].    Signature: __________________________________  Ulices Sue/MD Johnny

## 2020-12-02 ENCOUNTER — TREATMENT (OUTPATIENT)
Dept: PHYSICAL THERAPY | Facility: CLINIC | Age: 80
End: 2020-12-02

## 2020-12-02 DIAGNOSIS — Z87.81 HISTORY OF PATELLAR FRACTURE: ICD-10-CM

## 2020-12-02 DIAGNOSIS — Z96.651 S/P TKR (TOTAL KNEE REPLACEMENT), RIGHT: Primary | ICD-10-CM

## 2020-12-02 DIAGNOSIS — R26.2 DIFFICULTY WALKING: ICD-10-CM

## 2020-12-02 DIAGNOSIS — M25.561 ACUTE PAIN OF RIGHT KNEE: ICD-10-CM

## 2020-12-02 PROCEDURE — 97140 MANUAL THERAPY 1/> REGIONS: CPT | Performed by: PHYSICAL THERAPIST

## 2020-12-02 PROCEDURE — 97110 THERAPEUTIC EXERCISES: CPT | Performed by: PHYSICAL THERAPIST

## 2020-12-02 PROCEDURE — G0283 ELEC STIM OTHER THAN WOUND: HCPCS | Performed by: PHYSICAL THERAPIST

## 2020-12-02 NOTE — PROGRESS NOTES
Physical Therapy Daily Progress Note      Patient: Vida Jamison   : 1940  Diagnosis/ICD-10 Code:  S/P TKR (total knee replacement), right [Z96.651]  Referring practitioner: Ulices Braswell/Johnny Antonio*  Date of Initial Visit: Type: THERAPY  Noted: 8/3/2020  Today's Date: 2020  Patient seen for 20 sessions         Vida Jamison reports: she continues to fell better and is feeling better when she exercises and walks at home. Pt. State she just can't get the knee straighter on her own.    Objective   See Exercise, Manual, and Modality Logs for complete treatment.     Assessment/Plan   Pt. Continues to present with improving passive range into both flexion and extension at this time but active range continues to be limited by poor ambulation and posturing.    Progress per Plan of Care           Timed:         Manual Therapy:    15     mins  69539;     Therapeutic Exercise:    20     mins  19758;       Un-Timed:  Electrical Stimulation:    15     mins  43802 ( );      Timed Treatment:   35   mins   Total Treatment:     50   mins    Maty Bernard PTA  Physical Therapist Assistant License #[id number]

## 2020-12-04 ENCOUNTER — TREATMENT (OUTPATIENT)
Dept: PHYSICAL THERAPY | Facility: CLINIC | Age: 80
End: 2020-12-04

## 2020-12-04 DIAGNOSIS — Z96.651 S/P TKR (TOTAL KNEE REPLACEMENT), RIGHT: Primary | ICD-10-CM

## 2020-12-04 DIAGNOSIS — R26.2 DIFFICULTY WALKING: ICD-10-CM

## 2020-12-04 DIAGNOSIS — M25.561 ACUTE PAIN OF RIGHT KNEE: ICD-10-CM

## 2020-12-04 DIAGNOSIS — Z87.81 HISTORY OF PATELLAR FRACTURE: ICD-10-CM

## 2020-12-04 PROCEDURE — 97140 MANUAL THERAPY 1/> REGIONS: CPT | Performed by: PHYSICAL THERAPIST

## 2020-12-04 PROCEDURE — G0283 ELEC STIM OTHER THAN WOUND: HCPCS | Performed by: PHYSICAL THERAPIST

## 2020-12-04 PROCEDURE — 97110 THERAPEUTIC EXERCISES: CPT | Performed by: PHYSICAL THERAPIST

## 2020-12-04 NOTE — PROGRESS NOTES
Physical Therapy Daily Progress Note      Patient: Vida Jamison   : 1940  Diagnosis/ICD-10 Code:  S/P TKR (total knee replacement), right [Z96.651]  Referring practitioner: Ulices Braswell/Johnny Antonio*  Date of Initial Visit: Type: THERAPY  Noted: 8/3/2020  Today's Date: 2020  Patient seen for 21 sessions         Vida Jamison reports: the knee is feeling better and she has became progressively more concerned with her balance and states she would like to work on that in future visits.    Objective   See Exercise, Manual, and Modality Logs for complete treatment.     Assessment/Plan   Pt. Tolerates treatment well this visit and continues to have improving passive ranges into extension and flexion. Pt. Will benefit from increased attention to balance activities due to weakness and instability for progression to cane for ambulation.    Progress per Plan of Care           Timed:         Manual Therapy:   23      mins  24035;     Therapeutic Exercise:    17     mins  11521;     Neuromuscular Galina:        mins  11289;    Therapeutic Activity:          mins  31387;     Gait Training:           mins  02274;     Ultrasound:          mins  10105;    Ionto                                   mins   26924  Self Care                            mins   31935  Canalith Repos                   mins  4209    Un-Timed:  Electrical Stimulation:    15     mins  26570 ( );  Dry Needling          mins self-pay  Traction          mins 03023  Low Eval          Mins  65885  Mod Eval          Mins  66539  High Eval                            Mins  11470    Timed Treatment:   40   mins   Total Treatment:     55   mins    Maty Bernard PTA  Physical Therapist Assistant License #[id number]

## 2020-12-07 ENCOUNTER — TREATMENT (OUTPATIENT)
Dept: PHYSICAL THERAPY | Facility: CLINIC | Age: 80
End: 2020-12-07

## 2020-12-07 DIAGNOSIS — Z96.651 S/P TKR (TOTAL KNEE REPLACEMENT), RIGHT: Primary | ICD-10-CM

## 2020-12-07 DIAGNOSIS — Z87.81 HISTORY OF PATELLAR FRACTURE: ICD-10-CM

## 2020-12-07 DIAGNOSIS — R26.2 DIFFICULTY WALKING: ICD-10-CM

## 2020-12-07 DIAGNOSIS — M25.561 ACUTE PAIN OF RIGHT KNEE: ICD-10-CM

## 2020-12-07 PROCEDURE — 97140 MANUAL THERAPY 1/> REGIONS: CPT | Performed by: PHYSICAL THERAPIST

## 2020-12-07 PROCEDURE — G0283 ELEC STIM OTHER THAN WOUND: HCPCS | Performed by: PHYSICAL THERAPIST

## 2020-12-07 PROCEDURE — 97110 THERAPEUTIC EXERCISES: CPT | Performed by: PHYSICAL THERAPIST

## 2020-12-07 NOTE — PROGRESS NOTES
Physical Therapy Daily Progress Note      Patient: Vida Jamison   : 1940  Diagnosis/ICD-10 Code:  S/P TKR (total knee replacement), right [Z96.651]  Referring practitioner: Ulices Braswell/Johnny Antonio*  Date of Initial Visit: Type: THERAPY  Noted: 8/3/2020  Today's Date: 2020  Patient seen for 22 sessions         Vida Jamison reports: her knee continues to feel better when she is walking and states she has been happy with feeling better. Pt. Reports she has stopped using the Saray splint and will be sending it back once she receives a return label.     Objective   See Exercise, Manual, and Modality Logs for complete treatment.     Assessment/Plan   Pt. Tolerates treatment well this visit and continues to show improving ROM into Ext and Flex. Pt. Has poor tolerance to estim this visit reporting discomfort into R lateral thigh at the 8 and a half minute jerrod at which point it was turned off.    Progress per Plan of Care           Timed:         Manual Therapy:    15     mins  97302;     Therapeutic Exercise:    25    mins  70473;       Un-Timed:  Electrical Stimulation:    15     mins  46099 ( );    Timed Treatment:  40    mins   Total Treatment:     55   mins    Maty Bernard PTA  Physical Therapist Assistant License #[id number]

## 2020-12-09 ENCOUNTER — TELEPHONE (OUTPATIENT)
Dept: PHYSICAL THERAPY | Facility: CLINIC | Age: 80
End: 2020-12-09

## 2020-12-09 DIAGNOSIS — I10 HYPERTENSION, BENIGN: ICD-10-CM

## 2020-12-09 DIAGNOSIS — J30.1 SEASONAL ALLERGIC RHINITIS DUE TO POLLEN: ICD-10-CM

## 2020-12-09 DIAGNOSIS — G89.4 PAIN SYNDROME, CHRONIC: ICD-10-CM

## 2020-12-09 DIAGNOSIS — K21.9 GASTROESOPHAGEAL REFLUX DISEASE WITHOUT ESOPHAGITIS: ICD-10-CM

## 2020-12-10 RX ORDER — CELECOXIB 200 MG/1
200 CAPSULE ORAL DAILY
Qty: 90 CAPSULE | Refills: 0 | Status: SHIPPED | OUTPATIENT
Start: 2020-12-10 | End: 2021-01-06 | Stop reason: SDUPTHER

## 2020-12-10 RX ORDER — AMITRIPTYLINE HYDROCHLORIDE 75 MG/1
75 TABLET, FILM COATED ORAL
Qty: 90 TABLET | Refills: 0 | Status: SHIPPED | OUTPATIENT
Start: 2020-12-10 | End: 2020-12-16 | Stop reason: SDUPTHER

## 2020-12-10 RX ORDER — MONTELUKAST SODIUM 10 MG/1
10 TABLET ORAL DAILY
Qty: 90 TABLET | Refills: 0 | Status: SHIPPED | OUTPATIENT
Start: 2020-12-10 | End: 2020-12-14 | Stop reason: SDUPTHER

## 2020-12-10 RX ORDER — PANTOPRAZOLE SODIUM 40 MG/1
40 TABLET, DELAYED RELEASE ORAL DAILY
Qty: 90 TABLET | Refills: 0 | Status: SHIPPED | OUTPATIENT
Start: 2020-12-10 | End: 2020-12-14 | Stop reason: SDUPTHER

## 2020-12-14 ENCOUNTER — TREATMENT (OUTPATIENT)
Dept: PHYSICAL THERAPY | Facility: CLINIC | Age: 80
End: 2020-12-14

## 2020-12-14 DIAGNOSIS — J30.1 SEASONAL ALLERGIC RHINITIS DUE TO POLLEN: ICD-10-CM

## 2020-12-14 DIAGNOSIS — R26.2 DIFFICULTY WALKING: ICD-10-CM

## 2020-12-14 DIAGNOSIS — I10 HYPERTENSION, BENIGN: ICD-10-CM

## 2020-12-14 DIAGNOSIS — K21.9 GASTROESOPHAGEAL REFLUX DISEASE WITHOUT ESOPHAGITIS: ICD-10-CM

## 2020-12-14 DIAGNOSIS — M25.561 ACUTE PAIN OF RIGHT KNEE: ICD-10-CM

## 2020-12-14 DIAGNOSIS — Z96.651 S/P TKR (TOTAL KNEE REPLACEMENT), RIGHT: Primary | ICD-10-CM

## 2020-12-14 DIAGNOSIS — Z87.81 HISTORY OF PATELLAR FRACTURE: ICD-10-CM

## 2020-12-14 PROCEDURE — 97110 THERAPEUTIC EXERCISES: CPT | Performed by: PHYSICAL THERAPIST

## 2020-12-14 PROCEDURE — G0283 ELEC STIM OTHER THAN WOUND: HCPCS | Performed by: PHYSICAL THERAPIST

## 2020-12-14 PROCEDURE — 97140 MANUAL THERAPY 1/> REGIONS: CPT | Performed by: PHYSICAL THERAPIST

## 2020-12-14 RX ORDER — MONTELUKAST SODIUM 10 MG/1
10 TABLET ORAL DAILY
Qty: 90 TABLET | Refills: 0 | Status: SHIPPED | OUTPATIENT
Start: 2020-12-14 | End: 2020-12-22 | Stop reason: SDUPTHER

## 2020-12-14 RX ORDER — PANTOPRAZOLE SODIUM 40 MG/1
40 TABLET, DELAYED RELEASE ORAL DAILY
Qty: 90 TABLET | Refills: 0 | Status: SHIPPED | OUTPATIENT
Start: 2020-12-14 | End: 2020-12-22 | Stop reason: SDUPTHER

## 2020-12-14 NOTE — PROGRESS NOTES
Physical Therapy Daily Progress Note      Patient: Vida Jamison   : 1940  Diagnosis/ICD-10 Code:  S/P TKR (total knee replacement), right [Z96.651]  Referring practitioner: Ulices Braswell/Johnny Antonio*  Date of Initial Visit: Type: THERAPY  Noted: 8/3/2020  Today's Date: 2020  Patient seen for 23 sessions         Vida Jamison reports: her knee has not been hurting and she is feeling better but states her knee wont straighten anymore when she tries to at home.     Objective   See Exercise, Manual, and Modality Logs for complete treatment.     Assessment/Plan   Pt. Tolerates treatment well this visit however continues to have limited extension and flexion due to pain at end ranges. Pt. Responds well to balance activities today and continues to show improved ability with practice.    Progress per Plan of Care and Progress strengthening /stabilization /functional activity           Timed:         Manual Therapy:    15     mins  38185;     Therapeutic Exercise:    25     mins  55629;       Un-Timed:  Electrical Stimulation:    15     mins  26197 ( );      Timed Treatment:   55   mins   Total Treatment:     55   mins    Maty Bernard PTA  Physical Therapist Assistant License #[id number]

## 2020-12-16 ENCOUNTER — TREATMENT (OUTPATIENT)
Dept: PHYSICAL THERAPY | Facility: CLINIC | Age: 80
End: 2020-12-16

## 2020-12-16 DIAGNOSIS — R26.2 DIFFICULTY WALKING: ICD-10-CM

## 2020-12-16 DIAGNOSIS — M25.561 ACUTE PAIN OF RIGHT KNEE: ICD-10-CM

## 2020-12-16 DIAGNOSIS — Z87.81 HISTORY OF PATELLAR FRACTURE: ICD-10-CM

## 2020-12-16 DIAGNOSIS — G89.4 PAIN SYNDROME, CHRONIC: ICD-10-CM

## 2020-12-16 DIAGNOSIS — Z96.651 S/P TKR (TOTAL KNEE REPLACEMENT), RIGHT: Primary | ICD-10-CM

## 2020-12-16 PROCEDURE — G0283 ELEC STIM OTHER THAN WOUND: HCPCS | Performed by: PHYSICAL THERAPIST

## 2020-12-16 PROCEDURE — 97140 MANUAL THERAPY 1/> REGIONS: CPT | Performed by: PHYSICAL THERAPIST

## 2020-12-16 PROCEDURE — 97110 THERAPEUTIC EXERCISES: CPT | Performed by: PHYSICAL THERAPIST

## 2020-12-16 RX ORDER — AMITRIPTYLINE HYDROCHLORIDE 75 MG/1
75 TABLET, FILM COATED ORAL
Qty: 90 TABLET | Refills: 0 | Status: SHIPPED | OUTPATIENT
Start: 2020-12-16 | End: 2021-01-19 | Stop reason: SDUPTHER

## 2020-12-16 NOTE — PROGRESS NOTES
Physical Therapy Daily Progress Note      Patient: Vida Jamison   : 1940  Diagnosis/ICD-10 Code:  S/P TKR (total knee replacement), right [Z96.651]  Referring practitioner: Ulices Braswell/Johnny Antonio*  Date of Initial Visit: Type: THERAPY  Noted: 8/3/2020  Today's Date: 2020  Patient seen for 24 sessions         Vida Jamison reports: she continues to have decreased soreness in her knee and she has been happy about that.     Objective   See Exercise, Manual, and Modality Logs for complete treatment.     Assessment/Plan  Pt. continues to tolerate increased overpressure in extension and flexion at this time. Pt. continues to have good passive response to ROM but due to altered gait pattern change is not noted during ambulation. Pt. Balance is responding well to physical challenge of modified SLS and standing upright with no UE support.    Progress strengthening /stabilization /functional activity           Timed:         Manual Therapy:    15     mins  28345;     Therapeutic Exercise:    24     mins  94850;       Un-Timed:  Electrical Stimulation:    15     mins  29438 ( );    Timed Treatment:   39   mins   Total Treatment:     54   mins    Maty Bernard PTA  Physical Therapist Assistant License #[id number]

## 2020-12-21 ENCOUNTER — TREATMENT (OUTPATIENT)
Dept: PHYSICAL THERAPY | Facility: CLINIC | Age: 80
End: 2020-12-21

## 2020-12-21 DIAGNOSIS — M25.561 ACUTE PAIN OF RIGHT KNEE: ICD-10-CM

## 2020-12-21 DIAGNOSIS — Z87.81 HISTORY OF PATELLAR FRACTURE: ICD-10-CM

## 2020-12-21 DIAGNOSIS — R26.2 DIFFICULTY WALKING: ICD-10-CM

## 2020-12-21 DIAGNOSIS — Z96.651 S/P TKR (TOTAL KNEE REPLACEMENT), RIGHT: Primary | ICD-10-CM

## 2020-12-21 PROCEDURE — 97110 THERAPEUTIC EXERCISES: CPT | Performed by: PHYSICAL THERAPIST

## 2020-12-21 PROCEDURE — 97140 MANUAL THERAPY 1/> REGIONS: CPT | Performed by: PHYSICAL THERAPIST

## 2020-12-21 NOTE — PROGRESS NOTES
Physical Therapy Daily Progress Note      Patient: Vida Jamison   : 1940  Diagnosis/ICD-10 Code:  S/P TKR (total knee replacement), right [Z96.651]  Referring practitioner: Ulices Braswell/Johnny Antonio*  Date of Initial Visit: Type: THERAPY  Noted: 8/3/2020  Today's Date: 2020  Patient seen for 25 sessions         Vida Jamison reports: she continues to have less pain and discomfort with ambulation and states daily activities don't really cause pain but rather she just struggles to ambulate long because sometimes her balance gets off.    Objective   See Exercise, Manual, and Modality Logs for complete treatment.     Assessment/Plan  Pt. Continues to show improving passive extension and flexion at this time however actively results are limited due to posture and comorbidity of other LE. Pt. Tolerates exercise well and has no report of fatigue this visit. Pt. Balance improves with modified SLS tolerating no hand assist for 15 sec B.    Progress per Plan of Care           Timed:         Manual Therapy:    15     mins  71163;     Therapeutic Exercise:    24     mins  74429;       Timed Treatment:   39   mins   Total Treatment:     54   mins    Maty Bernard PTA  Physical Therapist Assistant License #[id number]

## 2020-12-22 DIAGNOSIS — K21.9 GASTROESOPHAGEAL REFLUX DISEASE WITHOUT ESOPHAGITIS: ICD-10-CM

## 2020-12-22 DIAGNOSIS — J30.1 SEASONAL ALLERGIC RHINITIS DUE TO POLLEN: ICD-10-CM

## 2020-12-22 DIAGNOSIS — I10 HYPERTENSION, BENIGN: ICD-10-CM

## 2020-12-22 RX ORDER — MONTELUKAST SODIUM 10 MG/1
10 TABLET ORAL DAILY
Qty: 90 TABLET | Refills: 0 | Status: SHIPPED | OUTPATIENT
Start: 2020-12-22 | End: 2021-01-19 | Stop reason: SDUPTHER

## 2020-12-22 RX ORDER — PANTOPRAZOLE SODIUM 40 MG/1
40 TABLET, DELAYED RELEASE ORAL DAILY
Qty: 90 TABLET | Refills: 0 | Status: SHIPPED | OUTPATIENT
Start: 2020-12-22 | End: 2021-01-19 | Stop reason: SDUPTHER

## 2020-12-23 ENCOUNTER — TREATMENT (OUTPATIENT)
Dept: PHYSICAL THERAPY | Facility: CLINIC | Age: 80
End: 2020-12-23

## 2020-12-23 DIAGNOSIS — R26.2 DIFFICULTY WALKING: ICD-10-CM

## 2020-12-23 DIAGNOSIS — M25.561 ACUTE PAIN OF RIGHT KNEE: ICD-10-CM

## 2020-12-23 DIAGNOSIS — Z87.81 HISTORY OF PATELLAR FRACTURE: ICD-10-CM

## 2020-12-23 DIAGNOSIS — Z96.651 S/P TKR (TOTAL KNEE REPLACEMENT), RIGHT: Primary | ICD-10-CM

## 2020-12-23 PROCEDURE — 97140 MANUAL THERAPY 1/> REGIONS: CPT | Performed by: PHYSICAL THERAPIST

## 2020-12-23 PROCEDURE — G0283 ELEC STIM OTHER THAN WOUND: HCPCS | Performed by: PHYSICAL THERAPIST

## 2020-12-23 PROCEDURE — 97110 THERAPEUTIC EXERCISES: CPT | Performed by: PHYSICAL THERAPIST

## 2020-12-23 NOTE — PROGRESS NOTES
Physical Therapy Daily Progress Note      Patient: Vida Jamison   : 1940  Diagnosis/ICD-10 Code:  S/P TKR (total knee replacement), right [Z96.651]  Referring practitioner: Ulices Braswell/Johnny Antonio*  Date of Initial Visit: Type: THERAPY  Noted: 8/3/2020  Today's Date: 2020  Patient seen for 26 sessions         Vida Jamison reports: she has done well with therapy and her knee pain is much improved and she feels more stable but states going forward she will probably use her Rolator rather than transition to cane for her own safety.    Objective   See Exercise, Manual, and Modality Logs for complete treatment.     Assessment/Plan   Pt. Tolerates treatment well throughout and HEP was re-instructed with Pt. Verbalizing understanding of stretch and exercise going forward. Pt. final AROM measure was  deg and PROM 0-112 deg. Pt. Was encouraged to continue to try and achieve greater motion as tolerable.     Long-term goals (LTG):   Patient voices R knee is at functional level of L knee. MET  Increase R knee extension lag to 5 degrees Not MET    Progress per Plan of Care           Timed:         Manual Therapy:    15     mins  88243;     Therapeutic Exercise:    25     mins  89168;       Un-Timed:  Electrical Stimulation:    15     mins  91383 ( );    Timed Treatment:   40   mins   Total Treatment:     55   mins    Maty Bernard PTA  Physical Therapist Assistant License #[id number]

## 2021-01-06 DIAGNOSIS — G89.4 PAIN SYNDROME, CHRONIC: ICD-10-CM

## 2021-01-06 RX ORDER — CELECOXIB 200 MG/1
200 CAPSULE ORAL DAILY
Qty: 90 CAPSULE | Refills: 0 | Status: SHIPPED | OUTPATIENT
Start: 2021-01-06 | End: 2021-01-19 | Stop reason: SDUPTHER

## 2021-01-13 ENCOUNTER — HOSPITAL ENCOUNTER (OUTPATIENT)
Dept: BONE DENSITY | Facility: HOSPITAL | Age: 81
Discharge: HOME | End: 2021-01-13
Admitting: FAMILY MEDICINE

## 2021-01-13 DIAGNOSIS — D69.6 THROMBOCYTOPENIA (HCC): ICD-10-CM

## 2021-01-13 DIAGNOSIS — R73.9 HYPERGLYCEMIA: ICD-10-CM

## 2021-01-13 DIAGNOSIS — E78.2 MIXED HYPERLIPIDEMIA: Primary | ICD-10-CM

## 2021-01-13 PROCEDURE — 77081 DXA BONE DENSITY APPENDICULR: CPT

## 2021-01-14 LAB
ALBUMIN SERPL-MCNC: 4.1 G/DL (ref 3.7–4.7)
ALBUMIN/GLOB SERPL: 1.5 {RATIO} (ref 1.2–2.2)
ALP SERPL-CCNC: 116 IU/L (ref 39–117)
ALT SERPL-CCNC: 11 IU/L (ref 0–32)
AST SERPL-CCNC: 19 IU/L (ref 0–40)
BASOPHILS # BLD AUTO: 0.1 X10E3/UL (ref 0–0.2)
BASOPHILS NFR BLD AUTO: 1 %
BILIRUB SERPL-MCNC: 0.5 MG/DL (ref 0–1.2)
BUN SERPL-MCNC: 12 MG/DL (ref 8–27)
BUN/CREAT SERPL: 18 (ref 12–28)
CALCIUM SERPL-MCNC: 9.3 MG/DL (ref 8.7–10.3)
CHLORIDE SERPL-SCNC: 99 MMOL/L (ref 96–106)
CHOLEST SERPL-MCNC: 146 MG/DL (ref 100–199)
CHOLEST/HDLC SERPL: 3.6 RATIO (ref 0–4.4)
CO2 SERPL-SCNC: 26 MMOL/L (ref 20–29)
CREAT SERPL-MCNC: 0.66 MG/DL (ref 0.57–1)
EOSINOPHIL # BLD AUTO: 0.2 X10E3/UL (ref 0–0.4)
EOSINOPHIL NFR BLD AUTO: 2 %
ERYTHROCYTE [DISTWIDTH] IN BLOOD BY AUTOMATED COUNT: 14.5 % (ref 11.7–15.4)
GLOBULIN SER CALC-MCNC: 2.8 G/DL (ref 1.5–4.5)
GLUCOSE SERPL-MCNC: 93 MG/DL (ref 65–99)
HBA1C MFR BLD: 5.7 % (ref 4.8–5.6)
HCT VFR BLD AUTO: 39.4 % (ref 34–46.6)
HDLC SERPL-MCNC: 41 MG/DL
HGB BLD-MCNC: 12.6 G/DL (ref 11.1–15.9)
IMM GRANULOCYTES # BLD AUTO: 0 X10E3/UL (ref 0–0.1)
IMM GRANULOCYTES NFR BLD AUTO: 0 %
LDLC SERPL CALC-MCNC: 81 MG/DL (ref 0–99)
LYMPHOCYTES # BLD AUTO: 1.1 X10E3/UL (ref 0.7–3.1)
LYMPHOCYTES NFR BLD AUTO: 15 %
MCH RBC QN AUTO: 28.1 PG (ref 26.6–33)
MCHC RBC AUTO-ENTMCNC: 32 G/DL (ref 31.5–35.7)
MCV RBC AUTO: 88 FL (ref 79–97)
MONOCYTES # BLD AUTO: 0.6 X10E3/UL (ref 0.1–0.9)
MONOCYTES NFR BLD AUTO: 8 %
NEUTROPHILS # BLD AUTO: 5.3 X10E3/UL (ref 1.4–7)
NEUTROPHILS NFR BLD AUTO: 74 %
PLATELET # BLD AUTO: 390 X10E3/UL (ref 150–450)
POTASSIUM SERPL-SCNC: 4.7 MMOL/L (ref 3.5–5.2)
PROT SERPL-MCNC: 6.9 G/DL (ref 6–8.5)
RBC # BLD AUTO: 4.49 X10E6/UL (ref 3.77–5.28)
SODIUM SERPL-SCNC: 138 MMOL/L (ref 134–144)
TRIGL SERPL-MCNC: 138 MG/DL (ref 0–149)
VLDLC SERPL CALC-MCNC: 24 MG/DL (ref 5–40)
WBC # BLD AUTO: 7.1 X10E3/UL (ref 3.4–10.8)

## 2021-01-19 ENCOUNTER — OFFICE VISIT (OUTPATIENT)
Dept: FAMILY MEDICINE CLINIC | Facility: CLINIC | Age: 81
End: 2021-01-19

## 2021-01-19 VITALS
HEIGHT: 63 IN | RESPIRATION RATE: 18 BRPM | TEMPERATURE: 98.1 F | SYSTOLIC BLOOD PRESSURE: 136 MMHG | HEART RATE: 78 BPM | WEIGHT: 149.8 LBS | BODY MASS INDEX: 26.54 KG/M2 | DIASTOLIC BLOOD PRESSURE: 73 MMHG | OXYGEN SATURATION: 97 %

## 2021-01-19 DIAGNOSIS — I10 HYPERTENSION, BENIGN: ICD-10-CM

## 2021-01-19 DIAGNOSIS — E87.1 HYPONATREMIA: ICD-10-CM

## 2021-01-19 DIAGNOSIS — D69.6 THROMBOCYTOPENIA (HCC): ICD-10-CM

## 2021-01-19 DIAGNOSIS — E78.2 MIXED HYPERLIPIDEMIA: Primary | ICD-10-CM

## 2021-01-19 DIAGNOSIS — R74.8 ELEVATED ALKALINE PHOSPHATASE LEVEL: ICD-10-CM

## 2021-01-19 DIAGNOSIS — M81.0 OSTEOPOROSIS, UNSPECIFIED OSTEOPOROSIS TYPE, UNSPECIFIED PATHOLOGICAL FRACTURE PRESENCE: Primary | ICD-10-CM

## 2021-01-19 DIAGNOSIS — K58.9 IRRITABLE BOWEL SYNDROME WITHOUT DIARRHEA: ICD-10-CM

## 2021-01-19 DIAGNOSIS — J30.1 SEASONAL ALLERGIC RHINITIS DUE TO POLLEN: ICD-10-CM

## 2021-01-19 DIAGNOSIS — K21.9 GASTROESOPHAGEAL REFLUX DISEASE WITHOUT ESOPHAGITIS: ICD-10-CM

## 2021-01-19 DIAGNOSIS — R73.9 HYPERGLYCEMIA: ICD-10-CM

## 2021-01-19 DIAGNOSIS — D50.8 OTHER IRON DEFICIENCY ANEMIA: ICD-10-CM

## 2021-01-19 DIAGNOSIS — M81.0 OSTEOPOROSIS, UNSPECIFIED OSTEOPOROSIS TYPE, UNSPECIFIED PATHOLOGICAL FRACTURE PRESENCE: ICD-10-CM

## 2021-01-19 DIAGNOSIS — G89.4 CHRONIC PAIN SYNDROME: ICD-10-CM

## 2021-01-19 DIAGNOSIS — G89.4 PAIN SYNDROME, CHRONIC: ICD-10-CM

## 2021-01-19 PROBLEM — K59.09 OTHER CONSTIPATION: Status: ACTIVE | Noted: 2021-01-19

## 2021-01-19 PROBLEM — K44.9 HIATAL HERNIA: Status: ACTIVE | Noted: 2021-01-19

## 2021-01-19 PROBLEM — R32 URINARY INCONTINENCE: Status: ACTIVE | Noted: 2021-01-19

## 2021-01-19 PROBLEM — M54.2 CHRONIC NECK PAIN: Status: ACTIVE | Noted: 2021-01-19

## 2021-01-19 PROBLEM — M25.512 LEFT SHOULDER PAIN: Status: ACTIVE | Noted: 2021-01-19

## 2021-01-19 PROBLEM — D25.9 UTERINE LEIOMYOMA: Status: ACTIVE | Noted: 2021-01-19

## 2021-01-19 PROBLEM — G89.29 CHRONIC NECK PAIN: Status: ACTIVE | Noted: 2021-01-19

## 2021-01-19 PROBLEM — S82.002A FRACTURE OF LEFT PATELLA: Status: ACTIVE | Noted: 2020-08-08

## 2021-01-19 PROBLEM — G47.00 INSOMNIA, PERSISTENT: Status: ACTIVE | Noted: 2021-01-19

## 2021-01-19 PROCEDURE — 99214 OFFICE O/P EST MOD 30 MIN: CPT | Performed by: FAMILY MEDICINE

## 2021-01-19 RX ORDER — MONTELUKAST SODIUM 10 MG/1
10 TABLET ORAL DAILY
Qty: 90 TABLET | Refills: 1 | Status: SHIPPED | OUTPATIENT
Start: 2021-01-19 | End: 2021-05-28

## 2021-01-19 RX ORDER — DULOXETIN HYDROCHLORIDE 30 MG/1
30 CAPSULE, DELAYED RELEASE ORAL DAILY
Qty: 90 CAPSULE | Refills: 1 | Status: SHIPPED | OUTPATIENT
Start: 2021-01-19 | End: 2021-07-21

## 2021-01-19 RX ORDER — ALENDRONATE SODIUM 70 MG/1
70 TABLET ORAL
Qty: 4 TABLET | Refills: 12 | Status: SHIPPED | OUTPATIENT
Start: 2021-01-19 | End: 2021-04-15

## 2021-01-19 RX ORDER — PANTOPRAZOLE SODIUM 40 MG/1
40 TABLET, DELAYED RELEASE ORAL DAILY
Qty: 90 TABLET | Refills: 1 | Status: SHIPPED | OUTPATIENT
Start: 2021-01-19 | End: 2021-06-28

## 2021-01-19 RX ORDER — AMITRIPTYLINE HYDROCHLORIDE 75 MG/1
75 TABLET, FILM COATED ORAL
Qty: 90 TABLET | Refills: 1 | Status: SHIPPED | OUTPATIENT
Start: 2021-01-19 | End: 2021-05-28

## 2021-01-19 RX ORDER — CELECOXIB 200 MG/1
200 CAPSULE ORAL DAILY
Qty: 90 CAPSULE | Refills: 1 | Status: SHIPPED | OUTPATIENT
Start: 2021-01-19 | End: 2021-03-15 | Stop reason: SDUPTHER

## 2021-01-19 NOTE — ASSESSMENT & PLAN NOTE
Worse.  Dexa done 1-, results reviewed with pt.  Start Fosamax weekly.  Will check a Vit. D with next labs.

## 2021-01-19 NOTE — ASSESSMENT & PLAN NOTE
Doing well.  Patient tolerated Enalapril and Metroprolol well without side effects. I feel the benefits of the medication outweigh the risks.  Encouraged to watch salt, exercise more and lose weight.

## 2021-01-19 NOTE — PROGRESS NOTES
Subjective   Vida Jamison is a 80 y.o. female.     Hyperlipidemia  This is a chronic problem. The current episode started more than 1 year ago. The problem is controlled. Recent lipid tests were reviewed and are normal. Factors aggravating her hyperlipidemia include fatty foods. Pertinent negatives include no chest pain, myalgias or shortness of breath. Current antihyperlipidemic treatment includes statins. The current treatment provides significant improvement of lipids. There are no compliance problems.  Risk factors for coronary artery disease include dyslipidemia and hypertension.   Hypertension  This is a chronic problem. The current episode started more than 1 year ago. The problem is unchanged. The problem is controlled. Pertinent negatives include no chest pain, palpitations or shortness of breath. Risk factors for coronary artery disease include dyslipidemia. The current treatment provides significant improvement.   Anemia  Presents for follow-up visit. Symptoms include abdominal pain. There has been no palpitations or weight loss. There are no compliance problems.    Abnormal Lab  This is a chronic problem. The current episode started more than 1 year ago. The problem occurs constantly. The problem has been unchanged. Associated symptoms include abdominal pain. Pertinent negatives include no chest pain, fatigue, myalgias or nausea. Nothing aggravates the symptoms.        The following portions of the patient's history were reviewed and updated as appropriate: allergies, current medications, past family history, past medical history, past social history, past surgical history and problem list.    Family History   Problem Relation Age of Onset   • Ovarian cancer Mother    • Arthritis Mother    • Heart failure Father    • Suicidality Brother    • Heart disease Maternal Aunt    • Cancer Other         malignant neoplasm of gastrointestinal tract- in her 50's   • Arthritis Other    • Cervical cancer Other    •  Arthritis Other    • Diabetes Other        Social History     Tobacco Use   • Smoking status: Never Smoker   • Smokeless tobacco: Never Used   Substance Use Topics   • Alcohol use: No   • Drug use: No       Past Surgical History:   Procedure Laterality Date   • BREAST BIOPSY Right 1974    Dr Briseida Alonso   • CATARACT EXTRACTION W/ INTRAOCULAR LENS IMPLANT      7/20/10-rt. eye-Dr. Leon 7/13/10- Lt. eye-Dr. Leon   • FINGER SURGERY  2001    Revision of Finger joints. Dr. Brumfield w/Drs. Kleinert & Marlon   • HIP ENDOPROSTHESIS Left 05/22/2014    Osteonics endoprostehetic replacement of left hip. Dr. Reza Choudhury.   • JOINT REPLACEMENT     • KNEE ARTHROSCOPY Left     [1987] Dr Poon-torn medial meniscus [1995] Dr. Schaffer -torn medial meniscus   • LEEP  2000    Biopsy of cervix. for MAJOR-1 by Dr. Knight   • LUMBAR DISCECTOMY  1994    Hemilaminectomy, foraminectomy & discectomy. Dr. Velasquez, L4-L5 w/posterior lateral fusion & screw fixatoin   • LUMBAR LAMINECTOMY  12/19/2011    foraminotomies, medial facetectomies L5-X2bdrxs redo. left L5-S1 lumbar discectomy. [facility]-Dr. De Oliveira- 5 units of blood given to pt.   • POSTERIOR LUMBAR/THORACIC SPINE FUSION  2002    Fusion T-5 through L-1. Had T-11 burst fracture. Recuperated at [facility]   • SPINAL FUSION      Lower Back. 7/11/2011-[facility] - Hardware removal from L3-L5, Dr. De Oliveira surgeon, Dr. Booker [facility]-Fusion of spine at multi-levels 12/14/11 - [facility] - Dr Gomez and Dr. Momin, Anterior approach Spinal Fusion L5-S1 3--[facility]. Dr. De Oliveira and Dr. Dean. Failed posterior fusion with loose instrumentation. L-4 thru L-5. No complications.   • TONSILLECTOMY  1951    Dr Mcginnis   • TOTAL HIP ARTHROPLASTY Right 04/10/2017    [facility], Inpatient. Dr. Reinoso   • TOTAL KNEE ARTHROPLASTY Left 2002    Dr Sue @ [facility]       Patient Active Problem List   Diagnosis   • Gastroesophageal reflux disease without esophagitis   •  Pain in hip region after total hip replacement (CMS/[facility])   • Loose total hip arthroplasty (CMS/[facility])   • Lumbar degenerative disc disease   • Mixed hyperlipidemia   • Myalgia and myositis   • Hypertension, benign   • Peripheral vascular disease (CMS/[facility])   • Periprosthetic fracture around internal prosthetic left hip joint (CMS/[facility])   • Rheumatoid arthritis involving multiple sites with positive rheumatoid factor (CMS/[facility])   • S/P lumbar fusion   • Spinal stenosis of lumbar region   • Carpal tunnel syndrome of right wrist   • Anemia   • Hyperglycemia   • Left hip pain   • Vitamin D deficiency   • First degree atrioventricular block   • Chronic pain syndrome   • Hypotension due to drugs   • Dependent edema   • Advanced directives, counseling/discussion   • Aortic valve regurgitation   • ASCUS (atypical squamous cells of undetermined significance) on Pap smear   • Elevated diaphragm   • Esophageal hernia   • Family history of diabetes mellitus   • Fusion of spine of lumbar region   • Hypertensive left ventricular hypertrophy, without heart failure   • Hyponatremia   • Mitral valve disease   • Status post hip replacement   • Elevated alkaline phosphatase level   • Chronic pain of right knee   • Left knee pain   • Seasonal allergic rhinitis due to pollen   • Localized edema   • Status post knee replacement   • Myalgia   • Fracture of left patella   • Insomnia, persistent   • Irritable bowel syndrome without diarrhea   • Left shoulder pain   • Other constipation   • Urinary incontinence   • Uterine leiomyoma   • Hiatal hernia   • Scoliosis (and kyphoscoliosis), idiopathic   • Chronic neck pain   • Osteoporosis       Current Outpatient Medications on File Prior to Visit   Medication Sig Dispense Refill   • acetaminophen (TYLENOL) 650 MG 8 hr tablet Take 650 mg by mouth 2 (Two) Times a Day.     • atorvastatin (LIPITOR) 10 MG tablet TAKE 1 TABLET EVERY DAY 90 tablet 0   • Calcium Carbonate-Vitamin D (CALCIUM 600+D) 600-200  "MG-UNIT tablet Take 2 tablets by mouth Daily.     • Calcium Carbonate-Vitamin D (CALCIUM 600/VITAMIN D PO)      • diphenhydrAMINE (BENADRYL ALLERGY) 25 mg capsule Take 1 capsule by mouth Daily.     • enalapril (VASOTEC) 10 MG tablet TAKE 1 TABLET TWICE DAILY (NEED LABS AND FOLLOW UP) 180 tablet 0   • ferrous sulfate 325 (65 FE) MG tablet Take 325 mg by mouth Daily With Breakfast.     • gabapentin (NEURONTIN) 300 MG capsule Take 1 capsule by mouth 2 (Two) Times a Day. 180 capsule 1   • Glycerin-Hypromellose- (ARTIFICIAL TEARS) 0.2-0.2-1 % solution ophthalmic solution      • loratadine (CLARITIN) 10 MG tablet Take 10 mg by mouth Daily.     • methocarbamol (Robaxin) 500 MG tablet Take 1 tablet by mouth Daily. 90 tablet 0   • Psyllium 100 % powder METAMUCIL POWD     • tamsulosin (FLOMAX) 0.4 MG capsule 24 hr capsule Take 1 capsule by mouth Daily. 30 capsule 1   • traMADol (ULTRAM) 50 MG tablet Take 50 mg by mouth Every 8 (Eight) Hours As Needed for Moderate Pain .       No current facility-administered medications on file prior to visit.        Allergies   Allergen Reactions   • Morphine And Related Hallucinations     HALLUCINATIONS         Review of Systems   Constitutional: Negative for fatigue, unexpected weight gain and unexpected weight loss.   HENT: Negative for nosebleeds.    Respiratory: Negative for shortness of breath.    Cardiovascular: Negative for chest pain, palpitations and leg swelling.   Gastrointestinal: Positive for abdominal pain. Negative for blood in stool, nausea and indigestion.   Genitourinary: Negative for hematuria.   Musculoskeletal: Negative for myalgias.   Skin: Negative for dry skin.   Neurological: Negative for headache.       Objective   Visit Vitals  /73 (BP Location: Right arm, Patient Position: Sitting, Cuff Size: Adult)   Pulse 78   Temp 98.1 °F (36.7 °C) (Temporal)   Resp 18   Ht 160 cm (63\")   Wt 67.9 kg (149 lb 12.8 oz)   SpO2 97%   BMI 26.54 kg/m²     Physical " Exam  Vitals signs and nursing note reviewed.   Constitutional:       Appearance: She is well-developed.   HENT:      Head: Normocephalic.   Neck:      Musculoskeletal: Neck supple.      Thyroid: No thyromegaly.      Vascular: No carotid bruit.      Trachea: Trachea normal.   Cardiovascular:      Rate and Rhythm: Normal rate and regular rhythm.      Heart sounds: No murmur. No friction rub. No gallop.    Pulmonary:      Effort: Pulmonary effort is normal. No respiratory distress.      Breath sounds: Normal breath sounds. No wheezing.   Chest:      Chest wall: No tenderness.   Skin:     General: Skin is dry.      Findings: No rash.      Nails: There is no clubbing.     Neurological:      Mental Status: She is alert and oriented to person, place, and time.   Psychiatric:         Behavior: Behavior is cooperative.           Assessment/Plan .  Problem List Items Addressed This Visit        Medium    Anemia    Current Assessment & Plan     Resolved at present.  Labs done 1-, read by me, reviewed with pt.  CBC was normal.  Patient tolerated IRON well without side effects. I feel the benefits of the medication outweigh the risks.         Relevant Orders    CBC & Differential    Hypertension, benign    Current Assessment & Plan     Doing well.  Patient tolerated Enalapril and Metroprolol well without side effects. I feel the benefits of the medication outweigh the risks.  Encouraged to watch salt, exercise more and lose weight.           Mixed hyperlipidemia - Primary    Current Assessment & Plan     Labs done 1-, read by me, reviewed with pt.  Tot. Chol. 146 up from 139, Tot. Chol. 146 up from 139, HDL 41 same as last, LDL 81 up from 79  Doing well.  Patient tolerated Lipitor well without side effects. I feel the benefits of the medication outweigh the risks.  Encouraged to watch fatty intake, exercise more, and lose weight.   Compliant with medication  Is getting adequate diet and exercise  Goals developed  at last visit were met  Follow up in 3 months  Care management needs are self-addressed.  Self-management abilities addressed and patient is capable of managing her own disease.           Relevant Orders    Lipid Panel With / Chol / HDL Ratio    Comprehensive metabolic panel       Low    Hyperglycemia    Current Assessment & Plan     Improved.  Labs done 1-, read by me, reviewed with pt.  a1c was 5.7 down from 6.0         Relevant Orders    Hemoglobin A1c    Hyponatremia    Current Assessment & Plan     Resolved.  Labs done 1-, read by me, reviewed with pt.  Sodium was 138 same as last            Unprioritized    Elevated alkaline phosphatase level    Current Assessment & Plan     Labs done 1-, read by me, reviewed with pt.         Irritable bowel syndrome without diarrhea    Current Assessment & Plan     Worse, advised repeat colonoscopy.  Pt. Requests referral to Dr. Garcia for EGD and Colonoscopy.         Relevant Orders    Ambulatory Referral to Gastroenterology (Completed)    Osteoporosis    Current Assessment & Plan     Worse.  Dexa done 1-, results reviewed with pt.  Start Fosamax weekly.  Will check a Vit. D with next labs.         Relevant Orders    Vitamin D 25 hydroxy      Other Visit Diagnoses     Thrombocytopenia (CMS/HCC)

## 2021-01-19 NOTE — ASSESSMENT & PLAN NOTE
Resolved at present.  Labs done 1-, read by me, reviewed with pt.  CBC was normal.  Patient tolerated IRON well without side effects. I feel the benefits of the medication outweigh the risks.

## 2021-02-24 ENCOUNTER — TRANSCRIBE ORDERS (OUTPATIENT)
Dept: PHYSICAL THERAPY | Facility: CLINIC | Age: 81
End: 2021-02-24
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
## 2021-03-04 ENCOUNTER — DOCUMENTATION (OUTPATIENT)
Dept: PHYSICAL THERAPY | Facility: CLINIC | Age: 81
End: 2021-03-04

## 2021-03-04 DIAGNOSIS — Z96.651 S/P TKR (TOTAL KNEE REPLACEMENT), RIGHT: Primary | ICD-10-CM

## 2021-03-04 NOTE — PROGRESS NOTES
Discharge Summary  Discharge Summary from Physical Therapy Report          Goals: Partially Met    Discharge Plan: Continue with current home exercise program as instructed    Comments See note below.  Last seen 12/23/2020.  Patient failed to return for therapy.  Patient seen for 26 sessions            Vida Jamison reports: she has done well with therapy and her knee pain is much improved and she feels more stable but states going forward she will probably use her Rolator rather than transition to cane for her own safety.     Objective   See Exercise, Manual, and Modality Logs for complete treatment.      Assessment/Plan   Pt. Tolerates treatment well throughout and HEP was re-instructed with Pt. Verbalizing understanding of stretch and exercise going forward. Pt. final AROM measure was  deg and PROM 0-112 deg. Pt. Was encouraged to continue to try and achieve greater motion as tolerable.     Long-term goals (LTG):   Patient voices R knee is at functional level of L knee. MET  Increase R knee extension lag to 5 degrees Not MET      Date of Discharge 3/4/21        Robin A Sprigler, PT  Physical Therapist

## 2021-03-15 DIAGNOSIS — M51.36 LUMBAR DEGENERATIVE DISC DISEASE: ICD-10-CM

## 2021-03-15 DIAGNOSIS — G89.4 PAIN SYNDROME, CHRONIC: ICD-10-CM

## 2021-03-15 RX ORDER — GABAPENTIN 300 MG/1
CAPSULE ORAL
Qty: 180 CAPSULE | Refills: 0 | Status: SHIPPED | OUTPATIENT
Start: 2021-03-15 | End: 2021-06-23

## 2021-03-15 RX ORDER — CELECOXIB 200 MG/1
200 CAPSULE ORAL DAILY
Qty: 90 CAPSULE | Refills: 1 | Status: SHIPPED | OUTPATIENT
Start: 2021-03-15 | End: 2021-03-17 | Stop reason: SDUPTHER

## 2021-03-17 ENCOUNTER — TRANSCRIBE ORDERS (OUTPATIENT)
Dept: PHYSICAL THERAPY | Facility: CLINIC | Age: 81
End: 2021-03-17

## 2021-03-17 DIAGNOSIS — G89.4 PAIN SYNDROME, CHRONIC: ICD-10-CM

## 2021-03-17 DIAGNOSIS — Z98.890 STATUS POST LAPAROSCOPY: ICD-10-CM

## 2021-03-17 DIAGNOSIS — G89.29 CHRONIC PAIN OF RIGHT KNEE: Primary | ICD-10-CM

## 2021-03-17 DIAGNOSIS — M25.561 CHRONIC PAIN OF RIGHT KNEE: Primary | ICD-10-CM

## 2021-03-18 RX ORDER — CELECOXIB 200 MG/1
200 CAPSULE ORAL DAILY
Qty: 90 CAPSULE | Refills: 1 | Status: SHIPPED | OUTPATIENT
Start: 2021-03-18 | End: 2021-03-24 | Stop reason: SDUPTHER

## 2021-03-19 ENCOUNTER — TREATMENT (OUTPATIENT)
Dept: PHYSICAL THERAPY | Facility: CLINIC | Age: 81
End: 2021-03-19

## 2021-03-19 ENCOUNTER — TELEPHONE (OUTPATIENT)
Dept: FAMILY MEDICINE CLINIC | Facility: CLINIC | Age: 81
End: 2021-03-19

## 2021-03-19 DIAGNOSIS — M25.561 CHRONIC PAIN OF RIGHT KNEE: Primary | ICD-10-CM

## 2021-03-19 DIAGNOSIS — G89.29 CHRONIC PAIN OF RIGHT KNEE: Primary | ICD-10-CM

## 2021-03-19 PROCEDURE — 97162 PT EVAL MOD COMPLEX 30 MIN: CPT | Performed by: PHYSICAL THERAPIST

## 2021-03-19 PROCEDURE — 97110 THERAPEUTIC EXERCISES: CPT | Performed by: PHYSICAL THERAPIST

## 2021-03-19 NOTE — PROGRESS NOTES
Physical Therapy Initial Evaluation and Plan of Care    Patient: Vida Jamison   : 1940  Diagnosis/ICD-10 Code:  Chronic pain of right knee [M25.561, G89.29]  Referring practitioner: Dr Peñaloza  Date of Initial Visit: 3/19/2021  Today's Date: 3/19/2021  Patient seen for 1 sessions           Subjective Questionnaire: [place] Knee:       Subjective Evaluation    History of Present Illness  Mechanism of injury: Pt is a 81 yo female s/p R AKS 3/16/21.  Only took hydrocodone x 1 night.  Doing pretty well.  Has not returned to driving but is hopful to soon.  Using 4 wheel rolling walker pre and post surgery.  Getting around pretty well.  Knee hasn't been bothering her too much at night.  Sleeping ok.  Noted that she has probably had 10 falls since she was last here.  Most recent occurred early march.  Any uneven surface can cause her to fall.  Noted that her nephew lives with her but has several health problems.  Does have some swelling but not too bad.      Quality of life: good    Pain  Current pain ratin  At worst pain ratin  Quality: dull ache and tight  Relieving factors: medications, change in position and ice  Aggravating factors: ambulation  Progression: improved    Treatments  Previous treatment: physical therapy  Current treatment: physical therapy  Discharged from (in last 30 days): inpatient hospitalization  Patient Goals  Patient goals for therapy: decreased pain, increased motion, improved balance, decreased edema, increased strength and independence with ADLs/IADLs             Objective          Active Range of Motion     Right Knee   Flexion: 98 degrees   Extensor lag: 15 degrees      General Comments     Knee Comments  Edema - mild R knee edema  GAIT - pt amb with 4 wheel rolling walker  Incisions - covered with bandaid or steri-strip - appear to be healing well           Assessment & Plan     Assessment  Impairments: abnormal gait, abnormal or restricted ROM, activity intolerance,  impaired balance, impaired physical strength, lacks appropriate home exercise program, pain with function and safety issue  Assessment details: Pt presents with increased R knee pain.  Difficulty walking.  Decreased R knee AROM.  Decreased RLE strength.  Difficulty walking up/down stairs.  Increased RLE edema.  Increased incidence of falls.    Prognosis: good  Functional Limitations: walking, uncomfortable because of pain, moving in bed and standing  Goals  Plan Goals: STG  Pt I with HEP in 2 weeks.  To improve R knee ROM 5-105 in 2- 3 weeks.  To tolerate 30 min therapeutic exercise with no inc pain in 2-3 weeks.  To dec R knee pain 1-2/10 max in 2-3 weeks.    LTG  To improve R knee ROM 0-110 in 6-8 weeks.  To dem safe I amb up/down stairs in 4-6 weeks.  To dec R knee pain 0-1/10 max in 8-12 weeks.      Plan  Therapy options: will be seen for skilled physical therapy services  Planned modality interventions: cryotherapy, electrical stimulation/Russian stimulation and thermotherapy (hydrocollator packs)  Planned therapy interventions: balance/weight-bearing training, ADL retraining, body mechanics training, flexibility, functional ROM exercises, gait training, home exercise program, manual therapy, neuromuscular re-education, strengthening, stretching, therapeutic activities and transfer training  Frequency: 3x week  Duration in visits: 20  Treatment plan discussed with: patient  Plan details: Plan to begin 2-3 x to improve ROM, balance and exercise tolerance.  Will progress balance and ambulation tolerance as able.  May add modalities as needed for pain.  Monitor swelling.          Timed:         Manual Therapy:    5     mins  50715;     Therapeutic Exercise:    20     mins  72276;     Neuromuscular Galina:        mins  50207;    Therapeutic Activity:          mins  15581;     Gait Training:           mins  26481;     Ultrasound:          mins  08365;    Ionto                                   mins    24592    Un-Timed:  Electrical Stimulation:         mins  20907 ( );  Dry Needling          mins self-pay  Traction          mins 73998  Low Eval          Mins  23470  Mod Eval     30     Mins  91229  High Eval                            Mins  27601        Timed Treatment:   25   mins   Total Treatment:     55   mins    PT SIGNATURE: Domitila Jackson, PT   DATE TREATMENT INITIATED: 3/19/2021    Medicare Initial Certification  Certification Period: 6/17/2021  I certify that the therapy services are furnished while this patient is under my care.  The services outlined above are required by this patient, and will be reviewed every 90 days.     PHYSICIAN: Ulices Sue/MD Johnny      DATE:     Please sign and return via fax to [phone].. Thank you, [facility].

## 2021-03-22 ENCOUNTER — TREATMENT (OUTPATIENT)
Dept: PHYSICAL THERAPY | Facility: CLINIC | Age: 81
End: 2021-03-22

## 2021-03-22 DIAGNOSIS — G89.29 CHRONIC PAIN OF RIGHT KNEE: ICD-10-CM

## 2021-03-22 DIAGNOSIS — Z98.890 S/P ARTHROSCOPIC SURGERY OF RIGHT KNEE: Primary | ICD-10-CM

## 2021-03-22 DIAGNOSIS — M25.561 CHRONIC PAIN OF RIGHT KNEE: ICD-10-CM

## 2021-03-22 PROCEDURE — 97110 THERAPEUTIC EXERCISES: CPT | Performed by: PHYSICAL THERAPIST

## 2021-03-22 PROCEDURE — 97140 MANUAL THERAPY 1/> REGIONS: CPT | Performed by: PHYSICAL THERAPIST

## 2021-03-22 NOTE — PROGRESS NOTES
Physical Therapy Daily Progress Note      Patient: Vida Jamison   : 1940  Diagnosis/ICD-10 Code:  S/P arthroscopic surgery of right knee [Z98.890]   Problems Addressed this Visit        Musculoskeletal and Injuries    Chronic pain of right knee      Other Visit Diagnoses     S/P arthroscopic surgery of right knee    -  Primary      Diagnoses       Codes Comments    S/P arthroscopic surgery of right knee    -  Primary ICD-10-CM: Z98.890  ICD-9-CM: V45.89     Chronic pain of right knee     ICD-10-CM: M25.561, G89.29  ICD-9-CM: 719.46, 338.29          Referring practitioner: Reza Peñaloza MD  Date of Initial Visit: Type: THERAPY  Noted: 3/19/2021  Today's Date: 3/22/2021    VISIT#: 2    Subjective Pt stated she was able to do her exercises regularly over the weekend.  Would like to build up to doing an hour at PT soon.      Objective Began with manual stretching.  Completed exercise as listed.  Inc time and resistance on nustep.  Added tband abd.  Finished with ice.      See Exercise, Manual, and Modality Logs for complete treatment.     Assessment/Plan Plan to progress strength and balance as tolerated.      Progress per Plan of Care         Timed:         Manual Therapy:    10     mins  50819;     Therapeutic Exercise:    25     mins  04319;     Neuromuscular Galina:        mins  96193;    Therapeutic Activity:          mins  77556;     Gait Training:           mins  72261;     Ultrasound:          mins  53315;    Ionto                                   mins   98728  Self Care                            mins   80438  Canalith Repos                   mins  94166    Un-Timed:  Electrical Stimulation:         mins  32136 ( );  Dry Needling          mins self-pay  Traction          mins 60501  Low Eval          Mins  90206  Mod Eval          Mins  24388  High Eval                            Mins  80878  Re-Eval                               mins  13480    Timed Treatment:   35   mins   Total  Treatment:     45   mins    Domitila Jackson, PT  Physical Therapist

## 2021-03-24 ENCOUNTER — TREATMENT (OUTPATIENT)
Dept: PHYSICAL THERAPY | Facility: CLINIC | Age: 81
End: 2021-03-24

## 2021-03-24 DIAGNOSIS — G89.4 PAIN SYNDROME, CHRONIC: ICD-10-CM

## 2021-03-24 DIAGNOSIS — G89.29 CHRONIC PAIN OF RIGHT KNEE: ICD-10-CM

## 2021-03-24 DIAGNOSIS — Z98.890 S/P ARTHROSCOPIC SURGERY OF RIGHT KNEE: Primary | ICD-10-CM

## 2021-03-24 DIAGNOSIS — M25.561 CHRONIC PAIN OF RIGHT KNEE: ICD-10-CM

## 2021-03-24 PROCEDURE — 97140 MANUAL THERAPY 1/> REGIONS: CPT | Performed by: PHYSICAL THERAPIST

## 2021-03-24 PROCEDURE — 97110 THERAPEUTIC EXERCISES: CPT | Performed by: PHYSICAL THERAPIST

## 2021-03-24 RX ORDER — CELECOXIB 200 MG/1
200 CAPSULE ORAL DAILY
Qty: 90 CAPSULE | Refills: 0 | Status: SHIPPED | OUTPATIENT
Start: 2021-03-24 | End: 2021-09-15

## 2021-03-24 NOTE — PROGRESS NOTES
Physical Therapy Daily Progress Note      Patient: Vida Jamison   : 1940  Diagnosis/ICD-10 Code:  S/P arthroscopic surgery of right knee [Z98.890]   Problems Addressed this Visit        Musculoskeletal and Injuries    Chronic pain of right knee      Other Visit Diagnoses     S/P arthroscopic surgery of right knee    -  Primary      Diagnoses       Codes Comments    S/P arthroscopic surgery of right knee    -  Primary ICD-10-CM: Z98.890  ICD-9-CM: V45.89     Chronic pain of right knee     ICD-10-CM: M25.561, G89.29  ICD-9-CM: 719.46, 338.29          Referring practitioner: Reza Peñaloza MD  Date of Initial Visit: Type: THERAPY  Noted: 3/19/2021  Today's Date: 3/24/2021    VISIT#: 3    Subjective Patient reports feeling a little better every day in knee. Overall pleased with results of surgery this go around.       Objective began with manual interventions, followed by therapeutic exercise, finishing with ice.     See Exercise, Manual, and Modality Logs for complete treatment.     Assessment/Plan Patient demonstrated good activity tolerance to therapeutic exercise. Patient will continue to benefit from continued R LE strengthening as tolerable.     Progress per Plan of Care         Timed:         Manual Therapy:    10     mins  55307;     Therapeutic Exercise:    25     mins  28843;     Neuromuscular Galina:        mins  88254;    Therapeutic Activity:          mins  00946;     Gait Training:           mins  29935;     Ultrasound:          mins  59293;    Ionto                                   mins   45434  Canalith Repos                   mins  03681    Un-Timed:  Electrical Stimulation:         mins  96560 (MC );  Dry Needling          mins self-pay  Traction          mins 49418    Timed Treatment:   35   mins   Total Treatment:     45   mins    Yo Lawler PTA  Physical Therapist

## 2021-03-26 ENCOUNTER — TREATMENT (OUTPATIENT)
Dept: PHYSICAL THERAPY | Facility: CLINIC | Age: 81
End: 2021-03-26

## 2021-03-26 DIAGNOSIS — M25.561 CHRONIC PAIN OF RIGHT KNEE: ICD-10-CM

## 2021-03-26 DIAGNOSIS — Z98.890 S/P ARTHROSCOPIC SURGERY OF RIGHT KNEE: Primary | ICD-10-CM

## 2021-03-26 DIAGNOSIS — G89.29 CHRONIC PAIN OF RIGHT KNEE: ICD-10-CM

## 2021-03-26 PROCEDURE — 97140 MANUAL THERAPY 1/> REGIONS: CPT | Performed by: PHYSICAL THERAPIST

## 2021-03-26 PROCEDURE — 97110 THERAPEUTIC EXERCISES: CPT | Performed by: PHYSICAL THERAPIST

## 2021-03-26 NOTE — PROGRESS NOTES
Physical Therapy Daily Progress Note      Patient: Vida Jamison   : 1940  Diagnosis/ICD-10 Code:  S/P arthroscopic surgery of right knee [Z98.890]   Problems Addressed this Visit        Musculoskeletal and Injuries    Chronic pain of right knee      Other Visit Diagnoses     S/P arthroscopic surgery of right knee    -  Primary      Diagnoses       Codes Comments    S/P arthroscopic surgery of right knee    -  Primary ICD-10-CM: Z98.890  ICD-9-CM: V45.89     Chronic pain of right knee     ICD-10-CM: M25.561, G89.29  ICD-9-CM: 719.46, 338.29          Referring practitioner: Reza Peñaloza MD  Date of Initial Visit: Type: THERAPY  Noted: 3/19/2021  Today's Date: 3/26/2021    VISIT#: 4    Subjective Patient reports feeling a little fatigued today but knee is feeling good.       Objective added HS curls, standing MIP/hip abd/ mini squats     See Exercise, Manual, and Modality Logs for complete treatment.     Assessment/Plan  Patient demonstrated mild fatigue with progressed standing activity, good tolerance to all other performed therapeutic exercise. Patient demonstrating good passive flexion at 100 degrees and full extension. Patient will continue to benefit from improved distal quad strengthening for improved stability with functional movement.     Progress per Plan of Care         Timed:         Manual Therapy:    10     mins  86873;     Therapeutic Exercise:    30     mins  21123;     Neuromuscular Galina:        mins  48659;    Therapeutic Activity:          mins  76880;     Gait Training:           mins  15136;     Ultrasound:          mins  82332;    Ionto                                   mins   85194  Canalith Repos                   mins  87125    Un-Timed:  Electrical Stimulation:         mins  79005 ( );  Dry Needling          mins self-pay  Traction          mins 73183    Timed Treatment:   40   mins   Total Treatment:     50   mins    Yo Lawler PTA  Physical Therapist

## 2021-03-29 ENCOUNTER — TREATMENT (OUTPATIENT)
Dept: PHYSICAL THERAPY | Facility: CLINIC | Age: 81
End: 2021-03-29

## 2021-03-29 DIAGNOSIS — Z87.81 HISTORY OF PATELLAR FRACTURE: ICD-10-CM

## 2021-03-29 DIAGNOSIS — Z98.890 S/P ARTHROSCOPIC SURGERY OF RIGHT KNEE: Primary | ICD-10-CM

## 2021-03-29 DIAGNOSIS — G89.29 CHRONIC PAIN OF RIGHT KNEE: ICD-10-CM

## 2021-03-29 DIAGNOSIS — Z96.651 S/P TKR (TOTAL KNEE REPLACEMENT), RIGHT: ICD-10-CM

## 2021-03-29 DIAGNOSIS — M25.561 CHRONIC PAIN OF RIGHT KNEE: ICD-10-CM

## 2021-03-29 DIAGNOSIS — R26.2 DIFFICULTY WALKING: ICD-10-CM

## 2021-03-29 PROCEDURE — 97110 THERAPEUTIC EXERCISES: CPT | Performed by: PHYSICAL THERAPIST

## 2021-03-29 PROCEDURE — 97140 MANUAL THERAPY 1/> REGIONS: CPT | Performed by: PHYSICAL THERAPIST

## 2021-03-29 PROCEDURE — 97530 THERAPEUTIC ACTIVITIES: CPT | Performed by: PHYSICAL THERAPIST

## 2021-03-29 NOTE — PROGRESS NOTES
Physical Therapy Daily Progress Note    VISIT#: 5    Subjective   Vida Jamison reports that she just had her stitches removed. She is not in much pain but still cannot fully extend her knee.   Pain Ratin    Objective     See Exercise, Manual, and Modality Logs for complete treatment.     Patient Education: knee ROM, patellar mobility    Assessment/Plan   Pt exhibits very limited patellar ROM. Treatment focused on improved ROM, reducing quad tone and improving knee extension. Pt and PTA discussed HEP and self STM for quad.     Progress per Plan of Care and Progress strengthening /stabilization /functional activity          Timed:         Manual Therapy:    8     mins  35019;     Therapeutic Exercise:    14     mins  75031;     Neuromuscular Galina:        mins  80775;    Therapeutic Activity:     16     mins  39774;     Gait Training:           mins  44806;     Ultrasound:          mins  76860;    Ionto                                   mins   67851  Self Care                            mins   01523  Canalith Repos                   mins  11071    Un-Timed:  Electrical Stimulation:         mins  76853 ( );  Dry Needling          mins self-pay  Traction          mins 72671  Low Eval          Mins  65892  Mod Eval          Mins  86892  High Eval                            Mins  44511  Re-Eval                               mins  74522    Timed Treatment:   38   mins   Total Treatment:     48   mins    Shawna Parra  Physical Therapist Assistant  [place] License # [id number]

## 2021-03-31 ENCOUNTER — TREATMENT (OUTPATIENT)
Dept: PHYSICAL THERAPY | Facility: CLINIC | Age: 81
End: 2021-03-31

## 2021-03-31 DIAGNOSIS — G89.29 CHRONIC PAIN OF RIGHT KNEE: ICD-10-CM

## 2021-03-31 DIAGNOSIS — M25.561 CHRONIC PAIN OF RIGHT KNEE: ICD-10-CM

## 2021-03-31 DIAGNOSIS — Z98.890 S/P ARTHROSCOPIC SURGERY OF RIGHT KNEE: Primary | ICD-10-CM

## 2021-03-31 PROCEDURE — 97110 THERAPEUTIC EXERCISES: CPT | Performed by: PHYSICAL THERAPIST

## 2021-03-31 PROCEDURE — 97530 THERAPEUTIC ACTIVITIES: CPT | Performed by: PHYSICAL THERAPIST

## 2021-03-31 PROCEDURE — 97140 MANUAL THERAPY 1/> REGIONS: CPT | Performed by: PHYSICAL THERAPIST

## 2021-03-31 NOTE — PROGRESS NOTES
Physical Therapy Daily Progress Note      Patient: Vida Jamison   : 1940  Diagnosis/ICD-10 Code:  S/P arthroscopic surgery of right knee [Z98.890]   Problems Addressed this Visit        Musculoskeletal and Injuries    Chronic pain of right knee      Other Visit Diagnoses     S/P arthroscopic surgery of right knee    -  Primary      Diagnoses       Codes Comments    S/P arthroscopic surgery of right knee    -  Primary ICD-10-CM: Z98.890  ICD-9-CM: V45.89     Chronic pain of right knee     ICD-10-CM: M25.561, G89.29  ICD-9-CM: 719.46, 338.29          Referring practitioner: Reza Peñaloza MD  Date of Initial Visit: Type: THERAPY  Noted: 3/19/2021  Today's Date: 3/31/2021    VISIT#: 6    Subjective Patient reports knee is feeling good and is pleased with progress at this time.       Objective added leg press and gait training with straight cane.     See Exercise, Manual, and Modality Logs for complete treatment.     Assessment/Plan Patient demonstrated good tolerance to progressed therapeutic exercise. Patient demonstrated proper gait mechanics with straight cane as able with other complications including deviating gait. Patient will continue to benefit from continued lower extremity strengthening for improved tolerance to community/stair ambulation.     Progress per Plan of Care         Timed:         Manual Therapy:    10     mins  59999;     Therapeutic Exercise:    15     mins  61546;     Neuromuscular Galina:        mins  25681;    Therapeutic Activity:     15     mins  27418;     Gait Trainin    mins  44909;     Ultrasound:          mins  42994;    Ionto                                   mins   02305  Canalith Repos                   mins  53564    Un-Timed:  Electrical Stimulation:         mins  31591 ( );  Dry Needling          mins self-pay  Traction          mins 90219    Timed Treatment:   45   mins   Total Treatment:     55   mins    Yo Lawler PTA  Physical Therapist

## 2021-04-05 ENCOUNTER — TREATMENT (OUTPATIENT)
Dept: PHYSICAL THERAPY | Facility: CLINIC | Age: 81
End: 2021-04-05

## 2021-04-05 DIAGNOSIS — M25.561 CHRONIC PAIN OF RIGHT KNEE: ICD-10-CM

## 2021-04-05 DIAGNOSIS — Z98.890 S/P ARTHROSCOPIC SURGERY OF RIGHT KNEE: Primary | ICD-10-CM

## 2021-04-05 DIAGNOSIS — G89.29 CHRONIC PAIN OF RIGHT KNEE: ICD-10-CM

## 2021-04-05 PROCEDURE — 97110 THERAPEUTIC EXERCISES: CPT | Performed by: PHYSICAL THERAPIST

## 2021-04-05 PROCEDURE — 97530 THERAPEUTIC ACTIVITIES: CPT | Performed by: PHYSICAL THERAPIST

## 2021-04-05 PROCEDURE — 97140 MANUAL THERAPY 1/> REGIONS: CPT | Performed by: PHYSICAL THERAPIST

## 2021-04-05 NOTE — PROGRESS NOTES
Physical Therapy Daily Progress Note      Patient: Vida Jamison   : 1940  Diagnosis/ICD-10 Code:  No primary diagnosis found.   Problems Addressed this Visit     None      Diagnoses    None.        Referring practitioner: Reza Peñaloza MD  Date of Initial Visit: Type: THERAPY  Noted: 3/19/2021  Today's Date: 2021    VISIT#: 7    Subjective Patient reports feeling good and is pleased with progress, has been practicing with straight cane within home. Still feels uncomfortable to perform outside of home.       Objective began with MHP, followed by manual interventions, finishing with therapeutic exercise/activity    See Exercise, Manual, and Modality Logs for complete treatment.     Assessment/Plan Patient demonstrating good tolerance to therapeutic exercise at this time. Patient demonstrating good heel strike to push off mechanics with straight, patient gait appears antalgic but from previous complications with hip and back pain.      Progress per Plan of Care         Timed:         Manual Therapy:    10     mins  36204;     Therapeutic Exercise:    15     mins  20589;     Neuromuscular Galina:        mins  31914;    Therapeutic Activity:     15     mins  57425;     Gait Trainin     mins  02883;     Ultrasound:          mins  73743;    Ionto                                   mins   13009  Canalith Repos                   mins  20975    Un-Timed:  Electrical Stimulation:         mins  48336 (MC );  Dry Needling          mins self-pay  Traction          mins 20559    Timed Treatment:   45   mins   Total Treatment:     55   mins    Yo Lawler PTA  Physical Therapist

## 2021-04-07 ENCOUNTER — TREATMENT (OUTPATIENT)
Dept: PHYSICAL THERAPY | Facility: CLINIC | Age: 81
End: 2021-04-07

## 2021-04-07 DIAGNOSIS — Z98.890 S/P ARTHROSCOPIC SURGERY OF RIGHT KNEE: Primary | ICD-10-CM

## 2021-04-07 DIAGNOSIS — M25.561 CHRONIC PAIN OF RIGHT KNEE: ICD-10-CM

## 2021-04-07 DIAGNOSIS — G89.29 CHRONIC PAIN OF RIGHT KNEE: ICD-10-CM

## 2021-04-07 PROCEDURE — 97110 THERAPEUTIC EXERCISES: CPT | Performed by: PHYSICAL THERAPIST

## 2021-04-07 PROCEDURE — 97140 MANUAL THERAPY 1/> REGIONS: CPT | Performed by: PHYSICAL THERAPIST

## 2021-04-07 PROCEDURE — 97530 THERAPEUTIC ACTIVITIES: CPT | Performed by: PHYSICAL THERAPIST

## 2021-04-07 NOTE — PROGRESS NOTES
Physical Therapy Daily Progress Note      Patient: Vida Jamison   : 1940  Diagnosis/ICD-10 Code:  S/P arthroscopic surgery of right knee [Z98.890]   Problems Addressed this Visit        Musculoskeletal and Injuries    Chronic pain of right knee      Other Visit Diagnoses     S/P arthroscopic surgery of right knee    -  Primary      Diagnoses       Codes Comments    S/P arthroscopic surgery of right knee    -  Primary ICD-10-CM: Z98.890  ICD-9-CM: V45.89     Chronic pain of right knee     ICD-10-CM: M25.561, G89.29  ICD-9-CM: 719.46, 338.29          Referring practitioner: Reza Peñaloza MD  Date of Initial Visit: Type: THERAPY  Noted: 3/19/2021  Today's Date: 2021    VISIT#: 8    Subjective Patient reports feeling a little fatigued today after going to aquatic pool at the [facility].       Objective began with MHP followed by manual interventions, finishing with therapeutic exercise.     See Exercise, Manual, and Modality Logs for complete treatment.     Assessment/Plan Patient with increased fatigue following therapeutic exercise and also going to pool at [facility]. Patient will continue to benefit from improved hip IR to allow improved ease with donning/doffing shoes/socks. Patient will also continue to benefit from continued LE strengthening for improved activity tolerance.     Progress per Plan of Care         Timed:         Manual Therapy:    10     mins  21233;     Therapeutic Exercise:    15     mins  90857;     Neuromuscular Galina:        mins  60268;    Therapeutic Activity:     15     mins  34536;     Gait Trainin     mins  57989;     Ultrasound:          mins  75914;    Ionto                                   mins   72622  Canalith Repos                   mins  95581    Un-Timed:  Electrical Stimulation:         mins  98632 ( );  Dry Needling          mins self-pay  Traction          mins 60674    Timed Treatment:   45   mins   Total Treatment:     55   mins    Yo Lawler  PTA  Physical Therapist

## 2021-04-08 LAB
25(OH)D3+25(OH)D2 SERPL-MCNC: 44.2 NG/ML (ref 30–100)
ALBUMIN SERPL-MCNC: 4.3 G/DL (ref 3.7–4.7)
ALBUMIN/GLOB SERPL: 1.7 {RATIO} (ref 1.2–2.2)
ALP SERPL-CCNC: 108 IU/L (ref 39–117)
ALT SERPL-CCNC: 10 IU/L (ref 0–32)
AST SERPL-CCNC: 20 IU/L (ref 0–40)
BASOPHILS # BLD AUTO: 0.1 X10E3/UL (ref 0–0.2)
BASOPHILS NFR BLD AUTO: 1 %
BILIRUB SERPL-MCNC: 0.7 MG/DL (ref 0–1.2)
BUN SERPL-MCNC: 15 MG/DL (ref 8–27)
BUN/CREAT SERPL: 21 (ref 12–28)
CALCIUM SERPL-MCNC: 9.3 MG/DL (ref 8.7–10.3)
CHLORIDE SERPL-SCNC: 98 MMOL/L (ref 96–106)
CHOLEST SERPL-MCNC: 161 MG/DL (ref 100–199)
CHOLEST/HDLC SERPL: 3.7 RATIO (ref 0–4.4)
CO2 SERPL-SCNC: 24 MMOL/L (ref 20–29)
CREAT SERPL-MCNC: 0.71 MG/DL (ref 0.57–1)
EOSINOPHIL # BLD AUTO: 0.1 X10E3/UL (ref 0–0.4)
EOSINOPHIL NFR BLD AUTO: 3 %
ERYTHROCYTE [DISTWIDTH] IN BLOOD BY AUTOMATED COUNT: 13.1 % (ref 11.7–15.4)
GLOBULIN SER CALC-MCNC: 2.5 G/DL (ref 1.5–4.5)
GLUCOSE SERPL-MCNC: 95 MG/DL (ref 65–99)
HBA1C MFR BLD: 5.4 % (ref 4.8–5.6)
HCT VFR BLD AUTO: 35.7 % (ref 34–46.6)
HDLC SERPL-MCNC: 44 MG/DL
HGB BLD-MCNC: 11.7 G/DL (ref 11.1–15.9)
IMM GRANULOCYTES # BLD AUTO: 0 X10E3/UL (ref 0–0.1)
IMM GRANULOCYTES NFR BLD AUTO: 1 %
LDLC SERPL CALC-MCNC: 96 MG/DL (ref 0–99)
LYMPHOCYTES # BLD AUTO: 0.9 X10E3/UL (ref 0.7–3.1)
LYMPHOCYTES NFR BLD AUTO: 17 %
MCH RBC QN AUTO: 29.3 PG (ref 26.6–33)
MCHC RBC AUTO-ENTMCNC: 32.8 G/DL (ref 31.5–35.7)
MCV RBC AUTO: 89 FL (ref 79–97)
MONOCYTES # BLD AUTO: 0.4 X10E3/UL (ref 0.1–0.9)
MONOCYTES NFR BLD AUTO: 8 %
NEUTROPHILS # BLD AUTO: 4.1 X10E3/UL (ref 1.4–7)
NEUTROPHILS NFR BLD AUTO: 70 %
PLATELET # BLD AUTO: 358 X10E3/UL (ref 150–450)
POTASSIUM SERPL-SCNC: 4.6 MMOL/L (ref 3.5–5.2)
PROT SERPL-MCNC: 6.8 G/DL (ref 6–8.5)
RBC # BLD AUTO: 4 X10E6/UL (ref 3.77–5.28)
SODIUM SERPL-SCNC: 137 MMOL/L (ref 134–144)
TRIGL SERPL-MCNC: 118 MG/DL (ref 0–149)
VLDLC SERPL CALC-MCNC: 21 MG/DL (ref 5–40)
WBC # BLD AUTO: 5.7 X10E3/UL (ref 3.4–10.8)

## 2021-04-12 ENCOUNTER — TREATMENT (OUTPATIENT)
Dept: PHYSICAL THERAPY | Facility: CLINIC | Age: 81
End: 2021-04-12

## 2021-04-12 DIAGNOSIS — Z98.890 S/P ARTHROSCOPIC SURGERY OF RIGHT KNEE: Primary | ICD-10-CM

## 2021-04-12 DIAGNOSIS — M25.561 CHRONIC PAIN OF RIGHT KNEE: ICD-10-CM

## 2021-04-12 DIAGNOSIS — G89.29 CHRONIC PAIN OF RIGHT KNEE: ICD-10-CM

## 2021-04-12 PROCEDURE — 97530 THERAPEUTIC ACTIVITIES: CPT | Performed by: PHYSICAL THERAPIST

## 2021-04-12 PROCEDURE — 97140 MANUAL THERAPY 1/> REGIONS: CPT | Performed by: PHYSICAL THERAPIST

## 2021-04-12 PROCEDURE — 97110 THERAPEUTIC EXERCISES: CPT | Performed by: PHYSICAL THERAPIST

## 2021-04-12 NOTE — PROGRESS NOTES
Physical Therapy Daily Progress Note      Patient: Vida Jamison   : 1940  Diagnosis/ICD-10 Code:  S/P arthroscopic surgery of right knee [Z98.890]   Problems Addressed this Visit        Musculoskeletal and Injuries    Chronic pain of right knee      Other Visit Diagnoses     S/P arthroscopic surgery of right knee    -  Primary      Diagnoses       Codes Comments    S/P arthroscopic surgery of right knee    -  Primary ICD-10-CM: Z98.890  ICD-9-CM: V45.89     Chronic pain of right knee     ICD-10-CM: M25.561, G89.29  ICD-9-CM: 719.46, 338.29          Referring practitioner: Reza Peñaloza MD  Date of Initial Visit: Type: THERAPY  Noted: 3/19/2021  Today's Date: 2021    VISIT#: 9    Subjective Patient reports knee has been feeling better each week, still having difficulty with donning/doffing sock/shoes.       Objective began with mhp, followed by manual interventions, finishing with therapeutic exercise.     See Exercise, Manual, and Modality Logs for complete treatment.     Assessment/Plan Patient continues to struggle with stair ambulation and requires UE support to complete safely. Patient will continue to benefit from improved lower extremity strengthening for improved tolerance to stair ambulation and community ambulation. Safely.   Plan Goals: STG  Pt I with HEP in 2 weeks. MET  To improve R knee ROM 5-105 in 2- 3 weeks. MET  To tolerate 30 min therapeutic exercise with no inc pain in 2-3 weeks. MET  To dec R knee pain 1-2/10 max in 2-3 weeks.   MET  LTG  To improve R knee ROM 0-110 in 6-8 weeks. MET  To dem safe I amb up/down stairs in 4-6 weeks. progressing  To dec R knee pain 0-1/10 max in 8-12 weeks Progressing   Progress per Plan of Care         Timed:         Manual Therapy:    10     mins  08075;     Therapeutic Exercise:    15     mins  90901;     Neuromuscular Galina:        mins  06479;    Therapeutic Activity:     15     mins  95058;     Gait Trainin     mins  78658;      Ultrasound:          mins  48565;    Ionto                                   mins   01058  Canalith Repos                   mins  25669    Un-Timed:  Electrical Stimulation:         mins  10078 ( );  Dry Needling          mins self-pay  Traction          mins 34112    Timed Treatment:   45   mins   Total Treatment:     55   mins    Yo Lawler PTA  Physical Therapist

## 2021-04-14 ENCOUNTER — TREATMENT (OUTPATIENT)
Dept: PHYSICAL THERAPY | Facility: CLINIC | Age: 81
End: 2021-04-14

## 2021-04-14 DIAGNOSIS — Z98.890 S/P ARTHROSCOPIC SURGERY OF RIGHT KNEE: Primary | ICD-10-CM

## 2021-04-14 DIAGNOSIS — M25.561 CHRONIC PAIN OF RIGHT KNEE: ICD-10-CM

## 2021-04-14 DIAGNOSIS — G89.29 CHRONIC PAIN OF RIGHT KNEE: ICD-10-CM

## 2021-04-14 PROBLEM — J30.9 ALLERGIC RHINITIS: Status: ACTIVE | Noted: 2019-07-31

## 2021-04-14 PROBLEM — S83.209A TEAR OF MENISCUS OF KNEE: Status: ACTIVE | Noted: 2020-03-25

## 2021-04-14 PROBLEM — F32.A DEPRESSIVE DISORDER: Status: ACTIVE | Noted: 2021-04-14

## 2021-04-14 PROBLEM — M17.11 OSTEOARTHRITIS OF RIGHT KNEE: Status: ACTIVE | Noted: 2020-03-25

## 2021-04-14 PROBLEM — I95.89 IATROGENIC HYPOTENSION: Status: ACTIVE | Noted: 2017-01-10

## 2021-04-14 PROBLEM — D64.9 ANEMIA: Status: ACTIVE | Noted: 2021-02-24
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
## 2021-04-14 PROBLEM — M25.9: Status: ACTIVE | Noted: 2021-04-14

## 2021-04-14 PROBLEM — J30.2 SEASONAL ALLERGIES: Status: ACTIVE | Noted: 2021-04-14

## 2021-04-14 PROBLEM — I44.0 FIRST DEGREE HEART BLOCK: Status: ACTIVE | Noted: 2021-02-24

## 2021-04-14 PROCEDURE — 97140 MANUAL THERAPY 1/> REGIONS: CPT | Performed by: PHYSICAL THERAPIST

## 2021-04-14 PROCEDURE — 97110 THERAPEUTIC EXERCISES: CPT | Performed by: PHYSICAL THERAPIST

## 2021-04-14 PROCEDURE — 97530 THERAPEUTIC ACTIVITIES: CPT | Performed by: PHYSICAL THERAPIST

## 2021-04-14 NOTE — PROGRESS NOTES
Physical Therapy Daily Progress Note      Patient: Vida Jamison   : 1940  Diagnosis/ICD-10 Code:  S/P arthroscopic surgery of right knee [Z98.890]   Problems Addressed this Visit        Musculoskeletal and Injuries    Chronic pain of right knee      Other Visit Diagnoses     S/P arthroscopic surgery of right knee    -  Primary      Diagnoses       Codes Comments    S/P arthroscopic surgery of right knee    -  Primary ICD-10-CM: Z98.890  ICD-9-CM: V45.89     Chronic pain of right knee     ICD-10-CM: M25.561, G89.29  ICD-9-CM: 719.46, 338.29          Referring practitioner: Reza Peñaloza MD  Date of Initial Visit: Type: THERAPY  Noted: 3/19/2021  Today's Date: 2021    VISIT#: 10    Subjective Patient reports feeling ok today getting a little close to being able to put on socks and shoes.       Objective added side step with UE 44% impairment with ox[place] knee improved from 56% impairment.     See Exercise, Manual, and Modality Logs for complete treatment.     Assessment/Plan Patient demonstrating good tolerance to therapeutic exercise with no reports of increased symptoms. Patient will continue to benefit from improved lower extremity strengthening for improved gait tolerance and stair ambulation within community.   Plan Goals: STG  Pt I with HEP in 2 weeks. MET  To improve R knee ROM 5-105 in 2- 3 weeks. MET  To tolerate 30 min therapeutic exercise with no inc pain in 2-3 weeks.  MET  To dec R knee pain 1-2/10 max in 2-3 weeks.   MET  LTG  To improve R knee ROM 0-110 in 6-8 weeks. MET  To dem safe I amb up/down stairs in 4-6 weeks. progressing  To dec R knee pain 0-1/10 max in 8-12 weeks Progressing  Progress per Plan of Care         Timed:         Manual Therapy:    10     mins  12759;     Therapeutic Exercise:    15     mins  93364;     Neuromuscular Galina:        mins  57301;    Therapeutic Activity:     15     mins  90462;     Gait Trainin     mins  24108;     Ultrasound:          mins   01296;    Ionto                                   mins   91334  Canalith Repos                   mins  67279    Un-Timed:  Electrical Stimulation:         mins  83855 ( );  Dry Needling          mins self-pay  Traction          mins 82047    Timed Treatment:   45   mins   Total Treatment:     55   mins    Yo Lawler PTA  Physical Therapist

## 2021-04-15 ENCOUNTER — OFFICE VISIT (OUTPATIENT)
Dept: FAMILY MEDICINE CLINIC | Facility: CLINIC | Age: 81
End: 2021-04-15

## 2021-04-15 VITALS
RESPIRATION RATE: 15 BRPM | HEIGHT: 62 IN | OXYGEN SATURATION: 94 % | BODY MASS INDEX: 28.89 KG/M2 | SYSTOLIC BLOOD PRESSURE: 145 MMHG | TEMPERATURE: 97.7 F | HEART RATE: 81 BPM | DIASTOLIC BLOOD PRESSURE: 76 MMHG | WEIGHT: 157 LBS

## 2021-04-15 DIAGNOSIS — E78.2 MIXED HYPERLIPIDEMIA: ICD-10-CM

## 2021-04-15 DIAGNOSIS — E55.9 VITAMIN D DEFICIENCY: ICD-10-CM

## 2021-04-15 DIAGNOSIS — I10 HYPERTENSION, BENIGN: Primary | ICD-10-CM

## 2021-04-15 DIAGNOSIS — D50.8 OTHER IRON DEFICIENCY ANEMIA: ICD-10-CM

## 2021-04-15 DIAGNOSIS — Z11.59 ENCOUNTER FOR HEPATITIS C SCREENING TEST FOR LOW RISK PATIENT: ICD-10-CM

## 2021-04-15 DIAGNOSIS — R73.9 HYPERGLYCEMIA: ICD-10-CM

## 2021-04-15 PROCEDURE — 99497 ADVNCD CARE PLAN 30 MIN: CPT | Performed by: FAMILY MEDICINE

## 2021-04-15 PROCEDURE — 99214 OFFICE O/P EST MOD 30 MIN: CPT | Performed by: FAMILY MEDICINE

## 2021-04-15 PROCEDURE — G0439 PPPS, SUBSEQ VISIT: HCPCS | Performed by: FAMILY MEDICINE

## 2021-04-15 NOTE — PROGRESS NOTES
Physical Therapy Daily Progress Note/ 10th Visit        Patient: Vida Jamison   : 1940  Diagnosis/ICD-10 Code:  S/P arthroscopic surgery of right knee [Z98.890]  Referring practitioner: Reza Peñaloza MD  Date of Initial Visit: Type: THERAPY  Noted: 3/19/2021  Today's Date: 4/15/2021  Patient seen for 10 sessions         Subjective Questionnaire: [place] Knee:  --> 46% disability (improved from 56% disability)    Subjective Improving ability to don socks/shoes.      Objective See PTA note for tx.    See Exercise, Manual, and Modality Logs for complete treatment.       Assessment/Plan Improved mobility.  Improving strength able to complete leg press well.  Plan to continue with current plan to improve tolerance to ADLs and ambulation.       Plan Goals: STG  Pt I with HEP in 2 weeks. MET  To improve R knee ROM 5-105 in 2- 3 weeks. MET  To tolerate 30 min therapeutic exercise with no inc pain in 2-3 weeks.  MET  To dec R knee pain 1-2/10 max in 2-3 weeks.   MET  LTG  To improve R knee ROM 0-110 in 6-8 weeks. MET  To dem safe I amb up/down stairs in 4-6 weeks. progressing  To dec R knee pain 0-1/10 max in 8-12 weeks Progressing    Progress per Plan of Care           Domitila Jackson  Physical Therapist

## 2021-04-15 NOTE — PROGRESS NOTES
QUICK REFERENCE INFORMATION:  The ABCs of the Annual Wellness Visit    Medicare visit type: Subsequent     HEALTH RISK ASSESSMENT    : 1940    Recent Hospitalizations:  Recently treated at the following:  [facility] .    Current Medical Providers:  Patient Care Team:  Mustapha Gonzales MD as PCP - General (Family Medicine)  Sam Walton (General Surgery)  Ulices Sue/MD Johnny as Surgeon (Orthopedic Surgery)  Chris Garcia MD as Consulting Physician (Ophthalmology)    Smoking Status:  Social History     Tobacco Use   Smoking Status Never Smoker   Smokeless Tobacco Never Used       Alcohol Consumption:  Social History     Substance and Sexual Activity   Alcohol Use No       Depression Screen:   PHQ-9 Depression Screening  Little interest or pleasure in doing things? 2   Feeling down, depressed, or hopeless? 0   Trouble falling or staying asleep, or sleeping too much? 0   Feeling tired or having little energy? 2   Poor appetite or overeating? 0   Feeling bad about yourself - or that you are a failure or have let yourself or your family down? 0   Trouble concentrating on things, such as reading the newspaper or watching television? 0   Moving or speaking so slowly that other people could have noticed? Or the opposite - being so fidgety or restless that you have been moving around a lot more than usual? 0   Thoughts that you would be better off dead, or of hurting yourself in some way? 0   PHQ-9 Total Score 4   If you checked off any problems, how difficult have these problems made it for you to do your work, take care of things at home, or get along with other people? Somewhat difficult      PHQ-2/PHQ-9 Depression Screening 4/15/2021   Little interest or pleasure in doing things 2   Feeling down, depressed, or hopeless 0   Trouble falling or staying asleep, or sleeping too much 0   Feeling tired or having little energy 2   Poor appetite or overeating 0   Feeling bad about yourself - or  that you are a failure or have let yourself or your family down 0   Trouble concentrating on things, such as reading the newspaper or watching television 0   Moving or speaking so slowly that other people could have noticed. Or the opposite - being so fidgety or restless that you have been moving around a lot more than usual 0   Thoughts that you would be better off dead, or of hurting yourself in some way 0   Total Score 4   If you checked off any problems, how difficult have these problems made it for you to do your work, take care of things at home, or get along with other people? Somewhat difficult     We spent  6 minutes asking patient questions, counseling and documenting in the chart patients risk of depression.    Health Habits and Functional and Cognitive Screening:  Functional & Cognitive Status 4/15/2021   Do you have difficulty preparing food and eating? No   Do you have difficulty bathing yourself, getting dressed or grooming yourself? No   Do you have difficulty using the toilet? No   Do you have difficulty moving around from place to place? No   Do you have trouble with steps or getting out of a bed or a chair? No   Current Diet Well Balanced Diet   Dental Exam Not up to date   Eye Exam Up to date        Eye Exam Comment Dr. Leon   Exercise (times per week) 5 times per week   Current Exercise Activities Include Swimming   Do you need help using the phone?  No   Are you deaf or do you have serious difficulty hearing?  No   Do you need help with transportation? Yes   Do you need help shopping? No   Do you need help preparing meals?  No   Do you need help with housework?  No   Do you need help with laundry? No   Do you need help taking your medications? No   Do you need help managing money? No   Do you ever drive or ride in a car without wearing a seat belt? No   Have you felt unusual stress, anger or loneliness in the last month? No   Who do you live with? Other   If you need help, do you have trouble  finding someone available to you? No   Have you been bothered in the last four weeks by sexual problems? No   Do you have difficulty concentrating, remembering or making decisions? No       Does the patient have evidence of cognitive impairment? No    Asiprin use counseling: Taking ASA appropriately as indicated    Recent Lab Results:    Lab Results   Component Value Date    GLU 95 04/07/2021     Lab Results   Component Value Date    HGBA1C 5.4 04/07/2021     Lab Results   Component Value Date    CHOL 172 04/16/2019    TRIG 118 04/07/2021    HDL 44 04/07/2021    VLDL 21 04/07/2021       Age-appropriate Screening Schedule:  Refer to the list below for future screening recommendations based on patient's age, sex and/or medical conditions. Orders for these recommended tests are listed in the plan section. The patient has been provided with a written plan.    Health Maintenance   Topic Date Due   • ZOSTER VACCINE (1 of 2) Never done   • INFLUENZA VACCINE  08/01/2021   • LIPID PANEL  04/07/2022   • DXA SCAN  01/13/2023   • TDAP/TD VACCINES (3 - Td) 10/26/2025       Immunizations reviewed and the patient was encouraged to update.  The patient declines.       Subjective   History of Present Illness    Vida Jamison is a 80 y.o. female who presents for an Annual Wellness Visit.  Hypertension  This is a chronic problem. The current episode started more than 1 year ago. The problem has been rapidly improving since onset. The problem is controlled. Pertinent negatives include no chest pain, palpitations or shortness of breath. Risk factors for coronary artery disease include dyslipidemia.   Hyperlipidemia  This is a chronic problem. The current episode started more than 1 year ago. The problem is controlled. Pertinent negatives include no chest pain, myalgias or shortness of breath. Current antihyperlipidemic treatment includes statins. The current treatment provides moderate improvement of lipids. Risk factors for coronary  artery disease include dyslipidemia and hypertension.       The following portions of the patient's history were reviewed and updated as appropriate: current medications, past family history, past medical history, past social history, past surgical history and problem list.    Outpatient Medications Prior to Visit   Medication Sig Dispense Refill   • acetaminophen (TYLENOL) 650 MG 8 hr tablet Take 650 mg by mouth 2 (Two) Times a Day.     • amitriptyline (ELAVIL) 75 MG tablet Take 1 tablet by mouth every night at bedtime. 90 tablet 1   • atorvastatin (LIPITOR) 10 MG tablet TAKE 1 TABLET EVERY DAY 90 tablet 0   • Calcium Carbonate-Vitamin D (CALCIUM 600+D) 600-200 MG-UNIT tablet Take 2 tablets by mouth Daily.     • Calcium Carbonate-Vitamin D (CALCIUM 600/VITAMIN D PO)      • celecoxib (CeleBREX) 200 MG capsule Take 1 capsule by mouth Daily. 90 capsule 0   • diphenhydrAMINE (BENADRYL ALLERGY) 25 mg capsule Take 1 capsule by mouth Daily.     • DULoxetine (CYMBALTA) 30 MG capsule Take 1 capsule by mouth Daily. 90 capsule 1   • enalapril (VASOTEC) 10 MG tablet TAKE 1 TABLET TWICE DAILY (NEED LABS AND FOLLOW UP) 180 tablet 0   • gabapentin (NEURONTIN) 300 MG capsule TAKE 1 CAPSULE TWICE DAILY 180 capsule 0   • Glycerin-Hypromellose- (ARTIFICIAL TEARS) 0.2-0.2-1 % solution ophthalmic solution      • loratadine (CLARITIN) 10 MG tablet Take 10 mg by mouth Daily.     • methocarbamol (Robaxin) 500 MG tablet Take 1 tablet by mouth Daily. 90 tablet 0   • metoprolol tartrate (LOPRESSOR) 25 MG tablet Take 1 tablet by mouth 2 (Two) Times a Day. 180 tablet 1   • montelukast (SINGULAIR) 10 MG tablet Take 1 tablet by mouth Daily. 90 tablet 1   • pantoprazole (PROTONIX) 40 MG EC tablet Take 1 tablet by mouth Daily. 90 tablet 1   • Psyllium 100 % powder METAMUCIL POWD     • tamsulosin (FLOMAX) 0.4 MG capsule 24 hr capsule Take 1 capsule by mouth Daily. 30 capsule 1   • alendronate (Fosamax) 70 MG tablet Take 1 tablet by mouth  Every 7 (Seven) Days. 4 tablet 12   • ferrous sulfate 325 (65 FE) MG tablet Take 325 mg by mouth Daily With Breakfast.     • traMADol (ULTRAM) 50 MG tablet Take 50 mg by mouth Every 8 (Eight) Hours As Needed for Moderate Pain .       No facility-administered medications prior to visit.       Patient Active Problem List   Diagnosis   • Gastroesophageal reflux disease without esophagitis   • Pain in hip region after total hip replacement (CMS/[facility])   • Loose total hip arthroplasty (CMS/[facility])   • Lumbar degenerative disc disease   • Mixed hyperlipidemia   • Myalgia and myositis   • Hypertension, benign   • Peripheral vascular disease (CMS/[facility])   • Periprosthetic fracture around internal prosthetic left hip joint (CMS/[facility])   • Rheumatoid arthritis involving multiple sites with positive rheumatoid factor (CMS/[facility])   • S/P lumbar fusion   • Spinal stenosis of lumbar region   • Carpal tunnel syndrome of right wrist   • Anemia   • Hyperglycemia   • Left hip pain   • Vitamin D deficiency   • First degree heart block   • Chronic pain syndrome   • Hypotension due to drugs   • Dependent edema   • Advanced directives, counseling/discussion   • Aortic valve regurgitation   • ASCUS (atypical squamous cells of undetermined significance) on Pap smear   • Elevated diaphragm   • Esophageal hernia   • Family history of diabetes mellitus   • Fusion of spine of lumbar region   • Hypertensive left ventricular hypertrophy, without heart failure   • Hyponatremia   • Mitral valve disease   • Status post hip replacement   • Elevated alkaline phosphatase level   • Chronic pain of right knee   • Left knee pain   • Seasonal allergic rhinitis due to pollen   • Localized edema   • Status post knee replacement   • Myalgia   • Fracture of left patella   • Insomnia, persistent   • Irritable bowel syndrome without diarrhea   • Left shoulder pain   • Other constipation   • Urinary incontinence   • Uterine leiomyoma   • Hiatal hernia   • Scoliosis (and  kyphoscoliosis), idiopathic   • Chronic neck pain   • Osteoporosis   • Allergic rhinitis   • Arthritis of right hip   • Depressive disorder   • Iatrogenic hypotension   • Lesion of joint capsule of knee region   • Tear of meniscus of knee   • Osteoarthritis of right knee   • Prosthetic and other implants, materials and accessory orthopedic devices associated with adverse incidents   • Seasonal allergies       Advance Care Planning:  ACP discussion was held with the patient during this visit. Patient has an advance directive in EMR which is still valid.     Discussion with Patientregarding advanced directives. POST form discussed at length and reviewed with patient. Patient states she does want CPR. Reviewed medical interventions with patient and the differences between each: Comfort, Limited and Full. Patient opted for Limited. Discussed the use of antibiotics at the end of life. She chose to use antibiotics consistent with treatment goals. Discussed artificially administered nutrition, patient is aware that if she is alert and oriented they can change their mind at any time. However, they have elected to have no artificial nutrition. Patient has identified her Jannie Diaz as her healthcare representative. Patient encouraged to have a family meeting to discuss his/her decision regarding advanced care directives and goals of care.    In regard to the POST form:The patient opted to complete POST while in the office and copy scanned into the chart.  We spent 17 minutes discussing Advanced Care Planning information and documenting in the chart.    Identification of Risk Factors:  Risk factors include: Advance Directive Discussion.  Due to age she declines scheduling colonoscopy.  Review of Systems   Constitutional: Negative for fatigue, unexpected weight gain and unexpected weight loss.   Respiratory: Negative for shortness of breath.    Cardiovascular: Negative for chest pain, palpitations and leg swelling.  "  Gastrointestinal: Negative for nausea.   Musculoskeletal: Negative for myalgias.   Skin: Negative for dry skin.   Neurological: Negative for headache.       Compared to one year ago, the patient feels her physical health is better.  Compared to one year ago, the patient feels her mental health is better.    Objective     Physical Exam  Vitals and nursing note reviewed.   Constitutional:       Appearance: She is well-developed.   HENT:      Head: Normocephalic.   Neck:      Thyroid: No thyromegaly.      Vascular: No carotid bruit.      Trachea: Trachea normal.   Cardiovascular:      Rate and Rhythm: Normal rate and regular rhythm.      Heart sounds: No murmur heard.   No friction rub. No gallop.    Pulmonary:      Effort: Pulmonary effort is normal. No respiratory distress.      Breath sounds: Normal breath sounds. No wheezing.   Chest:      Chest wall: No tenderness.   Musculoskeletal:      Cervical back: Neck supple.   Skin:     General: Skin is dry.      Findings: No rash.      Nails: There is no clubbing.   Neurological:      Mental Status: She is alert and oriented to person, place, and time.   Psychiatric:         Behavior: Behavior is cooperative.         Vitals:    04/15/21 1446   BP: 145/76   BP Location: Left arm   Patient Position: Sitting   Cuff Size: Large Adult   Pulse: 81   Resp: 15   Temp: 97.7 °F (36.5 °C)   SpO2: 94%   Weight: 71.2 kg (157 lb)   Height: 157.5 cm (62\")       Patient's Body mass index is 28.72 kg/m². BMI is above normal parameters. Recommendations include: educational material.      Assessment/Plan   Patient Self-Management and Personalized Health Advice  The patient has been provided with information about: diet and preventive services including:   · Annual Wellness Visit (AWV).    Visit Diagnoses:  Problem List Items Addressed This Visit        Medium    Anemia    Overview     Last Assessment & Plan:   Resolved at present.  Labs done 1-, read by me, reviewed with pt.  CBC " was normal.  Patient tolerated IRON well without side effects. I feel the benefits of the medication outweigh the risks.         Current Assessment & Plan     Resolved with hemoglobin up to 11.7         Hypertension, benign - Primary    Current Assessment & Plan     Doing well; Encouraged to watch salt, exercise more and lose weight. Patient tolerated enalapril, metoprolol well without side effects. I feel the benefits of the medication outweigh the risks.             Relevant Orders    CBC & Differential    Mixed hyperlipidemia    Current Assessment & Plan     Improved-lipid and CMP reviewed with her  Encouraged to watch fatty intake, exercise more, and lose weight.   compliant with medication  Patient tolerated lipitor well without side effects. I feel the benefits of the medication outweigh the risks.    Is not getting adequate diet and exercise  Goals developed at last visit were met   Follow up in 6  months  Care management needs are self-addressed.Self-management abilities addressed and patient is capable of managing her own disease.           Relevant Orders    Lipid Panel With / Chol / HDL Ratio    Comprehensive Metabolic Panel       Low    Hyperglycemia    Current Assessment & Plan     Doing well; Encourged to watch sugar intake, exercise more, lose weight,            Relevant Orders    Hemoglobin A1c    Vitamin D deficiency    Relevant Orders    Vitamin D 25 Hydroxy      Other Visit Diagnoses     Encounter for hepatitis C screening test for low risk patient        Relevant Orders    Hepatitis C antibody          Outpatient Encounter Medications as of 4/15/2021   Medication Sig Dispense Refill   • acetaminophen (TYLENOL) 650 MG 8 hr tablet Take 650 mg by mouth 2 (Two) Times a Day.     • amitriptyline (ELAVIL) 75 MG tablet Take 1 tablet by mouth every night at bedtime. 90 tablet 1   • atorvastatin (LIPITOR) 10 MG tablet TAKE 1 TABLET EVERY DAY 90 tablet 0   • Calcium Carbonate-Vitamin D (CALCIUM 600+D)  600-200 MG-UNIT tablet Take 2 tablets by mouth Daily.     • Calcium Carbonate-Vitamin D (CALCIUM 600/VITAMIN D PO)      • celecoxib (CeleBREX) 200 MG capsule Take 1 capsule by mouth Daily. 90 capsule 0   • diphenhydrAMINE (BENADRYL ALLERGY) 25 mg capsule Take 1 capsule by mouth Daily.     • DULoxetine (CYMBALTA) 30 MG capsule Take 1 capsule by mouth Daily. 90 capsule 1   • enalapril (VASOTEC) 10 MG tablet TAKE 1 TABLET TWICE DAILY (NEED LABS AND FOLLOW UP) 180 tablet 0   • gabapentin (NEURONTIN) 300 MG capsule TAKE 1 CAPSULE TWICE DAILY 180 capsule 0   • Glycerin-Hypromellose- (ARTIFICIAL TEARS) 0.2-0.2-1 % solution ophthalmic solution      • loratadine (CLARITIN) 10 MG tablet Take 10 mg by mouth Daily.     • methocarbamol (Robaxin) 500 MG tablet Take 1 tablet by mouth Daily. 90 tablet 0   • metoprolol tartrate (LOPRESSOR) 25 MG tablet Take 1 tablet by mouth 2 (Two) Times a Day. 180 tablet 1   • montelukast (SINGULAIR) 10 MG tablet Take 1 tablet by mouth Daily. 90 tablet 1   • pantoprazole (PROTONIX) 40 MG EC tablet Take 1 tablet by mouth Daily. 90 tablet 1   • Psyllium 100 % powder METAMUCIL POWD     • tamsulosin (FLOMAX) 0.4 MG capsule 24 hr capsule Take 1 capsule by mouth Daily. 30 capsule 1   • [DISCONTINUED] alendronate (Fosamax) 70 MG tablet Take 1 tablet by mouth Every 7 (Seven) Days. 4 tablet 12   • [DISCONTINUED] ferrous sulfate 325 (65 FE) MG tablet Take 325 mg by mouth Daily With Breakfast.     • [DISCONTINUED] traMADol (ULTRAM) 50 MG tablet Take 50 mg by mouth Every 8 (Eight) Hours As Needed for Moderate Pain .       No facility-administered encounter medications on file as of 4/15/2021.       Reviewed use of high risk medication in the elderly: yes  Reviewed for potential of harmful drug interactions in the elderly: yes    Follow Up:  No follow-ups on file.     An After Visit Summary and PPPS with all of these plans were given to the patient.

## 2021-04-15 NOTE — ASSESSMENT & PLAN NOTE
Doing well; Encouraged to watch salt, exercise more and lose weight. Patient tolerated enalapril, metoprolol well without side effects. I feel the benefits of the medication outweigh the risks.

## 2021-04-15 NOTE — ASSESSMENT & PLAN NOTE
Improved-lipid and CMP reviewed with her  Encouraged to watch fatty intake, exercise more, and lose weight.   compliant with medication  Patient tolerated lipitor well without side effects. I feel the benefits of the medication outweigh the risks.    Is not getting adequate diet and exercise  Goals developed at last visit were met   Follow up in 6  months  Care management needs are self-addressed.Self-management abilities addressed and patient is capable of managing her own disease.

## 2021-04-19 ENCOUNTER — TREATMENT (OUTPATIENT)
Dept: PHYSICAL THERAPY | Facility: CLINIC | Age: 81
End: 2021-04-19

## 2021-04-19 DIAGNOSIS — M25.561 CHRONIC PAIN OF RIGHT KNEE: ICD-10-CM

## 2021-04-19 DIAGNOSIS — G89.29 CHRONIC PAIN OF RIGHT KNEE: ICD-10-CM

## 2021-04-19 DIAGNOSIS — Z98.890 S/P ARTHROSCOPIC SURGERY OF RIGHT KNEE: Primary | ICD-10-CM

## 2021-04-19 PROCEDURE — 97140 MANUAL THERAPY 1/> REGIONS: CPT | Performed by: PHYSICAL THERAPIST

## 2021-04-19 PROCEDURE — 97530 THERAPEUTIC ACTIVITIES: CPT | Performed by: PHYSICAL THERAPIST

## 2021-04-19 PROCEDURE — 97110 THERAPEUTIC EXERCISES: CPT | Performed by: PHYSICAL THERAPIST

## 2021-04-19 NOTE — PROGRESS NOTES
Physical Therapy Daily Progress Note      Patient: Vida Jamison   : 1940  Diagnosis/ICD-10 Code:  S/P arthroscopic surgery of right knee [Z98.890]   Problems Addressed this Visit        Musculoskeletal and Injuries    Chronic pain of right knee      Other Visit Diagnoses     S/P arthroscopic surgery of right knee    -  Primary      Diagnoses       Codes Comments    S/P arthroscopic surgery of right knee    -  Primary ICD-10-CM: Z98.890  ICD-9-CM: V45.89     Chronic pain of right knee     ICD-10-CM: M25.561, G89.29  ICD-9-CM: 719.46, 338.29          Referring practitioner: Reza Peñaloza MD  Date of Initial Visit: Type: THERAPY  Noted: 3/19/2021  Today's Date: 2021    VISIT#: 11    Subjective Patient reports feeling ok noticing getting closer to doff/donning socks and shoes.       Objective began with MHP/ followed by manual interventions, finishing with therapeutic exercise. Focused on gait training with quad cane.     See Exercise, Manual, and Modality Logs for complete treatment.     Assessment/Plan Patient required mild verbal cues for correct technique with quad cane and to decrease pace for improved stability. Patient provided proper return demonstration following verbal cues. Patient will continue to benefit from improved lower extremity strengthening and improved R hip mobility in order to be able to priscilla/doff socks/shoes with more ease.   Pt I with HEP in 2 weeks. MET  To improve R knee ROM 5-105 in 2- 3 weeks. MET  To tolerate 30 min therapeutic exercise with no inc pain in 2-3 weeks.  MET  To dec R knee pain 1-2/10 max in 2-3 weeks.   MET  LTG  To improve R knee ROM 0-110 in 6-8 weeks. MET  To dem safe I amb up/down stairs in 4-6 weeks. progressing  To dec R knee pain 0-1/10 max in 8-12 weeks Progressing  Progress per Plan of Care         Timed:         Manual Therapy:    10     mins  92601;     Therapeutic Exercise:    15     mins  13264;     Neuromuscular Galina:       mins  00092;     Therapeutic Activity:     15     mins  79364;     Gait Trainin     mins  11988;     Ultrasound:          mins  77775;    Ionto                                   mins   [id number]  Canalith Repos                   mins  64810    Un-Timed:  Electrical Stimulation:         mins  35753 ( );  Dry Needling          mins self-pay  Traction          mins [id number]    Timed Treatment:   45   mins   Total Treatment:     55   mins    Yo Lawler PTA  Physical Therapist

## 2021-04-21 ENCOUNTER — TREATMENT (OUTPATIENT)
Dept: PHYSICAL THERAPY | Facility: CLINIC | Age: 81
End: 2021-04-21

## 2021-04-21 DIAGNOSIS — M25.561 CHRONIC PAIN OF RIGHT KNEE: ICD-10-CM

## 2021-04-21 DIAGNOSIS — Z98.890 S/P ARTHROSCOPIC SURGERY OF RIGHT KNEE: Primary | ICD-10-CM

## 2021-04-21 DIAGNOSIS — G89.29 CHRONIC PAIN OF RIGHT KNEE: ICD-10-CM

## 2021-04-21 PROCEDURE — 97530 THERAPEUTIC ACTIVITIES: CPT | Performed by: PHYSICAL THERAPIST

## 2021-04-21 PROCEDURE — 97140 MANUAL THERAPY 1/> REGIONS: CPT | Performed by: PHYSICAL THERAPIST

## 2021-04-21 PROCEDURE — 97110 THERAPEUTIC EXERCISES: CPT | Performed by: PHYSICAL THERAPIST

## 2021-04-21 NOTE — PROGRESS NOTES
Physical Therapy Daily Progress Note      Patient: Vida Jamison   : 1940  Diagnosis/ICD-10 Code:  S/P arthroscopic surgery of right knee [Z98.890]   Problems Addressed this Visit        Musculoskeletal and Injuries    Chronic pain of right knee      Other Visit Diagnoses     S/P arthroscopic surgery of right knee    -  Primary      Diagnoses       Codes Comments    S/P arthroscopic surgery of right knee    -  Primary ICD-10-CM: Z98.890  ICD-9-CM: V45.89     Chronic pain of right knee     ICD-10-CM: M25.561, G89.29  ICD-9-CM: 719.46, 338.29          Referring practitioner: Reza Peñaloza MD  Date of Initial Visit: Type: THERAPY  Noted: 3/19/2021  Today's Date: 2021    VISIT#: 12    Subjective Patient reports feeling very arthritic today all over.       Objective began with MHP.     See Exercise, Manual, and Modality Logs for complete treatment.     Assessment/Plan Patient very slow with movement today, but able to complete all therapeutic exercise. Patient will continue to benefit from continued lower extremity strengthening for improved activity tolerance and improved gait mechanics.   I with HEP in 2 weeks. MET  To improve R knee ROM 5-105 in 2- 3 weeks. MET  To tolerate 30 min therapeutic exercise with no inc pain in 2-3 weeks.  MET  To dec R knee pain 1-2/10 max in 2-3 weeks.   MET  LTG  To improve R knee ROM 0-110 in 6-8 weeks. MET  To dem safe I amb up/down stairs in 4-6 weeks. progressing  To dec R knee pain 0-1/10 max in 8-12 weeks Progressing  Progress per Plan of Care         Timed:         Manual Therapy:    10     mins  63333;     Therapeutic Exercise:    15     mins  56202;     Neuromuscular Galina:        mins  59989;    Therapeutic Activity:     15     mins  00305;     Gait Trainin     mins  88407;     Ultrasound:          mins  41654;    Ionto                                   mins   96425  Canalith Repos                   mins  91712    Un-Timed:  Electrical Stimulation:          mins  48096 ( );  Dry Needling          mins self-pay  Traction          mins 03042    Timed Treatment:   45   mins   Total Treatment:     55   mins    Yo Lawler PTA  Physical Therapist

## 2021-04-26 ENCOUNTER — TREATMENT (OUTPATIENT)
Dept: PHYSICAL THERAPY | Facility: CLINIC | Age: 81
End: 2021-04-26

## 2021-04-26 DIAGNOSIS — Z98.890 S/P ARTHROSCOPIC SURGERY OF RIGHT KNEE: Primary | ICD-10-CM

## 2021-04-26 DIAGNOSIS — M25.561 CHRONIC PAIN OF RIGHT KNEE: ICD-10-CM

## 2021-04-26 DIAGNOSIS — G89.29 CHRONIC PAIN OF RIGHT KNEE: ICD-10-CM

## 2021-04-26 PROCEDURE — 97530 THERAPEUTIC ACTIVITIES: CPT | Performed by: PHYSICAL THERAPIST

## 2021-04-26 PROCEDURE — 97110 THERAPEUTIC EXERCISES: CPT | Performed by: PHYSICAL THERAPIST

## 2021-04-26 PROCEDURE — 97140 MANUAL THERAPY 1/> REGIONS: CPT | Performed by: PHYSICAL THERAPIST

## 2021-04-26 NOTE — PROGRESS NOTES
Physical Therapy Daily Progress Note      Patient: Vida Jamison   : 1940  Diagnosis/ICD-10 Code:  S/P arthroscopic surgery of right knee [Z98.890]   Problems Addressed this Visit        Musculoskeletal and Injuries    Chronic pain of right knee      Other Visit Diagnoses     S/P arthroscopic surgery of right knee    -  Primary      Diagnoses       Codes Comments    S/P arthroscopic surgery of right knee    -  Primary ICD-10-CM: Z98.890  ICD-9-CM: V45.89     Chronic pain of right knee     ICD-10-CM: M25.561, G89.29  ICD-9-CM: 719.46, 338.29          Referring practitioner: Reza Peñaloza MD  Date of Initial Visit: Type: THERAPY  Noted: 3/19/2021  Today's Date: 2021    VISIT#: 13    Subjective Patient reports feeling ok today, feeling a little stronger, and continues to go to [facility].       Objective began with MHP followed by manual interventions, finishing with therapeutic exercise.     See Exercise, Manual, and Modality Logs for complete treatment.     Assessment/Plan Patient demonstrated improved tolerance to therapeutic exercise with no reports of increased symptoms. Patient will continue to benefit from LE strengthening for improved stability with ambulation and to demonstrate improved confidence and to safely with ambulate with quad cane.     Progress per Plan of Care         Timed:         Manual Therapy:    10     mins  30034;     Therapeutic Exercise:    15     mins  11457;     Neuromuscular Galina:        mins  63783;    Therapeutic Activity:     15     mins  80617;     Gait Trainin     mins  67135;     Ultrasound:          mins  88437;    Ionto                                   mins   17033  Canalith Repos                   mins  58871    Un-Timed:  Electrical Stimulation:         mins  92788 ( );  Dry Needling          mins self-pay  Traction          mins 92405    Timed Treatment:   45   mins   Total Treatment:     55   mins    Yo Lawler PTA  Physical Therapist

## 2021-04-28 ENCOUNTER — TREATMENT (OUTPATIENT)
Dept: PHYSICAL THERAPY | Facility: CLINIC | Age: 81
End: 2021-04-28

## 2021-04-28 DIAGNOSIS — M25.561 CHRONIC PAIN OF RIGHT KNEE: ICD-10-CM

## 2021-04-28 DIAGNOSIS — G89.29 CHRONIC PAIN OF RIGHT KNEE: ICD-10-CM

## 2021-04-28 DIAGNOSIS — Z98.890 S/P ARTHROSCOPIC SURGERY OF RIGHT KNEE: Primary | ICD-10-CM

## 2021-04-28 PROCEDURE — 97110 THERAPEUTIC EXERCISES: CPT | Performed by: PHYSICAL THERAPIST

## 2021-04-28 PROCEDURE — 97530 THERAPEUTIC ACTIVITIES: CPT | Performed by: PHYSICAL THERAPIST

## 2021-04-28 PROCEDURE — 97140 MANUAL THERAPY 1/> REGIONS: CPT | Performed by: PHYSICAL THERAPIST

## 2021-04-28 NOTE — PROGRESS NOTES
Physical Therapy Daily Progress Note      Patient: Vida Jamison   : 1940  Diagnosis/ICD-10 Code:  S/P arthroscopic surgery of right knee [Z98.890]   Problems Addressed this Visit        Musculoskeletal and Injuries    Chronic pain of right knee      Other Visit Diagnoses     S/P arthroscopic surgery of right knee    -  Primary      Diagnoses       Codes Comments    S/P arthroscopic surgery of right knee    -  Primary ICD-10-CM: Z98.890  ICD-9-CM: V45.89     Chronic pain of right knee     ICD-10-CM: M25.561, G89.29  ICD-9-CM: 719.46, 338.29          Referring practitioner: Reza Peñaloza MD  Date of Initial Visit: Type: THERAPY  Noted: 3/19/2021  Today's Date: 2021    VISIT#: 14    Subjective Patient reports feeling ok, and expresses readiness for discharge, feeling like she can manage with HEP by attending [employer].       Objective reviewed HEP.     See Exercise, Manual, and Modality Logs for complete treatment.     Assessment/Plan Patient provided proper return demonstration with reviewed HEP and progressive exercises while attending [employer]. Patient has made gradual gains towards goals and PTA expressed importance to continue to increase strength.   Plan Goals: STG  Pt I with HEP in 2 weeks. MET  To improve R knee ROM 5-105 in 2- 3 weeks. MET  To tolerate 30 min therapeutic exercise with no inc pain in 2-3 weeks. MET  To dec R knee pain 1-2/10 max in 2-3 weeks.   MET  LTG  To improve R knee ROM 0-110 in 6-8 weeks. MET  To dem safe I amb up/down stairs in 4-6 weeks. NOT MET  To dec R knee pain 0-1/10 max in 8-12 weeks MET    Plan to DC with HEP         Timed:         Manual Therapy:    10     mins  23006;     Therapeutic Exercise:    15     mins  48907;     Neuromuscular Galina:        mins  19747;    Therapeutic Activity:     15     mins  27427;     Gait Trainin     mins  58600;     Ultrasound:          mins  08155;    Ionto                                   mins   36795  [facility]                    mins  52335    Un-Timed:  Electrical Stimulation:         mins  10158 ( );  Dry Needling          mins self-pay  Traction         mins 21304    Timed Treatment:   45   mins   Total Treatment:     55   mins    Yo Lawler PTA  Physical Therapist

## 2021-05-06 ENCOUNTER — TELEPHONE (OUTPATIENT)
Dept: FAMILY MEDICINE CLINIC | Facility: CLINIC | Age: 81
End: 2021-05-06

## 2021-05-12 ENCOUNTER — TELEPHONE (OUTPATIENT)
Dept: FAMILY MEDICINE CLINIC | Facility: CLINIC | Age: 81
End: 2021-05-12

## 2021-05-12 NOTE — TELEPHONE ENCOUNTER
Called and left another message that we were calling to check on her and for her to call the office.

## 2021-05-18 DIAGNOSIS — E78.2 MIXED HYPERLIPIDEMIA: ICD-10-CM

## 2021-05-18 RX ORDER — ATORVASTATIN CALCIUM 10 MG/1
TABLET, FILM COATED ORAL
Qty: 90 TABLET | Refills: 0 | Status: SHIPPED | OUTPATIENT
Start: 2021-05-18 | End: 2021-10-19

## 2021-05-24 DIAGNOSIS — J30.1 SEASONAL ALLERGIC RHINITIS DUE TO POLLEN: ICD-10-CM

## 2021-05-24 DIAGNOSIS — I10 HYPERTENSION, BENIGN: ICD-10-CM

## 2021-05-24 DIAGNOSIS — G89.4 PAIN SYNDROME, CHRONIC: ICD-10-CM

## 2021-05-25 NOTE — TELEPHONE ENCOUNTER
LMOM for patient to return call, to let me know how she is tolerating Amitriptyline and Cymbalta since there are contradiction with them being used together.

## 2021-05-27 ENCOUNTER — DOCUMENTATION (OUTPATIENT)
Dept: PHYSICAL THERAPY | Facility: CLINIC | Age: 81
End: 2021-05-27

## 2021-05-27 DIAGNOSIS — G89.29 CHRONIC PAIN OF RIGHT KNEE: ICD-10-CM

## 2021-05-27 DIAGNOSIS — Z98.890 S/P ARTHROSCOPIC SURGERY OF RIGHT KNEE: Primary | ICD-10-CM

## 2021-05-27 DIAGNOSIS — M25.561 CHRONIC PAIN OF RIGHT KNEE: ICD-10-CM

## 2021-05-27 NOTE — PROGRESS NOTES
Discharge Summary  Discharge Summary from Physical Therapy Report      Dates  PT visit: March-April 2021  Number of Visits: 14     Discharge Status of Patient: Patient reports feeling ok, and expresses readiness for discharge, feeling like she can manage with HEP by attending [facility].     Goals: Partially Met  Plan Goals: STG  Pt I with HEP in 2 weeks. MET  To improve R knee ROM 5-105 in 2- 3 weeks. MET  To tolerate 30 min therapeutic exercise with no inc pain in 2-3 weeks. MET  To dec R knee pain 1-2/10 max in 2-3 weeks.   MET  LTG  To improve R knee ROM 0-110 in 6-8 weeks. MET  To dem safe I amb up/down stairs in 4-6 weeks. NOT MET  To dec R knee pain 0-1/10 max in 8-12 weeks MET    Discharge Plan: Continue with current home exercise program as instructed    Comments Plan to DC at this time.  Pt planning to start exercise at [facility].      Date of Discharge 4/28/21        Domitila Jackson, PT  Physical Therapist

## 2021-05-28 RX ORDER — AMITRIPTYLINE HYDROCHLORIDE 75 MG/1
75 TABLET, FILM COATED ORAL
Qty: 30 TABLET | Refills: 0 | Status: SHIPPED | OUTPATIENT
Start: 2021-05-28 | End: 2021-06-28

## 2021-05-28 RX ORDER — MONTELUKAST SODIUM 10 MG/1
TABLET ORAL
Qty: 90 TABLET | Refills: 1 | Status: SHIPPED | OUTPATIENT
Start: 2021-05-28 | End: 2021-12-20

## 2021-06-23 DIAGNOSIS — M51.36 LUMBAR DEGENERATIVE DISC DISEASE: ICD-10-CM

## 2021-06-23 DIAGNOSIS — I10 HYPERTENSION, BENIGN: ICD-10-CM

## 2021-06-23 RX ORDER — GABAPENTIN 300 MG/1
CAPSULE ORAL
Qty: 180 CAPSULE | Refills: 1 | Status: SHIPPED | OUTPATIENT
Start: 2021-06-23 | End: 2022-01-04 | Stop reason: SDUPTHER

## 2021-06-24 DIAGNOSIS — G89.4 PAIN SYNDROME, CHRONIC: ICD-10-CM

## 2021-06-24 RX ORDER — ENALAPRIL MALEATE 10 MG/1
TABLET ORAL
Qty: 180 TABLET | Refills: 0 | Status: SHIPPED | OUTPATIENT
Start: 2021-06-24 | End: 2021-12-07 | Stop reason: SDUPTHER

## 2021-06-27 DIAGNOSIS — K21.9 GASTROESOPHAGEAL REFLUX DISEASE WITHOUT ESOPHAGITIS: ICD-10-CM

## 2021-06-28 RX ORDER — AMITRIPTYLINE HYDROCHLORIDE 75 MG/1
TABLET, FILM COATED ORAL
Qty: 30 TABLET | Refills: 5 | Status: SHIPPED | OUTPATIENT
Start: 2021-06-28 | End: 2022-02-02

## 2021-06-28 RX ORDER — PANTOPRAZOLE SODIUM 40 MG/1
TABLET, DELAYED RELEASE ORAL
Qty: 90 TABLET | Refills: 1 | Status: SHIPPED | OUTPATIENT
Start: 2021-06-28 | End: 2022-04-25

## 2021-07-21 DIAGNOSIS — G89.4 CHRONIC PAIN SYNDROME: ICD-10-CM

## 2021-07-21 RX ORDER — DULOXETIN HYDROCHLORIDE 30 MG/1
CAPSULE, DELAYED RELEASE ORAL
Qty: 90 CAPSULE | Refills: 1 | Status: SHIPPED | OUTPATIENT
Start: 2021-07-21 | End: 2021-12-07 | Stop reason: SDUPTHER

## 2021-09-15 DIAGNOSIS — G89.4 PAIN SYNDROME, CHRONIC: ICD-10-CM

## 2021-09-15 RX ORDER — CELECOXIB 200 MG/1
CAPSULE ORAL
Qty: 90 CAPSULE | Refills: 0 | Status: SHIPPED | OUTPATIENT
Start: 2021-09-15 | End: 2021-09-20 | Stop reason: SDUPTHER

## 2021-09-20 DIAGNOSIS — G89.4 PAIN SYNDROME, CHRONIC: ICD-10-CM

## 2021-09-20 RX ORDER — CELECOXIB 200 MG/1
200 CAPSULE ORAL DAILY
Qty: 90 CAPSULE | Refills: 0 | Status: SHIPPED | OUTPATIENT
Start: 2021-09-20 | End: 2021-09-23 | Stop reason: SDUPTHER

## 2021-09-23 DIAGNOSIS — G89.4 PAIN SYNDROME, CHRONIC: ICD-10-CM

## 2021-09-23 RX ORDER — CELECOXIB 200 MG/1
200 CAPSULE ORAL DAILY
Qty: 90 CAPSULE | Refills: 0 | Status: SHIPPED | OUTPATIENT
Start: 2021-09-23 | End: 2021-12-20

## 2021-10-07 LAB
25(OH)D3+25(OH)D2 SERPL-MCNC: 65.3 NG/ML (ref 30–100)
ALBUMIN SERPL-MCNC: 4.4 G/DL (ref 3.7–4.7)
ALBUMIN/GLOB SERPL: 1.8 {RATIO} (ref 1.2–2.2)
ALP SERPL-CCNC: 91 IU/L (ref 44–121)
ALT SERPL-CCNC: 11 IU/L (ref 0–32)
AST SERPL-CCNC: 22 IU/L (ref 0–40)
BASOPHILS # BLD AUTO: 0.1 X10E3/UL (ref 0–0.2)
BASOPHILS NFR BLD AUTO: 1 %
BILIRUB SERPL-MCNC: 0.6 MG/DL (ref 0–1.2)
BUN SERPL-MCNC: 16 MG/DL (ref 8–27)
BUN/CREAT SERPL: 21 (ref 12–28)
CALCIUM SERPL-MCNC: 8.9 MG/DL (ref 8.7–10.3)
CHLORIDE SERPL-SCNC: 99 MMOL/L (ref 96–106)
CHOLEST SERPL-MCNC: 165 MG/DL (ref 100–199)
CHOLEST/HDLC SERPL: 3.8 RATIO (ref 0–4.4)
CO2 SERPL-SCNC: 25 MMOL/L (ref 20–29)
CREAT SERPL-MCNC: 0.76 MG/DL (ref 0.57–1)
EOSINOPHIL # BLD AUTO: 0.2 X10E3/UL (ref 0–0.4)
EOSINOPHIL NFR BLD AUTO: 3 %
ERYTHROCYTE [DISTWIDTH] IN BLOOD BY AUTOMATED COUNT: 13.9 % (ref 11.7–15.4)
GLOBULIN SER CALC-MCNC: 2.4 G/DL (ref 1.5–4.5)
GLUCOSE SERPL-MCNC: 90 MG/DL (ref 65–99)
HBA1C MFR BLD: 6 % (ref 4.8–5.6)
HCT VFR BLD AUTO: 37.4 % (ref 34–46.6)
HCV AB S/CO SERPL IA: <0.1 S/CO RATIO (ref 0–0.9)
HDLC SERPL-MCNC: 43 MG/DL
HGB BLD-MCNC: 12.1 G/DL (ref 11.1–15.9)
IMM GRANULOCYTES # BLD AUTO: 0 X10E3/UL (ref 0–0.1)
IMM GRANULOCYTES NFR BLD AUTO: 0 %
LDLC SERPL CALC-MCNC: 102 MG/DL (ref 0–99)
LYMPHOCYTES # BLD AUTO: 1.1 X10E3/UL (ref 0.7–3.1)
LYMPHOCYTES NFR BLD AUTO: 20 %
MCH RBC QN AUTO: 29.2 PG (ref 26.6–33)
MCHC RBC AUTO-ENTMCNC: 32.4 G/DL (ref 31.5–35.7)
MCV RBC AUTO: 90 FL (ref 79–97)
MONOCYTES # BLD AUTO: 0.4 X10E3/UL (ref 0.1–0.9)
MONOCYTES NFR BLD AUTO: 7 %
NEUTROPHILS # BLD AUTO: 3.6 X10E3/UL (ref 1.4–7)
NEUTROPHILS NFR BLD AUTO: 69 %
PLATELET # BLD AUTO: 319 X10E3/UL (ref 150–450)
POTASSIUM SERPL-SCNC: 5 MMOL/L (ref 3.5–5.2)
PROT SERPL-MCNC: 6.8 G/DL (ref 6–8.5)
RBC # BLD AUTO: 4.15 X10E6/UL (ref 3.77–5.28)
SODIUM SERPL-SCNC: 137 MMOL/L (ref 134–144)
TRIGL SERPL-MCNC: 112 MG/DL (ref 0–149)
VLDLC SERPL CALC-MCNC: 20 MG/DL (ref 5–40)
WBC # BLD AUTO: 5.3 X10E3/UL (ref 3.4–10.8)

## 2021-10-19 DIAGNOSIS — E78.2 MIXED HYPERLIPIDEMIA: ICD-10-CM

## 2021-10-19 RX ORDER — ATORVASTATIN CALCIUM 10 MG/1
TABLET, FILM COATED ORAL
Qty: 90 TABLET | Refills: 0 | Status: SHIPPED | OUTPATIENT
Start: 2021-10-19 | End: 2021-12-20

## 2021-11-03 ENCOUNTER — OFFICE VISIT (OUTPATIENT)
Dept: FAMILY MEDICINE CLINIC | Facility: CLINIC | Age: 81
End: 2021-11-03

## 2021-11-03 VITALS
HEART RATE: 85 BPM | OXYGEN SATURATION: 98 % | RESPIRATION RATE: 15 BRPM | HEIGHT: 62 IN | DIASTOLIC BLOOD PRESSURE: 73 MMHG | SYSTOLIC BLOOD PRESSURE: 134 MMHG | WEIGHT: 163.4 LBS | BODY MASS INDEX: 30.07 KG/M2 | TEMPERATURE: 97.9 F

## 2021-11-03 DIAGNOSIS — Z23 FLU VACCINE NEED: ICD-10-CM

## 2021-11-03 DIAGNOSIS — M05.79 RHEUMATOID ARTHRITIS INVOLVING MULTIPLE SITES WITH POSITIVE RHEUMATOID FACTOR (HCC): ICD-10-CM

## 2021-11-03 DIAGNOSIS — E55.9 VITAMIN D DEFICIENCY: ICD-10-CM

## 2021-11-03 DIAGNOSIS — R73.9 HYPERGLYCEMIA: ICD-10-CM

## 2021-11-03 DIAGNOSIS — I10 HYPERTENSION, BENIGN: ICD-10-CM

## 2021-11-03 DIAGNOSIS — I73.9 PERIPHERAL VASCULAR DISEASE (HCC): ICD-10-CM

## 2021-11-03 DIAGNOSIS — E78.2 MIXED HYPERLIPIDEMIA: Primary | ICD-10-CM

## 2021-11-03 PROCEDURE — G0008 ADMIN INFLUENZA VIRUS VAC: HCPCS | Performed by: FAMILY MEDICINE

## 2021-11-03 PROCEDURE — 90662 IIV NO PRSV INCREASED AG IM: CPT | Performed by: FAMILY MEDICINE

## 2021-11-03 PROCEDURE — 99214 OFFICE O/P EST MOD 30 MIN: CPT | Performed by: FAMILY MEDICINE

## 2021-11-03 NOTE — ASSESSMENT & PLAN NOTE
Lipid and CMP reviewed with patient--improved.   Encouraged to watch fatty intake, exercise more, and lose weight.   compliant with medication Patient tolerated lipitor well without side effects. I feel the benefits of the medication outweigh the risks.     Is not getting adequate diet and exercise  Goals developed at last visit were not met   Follow up in 3  months  Care management needs are self-addressed. Self-management abilities addressed and patient is capable of managing her own disease.

## 2021-11-03 NOTE — ASSESSMENT & PLAN NOTE
Wosening; Hgb a1c 6.0 up from 5.4. Encourged to watch sugar intake, exercise more, lose weight,

## 2021-11-03 NOTE — PROGRESS NOTES
Subjective   Vida Jamison is a 81 y.o. female.     Hypertension  This is a chronic problem. The current episode started more than 1 year ago. The problem has been gradually improving since onset. The problem is controlled. Pertinent negatives include no chest pain, palpitations or shortness of breath. Risk factors for coronary artery disease include dyslipidemia and diabetes mellitus. Current antihypertension treatment includes beta blockers and ACE inhibitors.   Hyperlipidemia  This is a chronic problem. The current episode started more than 1 year ago. The problem is controlled. Pertinent negatives include no chest pain, myalgias or shortness of breath. Current antihyperlipidemic treatment includes statins. Risk factors for coronary artery disease include diabetes mellitus, dyslipidemia and hypertension.        The following portions of the patient's history were reviewed and updated as appropriate: current medications, past family history, past medical history, past social history, past surgical history and problem list.    Family History   Problem Relation Age of Onset   • Ovarian cancer Mother    • Arthritis Mother    • Heart failure Father    • Suicidality Brother    • Heart disease Maternal Aunt    • Cancer Other         malignant neoplasm of gastrointestinal tract- in her 50's   • Arthritis Other    • Cervical cancer Other    • Arthritis Other    • Diabetes Other        Social History     Tobacco Use   • Smoking status: Never Smoker   • Smokeless tobacco: Never Used   Substance Use Topics   • Alcohol use: No   • Drug use: No       Past Surgical History:   Procedure Laterality Date   • BREAST BIOPSY Right 1974    Dr Goins Jr   • CATARACT EXTRACTION W/ INTRAOCULAR LENS IMPLANT      7/20/10-rt. eye-Dr. Leon 7/13/10- Lt. eye-Dr. Leon   • FINGER SURGERY  2001    Revision of Finger joints. Dr. Brumfield w/Drs. Kleinert & Marlon   • HIP ENDOPROSTHESIS Left 05/22/2014    Osteonics endoprostehetic replacement of left  hip. Dr. Reza Choudhury.   • JOINT REPLACEMENT     • KNEE ARTHROSCOPY Left     [1987] Dr Poon-torn medial meniscus [1995] Dr. Schaffer -torn medial meniscus   • LEEP  2000    Biopsy of cervix. for MAJOR-1 by Dr. Knight   • LUMBAR DISCECTOMY  1994    Hemilaminectomy, foraminectomy & discectomy. Dr. Velasquez, L4-L5 w/posterior lateral fusion & screw fixatoin   • LUMBAR LAMINECTOMY  12/19/2011    foraminotomies, medial facetectomies L5-V9thtki redo. left L5-S1 lumbar discectomy. [facility]-Dr. De Oliveira- 5 units of blood given to pt.   • POSTERIOR LUMBAR/THORACIC SPINE FUSION  2002    Fusion T-5 through L-1. Had T-11 burst fracture. Recuperated at [facility]   • SPINAL FUSION      Lower Back. 7/11/2011-[facility] - Hardware removal from L3-L5, Dr. De Oliveira surgeon, Dr. Booker [facility]-Fusion of spine at multi-levels 12/14/11 - [facility] - Dr Gomez and Dr. Momin, Anterior approach Spinal Fusion L5-S1 3--[facility]. Dr. De Oliveira and Dr. Dean. Failed posterior fusion with loose instrumentation. L-4 thru L-5. No complications.   • TONSILLECTOMY  1951    Dr Mcginnis   • TOTAL HIP ARTHROPLASTY Right 04/10/2017    [facility] Inpatient. Dr. 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 rheumatoid factor ([facility])   • S/P lumbar fusion   • Spinal stenosis of lumbar region   • Carpal tunnel syndrome of right wrist   • Anemia   • Hyperglycemia   • Left hip pain   • Vitamin D deficiency   •  First degree heart block   • Chronic pain syndrome   • Hypotension due to drugs   • Dependent edema   • Advanced directives, counseling/discussion   • Aortic valve regurgitation   • ASCUS (atypical squamous cells of undetermined significance) on Pap smear   • Elevated diaphragm   • Esophageal hernia   • Family history of diabetes mellitus   • Fusion of spine of lumbar region   • Hypertensive left ventricular hypertrophy, without heart failure   • Hyponatremia   • Mitral valve disease   • Status post hip replacement   • Elevated alkaline phosphatase level   • Chronic pain of right knee   • Left knee pain   • Seasonal allergic rhinitis due to pollen   • Localized edema   • Status post knee replacement   • Myalgia   • Fracture of left patella   • Insomnia, persistent   • Irritable bowel syndrome without diarrhea   • Left shoulder pain   • Other constipation   • Urinary incontinence   • Uterine leiomyoma   • Hiatal hernia   • Scoliosis (and kyphoscoliosis), idiopathic   • Chronic neck pain   • Osteoporosis   • Allergic rhinitis   • Arthritis of right hip   • Depressive disorder   • Iatrogenic hypotension   • Lesion of joint capsule of knee region   • Tear of meniscus of knee   • Osteoarthritis of right knee   • Prosthetic and other implants, materials and accessory orthopedic devices associated with adverse incidents   • Seasonal allergies       Current Outpatient Medications on File Prior to Visit   Medication Sig Dispense Refill   • acetaminophen (TYLENOL) 650 MG 8 hr tablet Take 650 mg by mouth 2 (Two) Times a Day.     • amitriptyline (ELAVIL) 75 MG tablet TAKE 1 TABLET EVERY NIGHT AT BEDTIME. 30 tablet 5   • atorvastatin (LIPITOR) 10 MG tablet TAKE 1 TABLET EVERY DAY 90 tablet 0   • Calcium Carbonate-Vitamin D (CALCIUM 600+D) 600-200 MG-UNIT tablet Take 2 tablets by mouth Daily.     • Calcium Carbonate-Vitamin D (CALCIUM 600/VITAMIN D PO)      • celecoxib (CeleBREX) 200 MG capsule Take 1 capsule by mouth Daily. 90  "capsule 0   • diphenhydrAMINE (BENADRYL ALLERGY) 25 mg capsule Take 1 capsule by mouth Daily.     • DULoxetine (CYMBALTA) 30 MG capsule TAKE 1 CAPSULE EVERY DAY 90 capsule 1   • enalapril (VASOTEC) 10 MG tablet TAKE 1 TABLET TWICE DAILY (NEED LABS AND FOLLOW UP) 180 tablet 0   • gabapentin (NEURONTIN) 300 MG capsule TAKE 1 CAPSULE TWICE DAILY 180 capsule 1   • Glycerin-Hypromellose- (ARTIFICIAL TEARS) 0.2-0.2-1 % solution ophthalmic solution      • loratadine (CLARITIN) 10 MG tablet Take 10 mg by mouth Daily.     • metoprolol tartrate (LOPRESSOR) 25 MG tablet TAKE 1 TABLET TWICE DAILY 180 tablet 1   • montelukast (SINGULAIR) 10 MG tablet TAKE 1 TABLET EVERY DAY 90 tablet 1   • pantoprazole (PROTONIX) 40 MG EC tablet TAKE 1 TABLET EVERY DAY 90 tablet 1   • Psyllium 100 % powder METAMUCIL POWD       No current facility-administered medications on file prior to visit.       Allergies   Allergen Reactions   • Morphine And Related Hallucinations     HALLUCINATIONS         Review of Systems   Constitutional: Negative for fatigue, unexpected weight gain and unexpected weight loss.   Respiratory: Negative for shortness of breath.    Cardiovascular: Negative for chest pain, palpitations and leg swelling.   Gastrointestinal: Negative for nausea.   Musculoskeletal: Negative for myalgias.   Skin: Negative for dry skin.   Neurological: Negative for headache.       Objective   Visit Vitals  /73 (BP Location: Right arm, Patient Position: Sitting, Cuff Size: Large Adult)   Pulse 85   Temp 97.9 °F (36.6 °C)   Resp 15   Ht 157.5 cm (62\")   Wt 74.1 kg (163 lb 6.4 oz)   SpO2 98%   BMI 29.89 kg/m²     Physical Exam  Vitals and nursing note reviewed.   Constitutional:       Appearance: She is well-developed.   HENT:      Head: Normocephalic.   Neck:      Thyroid: No thyromegaly.      Vascular: No carotid bruit.      Trachea: Trachea normal.   Cardiovascular:      Rate and Rhythm: Normal rate and regular rhythm.      Heart " sounds: No murmur heard.  No friction rub. No gallop.    Pulmonary:      Effort: Pulmonary effort is normal. No respiratory distress.      Breath sounds: Normal breath sounds. No wheezing.   Chest:      Chest wall: No tenderness.   Musculoskeletal:      Cervical back: Neck supple.   Skin:     General: Skin is dry.      Findings: No rash.      Nails: There is no clubbing.   Neurological:      Mental Status: She is alert and oriented to person, place, and time.   Psychiatric:         Behavior: Behavior is cooperative.           Assessment/Plan .  Problem List Items Addressed This Visit        High    Peripheral vascular disease (HCC)    Current Assessment & Plan     Doing well at this time.            Medium    Hypertension, benign    Current Assessment & Plan     Doing well; Encouraged to watch salt, exercise more and lose weight.  Patient tolerated vasotec, metoprolol well without side effects. I feel the benefits of the medication outweigh the risks.           Mixed hyperlipidemia - Primary    Current Assessment & Plan     Lipid and CMP reviewed with patient--improved.   Encouraged to watch fatty intake, exercise more, and lose weight.   compliant with medication Patient tolerated lipitor well without side effects. I feel the benefits of the medication outweigh the risks.     Is not getting adequate diet and exercise  Goals developed at last visit were not met   Follow up in 3  months  Care management needs are self-addressed. Self-management abilities addressed and patient is capable of managing her own disease.           Rheumatoid arthritis involving multiple sites with positive rheumatoid factor (HCC)    Current Assessment & Plan     Doing well at this time.            Low    Hyperglycemia    Current Assessment & Plan     Wosening; Hgb a1c 6.0 up from 5.4. Encourged to watch sugar intake, exercise more, lose weight,            Vitamin D deficiency    Current Assessment & Plan     Doing well- Vitamin D level  65.3           Other Visit Diagnoses     Flu vaccine need        Relevant Orders    Fluzone High-Dose 65+yrs ([id number]) (Completed)

## 2021-11-03 NOTE — ASSESSMENT & PLAN NOTE
Doing well; Encouraged to watch salt, exercise more and lose weight.  Patient tolerated vasotec, metoprolol well without side effects. I feel the benefits of the medication outweigh the risks.

## 2021-12-07 ENCOUNTER — OFFICE VISIT (OUTPATIENT)
Dept: FAMILY MEDICINE CLINIC | Facility: CLINIC | Age: 81
End: 2021-12-07

## 2021-12-07 VITALS
DIASTOLIC BLOOD PRESSURE: 78 MMHG | OXYGEN SATURATION: 97 % | RESPIRATION RATE: 20 BRPM | HEART RATE: 77 BPM | BODY MASS INDEX: 27.93 KG/M2 | WEIGHT: 157.6 LBS | TEMPERATURE: 97.2 F | HEIGHT: 63 IN | SYSTOLIC BLOOD PRESSURE: 144 MMHG

## 2021-12-07 DIAGNOSIS — K21.9 GASTROESOPHAGEAL REFLUX DISEASE WITHOUT ESOPHAGITIS: ICD-10-CM

## 2021-12-07 DIAGNOSIS — D50.8 OTHER IRON DEFICIENCY ANEMIA: ICD-10-CM

## 2021-12-07 DIAGNOSIS — K59.00 CONSTIPATION, UNSPECIFIED CONSTIPATION TYPE: ICD-10-CM

## 2021-12-07 DIAGNOSIS — Z91.81 AT HIGH RISK FOR FALLS: Primary | ICD-10-CM

## 2021-12-07 DIAGNOSIS — T84.038D LOOSE TOTAL HIP ARTHROPLASTY, SUBSEQUENT ENCOUNTER: ICD-10-CM

## 2021-12-07 DIAGNOSIS — Z80.0 FAMILY HISTORY OF COLON CANCER: ICD-10-CM

## 2021-12-07 DIAGNOSIS — Z00.00 MEDICARE ANNUAL WELLNESS VISIT, SUBSEQUENT: ICD-10-CM

## 2021-12-07 DIAGNOSIS — F32.A DEPRESSIVE DISORDER: ICD-10-CM

## 2021-12-07 DIAGNOSIS — I10 HYPERTENSION, BENIGN: ICD-10-CM

## 2021-12-07 DIAGNOSIS — Z96.649 LOOSE TOTAL HIP ARTHROPLASTY, SUBSEQUENT ENCOUNTER: ICD-10-CM

## 2021-12-07 DIAGNOSIS — Z12.4 CERVICAL CANCER SCREENING: ICD-10-CM

## 2021-12-07 DIAGNOSIS — R87.620 ATYPICAL SQUAMOUS CELL CHANGES OF UNDETERMINED SIGNIFICANCE (ASCUS) ON VAGINAL CYTOLOGY: ICD-10-CM

## 2021-12-07 DIAGNOSIS — G89.4 CHRONIC PAIN SYNDROME: ICD-10-CM

## 2021-12-07 DIAGNOSIS — R73.9 HYPERGLYCEMIA: ICD-10-CM

## 2021-12-07 DIAGNOSIS — E78.2 MIXED HYPERLIPIDEMIA: ICD-10-CM

## 2021-12-07 DIAGNOSIS — M05.79 RHEUMATOID ARTHRITIS INVOLVING MULTIPLE SITES WITH POSITIVE RHEUMATOID FACTOR: ICD-10-CM

## 2021-12-07 PROBLEM — R29.6 FREQUENT FALLS: Status: ACTIVE | Noted: 2021-12-07

## 2021-12-07 LAB
EXPIRATION DATE: ABNORMAL
HBA1C MFR BLD: 5.9 %
Lab: ABNORMAL

## 2021-12-07 PROCEDURE — 99497 ADVNCD CARE PLAN 30 MIN: CPT | Performed by: FAMILY MEDICINE

## 2021-12-07 PROCEDURE — 3044F HG A1C LEVEL LT 7.0%: CPT | Performed by: FAMILY MEDICINE

## 2021-12-07 PROCEDURE — 3015F CERV CANCER SCREEN DOCD: CPT | Performed by: FAMILY MEDICINE

## 2021-12-07 PROCEDURE — G0444 DEPRESSION SCREEN ANNUAL: HCPCS | Performed by: FAMILY MEDICINE

## 2021-12-07 PROCEDURE — 83036 HEMOGLOBIN GLYCOSYLATED A1C: CPT | Performed by: FAMILY MEDICINE

## 2021-12-07 PROCEDURE — 1170F FXNL STATUS ASSESSED: CPT | Performed by: FAMILY MEDICINE

## 2021-12-07 PROCEDURE — 99214 OFFICE O/P EST MOD 30 MIN: CPT | Performed by: FAMILY MEDICINE

## 2021-12-07 PROCEDURE — 1159F MED LIST DOCD IN RCRD: CPT | Performed by: FAMILY MEDICINE

## 2021-12-07 PROCEDURE — G0101 CA SCREEN;PELVIC/BREAST EXAM: HCPCS | Performed by: FAMILY MEDICINE

## 2021-12-07 PROCEDURE — 1126F AMNT PAIN NOTED NONE PRSNT: CPT | Performed by: FAMILY MEDICINE

## 2021-12-07 RX ORDER — DULOXETIN HYDROCHLORIDE 60 MG/1
60 CAPSULE, DELAYED RELEASE ORAL DAILY
Qty: 90 CAPSULE | Refills: 1 | Status: SHIPPED | OUTPATIENT
Start: 2021-12-07 | End: 2022-09-12 | Stop reason: SDUPTHER

## 2021-12-07 RX ORDER — ENALAPRIL MALEATE 10 MG/1
5 TABLET ORAL 2 TIMES DAILY
Qty: 180 TABLET | Refills: 1 | Status: SHIPPED | OUTPATIENT
Start: 2021-12-07

## 2021-12-08 LAB
CYTOLOGIST CVX/VAG CYTO: NORMAL
CYTOLOGY CVX/VAG DOC CYTO: NORMAL
DX ICD CODE: NORMAL
HIV 1 & 2 AB SER-IMP: NORMAL
OTHER STN SPEC: NORMAL
STAT OF ADQ CVX/VAG CYTO-IMP: NORMAL

## 2021-12-16 DIAGNOSIS — E78.2 MIXED HYPERLIPIDEMIA: ICD-10-CM

## 2021-12-17 DIAGNOSIS — G89.4 PAIN SYNDROME, CHRONIC: ICD-10-CM

## 2021-12-17 DIAGNOSIS — J30.1 SEASONAL ALLERGIC RHINITIS DUE TO POLLEN: ICD-10-CM

## 2021-12-20 RX ORDER — MONTELUKAST SODIUM 10 MG/1
TABLET ORAL
Qty: 90 TABLET | Refills: 1 | Status: SHIPPED | OUTPATIENT
Start: 2021-12-20 | End: 2022-05-09

## 2021-12-20 RX ORDER — CELECOXIB 200 MG/1
CAPSULE ORAL
Qty: 90 CAPSULE | Refills: 1 | Status: SHIPPED | OUTPATIENT
Start: 2021-12-20 | End: 2022-05-09

## 2021-12-20 RX ORDER — ATORVASTATIN CALCIUM 10 MG/1
TABLET, FILM COATED ORAL
Qty: 90 TABLET | Refills: 1 | Status: SHIPPED | OUTPATIENT
Start: 2021-12-20 | End: 2022-07-11

## 2021-12-23 ENCOUNTER — OFFICE (OUTPATIENT)
Dept: URBAN - METROPOLITAN AREA CLINIC 64 | Facility: CLINIC | Age: 81
End: 2021-12-23

## 2021-12-23 VITALS
HEIGHT: 63 IN | HEART RATE: 82 BPM | WEIGHT: 157 LBS | DIASTOLIC BLOOD PRESSURE: 68 MMHG | SYSTOLIC BLOOD PRESSURE: 159 MMHG

## 2021-12-23 DIAGNOSIS — R10.84 GENERALIZED ABDOMINAL PAIN: ICD-10-CM

## 2021-12-23 DIAGNOSIS — K59.00 CONSTIPATION, UNSPECIFIED: ICD-10-CM

## 2021-12-23 PROCEDURE — 99202 OFFICE O/P NEW SF 15 MIN: CPT | Performed by: NURSE PRACTITIONER

## 2022-01-04 DIAGNOSIS — M51.36 LUMBAR DEGENERATIVE DISC DISEASE: ICD-10-CM

## 2022-01-04 RX ORDER — GABAPENTIN 300 MG/1
300 CAPSULE ORAL 2 TIMES DAILY
Qty: 180 CAPSULE | Refills: 1 | Status: SHIPPED | OUTPATIENT
Start: 2022-01-04 | End: 2022-03-07

## 2022-01-04 NOTE — TELEPHONE ENCOUNTER
Caller: Vida Jamison    Relationship: Self    Best call back number: [phone]     Requested Prescriptions:    gabapentin (NEURONTIN) 300 MG capsule    Pharmacy where request should be sent:    [facility] [place], [place] - [address]  - [phone]  [phone] FX  [phone]  Additional details provided by patient: PATIENT STATES SHE IS COMPLETELY OUT OF THE MEDICATION.    Does the patient have less than a 3 day supply:  [x] Yes  [] No    Lorie Ayala, RegSched Rep   01/04/22 15:49 EST     PLEASE ADVISE.

## 2022-01-08 PROBLEM — I95.2 HYPOTENSION DUE TO DRUGS: Status: RESOLVED | Noted: 2019-10-27 | Resolved: 2022-01-08

## 2022-01-08 PROBLEM — M51.36 LUMBAR DEGENERATIVE DISC DISEASE: Status: RESOLVED | Noted: 2019-07-25 | Resolved: 2022-01-08

## 2022-01-08 PROBLEM — M51.369 LUMBAR DEGENERATIVE DISC DISEASE: Status: RESOLVED | Noted: 2019-07-25 | Resolved: 2022-01-08

## 2022-01-08 PROBLEM — I95.89 IATROGENIC HYPOTENSION: Status: RESOLVED | Noted: 2017-01-10 | Resolved: 2022-01-08

## 2022-01-20 ENCOUNTER — OFFICE VISIT (OUTPATIENT)
Dept: FAMILY MEDICINE CLINIC | Facility: CLINIC | Age: 82
End: 2022-01-20

## 2022-01-20 VITALS
BODY MASS INDEX: 28.6 KG/M2 | HEART RATE: 66 BPM | TEMPERATURE: 98.2 F | SYSTOLIC BLOOD PRESSURE: 128 MMHG | WEIGHT: 161.4 LBS | RESPIRATION RATE: 18 BRPM | OXYGEN SATURATION: 97 % | HEIGHT: 63 IN | DIASTOLIC BLOOD PRESSURE: 74 MMHG

## 2022-01-20 DIAGNOSIS — G89.29 CHRONIC RIGHT SHOULDER PAIN: Primary | ICD-10-CM

## 2022-01-20 DIAGNOSIS — S00.03XA CONTUSION OF SCALP, INITIAL ENCOUNTER: ICD-10-CM

## 2022-01-20 DIAGNOSIS — M25.511 CHRONIC RIGHT SHOULDER PAIN: Primary | ICD-10-CM

## 2022-01-20 PROCEDURE — 99213 OFFICE O/P EST LOW 20 MIN: CPT | Performed by: FAMILY MEDICINE

## 2022-01-20 RX ORDER — HYDROCODONE BITARTRATE AND ACETAMINOPHEN 5; 325 MG/1; MG/1
1 TABLET ORAL EVERY 6 HOURS PRN
Qty: 30 TABLET | Refills: 0 | Status: SHIPPED | OUTPATIENT
Start: 2022-01-20 | End: 2022-03-28

## 2022-01-20 RX ORDER — TRAMADOL HYDROCHLORIDE 50 MG/1
50 TABLET ORAL EVERY 4 HOURS PRN
COMMUNITY
Start: 2022-01-05 | End: 2022-03-28

## 2022-01-20 NOTE — ASSESSMENT & PLAN NOTE
Prescribed Norco 5-325.  We discussed the risk of addiction and she will use these very conservatively.  She understands that any refills will need to be done by her orthopedic surgeon. pt is scheduled with a surgeon 01/25/2022 for surgery consultation.

## 2022-01-20 NOTE — ASSESSMENT & PLAN NOTE
Pt fell 01/19/2022, left side of head, minimal bleeding, negative LOC.  Pt advised if she should become dizzy, severe headache to seek medical attention.  No focal neurological complaints and is given head contusion sheet.

## 2022-01-20 NOTE — PROGRESS NOTES
Subjective   Vida Jamison is a 81 y.o. female.   Chief Complaint   Patient presents with   • Shoulder Pain     Right   • Fall     01/19/2022     Fall  Incident onset: 01/19/2022. The fall occurred while standing. She fell from a height of 1 to 2 ft. The volume of blood lost was minimal. The point of impact was the left hip and head. The pain is present in the head. The pain is at a severity of 5/10 (left side of head). The pain is mild. Associated symptoms include headaches. Pertinent negatives include no numbness or tingling. She has tried ice and acetaminophen for the symptoms. The treatment provided moderate relief.   Arm Pain   There was no injury mechanism. The pain is present in the right shoulder. The quality of the pain is described as aching. The pain is at a severity of 5/10. The pain is moderate. The pain has been fluctuating since the incident. Pertinent negatives include no numbness or tingling. The symptoms are aggravated by movement, lifting and palpation. She has tried heat (Gabapentin 300mg) for the symptoms. The treatment provided moderate relief.        The following portions of the patient's history were reviewed and updated as appropriate: allergies, current medications, past family history, past medical history, past social history, past surgical history and problem list.    Past Medical History:   Diagnosis Date   • Advanced directives, counseling/discussion     Impression: Discussion w/ pt regarding Advanced directives. POST/Living Will form discussed at length & reviewed with pt. Pt states she does want CPR. Reviewed Medical interventions w/ pt & differences between each Comfort, Limited & Full. Pt opted for full. Discussed use of antibiotics at the end of life, pt chose to use antibiotics consistent w/ treatment goals.   • Allergic contact dermatitis due to plants, except food     Impression: Worsening-probably due to poison ivy. Steroid injection given to patient. If not improving,  return to office for re-evaluation.   • Anemia     unspecified type. Impression: worse Hgb down to 10.7 from 11.5 -denies epitaxis, hemoptisis, heartburn, hematura, blood in stool or black tarry stool; Advised to start Iron-OTC 1 a day. she declines more aggressive work up at the present but should have a colonoscopy. she wants her hip fixed 1st   • Aortic valve insufficiency     etiology of cardiac valve disease unspecified. Impression: Echo done 12/2015 - [facility].   • Atypical squamous cells of undetermined significance (ASCUS) on Papanicolaou smear of cervix     Impression: Advised to repeat pap smear yearly. she is reluctent due to cost but I advised her it was covered with a G and Q code but she wanted me to gurentee no charge which I cannot promise. I also encouraged breast exam and mammo gram but again she wanted me to gurentee no cost. I advised her she might consider exam with gyn but that they probably would use the same codes   • Breast cancer screening     Impression: agreed to schedule mammo but advised ideally should also have a breast exam - she did not want to schedule at the present   • Cervical cancer screening     Impression: Doing excellent. Follow conservatively.   • Chronic pain syndrome     Impression: Doing well. Continue with Elavil as presently prescribed, Discussed benifits and side effect of medicine. Patient aware of high risk medication. Follow conservatively. Discussed decreasing celexa from 10mg BID to daily.   • Dependent edema     Impression: New dx. Advised patient to elevate lower extremities above the level of the heart as much as possible, patient states she will try as possible. States she probably isn't able to.   • Edema extremities     Impression: mild. Patient to return if symptoms worsen or change. Advised to elevate legs above level of heart as needed.   • Elevated diaphragm     Impression: New dx. shown on chest xray from 2012, will follow conservatively   •  Esophageal hernia     Impression: Doing well. Follow conservatively.   • First degree atrioventricular block     Impression: Doing excellent. Follow conservatively.   • Gastroesophageal reflux disease without esophagitis     Impression: no sx at present but this could be the cause of her anemia   • Hyperglycemia     Impression: Worse: hgb a1c 6.0 was 5.6. Encouraged to watch sugar intake, exercise more and lose weight.   • Hypertension, benign     Impression: Doing well; Encouraged to watch salt, exercise more and lose weight   • Hypertensive left ventricular hypertrophy, without heart failure     Impression: Echo done 12/2015 - [facility].   • Hyponatremia     Impression: worse at 134 - repeat with next labs   • Insomnia, persistent     Impression: New dx. Worsening. Advised patient she may try increasing her elavil from 50mg to 75mg. Discussed benefits and side effect of medicine. Patient to return if symptoms worsen or change.   • Irritable bowel syndrome without diarrhea     Impression: Doing well. Follow conservatively.   • Left hip pain     Impression: Worsening; reviewed xray of the left hip with patient. Advised patient that she needs to see here surgeon, Dr. Lane. Patient prefers to call and make her appointment.   • Left knee pain     Impression: Worse, will try to schedule ortho appt. sooner than 10-16-18   • Left leg pain     Impression: Worse - discussed addiction potential of ativan femi with narcotic and muscle relax combination   • Left shoulder pain     Impression: New dx. Left shoulder pain after fall, pt. sent to Xray. Will call pt. with xray results if broken will schedule pt. with DR. Arredondo or Dr. Fleming. Pt. placed in a small sling.   • Lumbar degenerative disc disease     Story: Spinal fusion done 3/24/10 and 8/12/2011. Impression: Worsening; Offered further testing, patient prefers to hold on this until after hip has been repaired.   • Medicare annual wellness visit, subsequent     with  abnormal findings   • Mitral valve disease     Impression: Echo done 12/2015 - [facility].   • Mixed hyperlipidemia     Impression: Labs done 04/16/19 Tot 172 up from 156, HDL 46 down from 47, Trig 152 up from 118,  up from 88 Worsening Encouraged to watch fatty intake, exercise more, and lose weight . Compliant with meds Goals developed at last visit were not met because Is not getting adequate diet and exercise( due to hip pain) Follow up in 3 months Care management needs are self addressed.   • Onychomycosis     Impression: Stable, pt. declines tx   • Other constipation     Impression: New dx. Pain probably due to IBS, Start Citrucel 1tbsp mixed in 8oz of fluid daily, may gradually increase up to three times daily for a goal of 2 BM that float in the commode daily. Advised to increase fiber, beans, rice, fruits, veggies.   • Other hypotension     Impression: Improving- Patient states that while in Rehab b/p readings were low and she did not recieve medications on some days.   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   • Patellar fracture 08/2020   • Peripheral vascular disease (HCC)     Impression: JEROME last year was abnorm on the left last year thus will send for vasc studies   • Periprosthetic fracture around internal prosthetic left hip joint (HCC)     subsequent encounter. Impression: Patient was seen by Dr. Joseph who referred to Linette Weaver.   • Rheumatoid arthritis involving multiple sites with positive rheumatoid factor (HCC)     Impression: stable dc mobic ndue to anemia   • Right hip pain    • S/P arthroscopic surgery of right knee 03/16/2021   • S/P spinal fusion     Impression: Doing well since surgery on 1-9-17.   • Screening for depression     Negative Depression Screening (4 or less) ()   • Seasonal allergic rhinitis due to pollen     Impression: Doing well.   • Status post hip surgery      Impression: Doing well from surgery on 11-14-18- Total left hip prosthesis.   • Status post revision of total hip 2016    R EDSON   • Total knee replacement status, left 2003   • Total knee replacement status, right 07/07/2020   • Uterine leiomyoma     unspecified location. Impression: Discussed following with GYN for a scope, will discuss further after hip surgery   • Vitamin D deficiency     Impression: Doing well, patient gets plenty of time in the Sun. Vitamin D. level normal. Follow conservatively.       Past Surgical History:   Procedure Laterality Date   • BREAST BIOPSY Right 1974    Dr Briseida Alonso   • CATARACT EXTRACTION W/ INTRAOCULAR LENS IMPLANT      7/20/10-rt. eye-Dr. Leon 7/13/10- Lt. eye-Dr. Leon   • FINGER SURGERY  2001    Revision of Finger joints. Dr. Brumfield w/Drs. Kleinert & Marlon   • HIP ENDOPROSTHESIS Left 05/22/2014    Osteonics endoprostehetic replacement of left hip. Dr. Reza Choudhury.   • JOINT REPLACEMENT     • KNEE ARTHROSCOPY Left     [1987] Dr Poon-torn medial meniscus [1995] Dr. Schaffer -torn medial meniscus   • LEEP  2000    Biopsy of cervix. for MAJOR-1 by Dr. Knight   • LUMBAR DISCECTOMY  1994    Hemilaminectomy, foraminectomy & discectomy. Dr. Velasquez, L4-L5 w/posterior lateral fusion & screw fixatoin   • LUMBAR LAMINECTOMY  12/19/2011    foraminotomies, medial facetectomies [id number] redo. left L5-S1 lumbar discectomy. [facility]-Dr. De Oliveira- 5 units of blood given to pt.   • POSTERIOR LUMBAR/THORACIC SPINE FUSION  2002    Fusion T-5 through L-1. Had T-11 burst fracture. Recuperated at [facility]   • SPINAL FUSION      Lower Back. 7/11/2011-[facility] - Hardware removal from L3-L5, Dr. De Oliveira surgeon, Dr. Booker [facility]-Fusion of spine at multi-levels 12/14/11 - [facility] - Dr Gomez and Dr. Momin, Anterior approach Spinal Fusion L5-S1 3--[facility]. Dr. De Oliveira and Dr. Dean. Failed posterior fusion with loose instrumentation. L-4 thru L-5. No  "complications.   • TONSILLECTOMY  1951    Dr Mcginnis   • TOTAL HIP ARTHROPLASTY Right 04/10/2017    [facility], Inpatient. Dr. Reinoso   • TOTAL KNEE ARTHROPLASTY Left 2002    Dr Sue @ [facility]        Family History   Problem Relation Age of Onset   • Ovarian cancer Mother    • Arthritis Mother    • Heart failure Father    • Suicidality Brother    • Heart disease Maternal Aunt    • Cancer Other         malignant neoplasm of gastrointestinal tract- in her 50's   • Arthritis Other    • Cervical cancer Other    • Arthritis Other    • Diabetes Other         Social History     Socioeconomic History   • Marital status: Single   Tobacco Use   • Smoking status: Never Smoker   • Smokeless tobacco: Never Used   Substance and Sexual Activity   • Alcohol use: No   • Drug use: No   • Sexual activity: Never         Review of Systems   Eyes: Positive for double vision. Negative for blurred vision and visual disturbance.   Neurological: Negative for dizziness, tingling, facial asymmetry, speech difficulty, weakness, light-headedness, numbness, headache and confusion.       Objective   Visit Vitals  /74 (BP Location: Left arm, Patient Position: Sitting, Cuff Size: Large Adult)   Pulse 66   Temp 98.2 °F (36.8 °C) (Skin)   Resp 18   Ht 160 cm (63\")   Wt 73.2 kg (161 lb 6.4 oz)   SpO2 97%   BMI 28.59 kg/m²     Physical Exam  Vitals and nursing note reviewed.   Constitutional:       Appearance: She is well-developed.   HENT:      Head: Normocephalic.     Neck:      Thyroid: No thyromegaly.      Vascular: No carotid bruit.      Trachea: Trachea normal.   Cardiovascular:      Rate and Rhythm: Normal rate and regular rhythm.      Heart sounds: No murmur heard.  No friction rub. No gallop.    Pulmonary:      Effort: Pulmonary effort is normal. No respiratory distress.      Breath sounds: Normal breath sounds. No wheezing.   Chest:      Chest wall: No tenderness.   Musculoskeletal:      Cervical back: Neck supple. "   Skin:     General: Skin is dry.      Findings: No rash.      Nails: There is no clubbing.   Neurological:      General: No focal deficit present.      Mental Status: She is alert and oriented to person, place, and time.      Cranial Nerves: Cranial nerves are intact.   Psychiatric:         Behavior: Behavior is cooperative.         Assessment/Plan   Problem List Items Addressed This Visit        Unprioritized    Chronic right shoulder pain - Primary    Current Assessment & Plan     Prescribed Norco 5-325.  We discussed the risk of addiction and she will use these very conservatively.  She understands that any refills will need to be done by her orthopedic surgeon. pt is scheduled with a surgeon 01/25/2022 for surgery consultation.           Relevant Medications    HYDROcodone-acetaminophen (Norco) 5-325 MG per tablet    Contusion of scalp    Current Assessment & Plan     Pt fell 01/19/2022, left side of head, minimal bleeding, negative LOC.  Pt advised if she should become dizzy, severe headache to seek medical attention.  No focal neurological complaints and is given head contusion sheet.

## 2022-02-02 DIAGNOSIS — G89.4 PAIN SYNDROME, CHRONIC: ICD-10-CM

## 2022-02-02 RX ORDER — AMITRIPTYLINE HYDROCHLORIDE 75 MG/1
TABLET, FILM COATED ORAL
Qty: 90 TABLET | Refills: 0 | Status: SHIPPED | OUTPATIENT
Start: 2022-02-02 | End: 2022-04-06

## 2022-03-04 DIAGNOSIS — M51.36 LUMBAR DEGENERATIVE DISC DISEASE: ICD-10-CM

## 2022-03-07 RX ORDER — GABAPENTIN 300 MG/1
CAPSULE ORAL
Qty: 180 CAPSULE | Refills: 1 | Status: SHIPPED | OUTPATIENT
Start: 2022-03-07 | End: 2022-03-08 | Stop reason: SDUPTHER

## 2022-03-08 DIAGNOSIS — M51.36 LUMBAR DEGENERATIVE DISC DISEASE: ICD-10-CM

## 2022-03-08 RX ORDER — GABAPENTIN 300 MG/1
300 CAPSULE ORAL 2 TIMES DAILY
Qty: 180 CAPSULE | Refills: 0 | Status: SHIPPED | OUTPATIENT
Start: 2022-03-08 | End: 2022-03-15 | Stop reason: SDUPTHER

## 2022-03-15 DIAGNOSIS — M51.36 LUMBAR DEGENERATIVE DISC DISEASE: ICD-10-CM

## 2022-03-15 RX ORDER — GABAPENTIN 300 MG/1
300 CAPSULE ORAL 2 TIMES DAILY
Qty: 180 CAPSULE | Refills: 0 | Status: SHIPPED | OUTPATIENT
Start: 2022-03-15 | End: 2022-09-28

## 2022-03-24 LAB
ALBUMIN SERPL-MCNC: 4.3 G/DL (ref 3.7–4.7)
ALBUMIN/GLOB SERPL: 1.7 {RATIO} (ref 1.2–2.2)
ALP SERPL-CCNC: 90 IU/L (ref 44–121)
ALT SERPL-CCNC: 10 IU/L (ref 0–32)
AST SERPL-CCNC: 18 IU/L (ref 0–40)
BASOPHILS # BLD AUTO: 0.1 X10E3/UL (ref 0–0.2)
BASOPHILS NFR BLD AUTO: 1 %
BILIRUB SERPL-MCNC: 0.6 MG/DL (ref 0–1.2)
BUN SERPL-MCNC: 14 MG/DL (ref 8–27)
BUN/CREAT SERPL: 20 (ref 12–28)
CALCIUM SERPL-MCNC: 9.2 MG/DL (ref 8.7–10.3)
CHLORIDE SERPL-SCNC: 98 MMOL/L (ref 96–106)
CHOLEST SERPL-MCNC: 168 MG/DL (ref 100–199)
CHOLEST/HDLC SERPL: 3.7 RATIO (ref 0–4.4)
CO2 SERPL-SCNC: 26 MMOL/L (ref 20–29)
CREAT SERPL-MCNC: 0.71 MG/DL (ref 0.57–1)
EGFRCR SERPLBLD CKD-EPI 2021: 86 ML/MIN/1.73
EOSINOPHIL # BLD AUTO: 0.2 X10E3/UL (ref 0–0.4)
EOSINOPHIL NFR BLD AUTO: 3 %
ERYTHROCYTE [DISTWIDTH] IN BLOOD BY AUTOMATED COUNT: 12.9 % (ref 11.7–15.4)
GLOBULIN SER CALC-MCNC: 2.6 G/DL (ref 1.5–4.5)
GLUCOSE SERPL-MCNC: 90 MG/DL (ref 65–99)
HCT VFR BLD AUTO: 38.2 % (ref 34–46.6)
HDLC SERPL-MCNC: 46 MG/DL
HGB BLD-MCNC: 12.4 G/DL (ref 11.1–15.9)
IMM GRANULOCYTES # BLD AUTO: 0 X10E3/UL (ref 0–0.1)
IMM GRANULOCYTES NFR BLD AUTO: 0 %
LDLC SERPL CALC-MCNC: 101 MG/DL (ref 0–99)
LYMPHOCYTES # BLD AUTO: 0.9 X10E3/UL (ref 0.7–3.1)
LYMPHOCYTES NFR BLD AUTO: 16 %
MCH RBC QN AUTO: 28.6 PG (ref 26.6–33)
MCHC RBC AUTO-ENTMCNC: 32.5 G/DL (ref 31.5–35.7)
MCV RBC AUTO: 88 FL (ref 79–97)
MONOCYTES # BLD AUTO: 0.5 X10E3/UL (ref 0.1–0.9)
MONOCYTES NFR BLD AUTO: 10 %
NEUTROPHILS # BLD AUTO: 3.8 X10E3/UL (ref 1.4–7)
NEUTROPHILS NFR BLD AUTO: 70 %
PLATELET # BLD AUTO: 330 X10E3/UL (ref 150–450)
POTASSIUM SERPL-SCNC: 4.8 MMOL/L (ref 3.5–5.2)
PROT SERPL-MCNC: 6.9 G/DL (ref 6–8.5)
RBC # BLD AUTO: 4.33 X10E6/UL (ref 3.77–5.28)
SODIUM SERPL-SCNC: 139 MMOL/L (ref 134–144)
TRIGL SERPL-MCNC: 114 MG/DL (ref 0–149)
VLDLC SERPL CALC-MCNC: 21 MG/DL (ref 5–40)
WBC # BLD AUTO: 5.4 X10E3/UL (ref 3.4–10.8)

## 2022-03-28 ENCOUNTER — OFFICE VISIT (OUTPATIENT)
Dept: FAMILY MEDICINE CLINIC | Facility: CLINIC | Age: 82
End: 2022-03-28

## 2022-03-28 VITALS
HEART RATE: 88 BPM | TEMPERATURE: 97.7 F | DIASTOLIC BLOOD PRESSURE: 79 MMHG | SYSTOLIC BLOOD PRESSURE: 140 MMHG | BODY MASS INDEX: 28.39 KG/M2 | WEIGHT: 160.2 LBS | OXYGEN SATURATION: 99 % | RESPIRATION RATE: 16 BRPM | HEIGHT: 63 IN

## 2022-03-28 DIAGNOSIS — R73.9 HYPERGLYCEMIA: ICD-10-CM

## 2022-03-28 DIAGNOSIS — E78.2 MIXED HYPERLIPIDEMIA: Primary | ICD-10-CM

## 2022-03-28 DIAGNOSIS — I10 HYPERTENSION, BENIGN: ICD-10-CM

## 2022-03-28 PROCEDURE — 99214 OFFICE O/P EST MOD 30 MIN: CPT | Performed by: FAMILY MEDICINE

## 2022-03-30 ENCOUNTER — OFFICE (OUTPATIENT)
Dept: RURAL CLINIC 3 | Facility: CLINIC | Age: 82
End: 2022-03-30

## 2022-03-30 VITALS
DIASTOLIC BLOOD PRESSURE: 62 MMHG | SYSTOLIC BLOOD PRESSURE: 128 MMHG | HEIGHT: 63 IN | HEART RATE: 76 BPM | WEIGHT: 158 LBS

## 2022-03-30 DIAGNOSIS — R10.30 LOWER ABDOMINAL PAIN, UNSPECIFIED: ICD-10-CM

## 2022-03-30 DIAGNOSIS — K59.00 CONSTIPATION, UNSPECIFIED: ICD-10-CM

## 2022-03-30 PROCEDURE — 99213 OFFICE O/P EST LOW 20 MIN: CPT | Performed by: NURSE PRACTITIONER

## 2022-04-06 DIAGNOSIS — G89.4 PAIN SYNDROME, CHRONIC: ICD-10-CM

## 2022-04-06 RX ORDER — AMITRIPTYLINE HYDROCHLORIDE 75 MG/1
TABLET, FILM COATED ORAL
Qty: 90 TABLET | Refills: 0 | Status: SHIPPED | OUTPATIENT
Start: 2022-04-06 | End: 2022-06-22 | Stop reason: SDUPTHER

## 2022-04-25 DIAGNOSIS — K21.9 GASTROESOPHAGEAL REFLUX DISEASE WITHOUT ESOPHAGITIS: ICD-10-CM

## 2022-04-26 RX ORDER — PANTOPRAZOLE SODIUM 40 MG/1
TABLET, DELAYED RELEASE ORAL
Qty: 90 TABLET | Refills: 0 | Status: SHIPPED | OUTPATIENT
Start: 2022-04-26 | End: 2022-10-12 | Stop reason: SDUPTHER

## 2022-05-09 DIAGNOSIS — G89.4 PAIN SYNDROME, CHRONIC: ICD-10-CM

## 2022-05-09 DIAGNOSIS — J30.1 SEASONAL ALLERGIC RHINITIS DUE TO POLLEN: ICD-10-CM

## 2022-05-09 RX ORDER — MONTELUKAST SODIUM 10 MG/1
TABLET ORAL
Qty: 90 TABLET | Refills: 0 | Status: SHIPPED | OUTPATIENT
Start: 2022-05-09 | End: 2022-11-02

## 2022-05-09 RX ORDER — CELECOXIB 200 MG/1
CAPSULE ORAL
Qty: 90 CAPSULE | Refills: 0 | Status: SHIPPED | OUTPATIENT
Start: 2022-05-09 | End: 2022-11-30

## 2022-06-22 DIAGNOSIS — G89.4 PAIN SYNDROME, CHRONIC: ICD-10-CM

## 2022-06-22 RX ORDER — AMITRIPTYLINE HYDROCHLORIDE 75 MG/1
75 TABLET, FILM COATED ORAL
Qty: 90 TABLET | Refills: 0 | Status: SHIPPED | OUTPATIENT
Start: 2022-06-22 | End: 2022-08-28

## 2022-07-01 ENCOUNTER — OFFICE VISIT (OUTPATIENT)
Dept: FAMILY MEDICINE CLINIC | Facility: CLINIC | Age: 82
End: 2022-07-01

## 2022-07-01 VITALS
OXYGEN SATURATION: 95 % | WEIGHT: 163 LBS | HEART RATE: 73 BPM | DIASTOLIC BLOOD PRESSURE: 70 MMHG | TEMPERATURE: 97.3 F | RESPIRATION RATE: 16 BRPM | HEIGHT: 63 IN | BODY MASS INDEX: 28.88 KG/M2 | SYSTOLIC BLOOD PRESSURE: 116 MMHG

## 2022-07-01 DIAGNOSIS — R22.0 LEFT FACIAL SWELLING: Primary | ICD-10-CM

## 2022-07-01 PROCEDURE — 99213 OFFICE O/P EST LOW 20 MIN: CPT | Performed by: FAMILY MEDICINE

## 2022-07-01 NOTE — PROGRESS NOTES
Chief Complaint   Patient presents with   • Facial Swelling       Subjective   Vida Jamison is a 81 y.o. female.     Eye Problem   The left eye is affected. This is a new problem. The current episode started yesterday. The problem has been gradually improving (better today). The injury mechanism was a chemical exposure (Vida reports that she was spraying weed killer. She was wiping her eye often to remove sweat. When she finished her left eye was swollen. She is unsure if she got any chemicals in her eye.). The pain is mild. There is no known exposure to pink eye. She does not wear contacts. Associated symptoms include eye redness and photophobia. Pertinent negatives include no blurred vision, eye discharge, double vision, fever, itching or recent URI. She has tried commercial eye wash and eye drops for the symptoms. The treatment provided mild relief.        Past Medical History :  Active Ambulatory Problems     Diagnosis Date Noted   • Gastroesophageal reflux disease without esophagitis 07/08/2012   • Pain in hip region after total hip replacement ([facility]) 07/25/2019   • Loose total hip arthroplasty ([facility]) 04/09/2019   • Mixed hyperlipidemia 07/25/2019   • Myalgia and myositis 07/25/2019   • Hypertension, benign 07/08/2012   • Peripheral vascular disease ([facility]) 07/25/2019   • Periprosthetic fracture around internal prosthetic left hip joint ([facility]) 07/25/2019   • Rheumatoid arthritis involving multiple sites with positive rheumatoid factor ([facility]) 07/25/2019   • S/P lumbar fusion 09/16/2016   • Spinal stenosis of lumbar region 01/03/2017   • Carpal tunnel syndrome of right wrist 07/25/2019   • Anemia 02/24/2021   • Hyperglycemia 07/25/2019   • Left hip pain 10/02/2019   • Vitamin D deficiency    • First degree heart block 02/24/2021   • Chronic pain syndrome    • Dependent edema 10/27/2019   • Medicare annual wellness visit, subsequent    • Aortic valve regurgitation 11/29/2019   • Atypical squamous cell changes of  undetermined significance (ASCUS) on vaginal cytology 11/29/2019   • Elevated diaphragm 11/29/2019   • Esophageal hernia 11/29/2019   • Family history of diabetes mellitus 11/29/2019   • Fusion of spine of lumbar region 01/22/2013   • Hypertensive left ventricular hypertrophy, without heart failure 11/29/2019   • Hyponatremia 11/29/2019   • Mitral valve disease 11/29/2019   • Status post hip replacement 04/27/2017   • Elevated alkaline phosphatase level 01/29/2020   • Chronic pain of right knee 01/29/2020   • Left knee pain 10/01/2015   • Seasonal allergic rhinitis due to pollen 03/22/2020   • Localized edema 07/29/2020   • Status post knee replacement 07/29/2020   • Myalgia 10/13/2020   • Fracture of left patella 08/08/2020   • Insomnia, persistent 01/19/2021   • Irritable bowel syndrome without diarrhea 01/19/2021   • Left shoulder pain 01/19/2021   • Constipation 01/19/2021   • Urinary incontinence 01/19/2021   • Uterine leiomyoma 01/19/2021   • Hiatal hernia 01/19/2021   • Scoliosis (and kyphoscoliosis), idiopathic 07/30/2013   • Chronic neck pain 01/19/2021   • Osteoporosis 01/19/2021   • Allergic rhinitis 07/31/2019   • Arthritis of right hip 09/16/2016   • Depressive disorder 04/14/2021   • Lesion of joint capsule of knee region 04/14/2021   • Tear of meniscus of knee 03/25/2020   • Osteoarthritis of right knee 03/25/2020   • Prosthetic and other implants, materials and accessory orthopedic devices associated with adverse incidents 04/09/2019   • Seasonal allergies 04/14/2021   • Family history of colon cancer 12/07/2021   • At high risk for falls 12/07/2021   • Cervical cancer screening 12/07/2021   • Contusion of scalp 01/20/2022   • Chronic right shoulder pain 01/20/2022     Resolved Ambulatory Problems     Diagnosis Date Noted   • Lumbar degenerative disc disease 07/25/2019   • Hypotension due to drugs 10/27/2019   • Iatrogenic hypotension 01/10/2017     Past Medical History:   Diagnosis Date   • Advanced  directives, counseling/discussion    • Allergic contact dermatitis due to plants, except food    • Aortic valve insufficiency    • Atypical squamous cells of undetermined significance (ASCUS) on Papanicolaou smear of cervix    • Breast cancer screening    • Edema extremities    • First degree atrioventricular block    • Left leg pain    • Onychomycosis    • Other constipation    • Other hypotension    • Overweight    • Pain due to total knee replacement, sequela    • Patellar fracture 08/2020   • Right hip pain    • S/P arthroscopic surgery of right knee 03/16/2021   • S/P spinal fusion    • Screening for depression    • Status post hip surgery    • Status post revision of total hip 2016   • Total knee replacement status, left 2003   • Total knee replacement status, right 07/07/2020       Medication List:    Current Outpatient Medications:   •  acetaminophen (TYLENOL) 650 MG 8 hr tablet, Take 650 mg by mouth 2 (Two) Times a Day., Disp: , Rfl:   •  amitriptyline (ELAVIL) 75 MG tablet, Take 1 tablet by mouth every night at bedtime., Disp: 90 tablet, Rfl: 0  •  atorvastatin (LIPITOR) 10 MG tablet, TAKE 1 TABLET EVERY DAY, Disp: 90 tablet, Rfl: 1  •  Calcium Carbonate-Vitamin D (CALCIUM 600/VITAMIN D PO), Take 1 tablet by mouth 2 (Two) Times a Day., Disp: , Rfl:   •  celecoxib (CeleBREX) 200 MG capsule, TAKE 1 CAPSULE EVERY DAY, Disp: 90 capsule, Rfl: 0  •  diphenhydrAMINE (BENADRYL) 25 mg capsule, Take 1 capsule by mouth Daily., Disp: , Rfl:   •  DULoxetine (CYMBALTA) 60 MG capsule, Take 1 capsule by mouth Daily., Disp: 90 capsule, Rfl: 1  •  enalapril (VASOTEC) 10 MG tablet, Take 0.5 tablets by mouth 2 (Two) Times a Day., Disp: 180 tablet, Rfl: 1  •  gabapentin (NEURONTIN) 300 MG capsule, Take 1 capsule by mouth 2 (Two) Times a Day., Disp: 180 capsule, Rfl: 0  •  Glycerin-Hypromellose- (ARTIFICIAL TEARS) 0.2-0.2-1 % solution ophthalmic solution, , Disp: , Rfl:   •  loratadine (CLARITIN) 10 MG tablet, Take 10 mg  "by mouth Daily., Disp: , Rfl:   •  metoprolol tartrate (LOPRESSOR) 25 MG tablet, TAKE 1 TABLET TWICE DAILY, Disp: 180 tablet, Rfl: 1  •  montelukast (SINGULAIR) 10 MG tablet, TAKE 1 TABLET EVERY DAY, Disp: 90 tablet, Rfl: 0  •  pantoprazole (PROTONIX) 40 MG EC tablet, TAKE 1 TABLET EVERY DAY, Disp: 90 tablet, Rfl: 0  •  Wheat Dextrin (BENEFIBER DRINK MIX PO), Take  by mouth As Needed., Disp: , Rfl:   •  Psyllium 100 % powder, METAMUCIL POWD, Disp: , Rfl:     Allergies   Allergen Reactions   • Morphine And Related Hallucinations     HALLUCINATIONS         Social History     Tobacco Use   • Smoking status: Never Smoker   • Smokeless tobacco: Never Used   Substance Use Topics   • Alcohol use: No     PHQ-2 Depression Screening  Little interest or pleasure in doing things? 0-->not at all   Feeling down, depressed, or hopeless? 0-->not at all   PHQ-2 Total Score 0       Review of Systems   Constitutional: Negative for fever.   HENT: Positive for hearing loss. Negative for ear discharge, ear pain, swollen glands and trouble swallowing.    Eyes: Positive for photophobia and redness. Negative for blurred vision, double vision, discharge, itching and visual disturbance.   Respiratory: Negative for chest tightness, shortness of breath and wheezing.          Objective   Vitals:    07/01/22 1117   BP: 116/70   BP Location: Left arm   Patient Position: Sitting   Cuff Size: Adult   Pulse: 73   Resp: 16   Temp: 97.3 °F (36.3 °C)   TempSrc: Temporal   SpO2: 95%   Weight: 73.9 kg (163 lb)   Height: 160 cm (63\")     Body mass index is 28.87 kg/m².    Physical Exam  Constitutional:       General: She is not in acute distress.     Appearance: Normal appearance. She is well-developed. She is not ill-appearing or diaphoretic.   HENT:      Head: Normocephalic and atraumatic.      Comments: No facial swelling.     Right Ear: There is impacted cerumen.      Left Ear: There is impacted cerumen.      Ears:      Comments: Cerumen bilaterally.  " Irrigated in office using cerumenolytics and irrigation.  Wax removed w/o difficulty.     Nose: Nose normal. No congestion or rhinorrhea.      Mouth/Throat:      Mouth: Mucous membranes are moist.      Pharynx: No oropharyngeal exudate or posterior oropharyngeal erythema.   Eyes:      General: No scleral icterus.        Right eye: No discharge.         Left eye: No discharge.      Extraocular Movements: Extraocular movements intact.      Right eye: Normal extraocular motion.      Left eye: Normal extraocular motion.      Conjunctiva/sclera:      Right eye: Right conjunctiva is not injected. No exudate or hemorrhage.     Left eye: Left conjunctiva is injected (minimal). No exudate or hemorrhage.     Pupils: Pupils are equal, round, and reactive to light.      Comments: Minimal puffiness to the left lower eye lid.  No drainage.  No foreign body.       Cardiovascular:      Rate and Rhythm: Normal rate and regular rhythm.      Heart sounds: Normal heart sounds. No murmur heard.  Pulmonary:      Effort: Pulmonary effort is normal.      Breath sounds: Normal breath sounds.   Musculoskeletal:         General: No swelling.      Cervical back: Normal range of motion and neck supple.      Right lower leg: No edema.      Left lower leg: No edema.   Skin:     General: Skin is warm.      Capillary Refill: Capillary refill takes less than 2 seconds.      Findings: No erythema or rash.   Neurological:      Mental Status: She is alert.   Psychiatric:         Mood and Affect: Mood normal.         Assessment & Plan     Diagnoses and all orders for this visit:    1. Left facial swelling (Primary)      ? Irritant dermatitis/irritant conjunctivitis.  Improved day.  Minimal findings on exam today.  I will have patient try ketotifen eye drops (can purchase OTC).    Patient Instructions   Ketotifen eye drop (brand name is Zaditor).   One drop in the left eye twice daily as needed for redness, pain or swelling.        No follow-ups on file.    F/U PRN.       Patient was given instructions and counseling regarding his/her condition or for health maintenance advice. Please see specific information pulled into the AVS if appropriate.     I wore protective equipment throughout this patient encounter to include mask. Hand hygiene was performed before donning protective equipment and after removal when leaving the room.

## 2022-07-01 NOTE — PATIENT INSTRUCTIONS
Ketotifen eye drop (brand name is Zaditor).   One drop in the left eye twice daily as needed for redness, pain or swelling.

## 2022-07-08 DIAGNOSIS — E78.2 MIXED HYPERLIPIDEMIA: ICD-10-CM

## 2022-07-11 RX ORDER — ATORVASTATIN CALCIUM 10 MG/1
TABLET, FILM COATED ORAL
Qty: 90 TABLET | Refills: 0 | Status: SHIPPED | OUTPATIENT
Start: 2022-07-11 | End: 2022-08-02 | Stop reason: SDUPTHER

## 2022-07-28 DIAGNOSIS — I10 HYPERTENSION, BENIGN: ICD-10-CM

## 2022-07-28 LAB
ALBUMIN SERPL-MCNC: 4.1 G/DL (ref 3.7–4.7)
ALBUMIN/GLOB SERPL: 1.6 {RATIO} (ref 1.2–2.2)
ALP SERPL-CCNC: 89 IU/L (ref 44–121)
ALT SERPL-CCNC: 10 IU/L (ref 0–32)
AST SERPL-CCNC: 19 IU/L (ref 0–40)
BILIRUB SERPL-MCNC: 0.7 MG/DL (ref 0–1.2)
BUN SERPL-MCNC: 15 MG/DL (ref 8–27)
BUN/CREAT SERPL: 21 (ref 12–28)
CALCIUM SERPL-MCNC: 9 MG/DL (ref 8.7–10.3)
CHLORIDE SERPL-SCNC: 98 MMOL/L (ref 96–106)
CHOLEST SERPL-MCNC: 152 MG/DL (ref 100–199)
CHOLEST/HDLC SERPL: 3.9 RATIO (ref 0–4.4)
CO2 SERPL-SCNC: 25 MMOL/L (ref 20–29)
CREAT SERPL-MCNC: 0.7 MG/DL (ref 0.57–1)
EGFRCR SERPLBLD CKD-EPI 2021: 88 ML/MIN/1.73
GLOBULIN SER CALC-MCNC: 2.6 G/DL (ref 1.5–4.5)
GLUCOSE SERPL-MCNC: 89 MG/DL (ref 65–99)
HBA1C MFR BLD: 6.1 % (ref 4.8–5.6)
HDLC SERPL-MCNC: 39 MG/DL
LDLC SERPL CALC-MCNC: 89 MG/DL (ref 0–99)
POTASSIUM SERPL-SCNC: 4.8 MMOL/L (ref 3.5–5.2)
PROT SERPL-MCNC: 6.7 G/DL (ref 6–8.5)
SODIUM SERPL-SCNC: 137 MMOL/L (ref 134–144)
TRIGL SERPL-MCNC: 137 MG/DL (ref 0–149)
VLDLC SERPL CALC-MCNC: 24 MG/DL (ref 5–40)

## 2022-08-01 NOTE — ASSESSMENT & PLAN NOTE
Doing well; --Encouraged to watch salt, exercise more and lose weight.  Patient tolerated Enalapril 10 mg, Metoprolol Tartrate 25 mg well without side effects. I feel the benefits of the medication outweigh the risks.

## 2022-08-01 NOTE — ASSESSMENT & PLAN NOTE
Lipid and CMP reviewed with patient  Stable  Total 152 down from 168, Trig 137 up from 114, HDL 39 down from 46, LDL 89 down from 101  Encouraged to watch fatty intake, exercise more, and lose weight.   Compliant with medication   Is not getting adequate diet and exercise  Goals developed at last visit were met   Follow up in 6  months  Care management needs are self-addressed.   Self-management abilities addressed and patient is capable of managing her own disease.  Patient tolerated Lipitor 10 mg well without side effects. I feel the benefits of the medication outweigh the risks.

## 2022-08-01 NOTE — ASSESSMENT & PLAN NOTE
Worsening; A1c 6.1 up from 5.9  Encourged to watch sugar intake, exercise more, lose weight,   No medication.

## 2022-08-01 NOTE — PROGRESS NOTES
Subjective   Vida Jamison is a 81 y.o. female.   Chief Complaint   Patient presents with   • Hypertension   • Hyperlipidemia          • Hyperglycemia     Hypertension  This is a chronic problem. The current episode started more than 1 year ago. The problem has been gradually improving since onset. The problem is controlled. Pertinent negatives include no chest pain, palpitations or shortness of breath. There are no associated agents to hypertension. Risk factors for coronary artery disease include sedentary lifestyle and post-menopausal state. Current antihypertension treatment includes ACE inhibitors and beta blockers. The current treatment provides moderate improvement. There are no compliance problems.    Hyperlipidemia  This is a chronic problem. The current episode started more than 1 year ago. The problem is controlled. Recent lipid tests were reviewed and are variable. There are no known factors aggravating her hyperlipidemia. Pertinent negatives include no chest pain, myalgias or shortness of breath. Current antihyperlipidemic treatment includes statins. The current treatment provides moderate improvement of lipids. There are no compliance problems.  Risk factors for coronary artery disease include hypertension, post-menopausal and a sedentary lifestyle.   Hyperglycemia  This is a recurrent problem. The current episode started more than 1 year ago. The problem occurs intermittently. The problem has been waxing and waning. Pertinent negatives include no chest pain, fatigue, myalgias, nausea or numbness. Nothing aggravates the symptoms. She has tried nothing for the symptoms.        The following portions of the patient's history were reviewed and updated as appropriate: allergies, current medications, past family history, past medical history, past social history, past surgical history and problem list.    Past Medical History:   Diagnosis Date   • Advanced directives, counseling/discussion     Impression:  Discussion w/ pt regarding Advanced directives. POST/Living Will form discussed at length & reviewed with pt. Pt states she does want CPR. Reviewed Medical interventions w/ pt & differences between each Comfort, Limited & Full. Pt opted for full. Discussed use of antibiotics at the end of life, pt chose to use antibiotics consistent w/ treatment goals.   • Allergic contact dermatitis due to plants, except food     Impression: Worsening-probably due to poison ivy. Steroid injection given to patient. If not improving, return to office for re-evaluation.   • Anemia     unspecified type. Impression: worse Hgb down to 10.7 from 11.5 -denies epitaxis, hemoptisis, heartburn, hematura, blood in stool or black tarry stool; Advised to start Iron-OTC 1 a day. she declines more aggressive work up at the present but should have a colonoscopy. she wants her hip fixed 1st   • Aortic valve insufficiency     etiology of cardiac valve disease unspecified. Impression: Echo done 12/2015 - [facility].   • Atypical squamous cells of undetermined significance (ASCUS) on Papanicolaou smear of cervix     Impression: Advised to repeat pap smear yearly. she is reluctent due to cost but I advised her it was covered with a G and Q code but she wanted me to gurentee no charge which I cannot promise. I also encouraged breast exam and mammo gram but again she wanted me to gurentee no cost. I advised her she might consider exam with gyn but that they probably would use the same codes   • Breast cancer screening     Impression: agreed to schedule mammo but advised ideally should also have a breast exam - she did not want to schedule at the present   • Cervical cancer screening     Impression: Doing excellent. Follow conservatively.   • Chronic pain syndrome     Impression: Doing well. Continue with Elavil as presently prescribed, Discussed benifits and side effect of medicine. Patient aware of high risk medication. Follow conservatively.  Discussed decreasing celexa from 10mg BID to daily.   • Dependent edema     Impression: New dx. Advised patient to elevate lower extremities above the level of the heart as much as possible, patient states she will try as possible. States she probably isn't able to.   • Edema extremities     Impression: mild. Patient to return if symptoms worsen or change. Advised to elevate legs above level of heart as needed.   • Elevated diaphragm     Impression: New dx. shown on chest xray from 2012, will follow conservatively   • Esophageal hernia     Impression: Doing well. Follow conservatively.   • First degree atrioventricular block     Impression: Doing excellent. Follow conservatively.   • Gastroesophageal reflux disease without esophagitis     Impression: no sx at present but this could be the cause of her anemia   • Hyperglycemia     Impression: Worse: hgb a1c 6.0 was 5.6. Encouraged to watch sugar intake, exercise more and lose weight.   • Hypertension, benign     Impression: Doing well; Encouraged to watch salt, exercise more and lose weight   • Hypertensive left ventricular hypertrophy, without heart failure     Impression: Echo done 12/2015 - [facility].   • Hyponatremia     Impression: worse at 134 - repeat with next labs   • Insomnia, persistent     Impression: New dx. Worsening. Advised patient she may try increasing her elavil from 50mg to 75mg. Discussed benefits and side effect of medicine. Patient to return if symptoms worsen or change.   • Irritable bowel syndrome without diarrhea     Impression: Doing well. Follow conservatively.   • Left hip pain     Impression: Worsening; reviewed xray of the left hip with patient. Advised patient that she needs to see here surgeon, Dr. Lane. Patient prefers to call and make her appointment.   • Left knee pain     Impression: Worse, will try to schedule ortho appt. sooner than 10-16-18   • Left leg pain     Impression: Worse - discussed addiction potential of  ativan femi with narcotic and muscle relax combination   • Left shoulder pain     Impression: New dx. Left shoulder pain after fall, pt. sent to Xray. Will call pt. with xray results if broken will schedule pt. with DR. Arredondo or Dr. Fleming. Pt. placed in a small sling.   • Lumbar degenerative disc disease     Story: Spinal fusion done 3/24/10 and 8/12/2011. Impression: Worsening; Offered further testing, patient prefers to hold on this until after hip has been repaired.   • Medicare annual wellness visit, subsequent     with abnormal findings   • Mitral valve disease     Impression: Echo done 12/2015 - [facility].   • Mixed hyperlipidemia     Impression: Labs done 04/16/19 Tot 172 up from 156, HDL 46 down from 47, Trig 152 up from 118,  up from 88 Worsening Encouraged to watch fatty intake, exercise more, and lose weight . Compliant with meds Goals developed at last visit were not met because Is not getting adequate diet and exercise( due to hip pain) Follow up in 3 months Care management needs are self addressed.   • Onychomycosis     Impression: Stable, pt. declines tx   • Other constipation     Impression: New dx. Pain probably due to IBS, Start Citrucel 1tbsp mixed in 8oz of fluid daily, may gradually increase up to three times daily for a goal of 2 BM that float in the commode daily. Advised to increase fiber, beans, rice, fruits, veggies.   • Other hypotension     Impression: Improving- Patient states that while in Rehab b/p readings were low and she did not recieve medications on some days.   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   • Patellar fracture 08/2020   • Peripheral vascular disease (HCC)     Impression: JEROME last year was abnorm on the left last year thus will send for vasc studies   • Periprosthetic fracture around internal prosthetic left hip joint (HCC)     subsequent encounter.  Impression: Patient was seen by Dr. Joseph who referred to Linette Weaver.   • Rheumatoid arthritis involving multiple sites with positive rheumatoid factor (HCC)     Impression: stable dc mobic ndue to anemia   • Right hip pain    • S/P arthroscopic surgery of right knee 03/16/2021   • S/P spinal fusion     Impression: Doing well since surgery on 1-9-17.   • Screening for depression     Negative Depression Screening (4 or less) ()   • Seasonal allergic rhinitis due to pollen     Impression: Doing well.   • Status post hip surgery     Impression: Doing well from surgery on 11-14-18- Total left hip prosthesis.   • Status post revision of total hip 2016    R EDSON   • Total knee replacement status, left 2003   • Total knee replacement status, right 07/07/2020   • Uterine leiomyoma     unspecified location. Impression: Discussed following with GYN for a scope, will discuss further after hip surgery   • Vitamin D deficiency     Impression: Doing well, patient gets plenty of time in the Sun. Vitamin D. level normal. Follow conservatively.       Past Surgical History:   Procedure Laterality Date   • BREAST BIOPSY Right 1974    Dr Briseida Alonso   • CATARACT EXTRACTION W/ INTRAOCULAR LENS IMPLANT      7/20/10-rt. eye-Dr. Leon 7/13/10- Lt. eye-Dr. Leon   • FINGER SURGERY  2001    Revision of Finger joints. Dr. Brumfield w/Drs. Kleinert & Marlon   • HIP ENDOPROSTHESIS Left 05/22/2014    Osteonics endoprostehetic replacement of left hip. Dr. Reza Choudhury.   • JOINT REPLACEMENT     • KNEE ARTHROSCOPY Left     [1987] Dr Poon-torn medial meniscus [1995] Dr. Schaffer -torn medial meniscus   • LEEP  2000    Biopsy of cervix. for MAJOR-1 by Dr. Knight   • LUMBAR DISCECTOMY  1994    Hemilaminectomy, foraminectomy & discectomy. Dr. Velasquez, L4-L5 w/posterior lateral fusion & screw fixatoin   • LUMBAR LAMINECTOMY  12/19/2011    foraminotomies, medial facetectomies L5-[id number] redo. left L5-S1 lumbar discectomy. [facility]-Dr. De Oliveira- 5  units of blood given to pt.   • POSTERIOR LUMBAR/THORACIC SPINE FUSION  2002    Fusion T-5 through L-1. Had T-11 burst fracture. Recuperated at [facility]   • SPINAL FUSION      Lower Back. 7/11/2011-[facility] - Hardware removal from L3-L5, Dr. De Oliveira surgeon, Dr. Booker [facility]-Fusion of spine at multi-levels 12/14/11 - [facility] - Dr Gomez and Dr. Momin, Anterior approach Spinal Fusion L5-S1 3--[facility]. Dr. De Oliveira and Dr. Dean. Failed posterior fusion with loose instrumentation. L-4 thru L-5. No complications.   • TONSILLECTOMY  1951    Dr Mcginnis   • TOTAL HIP ARTHROPLASTY Right 04/10/2017    [facility] Inpatient. Dr. Reinoso   • TOTAL KNEE ARTHROPLASTY Left 2002    Dr Sue @ [facility]        Family History   Problem Relation Age of Onset   • Ovarian cancer Mother    • Arthritis Mother    • Heart failure Father    • Suicidality Brother    • Heart disease Maternal Aunt    • Cancer Other         malignant neoplasm of gastrointestinal tract- in her 50's   • Arthritis Other    • Cervical cancer Other    • Arthritis Other    • Diabetes Other         Social History     Socioeconomic History   • Marital status: Single   Tobacco Use   • Smoking status: Never Smoker   • Smokeless tobacco: Never Used   Substance and Sexual Activity   • Alcohol use: No   • Drug use: No   • Sexual activity: Never         Review of Systems   Constitutional: Negative for fatigue, unexpected weight gain and unexpected weight loss.   Eyes: Negative for visual disturbance.   Respiratory: Negative for shortness of breath.    Cardiovascular: Negative for chest pain, palpitations and leg swelling.   Gastrointestinal: Negative for nausea.   Endocrine: Negative for polyphagia and polyuria.   Genitourinary: Negative for frequency.   Musculoskeletal: Negative for myalgias.   Skin: Negative for dry skin and skin lesions.   Neurological: Negative for syncope, numbness and headache.       Objective   Visit  "Vitals  /69 (BP Location: Left arm, Patient Position: Sitting, Cuff Size: Adult)   Pulse 82   Temp 97.9 °F (36.6 °C)   Resp 15   Ht 160 cm (63\")   Wt 75.6 kg (166 lb 9.6 oz)   SpO2 93%   BMI 29.51 kg/m²     Physical Exam  Vitals and nursing note reviewed.   Constitutional:       Appearance: Normal appearance. She is well-developed.   HENT:      Head: Normocephalic.   Neck:      Thyroid: No thyromegaly.      Vascular: No carotid bruit.      Trachea: Trachea normal.   Cardiovascular:      Rate and Rhythm: Normal rate and regular rhythm.      Heart sounds: No murmur heard.    No friction rub. No gallop.   Pulmonary:      Effort: Pulmonary effort is normal. No respiratory distress.      Breath sounds: Normal breath sounds. No wheezing.   Chest:      Chest wall: No tenderness.   Abdominal:      Palpations: Abdomen is soft.      Tenderness: There is no abdominal tenderness.   Musculoskeletal:      Cervical back: Neck supple.   Skin:     General: Skin is dry.      Findings: No rash.      Nails: There is no clubbing.   Neurological:      Mental Status: She is alert and oriented to person, place, and time.   Psychiatric:         Behavior: Behavior is cooperative.         Assessment & Plan   Problem List Items Addressed This Visit        Medium    Hypertension, benign - Primary    Current Assessment & Plan     Doing well; --Encouraged to watch salt, exercise more and lose weight.  Patient tolerated Enalapril 10 mg, Metoprolol Tartrate 25 mg well without side effects. I feel the benefits of the medication outweigh the risks.           Mixed hyperlipidemia    Current Assessment & Plan     Lipid and CMP reviewed with patient  Stable  Total 152 down from 168, Trig 137 up from 114, HDL 39 down from 46, LDL 89 down from 101  Encouraged to watch fatty intake, exercise more, and lose weight.   Compliant with medication   Is not getting adequate diet and exercise  Goals developed at last visit were met   Follow up in 6  " months  Care management needs are self-addressed.   Self-management abilities addressed and patient is capable of managing her own disease.  Patient tolerated Lipitor 10 mg well without side effects. I feel the benefits of the medication outweigh the risks.         Relevant Medications    atorvastatin (LIPITOR) 10 MG tablet    Other Relevant Orders    Lipid Panel With / Chol / HDL Ratio    Comprehensive Metabolic Panel       Low    Hyperglycemia    Current Assessment & Plan     Worsening; A1c 6.1 up from 5.9  Encourged to watch sugar intake, exercise more, lose weight,   No medication.         Relevant Orders    Hemoglobin A1c       Unprioritized    Right lower quadrant abdominal pain    Current Assessment & Plan     Probable irritable bowel; Offered further studies and a referral to GI- Patient declines at this time.

## 2022-08-02 ENCOUNTER — OFFICE VISIT (OUTPATIENT)
Dept: FAMILY MEDICINE CLINIC | Facility: CLINIC | Age: 82
End: 2022-08-02

## 2022-08-02 VITALS
RESPIRATION RATE: 15 BRPM | TEMPERATURE: 97.9 F | WEIGHT: 166.6 LBS | HEART RATE: 82 BPM | OXYGEN SATURATION: 93 % | BODY MASS INDEX: 29.52 KG/M2 | SYSTOLIC BLOOD PRESSURE: 121 MMHG | DIASTOLIC BLOOD PRESSURE: 69 MMHG | HEIGHT: 63 IN

## 2022-08-02 DIAGNOSIS — R10.31 RIGHT LOWER QUADRANT ABDOMINAL PAIN: ICD-10-CM

## 2022-08-02 DIAGNOSIS — I10 HYPERTENSION, BENIGN: Primary | ICD-10-CM

## 2022-08-02 DIAGNOSIS — R73.9 HYPERGLYCEMIA: ICD-10-CM

## 2022-08-02 DIAGNOSIS — E78.2 MIXED HYPERLIPIDEMIA: ICD-10-CM

## 2022-08-02 PROBLEM — R10.11 RIGHT UPPER QUADRANT ABDOMINAL PAIN: Status: ACTIVE | Noted: 2022-08-02

## 2022-08-02 PROBLEM — R10.11 RIGHT UPPER QUADRANT ABDOMINAL PAIN: Status: RESOLVED | Noted: 2022-08-02 | Resolved: 2022-08-02

## 2022-08-02 PROCEDURE — 99214 OFFICE O/P EST MOD 30 MIN: CPT | Performed by: FAMILY MEDICINE

## 2022-08-02 RX ORDER — ATORVASTATIN CALCIUM 10 MG/1
10 TABLET, FILM COATED ORAL DAILY
Qty: 90 TABLET | Refills: 1 | Status: SHIPPED | OUTPATIENT
Start: 2022-08-02 | End: 2023-03-23

## 2022-08-02 NOTE — ASSESSMENT & PLAN NOTE
Probable irritable bowl disease- Offered further studies and a referral to GI- Patient declines at this time.

## 2022-08-02 NOTE — ASSESSMENT & PLAN NOTE
Probable irritable bowel; Offered further studies and a referral to GI- Patient declines at this time.

## 2022-08-25 DIAGNOSIS — G89.4 PAIN SYNDROME, CHRONIC: ICD-10-CM

## 2022-08-28 RX ORDER — AMITRIPTYLINE HYDROCHLORIDE 75 MG/1
TABLET, FILM COATED ORAL
Qty: 90 TABLET | Refills: 1 | Status: SHIPPED | OUTPATIENT
Start: 2022-08-28

## 2022-09-12 DIAGNOSIS — G89.4 CHRONIC PAIN SYNDROME: ICD-10-CM

## 2022-09-12 RX ORDER — DULOXETIN HYDROCHLORIDE 60 MG/1
60 CAPSULE, DELAYED RELEASE ORAL DAILY
Qty: 90 CAPSULE | Refills: 1 | Status: SHIPPED | OUTPATIENT
Start: 2022-09-12 | End: 2022-10-12 | Stop reason: SDUPTHER

## 2022-09-27 DIAGNOSIS — M51.36 LUMBAR DEGENERATIVE DISC DISEASE: ICD-10-CM

## 2022-09-28 RX ORDER — GABAPENTIN 300 MG/1
CAPSULE ORAL
Qty: 180 CAPSULE | Refills: 0 | Status: SHIPPED | OUTPATIENT
Start: 2022-09-28 | End: 2023-03-23

## 2022-10-12 DIAGNOSIS — K21.9 GASTROESOPHAGEAL REFLUX DISEASE WITHOUT ESOPHAGITIS: ICD-10-CM

## 2022-10-12 DIAGNOSIS — G89.4 CHRONIC PAIN SYNDROME: ICD-10-CM

## 2022-10-13 RX ORDER — DULOXETIN HYDROCHLORIDE 60 MG/1
60 CAPSULE, DELAYED RELEASE ORAL DAILY
Qty: 90 CAPSULE | Refills: 1 | Status: SHIPPED | OUTPATIENT
Start: 2022-10-13

## 2022-10-13 RX ORDER — PANTOPRAZOLE SODIUM 40 MG/1
40 TABLET, DELAYED RELEASE ORAL DAILY
Qty: 90 TABLET | Refills: 1 | Status: SHIPPED | OUTPATIENT
Start: 2022-10-13 | End: 2023-03-07

## 2022-11-02 DIAGNOSIS — J30.1 SEASONAL ALLERGIC RHINITIS DUE TO POLLEN: ICD-10-CM

## 2022-11-02 RX ORDER — MONTELUKAST SODIUM 10 MG/1
TABLET ORAL
Qty: 90 TABLET | Refills: 1 | Status: SHIPPED | OUTPATIENT
Start: 2022-11-02

## 2022-11-29 DIAGNOSIS — G89.4 PAIN SYNDROME, CHRONIC: ICD-10-CM

## 2022-11-30 RX ORDER — CELECOXIB 200 MG/1
CAPSULE ORAL
Qty: 90 CAPSULE | Refills: 0 | Status: SHIPPED | OUTPATIENT
Start: 2022-11-30

## 2023-01-28 LAB
ALBUMIN SERPL-MCNC: 4.5 G/DL (ref 3.7–4.7)
ALBUMIN/GLOB SERPL: 1.8 {RATIO} (ref 1.2–2.2)
ALP SERPL-CCNC: 86 IU/L (ref 44–121)
ALT SERPL-CCNC: 9 IU/L (ref 0–32)
AST SERPL-CCNC: 16 IU/L (ref 0–40)
BILIRUB SERPL-MCNC: 0.6 MG/DL (ref 0–1.2)
BUN SERPL-MCNC: 16 MG/DL (ref 8–27)
BUN/CREAT SERPL: 23 (ref 12–28)
CALCIUM SERPL-MCNC: 9.2 MG/DL (ref 8.7–10.3)
CHLORIDE SERPL-SCNC: 99 MMOL/L (ref 96–106)
CHOLEST SERPL-MCNC: 156 MG/DL (ref 100–199)
CHOLEST/HDLC SERPL: 3.8 RATIO (ref 0–4.4)
CO2 SERPL-SCNC: 25 MMOL/L (ref 20–29)
CREAT SERPL-MCNC: 0.69 MG/DL (ref 0.57–1)
EGFRCR SERPLBLD CKD-EPI 2021: 88 ML/MIN/1.73
GLOBULIN SER CALC-MCNC: 2.5 G/DL (ref 1.5–4.5)
GLUCOSE SERPL-MCNC: 92 MG/DL (ref 70–99)
HBA1C MFR BLD: 6 % (ref 4.8–5.6)
HDLC SERPL-MCNC: 41 MG/DL
LDLC SERPL CALC-MCNC: 91 MG/DL (ref 0–99)
POTASSIUM SERPL-SCNC: 5 MMOL/L (ref 3.5–5.2)
PROT SERPL-MCNC: 7 G/DL (ref 6–8.5)
SODIUM SERPL-SCNC: 138 MMOL/L (ref 134–144)
TRIGL SERPL-MCNC: 137 MG/DL (ref 0–149)
VLDLC SERPL CALC-MCNC: 24 MG/DL (ref 5–40)

## 2023-02-01 NOTE — ASSESSMENT & PLAN NOTE
Improving; Hgb a1c 6.0 down from 6.1.  Encourged to watch sugar intake, exercise more, lose weight,   No medication

## 2023-02-01 NOTE — ASSESSMENT & PLAN NOTE
Improved. Chol 156 up from 152, Trig 137 same as previous, HDL 41 up from 39, LDL 91 up from 89  Encouraged to watch fatty intake, exercise more, and lose weight.   compliant with medication   Patient tolerated lipitor well without side effects. I feel the benefits of the medication outweigh the risks.    Is not getting adequate diet and exercise  Goals developed at last visit were met   Follow up in 6  months  Care management needs are self-addressed. Self-management abilities addressed and patient is capable of managing her own disease.

## 2023-02-02 ENCOUNTER — HOSPITAL ENCOUNTER (OUTPATIENT)
Dept: CT IMAGING | Facility: HOSPITAL | Age: 83
Discharge: HOME | End: 2023-02-02
Admitting: FAMILY MEDICINE
Payer: MEDICARE

## 2023-02-02 ENCOUNTER — TELEPHONE (OUTPATIENT)
Dept: FAMILY MEDICINE CLINIC | Facility: CLINIC | Age: 83
End: 2023-02-02

## 2023-02-02 ENCOUNTER — OFFICE VISIT (OUTPATIENT)
Dept: FAMILY MEDICINE CLINIC | Facility: CLINIC | Age: 83
End: 2023-02-02
Payer: MEDICARE

## 2023-02-02 VITALS
HEART RATE: 75 BPM | TEMPERATURE: 97.9 F | DIASTOLIC BLOOD PRESSURE: 54 MMHG | HEIGHT: 63 IN | SYSTOLIC BLOOD PRESSURE: 105 MMHG | OXYGEN SATURATION: 96 % | WEIGHT: 163.4 LBS | RESPIRATION RATE: 14 BRPM | BODY MASS INDEX: 28.95 KG/M2

## 2023-02-02 DIAGNOSIS — K21.9 GASTROESOPHAGEAL REFLUX DISEASE WITHOUT ESOPHAGITIS: ICD-10-CM

## 2023-02-02 DIAGNOSIS — Z00.00 MEDICARE ANNUAL WELLNESS VISIT, SUBSEQUENT: Primary | ICD-10-CM

## 2023-02-02 DIAGNOSIS — Z71.85 IMMUNIZATION COUNSELING: ICD-10-CM

## 2023-02-02 DIAGNOSIS — E78.2 MIXED HYPERLIPIDEMIA: Primary | ICD-10-CM

## 2023-02-02 DIAGNOSIS — R10.30 LOWER ABDOMINAL PAIN: ICD-10-CM

## 2023-02-02 DIAGNOSIS — I10 HYPERTENSION, BENIGN: ICD-10-CM

## 2023-02-02 DIAGNOSIS — R73.9 HYPERGLYCEMIA: ICD-10-CM

## 2023-02-02 PROCEDURE — 99214 OFFICE O/P EST MOD 30 MIN: CPT | Performed by: FAMILY MEDICINE

## 2023-02-02 PROCEDURE — G0439 PPPS, SUBSEQ VISIT: HCPCS | Performed by: FAMILY MEDICINE

## 2023-02-02 PROCEDURE — 99497 ADVNCD CARE PLAN 30 MIN: CPT | Performed by: FAMILY MEDICINE

## 2023-02-02 PROCEDURE — 74177 CT ABD & PELVIS W/CONTRAST: CPT

## 2023-02-02 PROCEDURE — 1159F MED LIST DOCD IN RCRD: CPT | Performed by: FAMILY MEDICINE

## 2023-02-02 PROCEDURE — G0444 DEPRESSION SCREEN ANNUAL: HCPCS | Performed by: FAMILY MEDICINE

## 2023-02-02 PROCEDURE — 0 IOPAMIDOL PER 1 ML: Performed by: FAMILY MEDICINE

## 2023-02-02 RX ADMIN — IOPAMIDOL 100 ML: 755 INJECTION, SOLUTION INTRAVENOUS at 14:14

## 2023-02-02 NOTE — ASSESSMENT & PLAN NOTE
Seems to be worsening per patient. Advised to stop Celebrex and may need to start Carafate.  Will rule out pancreatitis with a lipase and amylase

## 2023-02-02 NOTE — ASSESSMENT & PLAN NOTE
Possibly IBS-Need to rule out other problems.  Will order CBC and Ct scan abd/pelvis and return to discuss results.

## 2023-02-02 NOTE — PROGRESS NOTES
The ABCs of the Annual Wellness Visit  Subsequent Medicare Wellness Visit        Subjective    History of Present Illness:  Vida Jamison is a 82 y.o. female who presents for a Subsequent Medicare Wellness Visit.    The following portions of the patient's history were reviewed and   updated as appropriate: current medications, past family history, past medical history, past social history, past surgical history and problem list.      Recent Hospitalizations:  She was not admitted to the hospital during the last year.       Current Medical Providers:  Patient Care Team:  Mustapha Gonzales MD as PCP - General (Family Medicine)  Ulices Sue/MD Johnny as Surgeon (Orthopedic Surgery)  Chris Garcia MD as Consulting Physician (Ophthalmology)    Outpatient Medications Prior to Visit   Medication Sig Dispense Refill   • acetaminophen (TYLENOL) 650 MG 8 hr tablet Take 650 mg by mouth 2 (Two) Times a Day.     • amitriptyline (ELAVIL) 75 MG tablet TAKE 1 TABLET EVERY NIGHT AT BEDTIME 90 tablet 1   • atorvastatin (LIPITOR) 10 MG tablet Take 1 tablet by mouth Daily. 90 tablet 1   • Calcium Carbonate-Vitamin D (CALCIUM 600/VITAMIN D PO) Take 1 tablet by mouth 2 (Two) Times a Day.     • celecoxib (CeleBREX) 200 MG capsule TAKE 1 CAPSULE EVERY DAY 90 capsule 0   • diphenhydrAMINE (BENADRYL) 25 mg capsule Take 1 capsule by mouth Daily.     • DULoxetine (CYMBALTA) 60 MG capsule Take 1 capsule by mouth Daily. 90 capsule 1   • enalapril (VASOTEC) 10 MG tablet Take 0.5 tablets by mouth 2 (Two) Times a Day. 180 tablet 1   • gabapentin (NEURONTIN) 300 MG capsule TAKE 1 CAPSULE TWICE DAILY 180 capsule 0   • Glycerin-Hypromellose- (ARTIFICIAL TEARS) 0.2-0.2-1 % solution ophthalmic solution      • loratadine (CLARITIN) 10 MG tablet Take 10 mg by mouth Daily.     • metoprolol tartrate (LOPRESSOR) 25 MG tablet TAKE 1 TABLET TWICE DAILY 180 tablet 1   • montelukast (SINGULAIR) 10 MG tablet TAKE 1 TABLET EVERY DAY 90  tablet 1   • pantoprazole (PROTONIX) 40 MG EC tablet Take 1 tablet by mouth Daily. 90 tablet 1   • Wheat Dextrin (BENEFIBER DRINK MIX PO) Take  by mouth As Needed.       No facility-administered medications prior to visit.       No opioid medication identified on active medication list. I have reviewed chart for other potential  high risk medication/s and harmful drug interactions in the elderly.          Aspirin is not on active medication list.  Aspirin use is not indicated based on review of current medical condition/s. Risk of harm outweighs potential benefits.  .    Patient Active Problem List   Diagnosis   • Gastroesophageal reflux disease without esophagitis   • Pain in hip region after total hip replacement (HCC)   • Loose total hip arthroplasty (HCC)   • Mixed hyperlipidemia   • Myalgia and myositis   • Hypertension, benign   • Peripheral vascular disease (HCC)   • Periprosthetic fracture around internal prosthetic left hip joint (HCC)   • Rheumatoid arthritis involving multiple sites with positive rheumatoid factor (HCC)   • S/P lumbar fusion   • Spinal stenosis of lumbar region   • Carpal tunnel syndrome of right wrist   • Anemia   • Hyperglycemia   • Left hip pain   • Vitamin D deficiency   • First degree heart block   • Chronic pain syndrome   • Dependent edema   • Medicare annual wellness visit, subsequent   • Aortic valve regurgitation   • Atypical squamous cell changes of undetermined significance (ASCUS) on vaginal cytology   • Elevated diaphragm   • Esophageal hernia   • Family history of diabetes mellitus   • Fusion of spine of lumbar region   • Hypertensive left ventricular hypertrophy, without heart failure   • Hyponatremia   • Mitral valve disease   • Status post hip replacement   • Elevated alkaline phosphatase level   • Chronic pain of right knee   • Left knee pain   • Seasonal allergic rhinitis due to pollen   • Localized edema   • Status post knee replacement   • Myalgia   • Fracture of  "left patella   • Insomnia, persistent   • Irritable bowel syndrome without diarrhea   • Left shoulder pain   • Constipation   • Urinary incontinence   • Uterine leiomyoma   • Hiatal hernia   • Scoliosis (and kyphoscoliosis), idiopathic   • Chronic neck pain   • Osteoporosis   • Allergic rhinitis   • Arthritis of right hip   • Depressive disorder   • Lesion of joint capsule of knee region   • Tear of meniscus of knee   • Osteoarthritis of right knee   • Prosthetic and other implants, materials and accessory orthopedic devices associated with adverse incidents   • Seasonal allergies   • Family history of colon cancer   • At high risk for falls   • Cervical cancer screening   • Contusion of scalp   • Chronic right shoulder pain   • Right lower quadrant abdominal pain   • Lower abdominal pain   • Immunization counseling            Above medical problems all reviewed and significant problems identified and reviewed in the E and M visit.   Objective          There were no vitals filed for this visit.  Estimated body mass index is 28.95 kg/m² as calculated from the following:    Height as of an earlier encounter on 2/2/23: 160 cm (63\").    Weight as of an earlier encounter on 2/2/23: 74.1 kg (163 lb 6.4 oz).    BMI is >= 25 and <30. (Overweight) The following options were offered after discussion;: exercise counseling/recommendations      Does the patient have evidence of cognitive impairment? No           Family History   Problem Relation Age of Onset   • Ovarian cancer Mother    • Arthritis Mother    • Heart failure Father    • Suicidality Brother    • Heart disease Maternal Aunt    • Cancer Other         malignant neoplasm of gastrointestinal tract- in her 50's   • Arthritis Other    • Cervical cancer Other    • Arthritis Other    • Diabetes Other        Compared to one year ago, the patient feels her physical   health is the same.    Compared to one year ago, the patient feels her mental   health is the same.    HEALTH " RISK ASSESSMENT    Smoking Status:  Social History     Tobacco Use   Smoking Status Never   Smokeless Tobacco Never     Alcohol Consumption:  Social History     Substance and Sexual Activity   Alcohol Use No     Fall Risk Screen:    STEADI Fall Risk Assessment was completed, and patient is at HIGH risk for falls. Assessment completed on:2/2/2023    Depression Screening:  Depression screen reviewed and discussed with patient. Recommendations were not needed. Medications were not discussed, counseling was not discussed. We spent 3 minutes with the screen and discussion of depression and options.     PHQ-2/PHQ-9 Depression Screening 2/2/2023   Retired PHQ-9 Total Score -   Retired Total Score -   Little Interest or Pleasure in Doing Things 0-->not at all   Feeling Down, Depressed or Hopeless 0-->not at all   PHQ-9: Brief Depression Severity Measure Score 0       Health Habits and Functional and Cognitive Screening:  Functional & Cognitive Status 2/2/2023   Do you have difficulty preparing food and eating? No   Do you have difficulty bathing yourself, getting dressed or grooming yourself? No   Do you have difficulty using the toilet? No   Do you have difficulty moving around from place to place? No   Do you have trouble with steps or getting out of a bed or a chair? No   Current Diet Well Balanced Diet   Dental Exam Not up to date   Eye Exam Up to date        Eye Exam Comment Dr. Leon's office 2022   Exercise (times per week) 3 times per week   Current Exercises Include Swimming   Current Exercise Activities Include -   Do you need help using the phone?  No   Are you deaf or do you have serious difficulty hearing?  No   Do you need help with transportation? Yes   Do you need help shopping? No   Do you need help preparing meals?  No   Do you need help with housework?  No   Do you need help with laundry? No   Do you need help taking your medications? No   Do you need help managing money? No   Do you ever drive or ride in  a car without wearing a seat belt? No   Have you felt unusual stress, anger or loneliness in the last month? No   Who do you live with? Alone   If you need help, do you have trouble finding someone available to you? No   Have you been bothered in the last four weeks by sexual problems? No   Do you have difficulty concentrating, remembering or making decisions? No       Age-appropriate Screening Schedule:  Refer to the list below for future screening recommendations based on patient's age, sex and/or medical conditions. Orders for these recommended tests are listed in the plan section. The patient has been provided with a written plan.    Health Maintenance   Topic Date Due   • ZOSTER VACCINE (1 of 2) Never done   • DXA SCAN  01/13/2023   • LIPID PANEL  01/27/2024   • TDAP/TD VACCINES (3 - Td or Tdap) 10/26/2025   • INFLUENZA VACCINE  Completed       Immunizations:  Vida Jamison is due for Shingrix  And Prevnar 20-[facility] get [facility].   Colorectal Screening  Vida Jamison last colonoscopy was 2009  Last Completed Colonoscopy     This patient has no relevant Health Maintenance data.             Assessment & Plan   CMS Preventative Services Quick Reference    Risk Factors Identified During Encounter      Fall Risk-High or Moderate: Sit to Stand Exercise Information Shared in After Visit Summary  The above risks/problems have been discussed with the patient.  Follow up actions/plans if indicated are seen below in the Assessment/Plan Section.  Pertinent information has been shared with the patient in the After Visit Summary.    I have identified multiple medical problems which are significant and separately identifiable that require added work above and beyond the wellness visit. These are not trivial or insignificant and are billed with a 25-modifier  These are in a separate E/M note      Sit-to-Stand Exercise    The sit-to-stand exercise (also known as the chair stand or chair rise exercise) strengthens  your lower body and helps you maintain or improve your mobility and independence. The goal is to do the sit-to-stand exercise without using your hands. This will be easier as you become stronger. You should always talk with your health care provider before starting any exercise program, especially if you have had recent surgery.  Do the exercise exactly as told by your health care provider and adjust it as directed. It is normal to feel mild stretching, pulling, tightness, or discomfort as you do this exercise, but you should stop right away if you feel sudden pain or your pain gets worse. Do not begin doing this exercise until told by your health care provider.  What the sit-to-stand exercise does  The sit-to-stand exercise helps to strengthen the muscles in your thighs and the muscles in the center of your body that give you stability (core muscles). This exercise is especially helpful if:  · You have had knee or hip surgery.  · You have trouble getting up from a chair, out of a car, or off the toilet.  How to do the sit-to-stand exercise  1. Sit toward the front edge of a sturdy chair without armrests. Your knees should be bent and your feet should be flat on the floor and shoulder-width apart.  2. Place your hands lightly on each side of the seat. Keep your back and neck as straight as possible, with your chest slightly forward.  3. Breathe in slowly. Lean forward and slightly shift your weight to the front of your feet.  4. Breathe out as you slowly stand up. Use your hands as little as possible.  5. Stand and pause for a full breath in and out.  6. Breathe in as you sit down slowly. Tighten your core and abdominal muscles to control your lowering as much as possible.  7. Breathe out slowly.  8. Do this exercise 10-15 times. If needed, do it fewer times until you build up strength.  9. Rest for 1 minute, then do another set of 10-15 repetitions.  To change the difficulty of the sit-to-stand exercise  · If the  exercise is too difficult, use a chair with sturdy armrests, and push off the armrests to help you come to the standing position. You can also use the armrests to help slowly lower yourself back to sitting. As this gets easier, try to use your arms less. You can also place a firm cushion or pillow on the chair to make the surface higher.  · If this exercise is too easy, do not use your arms to help raise or lower yourself. You can also wear a weighted vest, use hand weights, increase your repetitions, or try a lower chair.  General tips  · You may feel tired when starting an exercise routine. This is normal.  · You may have muscle soreness that lasts a few days. This is normal. As you get stronger, you may not feel muscle soreness.  · Use smooth, steady movements.  · Do not  hold your breath during strength exercises. This can cause unsafe changes in your blood pressure.  · Breathe in slowly through your nose, and breathe out slowly through your mouth.  Summary  · Strengthening your lower body is an important step to help you move safely and independently.  · The sit-to-stand exercise helps strengthen the muscles in your thighs and core.  · You should always talk with your health care provider before starting any exercise program, especially if you have had recent surgery.  This information is not intended to replace advice given to you by your health care provider. Make sure you discuss any questions you have with your health care provider.  Document Revised: 10/16/2019 Document Reviewed: 02/08/2018  Elsevier Patient Education © 2021 [employer]      Fall Prevention in the Home, Adult  Falls can cause injuries and can happen to people of all ages. There are many things you can do to make your home safe and to help prevent falls. Ask for help when making these changes.  What actions can I take to prevent falls?  General Instructions  1. Use good lighting in all rooms. Replace any light bulbs that burn  out.  2. Turn on the lights in dark areas. Use night-lights.  3. Keep items that you use often in easy-to-reach places. Lower the shelves around your home if needed.  4. Set up your furniture so you have a clear path. Avoid moving your furniture around.  5. Do not have throw rugs or other things on the floor that can make you trip.  6. Avoid walking on wet floors.  7. If any of your floors are uneven, fix them.  8. Add color or contrast paint or tape to clearly jerrod and help you see:  ? Grab bars or handrails.  ? First and last steps of staircases.  ? Where the edge of each step is.  9. If you use a stepladder:  ? Make sure that it is fully opened. Do not climb a closed stepladder.  ? Make sure the sides of the stepladder are locked in place.  ? Ask someone to hold the stepladder while you use it.  10. Know where your pets are when moving through your home.  What can I do in the bathroom?         · Keep the floor dry. Clean up any water on the floor right away.  · Remove soap buildup in the tub or shower.  · Use nonskid mats or decals on the floor of the tub or shower.  · Attach bath mats securely with double-sided, nonslip rug tape.  · If you need to sit down in the shower, use a plastic, nonslip stool.  · Install grab bars by the toilet and in the tub and shower. Do not use towel bars as grab bars.  What can I do in the bedroom?  · Make sure that you have a light by your bed that is easy to reach.  · Do not use any sheets or blankets for your bed that hang to the floor.  · Have a firm chair with side arms that you can use for support when you get dressed.  What can I do in the kitchen?  · Clean up any spills right away.  · If you need to reach something above you, use a step stool with a grab bar.  · Keep electrical cords out of the way.  · Do not use floor polish or wax that makes floors slippery.  What can I do with my stairs?  · Do not leave any items on the stairs.  · Make sure that you have a light switch at  the top and the bottom of the stairs.  · Make sure that there are handrails on both sides of the stairs. Fix handrails that are broken or loose.  · Install nonslip stair treads on all your stairs.  · Avoid having throw rugs at the top or bottom of the stairs.  · Choose a carpet that does not hide the edge of the steps on the stairs.  · Check carpeting to make sure that it is firmly attached to the stairs. Fix carpet that is loose or worn.  What can I do on the outside of my home?  · Use bright outdoor lighting.  · Fix the edges of walkways and driveways and fix any cracks.  · Remove anything that might make you trip as you walk through a door, such as a raised step or threshold.  · Trim any bushes or trees on paths to your home.  · Check to see if handrails are loose or broken and that both sides of all steps have handrails.  · Install guardrails along the edges of any raised decks and porches.  · Clear paths of anything that can make you trip, such as tools or rocks.  · Have leaves, snow, or ice cleared regularly.  · Use sand or salt on paths during winter.  · Clean up any spills in your garage right away. This includes grease or oil spills.  What other actions can I take?  1. Wear shoes that:  ? Have a low heel. Do not wear high heels.  ? Have rubber bottoms.  ? Feel good on your feet and fit well.  ? Are closed at the toe. Do not wear open-toe sandals.  2. Use tools that help you move around if needed. These include:  ? Canes.  ? Walkers.  ? Scooters.  ? Crutches.  3. Review your medicines with your doctor. Some medicines can make you feel dizzy. This can increase your chance of falling.  Ask your doctor what else you can do to help prevent falls.  Where to find more information  · Centers for Disease Control and Prevention, STEADI: www.cdc.gov  · [place] on Aging: www.emelina.nih.gov  Contact a doctor if:  · You are afraid of falling at home.  · You feel weak, drowsy, or dizzy at home.  · You fall at  home.  Summary  · There are many simple things that you can do to make your home safe and to help prevent falls.  · Ways to make your home safe include removing things that can make you trip and installing grab bars in the bathroom.  · Ask for help when making these changes in your home.  This information is not intended to replace advice given to you by your health care provider. Make sure you discuss any questions you have with your health care provider.  Document Revised: 07/21/2021 Document Reviewed: 07/21/2021  [employer] Patient Education © 2021 [employer]            Advance Care Planning    Advance Directive is on file.  ACP discussion was held with the patient during this visit. Patient has an advance directive in EMR which is still valid.  POST reviewed and updated today.   Discussion with Patient regarding advanced directives. POST form discussed at length and reviewed with patient. POST reviewed and updated today. I spent 16 minutes  with patient reviewing information and documenting  in the chart.  Patient states she does not want CPR. Reviewed medical interventions with patient and the differences between each: Comfort, Limited and Full. Patient opted for Limited. Discussed the use of antibiotics at the end of life. She chose to use antibiotics consistent with treatment goals. Discussed artificially administered nutrition, patient is aware that if she is alert and oriented they can change their mind at any time. However, they have elected to have no artificial nutrition. Patient has identified her friend Jannie Diaz as her healthcare representative. Advised to discuss with healthcare representative if they can comply with wishes. Patient encouraged to have a meeting to discuss his decision regarding advanced care directives and goals of care with extended family and significant  friends  In regard to the POST form:The patient opted to complete POST while in the office and copy scanned into the chart.  and advised to give to family members, place in an easily accessible place and take with them if going to the hospital or Emergency room.      Follow Up:   Future Appointments       Provider Department Center    2/28/2023 11:15 AM Mustapha Gonzales MD [facility]          An After Visit Summary and PPPS were made available to the patient.      Diagnoses and all orders for this visit:    1. Medicare annual wellness visit, subsequent (Primary)  Assessment & Plan:  Completed-  POST updated and reviewed      2. Immunization counseling  Assessment & Plan:  Patient is due for Shingrix and Prevnar 20 and prefers to get these at a drugstore.

## 2023-02-02 NOTE — TELEPHONE ENCOUNTER
Called Vida and notified her that her Ct abd-pelvis showed a large amount of stool.  Advised her to start Miralax capful and work up to 2 capfuls a day.

## 2023-02-02 NOTE — PROGRESS NOTES
Subjective   Vida Jamison is a 82 y.o. female.     Abdominal Pain  This is a recurrent problem. The current episode started more than 1 year ago. The onset quality is gradual. The problem occurs intermittently. The problem has been gradually worsening. The pain is located in the LLQ, LUQ, RLQ and RUQ. The pain is moderate. The quality of the pain is aching. The abdominal pain does not radiate. Pertinent negatives include no myalgias, nausea or weight loss.   Hypertension  This is a chronic problem. The current episode started more than 1 year ago. The problem has been resolved since onset. The problem is controlled. Pertinent negatives include no chest pain, palpitations or shortness of breath.   Hyperlipidemia  This is a chronic problem. The current episode started more than 1 year ago. The problem is controlled. Pertinent negatives include no chest pain, myalgias or shortness of breath. Current antihyperlipidemic treatment includes statins. Risk factors for coronary artery disease include dyslipidemia and hypertension.        The following portions of the patient's history were reviewed and updated as appropriate: current medications, past family history, past medical history, past social history, past surgical history and problem list.    Family History   Problem Relation Age of Onset   • Ovarian cancer Mother    • Arthritis Mother    • Heart failure Father    • Suicidality Brother    • Heart disease Maternal Aunt    • Cancer Other         malignant neoplasm of gastrointestinal tract- in her 50's   • Arthritis Other    • Cervical cancer Other    • Arthritis Other    • Diabetes Other        Social History     Tobacco Use   • Smoking status: Never   • Smokeless tobacco: Never   Substance Use Topics   • Alcohol use: No   • Drug use: No       Past Surgical History:   Procedure Laterality Date   • BREAST BIOPSY Right 1974    Dr Briseida Alonso   • CATARACT EXTRACTION W/ INTRAOCULAR LENS IMPLANT      7/20/10-rt. eye-  Black 7/13/10- Lt. eye-Dr. Leon   • FINGER SURGERY  2001    Revision of Finger joints. Dr. Brumfield w/Drs. Kleinert & Marlon   • HIP ENDOPROSTHESIS Left 05/22/2014    Osteonics endoprostehetic replacement of left hip. Dr. Reza Choudhury.   • JOINT REPLACEMENT     • KNEE ARTHROSCOPY Left     [1987] Dr Poon-torn medial meniscus [1995] Dr. Schaffer -torn medial meniscus   • LEEP  2000    Biopsy of cervix. for MAJOR-1 by Dr. Knight   • LUMBAR DISCECTOMY  1994    Hemilaminectomy, foraminectomy & discectomy. Dr. Velasquez, L4-L5 w/posterior lateral fusion & screw fixatoin   • LUMBAR LAMINECTOMY  12/19/2011    foraminotomies, medial facetectomies L5-[id number] redo. left L5-S1 lumbar discectomy. [facility]-Dr. De Oliveira- 5 units of blood given to pt.   • POSTERIOR LUMBAR/THORACIC SPINE FUSION  2002    Fusion T-5 through L-1. Had T-11 burst fracture. [facility] at [facility]   • SPINAL FUSION      Lower Back. 7/11/2011-[facility] - Hardware removal from L3-L5, Dr. De Oliveira surgeon, Dr. Booker [facility]-Fusion of spine at multi-levels 12/14/11 - [facility] - Dr Gomez and Dr. Momin, Anterior approach Spinal Fusion L5-S1 3--[facility]. Dr. De Oliveira and Dr. Dean. Failed posterior fusion with loose instrumentation. L-4 thru L-5. No complications.   • TONSILLECTOMY  1951    Dr Mcginnis   • TOTAL HIP ARTHROPLASTY Right 04/10/2017    [facility], Inpatient. Dr. Reinoso   • TOTAL KNEE ARTHROPLASTY Left 2002    Dr Sue @ [facility]       Patient Active Problem List   Diagnosis   • Gastroesophageal reflux disease without esophagitis   • Pain in hip region after total hip replacement (HCC)   • Loose total hip arthroplasty (HCC)   • Mixed hyperlipidemia   • Myalgia and myositis   • Hypertension, benign   • Peripheral vascular disease (HCC)   • Periprosthetic fracture around internal prosthetic left hip joint (HCC)   • Rheumatoid arthritis involving multiple sites with positive rheumatoid factor (HCC)   • S/P  lumbar fusion   • Spinal stenosis of lumbar region   • Carpal tunnel syndrome of right wrist   • Anemia   • Hyperglycemia   • Left hip pain   • Vitamin D deficiency   • First degree heart block   • Chronic pain syndrome   • Dependent edema   • Medicare annual wellness visit, subsequent   • Aortic valve regurgitation   • Atypical squamous cell changes of undetermined significance (ASCUS) on vaginal cytology   • Elevated diaphragm   • Esophageal hernia   • Family history of diabetes mellitus   • Fusion of spine of lumbar region   • Hypertensive left ventricular hypertrophy, without heart failure   • Hyponatremia   • Mitral valve disease   • Status post hip replacement   • Elevated alkaline phosphatase level   • Chronic pain of right knee   • Left knee pain   • Seasonal allergic rhinitis due to pollen   • Localized edema   • Status post knee replacement   • Myalgia   • Fracture of left patella   • Insomnia, persistent   • Irritable bowel syndrome without diarrhea   • Left shoulder pain   • Constipation   • Urinary incontinence   • Uterine leiomyoma   • Hiatal hernia   • Scoliosis (and kyphoscoliosis), idiopathic   • Chronic neck pain   • Osteoporosis   • Allergic rhinitis   • Arthritis of right hip   • Depressive disorder   • Lesion of joint capsule of knee region   • Tear of meniscus of knee   • Osteoarthritis of right knee   • Prosthetic and other implants, materials and accessory orthopedic devices associated with adverse incidents   • Seasonal allergies   • Family history of colon cancer   • At high risk for falls   • Cervical cancer screening   • Contusion of scalp   • Chronic right shoulder pain   • Right lower quadrant abdominal pain   • Lower abdominal pain   • Immunization counseling       Current Outpatient Medications on File Prior to Visit   Medication Sig Dispense Refill   • acetaminophen (TYLENOL) 650 MG 8 hr tablet Take 650 mg by mouth 2 (Two) Times a Day.     • amitriptyline (ELAVIL) 75 MG tablet TAKE 1  "TABLET EVERY NIGHT AT BEDTIME 90 tablet 1   • atorvastatin (LIPITOR) 10 MG tablet Take 1 tablet by mouth Daily. 90 tablet 1   • Calcium Carbonate-Vitamin D (CALCIUM 600/VITAMIN D PO) Take 1 tablet by mouth 2 (Two) Times a Day.     • celecoxib (CeleBREX) 200 MG capsule TAKE 1 CAPSULE EVERY DAY 90 capsule 0   • diphenhydrAMINE (BENADRYL) 25 mg capsule Take 1 capsule by mouth Daily.     • DULoxetine (CYMBALTA) 60 MG capsule Take 1 capsule by mouth Daily. 90 capsule 1   • enalapril (VASOTEC) 10 MG tablet Take 0.5 tablets by mouth 2 (Two) Times a Day. 180 tablet 1   • gabapentin (NEURONTIN) 300 MG capsule TAKE 1 CAPSULE TWICE DAILY 180 capsule 0   • Glycerin-Hypromellose- (ARTIFICIAL TEARS) 0.2-0.2-1 % solution ophthalmic solution      • loratadine (CLARITIN) 10 MG tablet Take 10 mg by mouth Daily.     • metoprolol tartrate (LOPRESSOR) 25 MG tablet TAKE 1 TABLET TWICE DAILY 180 tablet 1   • montelukast (SINGULAIR) 10 MG tablet TAKE 1 TABLET EVERY DAY 90 tablet 1   • pantoprazole (PROTONIX) 40 MG EC tablet Take 1 tablet by mouth Daily. 90 tablet 1   • Wheat Dextrin (BENEFIBER DRINK MIX PO) Take  by mouth As Needed.       No current facility-administered medications on file prior to visit.       Allergies   Allergen Reactions   • Morphine And Related Hallucinations     HALLUCINATIONS         Review of Systems   Constitutional: Negative for fatigue, unexpected weight gain and unexpected weight loss.   Respiratory: Negative for shortness of breath.    Cardiovascular: Negative for chest pain, palpitations and leg swelling.   Gastrointestinal: Positive for abdominal pain. Negative for nausea.   Musculoskeletal: Negative for myalgias.   Skin: Negative for dry skin.   Neurological: Negative for headache.       Objective   Visit Vitals  /54 (BP Location: Left arm, Patient Position: Sitting, Cuff Size: Adult)   Pulse 75   Temp 97.9 °F (36.6 °C)   Resp 14   Ht 160 cm (63\")   Wt 74.1 kg (163 lb 6.4 oz)   SpO2 96%   BMI " 28.95 kg/m²     Physical Exam  Vitals and nursing note reviewed.   Constitutional:       Appearance: Normal appearance. She is well-developed.   HENT:      Head: Normocephalic.   Neck:      Thyroid: No thyromegaly.      Vascular: No carotid bruit.      Trachea: Trachea normal.   Cardiovascular:      Rate and Rhythm: Normal rate and regular rhythm.      Heart sounds: No murmur heard.    No friction rub. No gallop.   Pulmonary:      Effort: Pulmonary effort is normal. No respiratory distress.      Breath sounds: Normal breath sounds. No wheezing.   Chest:      Chest wall: No tenderness.   Abdominal:      Palpations: Abdomen is soft.      Tenderness: There is no abdominal tenderness.   Musculoskeletal:      Cervical back: Neck supple.   Skin:     General: Skin is dry.      Findings: No rash.      Nails: There is no clubbing.   Neurological:      Mental Status: She is alert and oriented to person, place, and time.   Psychiatric:         Behavior: Behavior is cooperative.           Assessment & Plan .  Problem List Items Addressed This Visit        Medium    Gastroesophageal reflux disease without esophagitis    Current Assessment & Plan     Seems to be worsening per patient. Advised to stop Celebrex and may need to start Carafate.  Will rule out pancreatitis with a lipase and amylase         Hypertension, benign    Current Assessment & Plan     Doing well; Encouraged to watch salt, exercise more and lose weight.  Patient tolerated enalapril, metoprolol well without side effects. I feel the benefits of the medication outweigh the risks.           Mixed hyperlipidemia - Primary    Current Assessment & Plan      Improved. Chol 156 up from 152, Trig 137 same as previous, HDL 41 up from 39, LDL 91 up from 89  Encouraged to watch fatty intake, exercise more, and lose weight.   compliant with medication   Patient tolerated lipitor well without side effects. I feel the benefits of the medication outweigh the risks.    Is not  getting adequate diet and exercise  Goals developed at last visit were met   Follow up in 6  months  Care management needs are self-addressed. Self-management abilities addressed and patient is capable of managing her own disease.              Low    Hyperglycemia    Current Assessment & Plan     Improving; Hgb a1c 6.0 down from 6.1.  Encourged to watch sugar intake, exercise more, lose weight,   No medication            Unprioritized    Lower abdominal pain    Current Assessment & Plan     Possibly IBS-Need to rule out other problems.  Will order CBC and Ct scan abd/pelvis and return to discuss results.          Relevant Orders    CBC w AUTO Differential (Completed)    Lipase (Completed)    Amylase (Completed)    CT Abdomen Pelvis With Contrast (Completed)

## 2023-02-03 LAB
AMYLASE SERPL-CCNC: 39 U/L (ref 31–110)
BASOPHILS # BLD AUTO: 0.1 X10E3/UL (ref 0–0.2)
BASOPHILS NFR BLD AUTO: 1 %
EOSINOPHIL # BLD AUTO: 0.2 X10E3/UL (ref 0–0.4)
EOSINOPHIL NFR BLD AUTO: 2 %
ERYTHROCYTE [DISTWIDTH] IN BLOOD BY AUTOMATED COUNT: 12.5 % (ref 11.7–15.4)
HCT VFR BLD AUTO: 39.8 % (ref 34–46.6)
HGB BLD-MCNC: 12.9 G/DL (ref 11.1–15.9)
IMM GRANULOCYTES # BLD AUTO: 0 X10E3/UL (ref 0–0.1)
IMM GRANULOCYTES NFR BLD AUTO: 0 %
LIPASE SERPL-CCNC: 34 U/L (ref 14–85)
LYMPHOCYTES # BLD AUTO: 1.4 X10E3/UL (ref 0.7–3.1)
LYMPHOCYTES NFR BLD AUTO: 15 %
MCH RBC QN AUTO: 28.9 PG (ref 26.6–33)
MCHC RBC AUTO-ENTMCNC: 32.4 G/DL (ref 31.5–35.7)
MCV RBC AUTO: 89 FL (ref 79–97)
MONOCYTES # BLD AUTO: 0.5 X10E3/UL (ref 0.1–0.9)
MONOCYTES NFR BLD AUTO: 6 %
NEUTROPHILS # BLD AUTO: 7.2 X10E3/UL (ref 1.4–7)
NEUTROPHILS NFR BLD AUTO: 76 %
PLATELET # BLD AUTO: 326 X10E3/UL (ref 150–450)
RBC # BLD AUTO: 4.47 X10E6/UL (ref 3.77–5.28)
WBC # BLD AUTO: 9.4 X10E3/UL (ref 3.4–10.8)

## 2023-02-09 ENCOUNTER — PATIENT OUTREACH (OUTPATIENT)
Dept: CASE MANAGEMENT | Facility: OTHER | Age: 83
End: 2023-02-09
Payer: COMMERCIAL

## 2023-02-09 ENCOUNTER — TELEPHONE (OUTPATIENT)
Dept: FAMILY MEDICINE CLINIC | Facility: CLINIC | Age: 83
End: 2023-02-09
Payer: COMMERCIAL

## 2023-02-09 NOTE — OUTREACH NOTE
AMBULATORY CASE MANAGEMENT NOTE    Name and Relationship of Patient/Support Person: Vida Jamison M - Self    Patient Outreach  RN-ACM outreach with patient. Discussed  [facility] ED visit regarding right shoulder pain. Patient treated and discharged home. Patient state improvement regarding right shoulder pain; taking Tylenol for discomfort and states to have 2//17/23 ortho appointment and has 2/28/23 PCP appointment.Patient states no difficulty with numbness; tingling to right arm and has good sensation.  Patient states improvement regarding constipation and states to have had episode of diarrhea and voiced intent to adjust intake of Miralax. Patient states to be compliant with medications; medical appointments; monitoring of blood pressure and attends therapy pool 3 times per week. Patient states no difficulty with chest pain; SOB; appetite or sleeping. Reviewed with patient education; role of RN-ACM and HRCM case management services. Patient verbalized understanding. Patient states to appreciate outreach and declines needs for further outreach at this time. No further questions voiced at this time.   Adult Patient Profile  Questions/Answers    Flowsheet Row Most Recent Value   Symptoms/Conditions Managed at Home musculoskeletal   Musculoskeletal Symptoms/Conditions joint pain, mobility limited, other (see comments)  [right shoulder pain]   Musculoskeletal Management Strategies other (see comments)  [Physician follow up]   Barriers to Taking Medication as Prescribed none   Equipment Currently Used at Home bp cuff, walker, standard   Name of Support/Comfort Primary Source Has assistance from sister in law as needed   People in Home alone        Social Work Assessment  Questions/Answers    Flowsheet Row Most Recent Value   People in Home alone   Functional Status Comments Patient states to be independent with ADLs,  light meal preparation,  transportation,  and ambulates with walker   Equipment  Currently Used at Home bp cuff, walker, standard        Send Education  Questions/Answers    Flowsheet Row Most Recent Value   Other Patient Education/Resources  [employer] Nurse Call Line, Advanced Care Planning   24/7 Nurse Call Line Education Method Verbal   ACP Education Method Verbal   Advanced Directives: Patient Has      SDOH updated and reviewed with the patient during this program:  Financial Resource Strain: Low Risk    • Difficulty of Paying Living Expenses: Not hard at all      Food Insecurity: No Food Insecurity   • Worried About Running Out of Food in the Last Year: Never true   • Ran Out of Food in the Last Year: Never true      Transportation Needs: No Transportation Needs   • Lack of Transportation (Medical): No   • Lack of Transportation (Non-Medical): No        Education Documentation  Unresolved/Worsening Symptoms, taught by Nicole Blake, RN at 2/9/2023  1:06 PM.  Learner: Patient  Readiness: Acceptance  Method: Explanation  Response: Verbalizes Understanding    Joint Mobility/Strength, taught by Nicole Blake, RN at 2/9/2023  1:06 PM.  Learner: Patient  Readiness: Acceptance  Method: Explanation  Response: Verbalizes Understanding    Home Safety, taught by Nicole Blake, RN at 2/9/2023  1:06 PM.  Learner: Patient  Readiness: Acceptance  Method: Explanation  Response: Verbalizes Understanding    Energy Conservation, taught by Nicole Blake, RN at 2/9/2023  1:06 PM.  Learner: Patient  Readiness: Acceptance  Method: Explanation  Response: Verbalizes Understanding    Assistive/Adaptive Devices, taught by Nicole Blake, RN at 2/9/2023  1:06 PM.  Learner: Patient  Readiness: Acceptance  Method: Explanation  Response: Verbalizes Understanding          Nicole KRISHNA  Ambulatory Case Management    2/9/2023, 13:07 EST

## 2023-02-09 NOTE — TELEPHONE ENCOUNTER
Called to check on Vida- She was in ER at [facility] for shoulder pain.  Xray was negative.  She is improving. Offered appt with Dr. Gonzales she declines at this time.  She has appointment with ortho on Friday 2-17-23.

## 2023-02-28 ENCOUNTER — OFFICE VISIT (OUTPATIENT)
Dept: FAMILY MEDICINE CLINIC | Facility: CLINIC | Age: 83
End: 2023-02-28
Payer: MEDICARE

## 2023-02-28 VITALS
OXYGEN SATURATION: 99 % | HEIGHT: 62 IN | DIASTOLIC BLOOD PRESSURE: 90 MMHG | BODY MASS INDEX: 29.48 KG/M2 | WEIGHT: 160.2 LBS | HEART RATE: 115 BPM | TEMPERATURE: 97.8 F | SYSTOLIC BLOOD PRESSURE: 170 MMHG | RESPIRATION RATE: 18 BRPM

## 2023-02-28 DIAGNOSIS — I10 HYPERTENSION, BENIGN: Primary | ICD-10-CM

## 2023-02-28 DIAGNOSIS — R10.31 RIGHT LOWER QUADRANT ABDOMINAL PAIN: ICD-10-CM

## 2023-02-28 DIAGNOSIS — R10.30 LOWER ABDOMINAL PAIN: ICD-10-CM

## 2023-02-28 DIAGNOSIS — R73.9 HYPERGLYCEMIA: ICD-10-CM

## 2023-02-28 DIAGNOSIS — E78.2 MIXED HYPERLIPIDEMIA: ICD-10-CM

## 2023-02-28 DIAGNOSIS — R25.1 TREMOR: ICD-10-CM

## 2023-02-28 DIAGNOSIS — I73.9 PERIPHERAL VASCULAR DISEASE: ICD-10-CM

## 2023-02-28 DIAGNOSIS — M05.79 RHEUMATOID ARTHRITIS INVOLVING MULTIPLE SITES WITH POSITIVE RHEUMATOID FACTOR: ICD-10-CM

## 2023-02-28 DIAGNOSIS — K59.00 CONSTIPATION, UNSPECIFIED CONSTIPATION TYPE: ICD-10-CM

## 2023-02-28 PROCEDURE — 99214 OFFICE O/P EST MOD 30 MIN: CPT | Performed by: FAMILY MEDICINE

## 2023-02-28 RX ORDER — PROPRANOLOL HYDROCHLORIDE 20 MG/5ML
20 SOLUTION ORAL 3 TIMES DAILY
Qty: 30 ML | Refills: 5 | Status: SHIPPED | OUTPATIENT
Start: 2023-02-28

## 2023-03-02 ENCOUNTER — OFFICE VISIT (OUTPATIENT)
Dept: FAMILY MEDICINE CLINIC | Facility: CLINIC | Age: 83
End: 2023-03-02
Payer: COMMERCIAL

## 2023-03-02 VITALS
HEIGHT: 62 IN | SYSTOLIC BLOOD PRESSURE: 141 MMHG | BODY MASS INDEX: 29.81 KG/M2 | WEIGHT: 162 LBS | RESPIRATION RATE: 14 BRPM | TEMPERATURE: 97.1 F | DIASTOLIC BLOOD PRESSURE: 69 MMHG | OXYGEN SATURATION: 98 % | HEART RATE: 79 BPM

## 2023-03-02 DIAGNOSIS — H65.03 BILATERAL ACUTE SEROUS OTITIS MEDIA, RECURRENCE NOT SPECIFIED: ICD-10-CM
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
## 2023-03-02 DIAGNOSIS — M05.79 RHEUMATOID ARTHRITIS INVOLVING MULTIPLE SITES WITH POSITIVE RHEUMATOID FACTOR: ICD-10-CM

## 2023-03-02 DIAGNOSIS — F41.9 ANXIETY: ICD-10-CM

## 2023-03-02 DIAGNOSIS — F41.8 SITUATIONAL ANXIETY: ICD-10-CM
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
## 2023-03-02 PROBLEM — H65.93 BILATERAL SEROUS OTITIS MEDIA: Status: ACTIVE | Noted: 2023-03-02

## 2023-03-02 PROCEDURE — 99213 OFFICE O/P EST LOW 20 MIN: CPT | Performed by: FAMILY MEDICINE

## 2023-03-02 RX ORDER — LORAZEPAM 0.5 MG/1
0.5 TABLET ORAL EVERY 8 HOURS PRN
Qty: 20 TABLET | Refills: 0 | Status: SHIPPED | OUTPATIENT
Start: 2023-03-02

## 2023-03-02 NOTE — ASSESSMENT & PLAN NOTE
Advised patient to continue using Claritin and add Flonase.  Also recommended popping ears to open up the eustachian tubes
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
## 2023-03-03 NOTE — ASSESSMENT & PLAN NOTE
Probably secondary to irritable bowel syndrome--CAT scan of the abdomen done February 2 showed moderate to large stool burden within the cecum.  Adjust laxatives as please see note under constipation

## 2023-03-06 DIAGNOSIS — K21.9 GASTROESOPHAGEAL REFLUX DISEASE WITHOUT ESOPHAGITIS: ICD-10-CM

## 2023-03-07 ENCOUNTER — HOSPITAL ENCOUNTER (OUTPATIENT)
Dept: ULTRASOUND IMAGING | Facility: HOSPITAL | Age: 83
Discharge: HOME | End: 2023-03-07
Admitting: FAMILY MEDICINE
Payer: MEDICARE

## 2023-03-07 PROCEDURE — 93923 UPR/LXTR ART STDY 3+ LVLS: CPT

## 2023-03-07 RX ORDER — PANTOPRAZOLE SODIUM 40 MG/1
TABLET, DELAYED RELEASE ORAL
Qty: 90 TABLET | Refills: 1 | Status: SHIPPED | OUTPATIENT
Start: 2023-03-07

## 2023-03-21 DIAGNOSIS — M51.36 LUMBAR DEGENERATIVE DISC DISEASE: ICD-10-CM

## 2023-03-21 DIAGNOSIS — E78.2 MIXED HYPERLIPIDEMIA: ICD-10-CM

## 2023-03-22 ENCOUNTER — OFFICE VISIT (OUTPATIENT)
Dept: FAMILY MEDICINE CLINIC | Facility: CLINIC | Age: 83
End: 2023-03-22
Payer: COMMERCIAL

## 2023-03-22 VITALS
SYSTOLIC BLOOD PRESSURE: 136 MMHG | RESPIRATION RATE: 15 BRPM | OXYGEN SATURATION: 95 % | HEIGHT: 62 IN | WEIGHT: 160.8 LBS | BODY MASS INDEX: 29.59 KG/M2 | HEART RATE: 83 BPM | TEMPERATURE: 97.7 F | DIASTOLIC BLOOD PRESSURE: 72 MMHG

## 2023-03-22 DIAGNOSIS — F41.8 SITUATIONAL ANXIETY: ICD-10-CM

## 2023-03-22 DIAGNOSIS — H65.03 BILATERAL ACUTE SEROUS OTITIS MEDIA, RECURRENCE NOT SPECIFIED: ICD-10-CM

## 2023-03-22 DIAGNOSIS — I10 HYPERTENSION, BENIGN: ICD-10-CM

## 2023-03-22 DIAGNOSIS — I73.9 PERIPHERAL VASCULAR DISEASE: Primary | ICD-10-CM

## 2023-03-22 PROCEDURE — 99213 OFFICE O/P EST LOW 20 MIN: CPT | Performed by: FAMILY MEDICINE

## 2023-03-22 NOTE — ASSESSMENT & PLAN NOTE
Slightly worse- Ears irrigated for mild cerumen impaction while in office today. Offered a referral to ENT- Patient declines.
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
## 2023-03-22 NOTE — ASSESSMENT & PLAN NOTE
Discussed ultrasound of lower extremities- Right shows mild and left .8- Offered a referral to vascular surgery- patient declines.  Will repeat ultrasound in 1 year.

## 2023-03-23 RX ORDER — GABAPENTIN 300 MG/1
CAPSULE ORAL
Qty: 180 CAPSULE | Refills: 0 | Status: SHIPPED | OUTPATIENT
Start: 2023-03-23

## 2023-03-23 RX ORDER — ATORVASTATIN CALCIUM 10 MG/1
TABLET, FILM COATED ORAL
Qty: 90 TABLET | Refills: 1 | Status: SHIPPED | OUTPATIENT
Start: 2023-03-23

## 2023-03-29 ENCOUNTER — TELEPHONE (OUTPATIENT)
Dept: FAMILY MEDICINE CLINIC | Facility: CLINIC | Age: 83
End: 2023-03-29
Payer: COMMERCIAL

## 2023-03-29 NOTE — TELEPHONE ENCOUNTER
Called and left a message with Maricruz at Dr. Patricia's office to call me back or the patient to schedule an appointment in the [place] Office.

## 2023-03-29 NOTE — TELEPHONE ENCOUNTER
Caller: Vida Jamison    Relationship: Self    Best call back number: [phone]    What is the medical concern/diagnosis: CLOGGED EAR    What specialty or service is being requested: EAR SPECIALIST     What is the provider, practice or medical service name: UNKNOWN     What is the office location: UNKNOWN     What is the office phone number: UNKNOWN     Any additional details: PATIENT STATES HER EAR IS CLOGGED UP AGAIN.    PLEASE CALL PATIENT WHEN REFERRAL IS SENT

## 2023-04-04 ENCOUNTER — TELEPHONE (OUTPATIENT)
Dept: FAMILY MEDICINE CLINIC | Facility: CLINIC | Age: 83
End: 2023-04-04
Payer: COMMERCIAL

## 2023-04-04 NOTE — TELEPHONE ENCOUNTER
Caller: Vida Jamison    Relationship to patient: Self    Best call back number:     Patient is needing:  PATIENT STATES THAT DR. BRAXTON WAS SUPPOSED TO SET UP AN APPOINTMENT FOR HER WITH AN EAR DOCTOR, AND SHE HAS NOT HEARD ANYTHING BACK ABOUT IT.  PLEASE ADVISE PATIENT OF THE STATUS OF THIS REFERRAL.

## 2023-04-05 NOTE — TELEPHONE ENCOUNTER
Called Dr. Gibson office and scheduled appt for 5-4-23 at 9:15 [place] office-  Called patient to let her know.

## 2023-05-15 ENCOUNTER — OFFICE VISIT (OUTPATIENT)
Dept: FAMILY MEDICINE CLINIC | Facility: CLINIC | Age: 83
End: 2023-05-15
Payer: MEDICARE

## 2023-05-15 ENCOUNTER — HOSPITAL ENCOUNTER (OUTPATIENT)
Dept: ULTRASOUND IMAGING | Facility: HOSPITAL | Age: 83
Discharge: HOME | End: 2023-05-15
Admitting: FAMILY MEDICINE
Payer: MEDICARE

## 2023-05-15 VITALS
BODY MASS INDEX: 29.81 KG/M2 | HEIGHT: 62 IN | TEMPERATURE: 97.4 F | SYSTOLIC BLOOD PRESSURE: 128 MMHG | OXYGEN SATURATION: 98 % | HEART RATE: 72 BPM | WEIGHT: 162 LBS | DIASTOLIC BLOOD PRESSURE: 78 MMHG | RESPIRATION RATE: 18 BRPM

## 2023-05-15 DIAGNOSIS — R32 URINARY INCONTINENCE, UNSPECIFIED TYPE: ICD-10-CM

## 2023-05-15 DIAGNOSIS — R10.2 PELVIC AND PERINEAL PAIN: ICD-10-CM

## 2023-05-15 DIAGNOSIS — N93.9 VAGINAL BLEEDING: ICD-10-CM

## 2023-05-15 DIAGNOSIS — N95.2 ATROPHIC VAGINITIS: ICD-10-CM

## 2023-05-15 DIAGNOSIS — N95.0 POSTMENOPAUSAL VAGINAL BLEEDING: Primary | ICD-10-CM

## 2023-05-15 PROCEDURE — 3074F SYST BP LT 130 MM HG: CPT | Performed by: FAMILY MEDICINE

## 2023-05-15 PROCEDURE — 76830 TRANSVAGINAL US NON-OB: CPT

## 2023-05-15 PROCEDURE — 76856 US EXAM PELVIC COMPLETE: CPT

## 2023-05-15 PROCEDURE — 3078F DIAST BP <80 MM HG: CPT | Performed by: FAMILY MEDICINE

## 2023-05-15 PROCEDURE — 99214 OFFICE O/P EST MOD 30 MIN: CPT | Performed by: FAMILY MEDICINE

## 2023-05-15 NOTE — ASSESSMENT & PLAN NOTE
Pelvic exam done today, swabbed for vaginosis and sent to the lab, pap smear obtained, advised Transvaginal U/S need to rule out uterine cancer.
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
## 2023-05-15 NOTE — PROGRESS NOTES
Subjective   Vida Jamison is a 82 y.o. female.     Vaginal Bleeding  The patient's primary symptoms include vaginal bleeding and vaginal discharge. This is a new problem. The current episode started 1 to 4 weeks ago. The problem occurs constantly. Pertinent negatives include no chills or fever. The vaginal discharge was bloody and brown.        The following portions of the patient's history were reviewed and updated as appropriate: allergies, current medications, past family history, past medical history, past social history, past surgical history and problem list.    Family History   Problem Relation Age of Onset   • Ovarian cancer Mother    • Arthritis Mother    • Heart failure Father    • Suicidality Brother    • Heart disease Maternal Aunt    • Cancer Other         malignant neoplasm of gastrointestinal tract- in her 50's   • Arthritis Other    • Cervical cancer Other    • Arthritis Other    • Diabetes Other        Social History     Tobacco Use   • Smoking status: Never   • Smokeless tobacco: Never   Substance Use Topics   • Alcohol use: No   • Drug use: No       Past Surgical History:   Procedure Laterality Date   • BREAST BIOPSY Right 1974    Dr Briseida Alonso   • CATARACT EXTRACTION W/ INTRAOCULAR LENS IMPLANT      7/20/10-rt. eye-Dr. Leon 7/13/10- Lt. eye-Dr. Leon   • FINGER SURGERY  2001    Revision of Finger joints. Dr. Brumfield w/Drs. Kleinert & Marlon   • HIP ENDOPROSTHESIS Left 05/22/2014    Osteonics endoprostehetic replacement of left hip. Dr. Reza Choudhury.   • JOINT REPLACEMENT     • KNEE ARTHROSCOPY Left     [1987] Dr Poon-torn medial meniscus [1995] Dr. Schaffer -torn medial meniscus   • LEEP  2000    Biopsy of cervix. for MAJOR-1 by Dr. Knight   • LUMBAR DISCECTOMY  1994    Hemilaminectomy, foraminectomy & discectomy. Dr. Velasquez, L4-L5 w/posterior lateral fusion & screw fixatoin   • LUMBAR LAMINECTOMY  12/19/2011    foraminotomies, medial facetectomies L5-N5retfx redo. left L5-S1 lumbar discectomy.  [facility]-Dr. De Oliveira- 5 units of blood given to pt.   • POSTERIOR LUMBAR/THORACIC SPINE FUSION  2002    Fusion T-5 through L-1. Had T-11 burst fracture. Recuperated at [facility]   • SPINAL FUSION      Lower Back. 7/11/2011-[facility] - Hardware removal from L3-L5, Dr. De Oliveira surgeon, Dr. Booker [facility]-Fusion of spine at multi-levels 12/14/11 - [facility] - Dr Gomez and Dr. Momin, Anterior approach Spinal Fusion L5-S1 3--[facility]. Dr. De Oliveira and Dr. Dean. Failed posterior fusion with loose instrumentation. L-4 thru L-5. No complications.   • TONSILLECTOMY  1951    Dr Mcginnis   • TOTAL HIP ARTHROPLASTY Right 04/10/2017    [facility], Inpatient. Dr. Reinoso   • TOTAL KNEE ARTHROPLASTY Left 2002    Dr Sue @ [facility]       Patient Active Problem List   Diagnosis   • Gastroesophageal reflux disease without esophagitis   • Pain in hip region after total hip replacement   • Loose total hip arthroplasty   • Mixed hyperlipidemia   • Myalgia and myositis   • Hypertension, benign   • Peripheral vascular disease   • Periprosthetic fracture around internal prosthetic left hip joint   • Rheumatoid arthritis involving multiple sites with positive rheumatoid factor   • S/P lumbar fusion   • Spinal stenosis of lumbar region   • Carpal tunnel syndrome of right wrist   • Anemia   • Hyperglycemia   • Left hip pain   • Vitamin D deficiency   • First degree heart block   • Chronic pain syndrome   • Dependent edema   • Medicare annual wellness visit, subsequent   • Aortic valve regurgitation   • Atypical squamous cell changes of undetermined significance (ASCUS) on vaginal cytology   • Elevated diaphragm   • Esophageal hernia   • Family history of diabetes mellitus   • Fusion of spine of lumbar region   • Hypertensive left ventricular hypertrophy, without heart failure   • Hyponatremia   • Mitral valve disease   • Status post hip replacement   • Elevated alkaline phosphatase level   •  Chronic pain of right knee   • Left knee pain   • Seasonal allergic rhinitis due to pollen   • Localized edema   • Status post knee replacement   • Myalgia   • Fracture of left patella   • Insomnia, persistent   • Irritable bowel syndrome without diarrhea   • Left shoulder pain   • Constipation   • Urinary incontinence   • Uterine leiomyoma   • Hiatal hernia   • Scoliosis (and kyphoscoliosis), idiopathic   • Chronic neck pain   • Osteoporosis   • Allergic rhinitis   • Arthritis of right hip   • Depressive disorder   • Lesion of joint capsule of knee region   • Tear of meniscus of knee   • Osteoarthritis of right knee   • Prosthetic and other implants, materials and accessory orthopedic devices associated with adverse incidents   • Seasonal allergies   • Family history of colon cancer   • At high risk for falls   • Cervical cancer screening   • Chronic right shoulder pain   • Right lower quadrant abdominal pain   • Immunization counseling   • Tremor   • Situational anxiety   • Postmenopausal vaginal bleeding   • Atrophic vaginitis   • Vaginal bleeding   • Pelvic and perineal pain       Current Outpatient Medications on File Prior to Visit   Medication Sig Dispense Refill   • acetaminophen (TYLENOL) 650 MG 8 hr tablet Take 1 tablet by mouth 2 (Two) Times a Day.     • amitriptyline (ELAVIL) 75 MG tablet TAKE 1 TABLET EVERY NIGHT AT BEDTIME 90 tablet 1   • atorvastatin (LIPITOR) 10 MG tablet TAKE 1 TABLET EVERY DAY 90 tablet 1   • Calcium Carbonate-Vitamin D (CALCIUM 600/VITAMIN D PO) Take 1 tablet by mouth 2 (Two) Times a Day.     • celecoxib (CeleBREX) 200 MG capsule TAKE 1 CAPSULE EVERY DAY 90 capsule 0   • diphenhydrAMINE (BENADRYL) 25 mg capsule Take 1 capsule by mouth Daily.     • enalapril (VASOTEC) 10 MG tablet Take 0.5 tablets by mouth 2 (Two) Times a Day. 180 tablet 1   • gabapentin (NEURONTIN) 300 MG capsule TAKE 1 CAPSULE TWICE DAILY 180 capsule 0   • Glycerin-Hypromellose- (ARTIFICIAL TEARS)  "0.2-0.2-1 % solution ophthalmic solution      • loratadine (CLARITIN) 10 MG tablet Take 1 tablet by mouth Daily.     • LORazepam (Ativan) 0.5 MG tablet Take 1 tablet by mouth Every 8 (Eight) Hours As Needed for Anxiety. 20 tablet 0   • Lutein-Zeaxanthin (OCUVITE LUTEIN 25 PO) Take  by mouth.     • montelukast (SINGULAIR) 10 MG tablet TAKE 1 TABLET EVERY DAY 90 tablet 1   • pantoprazole (PROTONIX) 40 MG EC tablet TAKE 1 TABLET EVERY DAY 90 tablet 1   • propranolol (INDERAL) 20 MG/5ML solution Take 5 mL by mouth 3 (Three) Times a Day. 30 mL 5   • Wheat Dextrin (BENEFIBER DRINK MIX PO) Take  by mouth As Needed.       No current facility-administered medications on file prior to visit.       Allergies   Allergen Reactions   • Morphine And Related Hallucinations     HALLUCINATIONS         Review of Systems   Constitutional: Negative for chills, diaphoresis, fatigue and fever.   Genitourinary: Positive for vaginal bleeding and vaginal discharge.       Objective   Visit Vitals  /78 (BP Location: Left arm, Patient Position: Sitting, Cuff Size: Adult)   Pulse 72   Temp 97.4 °F (36.3 °C) (Temporal)   Resp 18   Ht 157.5 cm (62\")   Wt 73.5 kg (162 lb)   SpO2 98%   BMI 29.63 kg/m²     Physical Exam  Vitals and nursing note reviewed. Exam conducted with a chaperone present.   Constitutional:       Appearance: She is well-developed.   HENT:      Head: Normocephalic.   Neck:      Thyroid: No thyromegaly.      Vascular: No carotid bruit.      Trachea: Trachea normal.   Cardiovascular:      Rate and Rhythm: Normal rate and regular rhythm.      Heart sounds: No murmur heard.    No friction rub. No gallop.   Pulmonary:      Effort: Pulmonary effort is normal. No respiratory distress.      Breath sounds: Normal breath sounds. No wheezing.   Chest:      Chest wall: No tenderness.   Genitourinary:     Exam position: Supine.      Cervix: No discharge, friability or cervical bleeding.      Uterus: Normal. Not enlarged and not tender.  "      Adnexa: Right adnexa normal and left adnexa normal.      Comments: Atrophy present and very difficult to do speculum exam questionable diagnosis needs ultrasound of the pelvis but may not be able to do intravaginal exam none single none seen on exam today but history strongly suggest.--We will order ultrasound of the uterus.  Transabdominal should be easy but trend vaginal may be very difficult.  Musculoskeletal:      Cervical back: Neck supple.   Lymphadenopathy:      Lower Body: No right inguinal adenopathy. No left inguinal adenopathy.   Skin:     General: Skin is dry.      Findings: No rash.      Nails: There is no clubbing.   Neurological:      Mental Status: She is alert and oriented to person, place, and time.   Psychiatric:         Behavior: Behavior is cooperative.           Assessment & Plan .  Problem List Items Addressed This Visit        Unprioritized    Atrophic vaginitis    Current Assessment & Plan     New dx.  Pelvic exam done today, swabbed for vaginosis and sent to the lab, pap smear obtained, pt. Declines treatment.         Pelvic and perineal pain    Relevant Orders    NuSwab VG+ - Swab, Vagina (Completed)    Postmenopausal vaginal bleeding - Primary    Current Assessment & Plan     Pelvic exam done today, swabbed for vaginosis and sent to the lab, pap smear obtained, advised Transvaginal U/S need to rule out uterine cancer.         Relevant Orders    IGP,Aptima HPV,Age Gdln    US Pelvis Transvaginal Non OB (Completed)    Urinary incontinence    Current Assessment & Plan     Patient unable to provide urine sample.         Relevant Orders    NuSwab VG+ - Swab, Vagina (Completed)    Vaginal bleeding    Current Assessment & Plan     Per history without seen on exam today.  She is ultrasound of her uterus to rule out uterine cancer.  Transabdominal will be easy but transvaginal may be very difficult due to her severe atrophy and pelvic problems.  We called and discussed with the tech if need be  they will report on the transvaginal

## 2023-05-16 DIAGNOSIS — G89.4 CHRONIC PAIN SYNDROME: ICD-10-CM

## 2023-05-16 RX ORDER — DULOXETIN HYDROCHLORIDE 60 MG/1
60 CAPSULE, DELAYED RELEASE ORAL DAILY
Qty: 90 CAPSULE | Refills: 1 | Status: SHIPPED | OUTPATIENT
Start: 2023-05-16

## 2023-05-16 NOTE — TELEPHONE ENCOUNTER
Patient scheduled with dr. Korin MORLEY on 5- @ 8:45 am, pt. Notified and records faxed to [phone]

## 2023-05-20 PROBLEM — N93.9 VAGINAL BLEEDING: Status: ACTIVE | Noted: 2023-05-20

## 2023-05-20 PROBLEM — S00.03XA CONTUSION OF SCALP: Status: RESOLVED | Noted: 2022-01-20 | Resolved: 2023-05-20
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
## 2023-05-20 PROBLEM — R10.2 PELVIC AND PERINEAL PAIN: Status: ACTIVE | Noted: 2023-05-20

## 2023-05-20 PROBLEM — H65.93 BILATERAL SEROUS OTITIS MEDIA: Status: RESOLVED | Noted: 2023-03-02 | Resolved: 2023-05-20

## 2023-05-20 PROBLEM — R10.30 LOWER ABDOMINAL PAIN: Status: RESOLVED | Noted: 2023-02-02 | Resolved: 2023-05-20

## 2023-05-20 LAB
A VAGINAE DNA VAG QL NAA+PROBE: NORMAL SCORE
BVAB2 DNA VAG QL NAA+PROBE: NORMAL SCORE
C ALBICANS DNA VAG QL NAA+PROBE: NEGATIVE
C GLABRATA DNA VAG QL NAA+PROBE: NEGATIVE
C TRACH DNA VAG QL NAA+PROBE: NEGATIVE
MEGA1 DNA VAG QL NAA+PROBE: NORMAL SCORE
N GONORRHOEA DNA VAG QL NAA+PROBE: NEGATIVE
T VAGINALIS DNA VAG QL NAA+PROBE: NEGATIVE

## 2023-05-20 NOTE — ASSESSMENT & PLAN NOTE
Per history without seen on exam today.  She is ultrasound of her uterus to rule out uterine cancer.  Transabdominal will be easy but transvaginal may be very difficult due to her severe atrophy and pelvic problems.  We called and discussed with the tech if need be they will report on the transvaginal

## 2023-05-22 LAB
AGE GDLN ACOG TESTING: NORMAL
CYTOLOGIST CVX/VAG CYTO: NORMAL
CYTOLOGY CVX/VAG DOC CYTO: NORMAL
CYTOLOGY CVX/VAG DOC THIN PREP: NORMAL
DX ICD CODE: NORMAL
HIV 1 & 2 AB SER-IMP: NORMAL
Lab: NORMAL
OTHER STN SPEC: NORMAL
STAT OF ADQ CVX/VAG CYTO-IMP: NORMAL

## 2023-05-26 ENCOUNTER — HOSPITAL ENCOUNTER (OUTPATIENT)
Dept: CARDIOLOGY | Facility: HOSPITAL | Age: 83
Discharge: HOME | End: 2023-05-26
Payer: MEDICARE

## 2023-05-26 ENCOUNTER — LAB (OUTPATIENT)
Dept: LAB | Facility: HOSPITAL | Age: 83
End: 2023-05-26
Payer: MEDICARE

## 2023-05-26 ENCOUNTER — HOSPITAL ENCOUNTER (OUTPATIENT)
Dept: GENERAL RADIOLOGY | Facility: HOSPITAL | Age: 83
Discharge: HOME | End: 2023-05-26
Payer: MEDICARE

## 2023-05-26 LAB
ABO GROUP BLD: NORMAL
AMORPH URATE CRY URNS QL MICRO: ABNORMAL /HPF
ANION GAP SERPL CALCULATED.3IONS-SCNC: 10 MMOL/L (ref 5–15)
BACTERIA UR QL AUTO: ABNORMAL /HPF
BASOPHILS # BLD AUTO: 0.04 10*3/MM3 (ref 0–0.2)
BASOPHILS NFR BLD AUTO: 0.5 % (ref 0–1.5)
BILIRUB UR QL STRIP: NEGATIVE
BLD GP AB SCN SERPL QL: NEGATIVE
BUN SERPL-MCNC: 15 MG/DL (ref 8–23)
BUN/CREAT SERPL: 19.2 (ref 7–25)
CALCIUM SPEC-SCNC: 9 MG/DL (ref 8.6–10.5)
CHLORIDE SERPL-SCNC: 101 MMOL/L (ref 98–107)
CLARITY UR: ABNORMAL
CO2 SERPL-SCNC: 26 MMOL/L (ref 22–29)
COLOR UR: YELLOW
CREAT SERPL-MCNC: 0.78 MG/DL (ref 0.57–1)
DEPRECATED RDW RBC AUTO: 40.9 FL (ref 37–54)
EGFRCR SERPLBLD CKD-EPI 2021: 76.9 ML/MIN/1.73
EOSINOPHIL # BLD AUTO: 0.13 10*3/MM3 (ref 0–0.4)
EOSINOPHIL NFR BLD AUTO: 1.7 % (ref 0.3–6.2)
ERYTHROCYTE [DISTWIDTH] IN BLOOD BY AUTOMATED COUNT: 12.6 % (ref 12.3–15.4)
GLUCOSE SERPL-MCNC: 94 MG/DL (ref 65–99)
GLUCOSE UR STRIP-MCNC: NEGATIVE MG/DL
HCT VFR BLD AUTO: 36.2 % (ref 34–46.6)
HGB BLD-MCNC: 11.9 G/DL (ref 12–15.9)
HGB UR QL STRIP.AUTO: ABNORMAL
HYALINE CASTS UR QL AUTO: ABNORMAL /LPF
IMM GRANULOCYTES # BLD AUTO: 0.02 10*3/MM3 (ref 0–0.05)
IMM GRANULOCYTES NFR BLD AUTO: 0.3 % (ref 0–0.5)
KETONES UR QL STRIP: ABNORMAL
LEUKOCYTE ESTERASE UR QL STRIP.AUTO: ABNORMAL
LYMPHOCYTES # BLD AUTO: 0.83 10*3/MM3 (ref 0.7–3.1)
LYMPHOCYTES NFR BLD AUTO: 10.9 % (ref 19.6–45.3)
MCH RBC QN AUTO: 29.1 PG (ref 26.6–33)
MCHC RBC AUTO-ENTMCNC: 32.9 G/DL (ref 31.5–35.7)
MCV RBC AUTO: 88.5 FL (ref 79–97)
MONOCYTES # BLD AUTO: 0.67 10*3/MM3 (ref 0.1–0.9)
MONOCYTES NFR BLD AUTO: 8.8 % (ref 5–12)
NEUTROPHILS NFR BLD AUTO: 5.91 10*3/MM3 (ref 1.7–7)
NEUTROPHILS NFR BLD AUTO: 77.8 % (ref 42.7–76)
NITRITE UR QL STRIP: NEGATIVE
NRBC BLD AUTO-RTO: 0 /100 WBC (ref 0–0.2)
PH UR STRIP.AUTO: <=5 [PH] (ref 5–8)
PLATELET # BLD AUTO: 352 10*3/MM3 (ref 140–450)
PMV BLD AUTO: 10.7 FL (ref 6–12)
POTASSIUM SERPL-SCNC: 4.6 MMOL/L (ref 3.5–5.2)
PROT UR QL STRIP: NEGATIVE
QT INTERVAL: 405 MS
RBC # BLD AUTO: 4.09 10*6/MM3 (ref 3.77–5.28)
RBC # UR STRIP: ABNORMAL /HPF
REF LAB TEST METHOD: ABNORMAL
RH BLD: POSITIVE
SARS-COV-2 ORF1AB RESP QL NAA+PROBE: NOT DETECTED
SODIUM SERPL-SCNC: 137 MMOL/L (ref 136–145)
SP GR UR STRIP: 1.01 (ref 1–1.03)
SQUAMOUS #/AREA URNS HPF: ABNORMAL /HPF
T&S EXPIRATION DATE: NORMAL
UROBILINOGEN UR QL STRIP: ABNORMAL
WBC # UR STRIP: ABNORMAL /HPF
WBC NRBC COR # BLD: 7.6 10*3/MM3 (ref 3.4–10.8)

## 2023-05-26 PROCEDURE — 93005 ELECTROCARDIOGRAM TRACING: CPT | Performed by: OBSTETRICS & GYNECOLOGY

## 2023-05-26 PROCEDURE — 71046 X-RAY EXAM CHEST 2 VIEWS: CPT

## 2023-05-26 PROCEDURE — 86901 BLOOD TYPING SEROLOGIC RH(D): CPT

## 2023-05-26 PROCEDURE — 86900 BLOOD TYPING SEROLOGIC ABO: CPT

## 2023-05-26 PROCEDURE — 80048 BASIC METABOLIC PNL TOTAL CA: CPT

## 2023-05-26 PROCEDURE — 85025 COMPLETE CBC W/AUTO DIFF WBC: CPT

## 2023-05-26 PROCEDURE — 86850 RBC ANTIBODY SCREEN: CPT

## 2023-05-26 PROCEDURE — 81001 URINALYSIS AUTO W/SCOPE: CPT

## 2023-05-26 PROCEDURE — 87635 SARS-COV-2 COVID-19 AMP PRB: CPT

## 2023-05-30 NOTE — PAT
Abnormal u/a faxed to Dr. Lieberman.  Awaiting response.    Dr. Lieberman is aware per Tosin YOO at her office.

## 2023-05-31 ENCOUNTER — TELEPHONE (OUTPATIENT)
Dept: FAMILY MEDICINE CLINIC | Facility: CLINIC | Age: 83
End: 2023-05-31

## 2023-05-31 NOTE — TELEPHONE ENCOUNTER
Called Vida and she is doing ok.  She did not want to follow up appt with Dr. Gonzales.  I offered to schedule an appointment to take out suture, she said she would go to the ER to have them removed.

## 2023-06-06 ENCOUNTER — ANESTHESIA (OUTPATIENT)
Dept: PERIOP | Facility: HOSPITAL | Age: 83
End: 2023-06-06
Payer: MEDICARE

## 2023-06-06 ENCOUNTER — ANESTHESIA EVENT (OUTPATIENT)
Dept: PERIOP | Facility: HOSPITAL | Age: 83
End: 2023-06-06
Payer: MEDICARE

## 2023-06-06 ENCOUNTER — HOSPITAL ENCOUNTER (OUTPATIENT)
Facility: HOSPITAL | Age: 83
Setting detail: HOSPITAL OUTPATIENT SURGERY
Discharge: HOME | End: 2023-06-06
Attending: OBSTETRICS & GYNECOLOGY | Admitting: OBSTETRICS & GYNECOLOGY
Payer: MEDICARE

## 2023-06-06 VITALS
DIASTOLIC BLOOD PRESSURE: 53 MMHG | OXYGEN SATURATION: 97 % | BODY MASS INDEX: 28.78 KG/M2 | HEIGHT: 62 IN | TEMPERATURE: 97.7 F | RESPIRATION RATE: 16 BRPM | WEIGHT: 156.4 LBS | SYSTOLIC BLOOD PRESSURE: 134 MMHG | HEART RATE: 63 BPM

## 2023-06-06 RX ORDER — SODIUM CHLORIDE, SODIUM LACTATE, POTASSIUM CHLORIDE, CALCIUM CHLORIDE 600; 310; 30; 20 MG/100ML; MG/100ML; MG/100ML; MG/100ML
9 INJECTION, SOLUTION INTRAVENOUS CONTINUOUS PRN
Status: DISCONTINUED | OUTPATIENT
Start: 2023-06-06 | End: 2023-06-09 | Stop reason: HOSPADM

## 2023-06-06 RX ORDER — SODIUM CHLORIDE 0.9 % (FLUSH) 0.9 %
10 SYRINGE (ML) INJECTION AS NEEDED
Status: DISCONTINUED | OUTPATIENT
Start: 2023-06-06 | End: 2023-06-09 | Stop reason: HOSPADM

## 2023-06-06 RX ORDER — SODIUM CHLORIDE 0.9 % (FLUSH) 0.9 %
10 SYRINGE (ML) INJECTION EVERY 12 HOURS SCHEDULED
Status: DISCONTINUED | OUTPATIENT
Start: 2023-06-06 | End: 2023-06-09 | Stop reason: HOSPADM

## 2023-06-06 NOTE — H&P
Patient Care Team:  Mustapha Gonzales MD as PCP - General (Family Medicine)  Ulices Sue/MD Johnny as Surgeon (Orthopedic Surgery)  Chris Garcia MD as Consulting Physician (Ophthalmology)    Chief complaint postmenopausal bleeding    Subjective     Patient is a 82 y.o. female presents with postmenopausal bleeding.  Her PCP did an ultrasound which revealed an endometrial thickness of 6.8 mm and an intrauterine mass.     Review of Systems   Pertinent items are noted in HPI    History  Past Medical History:   Diagnosis Date    Advanced directives, counseling/discussion     Impression: Discussion w/ pt regarding Advanced directives. POST/Living Will form discussed at length & reviewed with pt. Pt states she does want CPR. Reviewed Medical interventions w/ pt & differences between each Comfort, Limited & Full. Pt opted for full. Discussed use of antibiotics at the end of life, pt chose to use antibiotics consistent w/ treatment goals.    Allergic contact dermatitis due to plants, except food     Impression: Worsening-probably due to poison ivy. Steroid injection given to patient. If not improving, return to office for re-evaluation.    Anemia     unspecified type. Impression: worse Hgb down to 10.7 from 11.5 -denies epitaxis, hemoptisis, heartburn, hematura, blood in stool or black tarry stool; Advised to start Iron-OTC 1 a day. she declines more aggressive work up at the present but should have a colonoscopy. she wants her hip fixed 1st    Aortic valve insufficiency     etiology of cardiac valve disease unspecified. Impression: Echo done 12/2015 - [facility].    Atypical squamous cells of undetermined significance (ASCUS) on Papanicolaou smear of cervix     Impression: Advised to repeat pap smear yearly. she is reluctent due to cost but I advised her it was covered with a G and Q code but she wanted me to gurentee no charge which I cannot promise. I also encouraged breast exam and mammo gram  but again she wanted me to gurentee no cost. I advised her she might consider exam with gyn but that they probably would use the same codes    Breast cancer screening     Impression: agreed to schedule mammo but advised ideally should also have a breast exam - she did not want to schedule at the present    Cervical cancer screening     Impression: Doing excellent. Follow conservatively.    Chronic pain syndrome     Impression: Doing well. Continue with Elavil as presently prescribed, Discussed benifits and side effect of medicine. Patient aware of high risk medication. Follow conservatively. Discussed decreasing celexa from 10mg BID to daily.    Dependent edema     Impression: New dx. Advised patient to elevate lower extremities above the level of the heart as much as possible, patient states she will try as possible. States she probably isn't able to.    Edema extremities     Impression: mild. Patient to return if symptoms worsen or change. Advised to elevate legs above level of heart as needed.    Elevated diaphragm     Impression: New dx. shown on chest xray from 2012, will follow conservatively    Esophageal hernia     Impression: Doing well. Follow conservatively.    First degree atrioventricular block     Impression: Doing excellent. Follow conservatively.    Gastroesophageal reflux disease without esophagitis     Impression: no sx at present but this could be the cause of her anemia    Hyperglycemia     Impression: Worse: hgb a1c 6.0 was 5.6. Encouraged to watch sugar intake, exercise more and lose weight.    Hypertension, benign     Impression: Doing well; Encouraged to watch salt, exercise more and lose weight    Hypertensive left ventricular hypertrophy, without heart failure     Impression: Echo done 12/2015 - [facility].    Hyponatremia     Impression: worse at 134 - repeat with next labs    Insomnia, persistent     Impression: New dx. Worsening. Advised patient she may try increasing her elavil  from 50mg to 75mg. Discussed benefits and side effect of medicine. Patient to return if symptoms worsen or change.    Irritable bowel syndrome without diarrhea     Impression: Doing well. Follow conservatively.    Left hip pain     Impression: Worsening; reviewed xray of the left hip with patient. Advised patient that she needs to see here surgeon, Dr. Lane. Patient prefers to call and make her appointment.    Left knee pain     Impression: Worse, will try to schedule ortho appt. sooner than 10-16-18    Left leg pain     Impression: Worse - discussed addiction potential of ativan femi with narcotic and muscle relax combination    Left shoulder pain     Impression: New dx. Left shoulder pain after fall, pt. sent to Xray. Will call pt. with xray results if broken will schedule pt. with DR. Arredondo or Dr. Fleming. Pt. placed in a small sling.    Lumbar degenerative disc disease     Story: Spinal fusion done 3/24/10 and 8/12/2011. Impression: Worsening; Offered further testing, patient prefers to hold on this until after hip has been repaired.    Medicare annual wellness visit, subsequent     with abnormal findings    Mitral valve disease     Impression: Echo done 12/2015 - [facility].    Mixed hyperlipidemia     Impression: Labs done 04/16/19 Tot 172 up from 156, HDL 46 down from 47, Trig 152 up from 118,  up from 88 Worsening Encouraged to watch fatty intake, exercise more, and lose weight . Compliant with meds Goals developed at last visit were not met because Is not getting adequate diet and exercise( due to hip pain) Follow up in 3 months Care management needs are self addressed.    Onychomycosis     Impression: Stable, pt. declines tx    Other constipation     Impression: New dx. Pain probably due to IBS, Start Citrucel 1tbsp mixed in 8oz of fluid daily, may gradually increase up to three times daily for a goal of 2 BM that float in the commode daily. Advised to increase fiber, beans, rice, fruits,  veggies.    Other hypotension     Impression: Improving- Patient states that while in Rehab b/p readings were low and she did not recieve medications on some days.    Overweight     Impression: Stable    Pain due to total knee replacement, sequela     Impression: Right hip-Worsening- will xray today.    Patellar fracture 08/2020    Peripheral vascular disease     Impression: JEROME last year was abnorm on the left last year thus will send for vasc studies    Periprosthetic fracture around internal prosthetic left hip joint     subsequent encounter. Impression: Patient was seen by Dr. Joseph who referred to Linette Weaver.    Rheumatoid arthritis involving multiple sites with positive rheumatoid factor     Impression: stable dc mobic ndue to anemia    Right hip pain     S/P arthroscopic surgery of right knee 03/16/2021    S/P spinal fusion     Impression: Doing well since surgery on 1-9-17.    Screening for depression     Negative Depression Screening (4 or less) ()    Seasonal allergic rhinitis due to pollen     Impression: Doing well.    Status post hip surgery     Impression: Doing well from surgery on 11-14-18- Total left hip prosthesis.    Status post revision of total hip 2016    R EDSON    Total knee replacement status, left 2003    Total knee replacement status, right 07/07/2020    Uterine leiomyoma     unspecified location. Impression: Discussed following with GYN for a scope, will discuss further after hip surgery    Vitamin D deficiency     Impression: Doing well, patient gets plenty of time in the Sun. Vitamin D. level normal. Follow conservatively.     Past Surgical History:   Procedure Laterality Date    BREAST BIOPSY Right 1974    Dr Briseida Alonso    CATARACT EXTRACTION W/ INTRAOCULAR LENS IMPLANT      7/20/10-rt. eye-Dr. Leon 7/13/10- Lt. eye-Dr. Leon    FINGER SURGERY  2001    Revision of Finger joints. Dr. Brumfield w/Drs. Kleinert & Marlon    HIP ENDOPROSTHESIS Left 05/22/2014    [facility] endoprostehetic  replacement of left hip. Dr. Reza Choudhury.    JOINT REPLACEMENT      KNEE ARTHROSCOPY Left     [1987] Dr Poon-torn medial meniscus [1995] Dr. Schaffer -torn medial meniscus    [facility]  2000    Biopsy of cervix. for MAJOR-1 by Dr. Knight    LUMBAR DISCECTOMY  1994    Hemilaminectomy, foraminectomy & discectomy. Dr. Velasquez, L4-L5 w/posterior lateral fusion & screw fixatoin    LUMBAR LAMINECTOMY  12/19/2011    foraminotomies, medial facetectomies L5-C1saiwg redo. left L5-S1 lumbar discectomy. [facility]-Dr. De Oliveira- 5 units of blood given to pt.    POSTERIOR LUMBAR/THORACIC SPINE FUSION  2002    Fusion T-5 through L-1. Had T-11 burst fracture. Recuperated at [facility]    SPINAL FUSION      Lower Back. 7/11/2011-[facility] - Hardware removal from L3-L5, Dr. De Oliveira [facility] - [facility] Dr Gomez and Dr. Momin, Anterior approach Spinal Fusion L5-S1 3--[facility]. Dr. De Oliveira and Dr. Dean. Failed posterior fusion with loose instrumentation. L-4 thru L-5. No complications.    TONSILLECTOMY  1951    Dr Mcginnis    TOTAL HIP ARTHROPLASTY Right 04/10/2017    [facility], Inpatient. Dr. Reinoso    TOTAL KNEE ARTHROPLASTY Left 2002    Dr Sue @ [facility]     Family History   Problem Relation Age of Onset    Ovarian cancer Mother     Arthritis Mother     Heart failure Father     Suicidality Brother     Heart disease Maternal Aunt     Cancer Other         malignant neoplasm of gastrointestinal tract- in her 50's    Arthritis Other     Cervical cancer Other     Arthritis Other     Diabetes Other      Social History     Tobacco Use    Smoking status: Never    Smokeless tobacco: Never   Substance Use Topics    Alcohol use: No    Drug use: No       Allergies  Allergies   Allergen Reactions    Morphine And Related Hallucinations     HALLUCINATIONS         Medications  Medications Prior to Admission   Medication Sig Dispense Refill  Last Dose    acetaminophen (TYLENOL) 650 MG 8 hr tablet Take 1 tablet by mouth 2 (Two) Times a Day.       amitriptyline (ELAVIL) 75 MG tablet TAKE 1 TABLET EVERY NIGHT AT BEDTIME (Patient taking differently: Take 1 tablet by mouth Every Night.) 90 tablet 1     atorvastatin (LIPITOR) 10 MG tablet TAKE 1 TABLET EVERY DAY (Patient taking differently: Take 1 tablet by mouth Daily.) 90 tablet 1     Calcium Carbonate-Vitamin D (CALCIUM 600/VITAMIN D PO) Take 1 tablet by mouth 2 (Two) Times a Day.   5/23/2023    celecoxib (CeleBREX) 200 MG capsule TAKE 1 CAPSULE EVERY DAY (Patient taking differently: Take 1 capsule by mouth Daily. Last Dose 5/30) 90 capsule 0     diphenhydrAMINE (BENADRYL) 25 mg capsule Take 1 capsule by mouth Daily.       DULoxetine (CYMBALTA) 60 MG capsule Take 1 capsule by mouth Daily. 90 capsule 1     enalapril (VASOTEC) 10 MG tablet Take 0.5 tablets by mouth 2 (Two) Times a Day. (Patient taking differently: Take 5 mg by mouth 2 (Two) Times a Day. HOLD 24 hours before surgery) 180 tablet 1     gabapentin (NEURONTIN) 300 MG capsule TAKE 1 CAPSULE TWICE DAILY (Patient taking differently: Take 1 capsule by mouth 2 (Two) Times a Day.) 180 capsule 0     Glycerin-Hypromellose- (ARTIFICIAL TEARS) 0.2-0.2-1 % solution ophthalmic solution        loratadine (CLARITIN) 10 MG tablet Take 1 tablet by mouth Daily.       Lutein-Zeaxanthin (OCUVITE LUTEIN 25 PO) Take  by mouth. Last dose 5/30       montelukast (SINGULAIR) 10 MG tablet TAKE 1 TABLET EVERY DAY (Patient taking differently: Take 1 tablet by mouth Every Night.) 90 tablet 1     pantoprazole (PROTONIX) 40 MG EC tablet TAKE 1 TABLET EVERY DAY 90 tablet 1     LORazepam (Ativan) 0.5 MG tablet Take 1 tablet by mouth Every 8 (Eight) Hours As Needed for Anxiety. 20 tablet 0     propranolol (INDERAL) 20 MG/5ML solution Take 5 mL by mouth 3 (Three) Times a Day. 30 mL 5     Wheat Dextrin (BENEFIBER DRINK MIX PO) Take  by mouth As Needed.          Objective  "    Vital Signs  Vitals:    05/23/23 1625   Weight: 72.6 kg (160 lb)   Height: 157.5 cm (62\")       Physical Exam:      General Appearance:    Alert, cooperative, in no acute distress   Lungs:     Clear to auscultation,respirations regular.    Heart:    Regular rhythm and normal rate.   Breast Exam:    Deferred   Abdomen:     Normal bowel sounds, no masses, soft non-tender, non-distended, no guarding, no rebound tenderness   Genitalia:  Normal external female genitalia.  Vault is without masses lesions or discharge.  Cervix is stenotic.  Uterus is normal size and nontender.  Adnexa are benign.   Extremities:   Moves all extremities well, no edema, no cyanosis, no              redness   Pulses:   Pulses palpable and equal bilaterally       Results Review:      Lab Results (last 48 hours)       ** No results found for the last 48 hours. **             Imaging Results (Last 48 Hours)       ** No results found for the last 48 hours. **            Assessment & Plan       * No active hospital problems. *      Postmenopausal bleeding with thickened endometrium and an intrauterine mass.  Recommend hysteroscopy D&C.  Patient stands the risk of the procedure infection, bleeding, damage to the uterus and underlying organs like the bowel or the bladder.  She understands there is a remote risk of anesthetic reaction and death.  All questions answered.  She desires to proceed with same.      Jackie Lieberman MD  06/06/23  13:38 EDT        "

## 2023-06-06 NOTE — ANESTHESIA PREPROCEDURE EVALUATION
" Anesthesia Evaluation     NPO Solid Status: > 8 hours  NPO Liquid Status: > 8 hours           Airway   Mallampati: I  TM distance: >3 FB  Neck ROM: full  No difficulty expected  Dental - normal exam   (+) upper dentures    Pulmonary - normal exam   Cardiovascular - normal exam    ECG reviewed  Patient on routine beta blocker    (+) hypertension, valvular problems/murmurs AIdysrhythmias, PVD, hyperlipidemia      Neuro/Psych  (+) tremors, numbness, psychiatric history  GI/Hepatic/Renal/Endo    (+) hiatal hernia, GERD    Musculoskeletal     (+) chronic pain, myalgias, neck pain  Abdominal  - normal exam    Bowel sounds: normal.   Substance History      OB/GYN          Other   arthritis, autoimmune disease rheumatoid arthritis,     ROS/Med Hx Other: FIRST DEGREE AV BLOCK  AI/MR \"GREATLY IMPROVED\"    Edema, hypotension, esophageal hernia, IBS, allergies    PSH  TOTAL HIP ARTHROPLASTYHIP ENDOPROSTHESISLUMBAR LAMINECTOMYSPINAL FUSIONCATARACT EXTRACTION W/ INTRAOCULAR LENS IMPLANTTOTAL KNEE ARTHROPLASTYPOSTERIOR LUMBAR/THORACIC SPINE FUSIONFINGER SURGERYLEEPLUMBAR DISCECTOMYBREAST BIOPSYTONSILLECTOMYKNEE ARTHROSCOPYJOINT REPLACEMENT      Phys Exam Other: UPPER DENTURE. A FEW REMAINING LOWER INCISORS POOR CONDITION. NONE LOOSE              Anesthesia Plan    ASA 3     general     (Patient identified; pre-operative vital signs, all relevant labs/studies, complete medical/surgical/anesthetic history, full medication list, full allergy list, and NPO status obtained/reviewed; physical assessment performed; anesthetic options, side effects, potential complications, risks, and benefits discussed; questions answered; written anesthesia consent obtained; patient cleared for procedure; anesthesia machine and equipment checked and functioning)  intravenous induction     Anesthetic plan, risks, benefits, and alternatives have been provided, discussed and informed consent has been obtained with: patient.  Pre-procedure education " provided  Plan discussed with CRNA.    CODE STATUS:

## 2023-06-07 NOTE — NURSING NOTE
"Procedure not performed.  OR  Lilia, spoke with MD Lieberman, ;who was in the OR., in another case. MD Lieberman sent her apologies but requested that the pt reschedule her procedure.  Explained this to pt and family member at .  IV discontinued.  Pt dressed and prepared to leave. Pt voiced concern that she hoped she was \"not charged\" since the procedure was not performed.  Pt and family member exited the OPS area.  "

## 2023-06-09 ENCOUNTER — HOSPITAL ENCOUNTER (OUTPATIENT)
Facility: HOSPITAL | Age: 83
Setting detail: HOSPITAL OUTPATIENT SURGERY
Discharge: HOME | End: 2023-06-09
Attending: OBSTETRICS & GYNECOLOGY | Admitting: OBSTETRICS & GYNECOLOGY
Payer: MEDICARE

## 2023-06-09 VITALS
WEIGHT: 158.4 LBS | OXYGEN SATURATION: 96 % | HEART RATE: 68 BPM | TEMPERATURE: 96.9 F | DIASTOLIC BLOOD PRESSURE: 62 MMHG | HEIGHT: 62 IN | RESPIRATION RATE: 20 BRPM | SYSTOLIC BLOOD PRESSURE: 159 MMHG | BODY MASS INDEX: 29.15 KG/M2

## 2023-06-09 DIAGNOSIS — N95.0 POSTMENOPAUSAL BLEEDING: Primary | ICD-10-CM

## 2023-06-09 LAB
ABO GROUP BLD: NORMAL
BLD GP AB SCN SERPL QL: NEGATIVE
RH BLD: POSITIVE
T&S EXPIRATION DATE: NORMAL

## 2023-06-09 PROCEDURE — 88305 TISSUE EXAM BY PATHOLOGIST: CPT | Performed by: OBSTETRICS & GYNECOLOGY

## 2023-06-09 PROCEDURE — 86850 RBC ANTIBODY SCREEN: CPT | Performed by: OBSTETRICS & GYNECOLOGY

## 2023-06-09 PROCEDURE — 25010000002 SUGAMMADEX 200 MG/2ML SOLUTION

## 2023-06-09 PROCEDURE — 25010000002 ONDANSETRON PER 1 MG

## 2023-06-09 PROCEDURE — 25010000002 FENTANYL CITRATE (PF) 100 MCG/2ML SOLUTION

## 2023-06-09 PROCEDURE — 25010000002 PROPOFOL 200 MG/20ML EMULSION

## 2023-06-09 PROCEDURE — 86901 BLOOD TYPING SEROLOGIC RH(D): CPT | Performed by: OBSTETRICS & GYNECOLOGY

## 2023-06-09 PROCEDURE — 25010000002 DEXAMETHASONE PER 1 MG

## 2023-06-09 PROCEDURE — 86900 BLOOD TYPING SEROLOGIC ABO: CPT | Performed by: OBSTETRICS & GYNECOLOGY

## 2023-06-09 RX ORDER — NALOXONE HCL 0.4 MG/ML
0.4 VIAL (ML) INJECTION AS NEEDED
Status: DISCONTINUED | OUTPATIENT
Start: 2023-06-09 | End: 2023-06-09 | Stop reason: HOSPADM

## 2023-06-09 RX ORDER — IPRATROPIUM BROMIDE AND ALBUTEROL SULFATE 2.5; .5 MG/3ML; MG/3ML
3 SOLUTION RESPIRATORY (INHALATION) ONCE AS NEEDED
Status: DISCONTINUED | OUTPATIENT
Start: 2023-06-09 | End: 2023-06-09 | Stop reason: HOSPADM

## 2023-06-09 RX ORDER — DIPHENHYDRAMINE HYDROCHLORIDE 50 MG/ML
12.5 INJECTION INTRAMUSCULAR; INTRAVENOUS ONCE AS NEEDED
Status: DISCONTINUED | OUTPATIENT
Start: 2023-06-09 | End: 2023-06-09 | Stop reason: HOSPADM

## 2023-06-09 RX ORDER — HYDROCODONE BITARTRATE AND ACETAMINOPHEN 5; 325 MG/1; MG/1
1-2 TABLET ORAL EVERY 4 HOURS PRN
Qty: 19 TABLET | Refills: 0 | Status: SHIPPED | OUTPATIENT
Start: 2023-06-09

## 2023-06-09 RX ORDER — ONDANSETRON 2 MG/ML
4 INJECTION INTRAMUSCULAR; INTRAVENOUS ONCE AS NEEDED
Status: DISCONTINUED | OUTPATIENT
Start: 2023-06-09 | End: 2023-06-09 | Stop reason: HOSPADM

## 2023-06-09 RX ORDER — HYDROCODONE BITARTRATE AND ACETAMINOPHEN 5; 325 MG/1; MG/1
1 TABLET ORAL ONCE AS NEEDED
Status: DISCONTINUED | OUTPATIENT
Start: 2023-06-09 | End: 2023-06-09 | Stop reason: HOSPADM

## 2023-06-09 RX ORDER — ROCURONIUM BROMIDE 10 MG/ML
INJECTION, SOLUTION INTRAVENOUS AS NEEDED
Status: DISCONTINUED | OUTPATIENT
Start: 2023-06-09 | End: 2023-06-09 | Stop reason: SURG

## 2023-06-09 RX ORDER — SODIUM CHLORIDE 0.9 % (FLUSH) 0.9 %
10 SYRINGE (ML) INJECTION AS NEEDED
Status: DISCONTINUED | OUTPATIENT
Start: 2023-06-09 | End: 2023-06-09 | Stop reason: HOSPADM

## 2023-06-09 RX ORDER — SODIUM CHLORIDE, SODIUM LACTATE, POTASSIUM CHLORIDE, CALCIUM CHLORIDE 600; 310; 30; 20 MG/100ML; MG/100ML; MG/100ML; MG/100ML
9 INJECTION, SOLUTION INTRAVENOUS CONTINUOUS PRN
Status: DISCONTINUED | OUTPATIENT
Start: 2023-06-09 | End: 2023-06-09 | Stop reason: HOSPADM

## 2023-06-09 RX ORDER — PROPOFOL 10 MG/ML
INJECTION, EMULSION INTRAVENOUS AS NEEDED
Status: DISCONTINUED | OUTPATIENT
Start: 2023-06-09 | End: 2023-06-09 | Stop reason: SURG

## 2023-06-09 RX ORDER — PROCHLORPERAZINE EDISYLATE 5 MG/ML
10 INJECTION INTRAMUSCULAR; INTRAVENOUS ONCE AS NEEDED
Status: DISCONTINUED | OUTPATIENT
Start: 2023-06-09 | End: 2023-06-09 | Stop reason: HOSPADM

## 2023-06-09 RX ORDER — LIDOCAINE HYDROCHLORIDE 10 MG/ML
INJECTION, SOLUTION EPIDURAL; INFILTRATION; INTRACAUDAL; PERINEURAL AS NEEDED
Status: DISCONTINUED | OUTPATIENT
Start: 2023-06-09 | End: 2023-06-09 | Stop reason: SURG

## 2023-06-09 RX ORDER — FENTANYL CITRATE 50 UG/ML
50 INJECTION, SOLUTION INTRAMUSCULAR; INTRAVENOUS
Status: DISCONTINUED | OUTPATIENT
Start: 2023-06-09 | End: 2023-06-09 | Stop reason: HOSPADM

## 2023-06-09 RX ORDER — FENTANYL CITRATE 50 UG/ML
INJECTION, SOLUTION INTRAMUSCULAR; INTRAVENOUS AS NEEDED
Status: DISCONTINUED | OUTPATIENT
Start: 2023-06-09 | End: 2023-06-09 | Stop reason: SURG

## 2023-06-09 RX ORDER — FLUMAZENIL 0.1 MG/ML
0.1 INJECTION INTRAVENOUS AS NEEDED
Status: DISCONTINUED | OUTPATIENT
Start: 2023-06-09 | End: 2023-06-09 | Stop reason: HOSPADM

## 2023-06-09 RX ORDER — SODIUM CHLORIDE 0.9 % (FLUSH) 0.9 %
10 SYRINGE (ML) INJECTION EVERY 12 HOURS SCHEDULED
Status: DISCONTINUED | OUTPATIENT
Start: 2023-06-09 | End: 2023-06-09 | Stop reason: HOSPADM

## 2023-06-09 RX ORDER — DEXAMETHASONE SODIUM PHOSPHATE 4 MG/ML
INJECTION, SOLUTION INTRA-ARTICULAR; INTRALESIONAL; INTRAMUSCULAR; INTRAVENOUS; SOFT TISSUE AS NEEDED
Status: DISCONTINUED | OUTPATIENT
Start: 2023-06-09 | End: 2023-06-09 | Stop reason: SURG

## 2023-06-09 RX ORDER — HYDROCODONE BITARTRATE AND ACETAMINOPHEN 7.5; 325 MG/1; MG/1
1 TABLET ORAL ONCE AS NEEDED
Status: DISCONTINUED | OUTPATIENT
Start: 2023-06-09 | End: 2023-06-09 | Stop reason: HOSPADM

## 2023-06-09 RX ORDER — ONDANSETRON 2 MG/ML
INJECTION INTRAMUSCULAR; INTRAVENOUS AS NEEDED
Status: DISCONTINUED | OUTPATIENT
Start: 2023-06-09 | End: 2023-06-09 | Stop reason: SURG

## 2023-06-09 RX ORDER — LABETALOL HYDROCHLORIDE 5 MG/ML
5 INJECTION, SOLUTION INTRAVENOUS
Status: DISCONTINUED | OUTPATIENT
Start: 2023-06-09 | End: 2023-06-09 | Stop reason: HOSPADM

## 2023-06-09 RX ORDER — FENTANYL CITRATE 50 UG/ML
25 INJECTION, SOLUTION INTRAMUSCULAR; INTRAVENOUS
Status: DISCONTINUED | OUTPATIENT
Start: 2023-06-09 | End: 2023-06-09 | Stop reason: HOSPADM

## 2023-06-09 RX ORDER — HYDRALAZINE HYDROCHLORIDE 20 MG/ML
5 INJECTION INTRAMUSCULAR; INTRAVENOUS
Status: DISCONTINUED | OUTPATIENT
Start: 2023-06-09 | End: 2023-06-09 | Stop reason: HOSPADM

## 2023-06-09 RX ADMIN — FENTANYL CITRATE 25 MCG: 50 INJECTION, SOLUTION INTRAMUSCULAR; INTRAVENOUS at 10:39

## 2023-06-09 RX ADMIN — PROPOFOL 60 MG: 10 INJECTION, EMULSION INTRAVENOUS at 10:41

## 2023-06-09 RX ADMIN — FENTANYL CITRATE 25 MCG: 50 INJECTION, SOLUTION INTRAMUSCULAR; INTRAVENOUS at 11:10

## 2023-06-09 RX ADMIN — DEXAMETHASONE SODIUM PHOSPHATE 4 MG: 4 INJECTION, SOLUTION INTRAMUSCULAR; INTRAVENOUS at 11:04

## 2023-06-09 RX ADMIN — PROPOFOL 80 MG: 10 INJECTION, EMULSION INTRAVENOUS at 10:39

## 2023-06-09 RX ADMIN — SODIUM CHLORIDE, POTASSIUM CHLORIDE, SODIUM LACTATE AND CALCIUM CHLORIDE 9 ML/HR: 600; 310; 30; 20 INJECTION, SOLUTION INTRAVENOUS at 09:30

## 2023-06-09 RX ADMIN — LIDOCAINE HYDROCHLORIDE 50 MG: 10 INJECTION, SOLUTION EPIDURAL; INFILTRATION; INTRACAUDAL; PERINEURAL at 10:39

## 2023-06-09 RX ADMIN — SUGAMMADEX 200 MG: 100 INJECTION, SOLUTION INTRAVENOUS at 11:11

## 2023-06-09 RX ADMIN — PROPOFOL 80 MCG/KG/MIN: 10 INJECTION, EMULSION INTRAVENOUS at 10:45

## 2023-06-09 RX ADMIN — ONDANSETRON 4 MG: 2 INJECTION INTRAMUSCULAR; INTRAVENOUS at 11:08

## 2023-06-09 RX ADMIN — FENTANYL CITRATE 25 MCG: 50 INJECTION, SOLUTION INTRAMUSCULAR; INTRAVENOUS at 11:21

## 2023-06-09 RX ADMIN — ROCURONIUM BROMIDE 40 MG: 10 INJECTION, SOLUTION INTRAVENOUS at 10:40

## 2023-06-09 NOTE — OP NOTE
Subjective     Date of Service:  06/09/23    Pre-operative diagnosis(es): Postmenopausal bleeding  Thickened endometrium on ultrasound     Post-operative diagnosis(es): Same plus polyp and heart-shaped uterus noted on hysteroscopy   Procedure(s): Hysteroscopy D&C         Surgeon:    MD Mio       EBL: 20 cc   Antibiotics: None ordered on call to OR     Anesthesia:  General Anesthesia       Objective      Operative findings: Uterus sounded to 7 cm.  There was a thick band in the middle making the internal uterine cavity consistent with a heart-shaped.  There is a small polyp arising from the left tubal area.  At the end of the procedure the endometrial walls were smooth and gritty throughout and the polyp was removed.     Description of Procedure:   The risks, benefits, alternatives, and indications of the procedure were discussed with the patient. She voiced an understanding of the procedure and signed the consent.  The patient was taken to the OR where general anesthesia was administered without difficulty. She was placed in the dorsal lithotomy position. An exam under anesthesia revealed a 7-week sized retroverted uterus with the cervix closed. The patient was prepared and draped in the normal sterile fashion.  A weighted speculum was inserted in the posterior aspect of the vagina. A single tooth-tooth tenaculum was used to grasp the anterior lip of the cervix.  An endocervical curettage was performed and the specimen was collected and sent to pathology.  The uterus was carefully sounded to 7 cm. The cervix was dilated with Hegar dilators to size 9.  A serrated banjo curette was gently inserted uterine cavity and curettage was performed in a circumferential fashion with minimal tissue being obtained.  Polyp forceps were used to remove any residual tissue and a small polyp was noted to be removed as well.  A smooth banjo curette followed by a serrated banjo curette was then again used to curette the endometrium.   The endometrial walls were smooth and gritty throughout and no more tissue was obtained.  Polyp forceps were used to remove any residual tissue.  Hysteroscopy was again performed which revealed no residual endometrial tissue and the polyp was removed.  The patient tolerated the procedure well. The instrument and sponge counts were correct times two. The patient was awakened from general anesthesia and taken to the recovery room in a stable condition.     Specimens removed:   Endocervical and endometrial curettings     Complications:   None     Condition:   stable     Disposition:   to PACU and then admit to  medical - surgical floor               Jackie Lieberman MD  06/09/23  11:34 EDT

## 2023-06-09 NOTE — ANESTHESIA PROCEDURE NOTES
Airway  Urgency: elective    Date/Time: 6/9/2023 10:43 AM  End Time:6/9/2023 10:44 AM  Airway not difficult    General Information and Staff    Patient location during procedure: OR  Anesthesiologist: Manoj Landeros MD  CRNA/CAA: Sunny Jameson CAA    Indications and Patient Condition  Indications for airway management: airway protection    Preoxygenated: yes  MILS maintained throughout  Mask difficulty assessment: 1 - vent by mask    Final Airway Details  Final airway type: endotracheal airway      Successful airway: ETT  Cuffed: yes   Successful intubation technique: direct laryngoscopy  Endotracheal tube insertion site: oral  Blade: Corey  Blade size: 3  ETT size (mm): 6.5  Cormack-Lehane Classification: grade I - full view of glottis  Placement verified by: chest auscultation and capnometry   Measured from: teeth  ETT/EBT  to teeth (cm): 20  Number of attempts at approach: 2  Assessment: lips, teeth, and gum same as pre-op and atraumatic intubation

## 2023-06-09 NOTE — H&P
Patient Care Team:  Mustapha Gonzales MD as PCP - General (Family Medicine)  Ulices Sue/MD Johnny as Surgeon (Orthopedic Surgery)  Chris Garcia MD as Consulting Physician (Ophthalmology)    Chief complaint postmenopausal bleeding and abnormal ultrasound    Subjective     Patient is a 82 y.o. female presents with postmenopausal bleeding and thickened endometrium on ultrasound.     Review of Systems   Pertinent items are noted in HPI    History  Past Medical History:   Diagnosis Date    Advanced directives, counseling/discussion     Impression: Discussion w/ pt regarding Advanced directives. POST/Living Will form discussed at length & reviewed with pt. Pt states she does want CPR. Reviewed Medical interventions w/ pt & differences between each Comfort, Limited & Full. Pt opted for full. Discussed use of antibiotics at the end of life, pt chose to use antibiotics consistent w/ treatment goals.    Allergic contact dermatitis due to plants, except food     Impression: Worsening-probably due to poison ivy. Steroid injection given to patient. If not improving, return to office for re-evaluation.    Anemia     unspecified type. Impression: worse Hgb down to 10.7 from 11.5 -denies epitaxis, hemoptisis, heartburn, hematura, blood in stool or black tarry stool; Advised to start Iron-OTC 1 a day. she declines more aggressive work up at the present but should have a colonoscopy. she wants her hip fixed 1st    Aortic valve insufficiency     etiology of cardiac valve disease unspecified. Impression: Echo done 12/2015 - [facility].    Atypical squamous cells of undetermined significance (ASCUS) on Papanicolaou smear of cervix     Impression: Advised to repeat pap smear yearly. she is reluctent due to cost but I advised her it was covered with a G and Q code but she wanted me to gurentee no charge which I cannot promise. I also encouraged breast exam and mammo gram but again she wanted me to gurentee no  cost. I advised her she might consider exam with gyn but that they probably would use the same codes    Breast cancer screening     Impression: agreed to schedule mammo but advised ideally should also have a breast exam - she did not want to schedule at the present    Cervical cancer screening     Impression: Doing excellent. Follow conservatively.    Chronic pain syndrome     Impression: Doing well. Continue with Elavil as presently prescribed, Discussed benifits and side effect of medicine. Patient aware of high risk medication. Follow conservatively. Discussed decreasing celexa from 10mg BID to daily.    Dependent edema     Impression: New dx. Advised patient to elevate lower extremities above the level of the heart as much as possible, patient states she will try as possible. States she probably isn't able to.    Edema extremities     Impression: mild. Patient to return if symptoms worsen or change. Advised to elevate legs above level of heart as needed.    Elevated diaphragm     Impression: New dx. shown on chest xray from 2012, will follow conservatively    Esophageal hernia     Impression: Doing well. Follow conservatively.    First degree atrioventricular block     Impression: Doing excellent. Follow conservatively.    Gastroesophageal reflux disease without esophagitis     Impression: no sx at present but this could be the cause of her anemia    Hyperglycemia     Impression: Worse: hgb a1c 6.0 was 5.6. Encouraged to watch sugar intake, exercise more and lose weight.    Hypertension, benign     Impression: Doing well; Encouraged to watch salt, exercise more and lose weight    Hypertensive left ventricular hypertrophy, without heart failure     Impression: Echo done 12/2015 - [facility].    Hyponatremia     Impression: worse at 134 - repeat with next labs    Insomnia, persistent     Impression: New dx. Worsening. Advised patient she may try increasing her elavil from 50mg to 75mg. Discussed benefits and  side effect of medicine. Patient to return if symptoms worsen or change.    Irritable bowel syndrome without diarrhea     Impression: Doing well. Follow conservatively.    Left hip pain     Impression: Worsening; reviewed xray of the left hip with patient. Advised patient that she needs to see here surgeon, Dr. Lane. Patient prefers to call and make her appointment.    Left knee pain     Impression: Worse, will try to schedule ortho appt. sooner than 10-16-18    Left leg pain     Impression: Worse - discussed addiction potential of ativan femi with narcotic and muscle relax combination    Left shoulder pain     Impression: New dx. Left shoulder pain after fall, pt. sent to Xray. Will call pt. with xray results if broken will schedule pt. with DR. Arredondo or Dr. Fleming. Pt. placed in a small sling.    Lumbar degenerative disc disease     Story: Spinal fusion done 3/24/10 and 8/12/2011. Impression: Worsening; Offered further testing, patient prefers to hold on this until after hip has been repaired.    Medicare annual wellness visit, subsequent     with abnormal findings    Mitral valve disease     Impression: Echo done 12/2015 - [facility].    Mixed hyperlipidemia     Impression: Labs done 04/16/19 Tot 172 up from 156, HDL 46 down from 47, Trig 152 up from 118,  up from 88 Worsening Encouraged to watch fatty intake, exercise more, and lose weight . Compliant with meds Goals developed at last visit were not met because Is not getting adequate diet and exercise( due to hip pain) Follow up in 3 months Care management needs are self addressed.    Onychomycosis     Impression: Stable, pt. declines tx    Other constipation     Impression: New dx. Pain probably due to IBS, Start Citrucel 1tbsp mixed in 8oz of fluid daily, may gradually increase up to three times daily for a goal of 2 BM that float in the commode daily. Advised to increase fiber, beans, rice, fruits, veggies.    Other hypotension     Impression:  Improving- Patient states that while in Rehab b/p readings were low and she did not recieve medications on some days.    Overweight     Impression: Stable    Pain due to total knee replacement, sequela     Impression: Right hip-Worsening- will xray today.    Patellar fracture 08/2020    Peripheral vascular disease     Impression: JEROME last year was abnorm on the left last year thus will send for vasc studies    Periprosthetic fracture around internal prosthetic left hip joint     subsequent encounter. Impression: Patient was seen by Dr. Joseph who referred to Linette Weaver.    Rheumatoid arthritis involving multiple sites with positive rheumatoid factor     Impression: stable dc mobic ndue to anemia    Right hip pain     S/P arthroscopic surgery of right knee 03/16/2021    S/P spinal fusion     Impression: Doing well since surgery on 1-9-17.    Screening for depression     Negative Depression Screening (4 or less) ()    Seasonal allergic rhinitis due to pollen     Impression: Doing well.    Status post hip surgery     Impression: Doing well from surgery on 11-14-18- Total left hip prosthesis.    Status post revision of total hip 2016    R EDSON    Total knee replacement status, left 2003    Total knee replacement status, right 07/07/2020    Uterine leiomyoma     unspecified location. Impression: Discussed following with GYN for a scope, will discuss further after hip surgery    Vitamin D deficiency     Impression: Doing well, patient gets plenty of time in the Sun. Vitamin D. level normal. Follow conservatively.     Past Surgical History:   Procedure Laterality Date    BREAST BIOPSY Right 1974    Dr Briseida Alonso    CATARACT EXTRACTION W/ INTRAOCULAR LENS IMPLANT      7/20/10-rt. eye-Dr. Leon 7/13/10- Lt. eye-Dr. Leon    FINGER SURGERY  2001    Revision of Finger joints. Dr. Brumfield w/Drs. Kleinert & Marlon    HIP ENDOPROSTHESIS Left 05/22/2014    Osteonics endoprostehetic replacement of left hip. Dr. Reza Choudhury.    JOINT  REPLACEMENT      KNEE ARTHROSCOPY Left     [1987] Dr Poon-torn medial meniscus [1995] Dr. Schaffer -torn medial meniscus    [facility]  2000    Biopsy of cervix. for MAJOR-1 by Dr. Knight    LUMBAR DISCECTOMY  1994    Hemilaminectomy, foraminectomy & discectomy. Dr. Velasquez, L4-L5 w/posterior lateral fusion & screw fixatoin    LUMBAR LAMINECTOMY  12/19/2011    foraminotomies, medial facetectomies L5-C1qbmso redo. left L5-S1 lumbar discectomy. [facility]-Dr. De Oliviera- 5 units of blood given to pt.    POSTERIOR LUMBAR/THORACIC SPINE FUSION  2002    Fusion T-5 through L-1. Had T-11 burst fracture. Recuperated at [facility]    SPINAL FUSION      Lower Back. 7/11/2011-[facility] - Hardware removal from L3-L5, Dr. De Oliveira surgeon[facility] Dr Gomez and Dr. Momin, Anterior approach Spinal Fusion L5-S1 3--[facility]. Dr. De Oliveira and Dr. Dean. Failed posterior fusion with loose instrumentation. L-4 thru L-5. No complications.    TONSILLECTOMY  1951    Dr Mcginnis    TOTAL HIP ARTHROPLASTY Right 04/10/2017    [facility], Inpatient. Dr. Reinoso    TOTAL KNEE ARTHROPLASTY Left 2002    Dr Sue @ [facility]     Family History   Problem Relation Age of Onset    Ovarian cancer Mother     Arthritis Mother     Heart failure Father     Suicidality Brother     Heart disease Maternal Aunt     Cancer Other         malignant neoplasm of gastrointestinal tract- in her 50's    Arthritis Other     Cervical cancer Other     Arthritis Other     Diabetes Other      Social History     Tobacco Use    Smoking status: Never    Smokeless tobacco: Never   Substance Use Topics    Alcohol use: No    Drug use: No       Allergies  Allergies   Allergen Reactions    Morphine And Related Hallucinations     HALLUCINATIONS         Medications  Medications Prior to Admission   Medication Sig Dispense Refill Last Dose    acetaminophen (TYLENOL) 650 MG 8 hr  tablet Take 1 tablet by mouth 2 (Two) Times a Day.   6/8/2023    amitriptyline (ELAVIL) 75 MG tablet TAKE 1 TABLET EVERY NIGHT AT BEDTIME (Patient taking differently: Take 1 tablet by mouth Every Night.) 90 tablet 1 6/8/2023    atorvastatin (LIPITOR) 10 MG tablet TAKE 1 TABLET EVERY DAY (Patient taking differently: Take 1 tablet by mouth Daily.) 90 tablet 1 6/8/2023    diphenhydrAMINE (BENADRYL) 25 mg capsule Take 1 capsule by mouth Daily.   6/8/2023    DULoxetine (CYMBALTA) 60 MG capsule Take 1 capsule by mouth Daily. 90 capsule 1 6/8/2023    enalapril (VASOTEC) 10 MG tablet Take 0.5 tablets by mouth 2 (Two) Times a Day. (Patient taking differently: Take 5 mg by mouth 2 (Two) Times a Day. HOLD 24 hours before surgery) 180 tablet 1 6/8/2023    gabapentin (NEURONTIN) 300 MG capsule TAKE 1 CAPSULE TWICE DAILY (Patient taking differently: Take 1 capsule by mouth 2 (Two) Times a Day.) 180 capsule 0 6/8/2023    Glycerin-Hypromellose- (ARTIFICIAL TEARS) 0.2-0.2-1 % solution ophthalmic solution    Past Week    loratadine (CLARITIN) 10 MG tablet Take 1 tablet by mouth Daily.   6/8/2023    LORazepam (Ativan) 0.5 MG tablet Take 1 tablet by mouth Every 8 (Eight) Hours As Needed for Anxiety. 20 tablet 0 6/8/2023    montelukast (SINGULAIR) 10 MG tablet TAKE 1 TABLET EVERY DAY (Patient taking differently: Take 1 tablet by mouth Every Night.) 90 tablet 1 6/8/2023    pantoprazole (PROTONIX) 40 MG EC tablet TAKE 1 TABLET EVERY DAY 90 tablet 1 6/8/2023    propranolol (INDERAL) 20 MG/5ML solution Take 5 mL by mouth 3 (Three) Times a Day. 30 mL 5 6/9/2023 at 0545    Wheat Dextrin (BENEFIBER DRINK MIX PO) Take  by mouth As Needed.   6/8/2023    Calcium Carbonate-Vitamin D (CALCIUM 600/VITAMIN D PO) Take 1 tablet by mouth 2 (Two) Times a Day.   5/26/2023    celecoxib (CeleBREX) 200 MG capsule TAKE 1 CAPSULE EVERY DAY (Patient taking differently: Take 1 capsule by mouth Daily. Last Dose 5/30) 90 capsule 0 5/30/2023     "Lutein-Zeaxanthin (OCUVITE LUTEIN 25 PO) Take  by mouth. Last dose 5/30 5/30/2023       Objective     Vital Signs  Vitals:    06/09/23 0820   BP: 171/65   Pulse: 61   Resp: 22   Temp: 97.8 °F (36.6 °C)   TempSrc: Oral   SpO2: 98%   Weight: 71.8 kg (158 lb 6.4 oz)   Height: 157.5 cm (62\")       Physical Exam:      General Appearance:    Alert, cooperative, in no acute distress   Lungs:     Clear to auscultation,respirations regular.    Heart:    Regular rhythm and normal rate.   Breast Exam:    Deferred   Abdomen:     Normal bowel sounds, no masses, soft non-tender, non-distended, no guarding, no rebound tenderness   Genitalia:  Normal atrophic external female genitalia.  Vaginal vault is atrophic.  Cervix is atrophic.  Uterus and adnexa are benign   Extremities:   Moves all extremities well, no edema, no cyanosis, no              redness   Pulses:   Pulses palpable and equal bilaterally       Results Review:      Lab Results (last 48 hours)       ** No results found for the last 48 hours. **             Imaging Results (Last 48 Hours)       ** No results found for the last 48 hours. **            Assessment & Plan       * No active hospital problems. *      Postmenopausal bleeding with thickened endometrium seen on ultrasound.  Patient scheduled for hysteroscopy D&C.      Jackie Lieberman MD  06/09/23  10:04 EDT        "

## 2023-06-12 LAB
LAB AP CASE REPORT: NORMAL
PATH REPORT.FINAL DX SPEC: NORMAL
PATH REPORT.GROSS SPEC: NORMAL

## 2023-06-19 NOTE — ANESTHESIA POSTPROCEDURE EVALUATION
Patient: Vida Jamison    Procedure Summary       Date: 06/09/23 Room / Location: [facility] OR [address] [place] [address] MAIN OR    Anesthesia Start: 1031 Anesthesia Stop: 1122    Procedure: DILATATION AND CURETTAGE HYSTEROSCOPY (Uterus) Diagnosis:       Postmenopausal bleeding      (Postmenopausal bleeding [N95.0])    Surgeons: Jackie Lieberman MD Provider: Manoj Landeros MD    Anesthesia Type: general ASA Status: 3            Anesthesia Type: general    Vitals  Vitals Value Taken Time   /69 06/09/23 1207   Temp 97.4 °F (36.3 °C) 06/09/23 1207   Pulse 95 06/09/23 1207   Resp 20 06/09/23 1207   SpO2 93 % 06/09/23 1207           Post Anesthesia Care and Evaluation    Patient location during evaluation: PACU  Patient participation: complete - patient participated  Level of consciousness: awake  Pain scale: See nurse's notes for pain score.  Pain management: adequate    Airway patency: patent  Anesthetic complications: No anesthetic complications  PONV Status: none  Cardiovascular status: acceptable  Respiratory status: acceptable and spontaneous ventilation  Hydration status: acceptable    Comments: Patient seen and examined postoperatively; vital signs stable; SpO2 greater than or equal to 90%; cardiopulmonary status stable; nausea/vomiting adequately controlled; pain adequately controlled; no apparent anesthesia complications; patient discharged from anesthesia care when discharge criteria were met

## 2023-06-22 NOTE — ADDENDUM NOTE
Addendum  created 06/22/23 1132 by Manoj Landeros MD    Attestation recorded in Intraprocedure, Intraprocedure Attestations filed

## 2023-08-05 LAB
ALBUMIN SERPL-MCNC: 4.2 G/DL (ref 3.8–4.8)
ALBUMIN/GLOB SERPL: 1.9 {RATIO} (ref 1.2–2.2)
ALP SERPL-CCNC: 84 IU/L (ref 44–121)
ALT SERPL-CCNC: 8 IU/L (ref 0–32)
AST SERPL-CCNC: 15 IU/L (ref 0–40)
BILIRUB SERPL-MCNC: 0.5 MG/DL (ref 0–1.2)
BUN SERPL-MCNC: 17 MG/DL (ref 8–27)
BUN/CREAT SERPL: 23 (ref 12–28)
CALCIUM SERPL-MCNC: 9 MG/DL (ref 8.7–10.3)
CHLORIDE SERPL-SCNC: 99 MMOL/L (ref 96–106)
CHOLEST SERPL-MCNC: 162 MG/DL (ref 100–199)
CHOLEST/HDLC SERPL: 4 RATIO (ref 0–4.4)
CO2 SERPL-SCNC: 24 MMOL/L (ref 20–29)
CREAT SERPL-MCNC: 0.74 MG/DL (ref 0.57–1)
EGFRCR SERPLBLD CKD-EPI 2021: 82 ML/MIN/1.73
GLOBULIN SER CALC-MCNC: 2.2 G/DL (ref 1.5–4.5)
GLUCOSE SERPL-MCNC: 90 MG/DL (ref 70–99)
HBA1C MFR BLD: 6.1 % (ref 4.8–5.6)
HDLC SERPL-MCNC: 41 MG/DL
LDLC SERPL CALC-MCNC: 100 MG/DL (ref 0–99)
POTASSIUM SERPL-SCNC: 4.6 MMOL/L (ref 3.5–5.2)
PROT SERPL-MCNC: 6.4 G/DL (ref 6–8.5)
SODIUM SERPL-SCNC: 138 MMOL/L (ref 134–144)
TRIGL SERPL-MCNC: 117 MG/DL (ref 0–149)
VLDLC SERPL CALC-MCNC: 21 MG/DL (ref 5–40)

## 2023-08-09 DIAGNOSIS — I10 HYPERTENSION, BENIGN: ICD-10-CM

## 2023-08-09 DIAGNOSIS — J30.1 SEASONAL ALLERGIC RHINITIS DUE TO POLLEN: ICD-10-CM

## 2023-08-14 DIAGNOSIS — J30.1 SEASONAL ALLERGIC RHINITIS DUE TO POLLEN: ICD-10-CM

## 2023-08-14 DIAGNOSIS — G89.4 CHRONIC PAIN SYNDROME: ICD-10-CM

## 2023-08-14 DIAGNOSIS — I10 HYPERTENSION, BENIGN: ICD-10-CM

## 2023-08-14 RX ORDER — MONTELUKAST SODIUM 10 MG/1
TABLET ORAL
Qty: 90 TABLET | Refills: 0 | Status: SHIPPED | OUTPATIENT
Start: 2023-08-14 | End: 2023-08-14 | Stop reason: SDUPTHER

## 2023-08-14 RX ORDER — MONTELUKAST SODIUM 10 MG/1
10 TABLET ORAL DAILY
Qty: 90 TABLET | Refills: 0 | Status: SHIPPED | OUTPATIENT
Start: 2023-08-14

## 2023-08-14 RX ORDER — DULOXETIN HYDROCHLORIDE 60 MG/1
60 CAPSULE, DELAYED RELEASE ORAL DAILY
Qty: 90 CAPSULE | Refills: 0 | Status: SHIPPED | OUTPATIENT
Start: 2023-08-14

## 2023-09-11 DIAGNOSIS — J30.1 SEASONAL ALLERGIC RHINITIS DUE TO POLLEN: ICD-10-CM

## 2023-09-11 DIAGNOSIS — M51.36 LUMBAR DEGENERATIVE DISC DISEASE: ICD-10-CM

## 2023-09-17 RX ORDER — GABAPENTIN 300 MG/1
300 CAPSULE ORAL 2 TIMES DAILY
Qty: 60 CAPSULE | Refills: 1 | Status: SHIPPED | OUTPATIENT
Start: 2023-09-17

## 2023-09-17 RX ORDER — MONTELUKAST SODIUM 10 MG/1
TABLET ORAL
Qty: 90 TABLET | Refills: 0 | Status: SHIPPED | OUTPATIENT
Start: 2023-09-17

## 2023-09-20 ENCOUNTER — TELEPHONE (OUTPATIENT)
Dept: FAMILY MEDICINE CLINIC | Facility: CLINIC | Age: 83
End: 2023-09-20
Payer: MEDICARE

## 2023-09-20 ENCOUNTER — ON CAMPUS - OUTPATIENT (OUTPATIENT)
Dept: RURAL HOSPITAL 3 | Facility: HOSPITAL | Age: 83
End: 2023-09-20
Payer: COMMERCIAL

## 2023-09-20 DIAGNOSIS — R19.4 CHANGE IN BOWEL HABIT: ICD-10-CM

## 2023-09-20 DIAGNOSIS — D12.2 BENIGN NEOPLASM OF ASCENDING COLON: ICD-10-CM

## 2023-09-20 DIAGNOSIS — D12.0 BENIGN NEOPLASM OF CECUM: ICD-10-CM

## 2023-09-20 DIAGNOSIS — K59.00 CONSTIPATION, UNSPECIFIED: ICD-10-CM

## 2023-09-20 PROCEDURE — 45385 COLONOSCOPY W/LESION REMOVAL: CPT | Performed by: INTERNAL MEDICINE

## 2023-09-20 NOTE — TELEPHONE ENCOUNTER
Patient had a colonoscopy today and was told by her gastro doctor that her medications were too high. She is requesting an appoitnment. Please advise

## 2023-09-21 NOTE — TELEPHONE ENCOUNTER
Caller: Vida Jamison    Relationship: Self    Best call back number: [phone]     What is the best time to reach you: ANY TIME    Who are you requesting to speak with (clinical staff, provider,  specific staff member): DR BRAXTON    Do you know the name of the person who called: VIDA JAMISON    What was the call regarding: SCHEDULING ISSUE.  PATIENT STATES NEEDS IN THIS WEEK OR NEXT WEEK.  FIRST AVAILABLE IS 10/02/2023.  SHE WANTS TO SEE DR BRAXTON.  Is it okay if the provider responds through [employer]: NO

## 2023-09-27 ENCOUNTER — OFFICE VISIT (OUTPATIENT)
Dept: FAMILY MEDICINE CLINIC | Facility: CLINIC | Age: 83
End: 2023-09-27
Payer: MEDICARE

## 2023-09-27 VITALS
SYSTOLIC BLOOD PRESSURE: 112 MMHG | HEIGHT: 62 IN | HEART RATE: 85 BPM | RESPIRATION RATE: 18 BRPM | TEMPERATURE: 98 F | BODY MASS INDEX: 29.81 KG/M2 | OXYGEN SATURATION: 98 % | WEIGHT: 162 LBS | DIASTOLIC BLOOD PRESSURE: 70 MMHG

## 2023-09-27 DIAGNOSIS — E55.9 VITAMIN D DEFICIENCY: ICD-10-CM

## 2023-09-27 DIAGNOSIS — E78.2 MIXED HYPERLIPIDEMIA: ICD-10-CM

## 2023-09-27 DIAGNOSIS — G89.4 CHRONIC PAIN SYNDROME: ICD-10-CM

## 2023-09-27 DIAGNOSIS — R73.9 HYPERGLYCEMIA: ICD-10-CM

## 2023-09-27 DIAGNOSIS — Z23 NEED FOR IMMUNIZATION AGAINST INFLUENZA: ICD-10-CM

## 2023-09-27 DIAGNOSIS — M81.0 OSTEOPOROSIS, UNSPECIFIED OSTEOPOROSIS TYPE, UNSPECIFIED PATHOLOGICAL FRACTURE PRESENCE: ICD-10-CM

## 2023-09-27 DIAGNOSIS — D50.8 OTHER IRON DEFICIENCY ANEMIA: ICD-10-CM

## 2023-09-27 DIAGNOSIS — I10 HYPERTENSION, BENIGN: ICD-10-CM

## 2023-09-27 DIAGNOSIS — K59.00 CONSTIPATION, UNSPECIFIED CONSTIPATION TYPE: Primary | ICD-10-CM

## 2023-09-27 DIAGNOSIS — D48.9 NEOPLASM, UNCERTAIN WHETHER BENIGN OR MALIGNANT: ICD-10-CM

## 2023-09-27 DIAGNOSIS — K63.5 POLYP OF COLON, UNSPECIFIED PART OF COLON, UNSPECIFIED TYPE: ICD-10-CM

## 2023-09-27 PROCEDURE — 99214 OFFICE O/P EST MOD 30 MIN: CPT | Performed by: FAMILY MEDICINE

## 2023-09-27 PROCEDURE — 3078F DIAST BP <80 MM HG: CPT | Performed by: FAMILY MEDICINE

## 2023-09-27 PROCEDURE — 90662 IIV NO PRSV INCREASED AG IM: CPT | Performed by: FAMILY MEDICINE

## 2023-09-27 PROCEDURE — 3074F SYST BP LT 130 MM HG: CPT | Performed by: FAMILY MEDICINE

## 2023-09-27 PROCEDURE — G0008 ADMIN INFLUENZA VIRUS VAC: HCPCS | Performed by: FAMILY MEDICINE

## 2023-09-27 NOTE — ASSESSMENT & PLAN NOTE
Lipid and CMP done 8-4-23, read by me, reviewed with pt.  Trig. 117 down from 137, Tot. Chol. 162 up from 156, HDL 41 same as last,  up from 91  Doing well.   Encouraged to watch fatty intake, exercise more, and lose weight.   Compliant with medication.  Patient tolerated Lipitor well without side effects. I feel the benefits of the medication outweigh the risks.   Is  getting adequate diet and exercise  Goals developed at last visit were met   Follow up in 3  months  Care management needs are self-addressed.  Self-management abilities addressed and patient is capable of managing her own disease.

## 2023-09-27 NOTE — ASSESSMENT & PLAN NOTE
Worse.  A1c done 8-4-23, read by me, reviewed with pt.  A1c was 6.1 up from 6.0  Encourged to watch sugar intake, exercise more, lose weight.  Need to consider starting metformin

## 2023-09-27 NOTE — PROGRESS NOTES
Subjective   Vida Jamison is a 83 y.o. female.     History of Present Illness  Lesion of the right cheek, changing in color.  Constipation  This is a chronic problem. The current episode started more than 1 year ago. The problem has been gradually improving since onset. Her stool frequency is 2 to 3 times per week. The patient is on a high fiber diet. She Exercises regularly. There has Been adequate water intake. Associated symptoms include bloating. Pertinent negatives include no fever, nausea or weight loss.   Hypertension  This is a chronic problem. The current episode started more than 1 year ago. The problem is unchanged. The problem is controlled. Pertinent negatives include no chest pain, palpitations or shortness of breath. Risk factors for coronary artery disease include dyslipidemia.   Hyperlipidemia  This is a chronic problem. The current episode started more than 1 year ago. The problem is controlled. Factors aggravating her hyperlipidemia include fatty foods. Pertinent negatives include no chest pain, myalgias or shortness of breath. Current antihyperlipidemic treatment includes statins. There are no compliance problems.       The following portions of the patient's history were reviewed and updated as appropriate: allergies, current medications, past family history, past medical history, past social history, past surgical history, and problem list.    Family History   Problem Relation Age of Onset    Ovarian cancer Mother     Arthritis Mother     Heart failure Father     Suicidality Brother     Heart disease Maternal Aunt     Cancer Other         malignant neoplasm of gastrointestinal tract- in her 50's    Arthritis Other     Cervical cancer Other     Arthritis Other     Diabetes Other        Social History     Tobacco Use    Smoking status: Never    Smokeless tobacco: Never   Substance Use Topics    Alcohol use: No    Drug use: No       Past Surgical History:   Procedure Laterality Date    BREAST  BIOPSY Right 1974    Dr Briseida Alonso    CATARACT EXTRACTION W/ INTRAOCULAR LENS IMPLANT      7/20/10-rt. eye-Dr. Leon 7/13/10- Lt. eye-Dr. Leon    D & C HYSTEROSCOPY N/A 6/9/2023    Procedure: DILATATION AND CURETTAGE HYSTEROSCOPY;  Surgeon: Jackie Lieberman MD;  Location: [facility];  Service: Obstetrics/Gynecology;  Laterality: N/A;    FINGER SURGERY  2001    Revision of Finger joints. Dr. Brumfield w/Drs. Kleinert & Marlon    HIP ENDOPROSTHESIS Left 05/22/2014    Osteonics endoprostehetic replacement of left hip. Dr. Reza Choudhury.    JOINT REPLACEMENT      KNEE ARTHROSCOPY Left     [1987] Dr Poon-torumm medial meniscus [1995] Dr. Schaffer -torumm medial meniscus    LEE  2000    Biopsy of cervix. for MAJOR-1 by Dr. Knight    LUMBAR DISCECTOMY  1994    Hemilaminectomy, foraminectomy & discectomy. Dr. Velasquez, L4-L5 w/posterior lateral fusion & screw fixatoin    LUMBAR LAMINECTOMY  12/19/2011    foraminotomies, medial facetectomies [id number] redo. left L5-S1 lumbar discectomy. [facility]. [facility] Olive[facility]- 5 units of blood given to pt.    POSTERIOR LUMBAR/THORACIC SPINE FUSION  2002    Fusion T-5 through L-1. Had T-11 burst fracture. Recuperated at [facility]    SPINAL FUSION      Lower Back. 7/11/2011-[facility] - Hardware removal from L3-L5, Dr. De Oliveira surgeon[facility] - Dr Gomez and Dr. Momin, Anterior approach Spinal Fusion L5-S1 3--[facility]. Dr. De Oliveira and Dr. Dean. Failed posterior fusion with loose instrumentation. L-4 thru L-5. No complications.    TONSILLECTOMY  1951    Dr Mcginnis    TOTAL HIP ARTHROPLASTY Right 04/10/2017    [facility] Inpatient. Dr. Reinoso    TOTAL KNEE ARTHROPLASTY Left 2002    Dr Sue @ [facility]       Patient Active Problem List   Diagnosis    Gastroesophageal reflux disease without esophagitis    Pain in hip region after total hip replacement    Loose total hip arthroplasty    Mixed  hyperlipidemia    Myalgia and myositis    Hypertension, benign    Peripheral vascular disease    Periprosthetic fracture around internal prosthetic left hip joint    Rheumatoid arthritis involving multiple sites with positive rheumatoid factor    S/P lumbar fusion    Spinal stenosis of lumbar region    Carpal tunnel syndrome of right wrist    Anemia    Hyperglycemia    Left hip pain    Vitamin D deficiency    First degree heart block    Chronic pain syndrome    Dependent edema    Medicare annual wellness visit, subsequent    Aortic valve regurgitation    Atypical squamous cell changes of undetermined significance (ASCUS) on vaginal cytology    Elevated diaphragm    Esophageal hernia    Family history of diabetes mellitus    Fusion of spine of lumbar region    Hypertensive left ventricular hypertrophy, without heart failure    Hyponatremia    Mitral valve disease    Status post hip replacement    Elevated alkaline phosphatase level    Chronic pain of right knee    Left knee pain    Seasonal allergic rhinitis due to pollen    Localized edema    Status post knee replacement    Myalgia    Fracture of left patella    Insomnia, persistent    Irritable bowel syndrome without diarrhea    Left shoulder pain    Constipation    Urinary incontinence    Uterine leiomyoma    Hiatal hernia    Scoliosis (and kyphoscoliosis), idiopathic    Chronic neck pain    Osteoporosis    Allergic rhinitis    Arthritis of right hip    Depressive disorder    Lesion of joint capsule of knee region    Tear of meniscus of knee    Osteoarthritis of right knee    Prosthetic and other implants, materials and accessory orthopedic devices associated with adverse incidents    Seasonal allergies    Family history of colon cancer    At high risk for falls    Cervical cancer screening    Chronic right shoulder pain    Right lower quadrant abdominal pain    Immunization counseling    Tremor    Situational anxiety    Postmenopausal vaginal bleeding    Atrophic  vaginitis    Vaginal bleeding    Pelvic and perineal pain    Colon polyps    Neoplasm, uncertain whether benign or malignant    Pure hypercholesterolemia    Back pain       Current Outpatient Medications on File Prior to Visit   Medication Sig Dispense Refill    acetaminophen (TYLENOL) 650 MG 8 hr tablet Take 1 tablet by mouth 2 (Two) Times a Day.      amitriptyline (ELAVIL) 75 MG tablet TAKE 1 TABLET AT BEDTIME 90 tablet 0    atorvastatin (LIPITOR) 10 MG tablet TAKE 1 TABLET EVERY DAY (Patient taking differently: Take 1 tablet by mouth Daily.) 90 tablet 1    Calcium Carbonate-Vitamin D (CALCIUM 600/VITAMIN D PO) Take 1 tablet by mouth 2 (Two) Times a Day.      celecoxib (CeleBREX) 200 MG capsule TAKE 1 CAPSULE EVERY DAY 90 capsule 0    diphenhydrAMINE (BENADRYL) 25 mg capsule Take 1 capsule by mouth Daily.      DULoxetine (CYMBALTA) 60 MG capsule Take 1 capsule by mouth Daily. 90 capsule 0    enalapril (VASOTEC) 10 MG tablet Take 0.5 tablets by mouth 2 (Two) Times a Day. (Patient taking differently: Take 0.5 tablets by mouth Daily. HOLD 24 hours before surgery) 180 tablet 1    gabapentin (NEURONTIN) 300 MG capsule Take 1 capsule by mouth 2 (Two) Times a Day. 60 capsule 1    Glycerin-Hypromellose- (ARTIFICIAL TEARS) 0.2-0.2-1 % solution ophthalmic solution       loratadine (CLARITIN) 10 MG tablet Take 1 tablet by mouth Daily.      Lutein-Zeaxanthin (OCUVITE LUTEIN 25 PO) Take  by mouth. Last dose 5/30      metoprolol tartrate (LOPRESSOR) 25 MG tablet Take 1 tablet by mouth 2 (Two) Times a Day. (Patient taking differently: Take 1 tablet by mouth Daily.) 180 tablet 0    montelukast (SINGULAIR) 10 MG tablet TAKE 1 TABLET EVERY DAY 90 tablet 0    pantoprazole (PROTONIX) 40 MG EC tablet TAKE 1 TABLET EVERY DAY 90 tablet 1    Wheat Dextrin (BENEFIBER DRINK MIX PO) Take  by mouth As Needed.      HYDROcodone-acetaminophen (NORCO) 5-325 MG per tablet Take 1-2 tablets by mouth Every 4 (Four) Hours As Needed (Pain).  "(Patient not taking: Reported on 9/27/2023) 19 tablet 0    LORazepam (Ativan) 0.5 MG tablet Take 1 tablet by mouth Every 8 (Eight) Hours As Needed for Anxiety. (Patient not taking: Reported on 9/27/2023) 20 tablet 0    propranolol (INDERAL) 20 MG/5ML solution Take 5 mL by mouth 3 (Three) Times a Day. (Patient not taking: Reported on 9/27/2023) 30 mL 5     No current facility-administered medications on file prior to visit.       Allergies   Allergen Reactions    Morphine And Related Hallucinations     HALLUCINATIONS         Review of Systems   Constitutional:  Negative for chills, diaphoresis, fatigue, fever, unexpected weight gain and unexpected weight loss.   Respiratory:  Negative for shortness of breath.    Cardiovascular:  Negative for chest pain, palpitations and leg swelling.   Gastrointestinal:  Positive for bloating and constipation. Negative for nausea.   Musculoskeletal:  Negative for myalgias.   Skin:  Positive for skin lesions. Negative for dry skin.   Neurological:  Negative for headache.     Objective   Visit Vitals  /70 (BP Location: Left arm, Patient Position: Sitting, Cuff Size: Adult)   Pulse 85   Temp 98 °F (36.7 °C) (Temporal)   Resp 18   Ht 157.5 cm (62\")   Wt 73.5 kg (162 lb)   SpO2 98%   BMI 29.63 kg/m²     Physical Exam  Vitals and nursing note reviewed.   Constitutional:       Appearance: Normal appearance. She is well-developed.   HENT:      Head: Normocephalic.        Comments: Right cheek raised irregular lesion need to rule out cancer  Neck:      Thyroid: No thyromegaly.      Vascular: No carotid bruit.      Trachea: Trachea normal.   Cardiovascular:      Rate and Rhythm: Normal rate and regular rhythm.      Heart sounds: No murmur heard.    No friction rub. No gallop.   Pulmonary:      Effort: Pulmonary effort is normal. No respiratory distress.      Breath sounds: Normal breath sounds. No wheezing.   Chest:      Chest wall: No tenderness.   Abdominal:      Palpations: Abdomen is " soft.      Tenderness: There is no abdominal tenderness.   Musculoskeletal:      Cervical back: Neck supple.   Skin:     General: Skin is dry.      Findings: No rash.      Nails: There is no clubbing.   Neurological:      Mental Status: She is alert and oriented to person, place, and time.   Psychiatric:         Behavior: Behavior is cooperative.         Assessment & Plan .  Problem List Items Addressed This Visit          Medium    Anemia    Overview     Colonoscopy done September 20, 2023 by Dr. Jackson follow-up polypectomy was done--need to find it pathology         Current Assessment & Plan     Last hemoglobin was 11.9 may have 23--will order labs today and patient will return for results and shared decision making.           Relevant Orders    CBC & Differential    Hypertension, benign    Current Assessment & Plan     Tight control.  Advised to decrease Metoprolol to 12.5 mg daily.  Patient tolerated Enalapril and Metoprolol well without side effects.  We will do trial of lower dose of metoprolol.   Encouraged to watch salt, exercise more and lose weight.           Mixed hyperlipidemia    Current Assessment & Plan     Lipid and CMP done 8-4-23, read by me, reviewed with pt.  Trig. 117 down from 137, Tot. Chol. 162 up from 156, HDL 41 same as last,  up from 91  Doing well.   Encouraged to watch fatty intake, exercise more, and lose weight.   Compliant with medication.  Patient tolerated Lipitor well without side effects. I feel the benefits of the medication outweigh the risks.   Is  getting adequate diet and exercise  Goals developed at last visit were met   Follow up in 3  months  Care management needs are self-addressed.  Self-management abilities addressed and patient is capable of managing her own disease.           Relevant Orders    Lipid Panel With / Chol / HDL Ratio    Comprehensive Metabolic Panel       Low    Chronic pain syndrome    Current Assessment & Plan     Stable.  Discussed decreasing  Amitriptyline to 50 mg, pt. Declines and wants to continue 75 mg daily.  Discussed decreasing Gabapentin to 300 mg daily, pt. Declines and wants to continue 300 mg Gabapentin BID.  Due to anemia need to consider stopping Celebrex         Hyperglycemia    Current Assessment & Plan     Worse.  A1c done 8-4-23, read by me, reviewed with pt.  A1c was 6.1 up from 6.0  Encourged to watch sugar intake, exercise more, lose weight.  Need to consider starting metformin           Relevant Orders    Hemoglobin A1c    Vitamin D deficiency    Current Assessment & Plan     Will order labs today and patient will return for results and shared decision making.           Relevant Orders    Vitamin D,25-Hydroxy       Unprioritized    Colon polyps    Current Assessment & Plan     New dx.  Colonoscopy done 9-20-23 by Dr. Alva--need to find pathology         Constipation - Primary    Current Assessment & Plan     Improving.  Benefiber helps.  Discussed starting Metamucil.         Neoplasm, uncertain whether benign or malignant    Current Assessment & Plan     New dx.  Right cheek.  Discussed shaving versus excision, pt. requests shaving and understand if this is carcinoma would need reexcision--scheduled.         Osteoporosis    Current Assessment & Plan     Stable.  Continue Calcium.          Other Visit Diagnoses       Need for immunization against influenza        Relevant Orders    Fluzone High-Dose 65+yrs (Completed)

## 2023-09-27 NOTE — ASSESSMENT & PLAN NOTE
Last hemoglobin was 11.9 may have 23--will order labs today and patient will return for results and shared decision making.

## 2023-09-27 NOTE — ASSESSMENT & PLAN NOTE
Tight control.  Advised to decrease Metoprolol to 12.5 mg daily.  Patient tolerated Enalapril and Metoprolol well without side effects.  We will do trial of lower dose of metoprolol.   Encouraged to watch salt, exercise more and lose weight.

## 2023-09-27 NOTE — ASSESSMENT & PLAN NOTE
Stable.  Discussed decreasing Amitriptyline to 50 mg, pt. Declines and wants to continue 75 mg daily.  Discussed decreasing Gabapentin to 300 mg daily, pt. Declines and wants to continue 300 mg Gabapentin BID.  Due to anemia need to consider stopping Celebrex

## 2023-09-27 NOTE — ASSESSMENT & PLAN NOTE
New dx.  Right cheek.  Discussed shaving versus excision, pt. requests shaving and understand if this is carcinoma would need reexcision--scheduled.

## 2023-09-30 PROBLEM — M54.9 BACK PAIN: Status: ACTIVE | Noted: 2023-09-30

## 2023-09-30 PROBLEM — E78.00 PURE HYPERCHOLESTEROLEMIA: Status: ACTIVE | Noted: 2023-09-30

## 2023-10-03 ENCOUNTER — TELEPHONE (OUTPATIENT)
Dept: FAMILY MEDICINE CLINIC | Facility: CLINIC | Age: 83
End: 2023-10-03
Payer: MEDICARE

## 2023-10-03 NOTE — TELEPHONE ENCOUNTER
Spoke with pt and advised her to stop Celebrex due to anemia, pt. Stated that she has sever arthritic pain when stopping Celebrex and wants to continue, will discuss further at follow up on 10-18-23

## 2023-10-09 DIAGNOSIS — G89.4 CHRONIC PAIN SYNDROME: ICD-10-CM

## 2023-10-09 RX ORDER — DULOXETIN HYDROCHLORIDE 60 MG/1
60 CAPSULE, DELAYED RELEASE ORAL DAILY
Qty: 90 CAPSULE | Refills: 0 | Status: SHIPPED | OUTPATIENT
Start: 2023-10-09 | End: 2023-10-11 | Stop reason: SDUPTHER

## 2023-10-11 DIAGNOSIS — G89.4 CHRONIC PAIN SYNDROME: ICD-10-CM

## 2023-10-11 RX ORDER — DULOXETIN HYDROCHLORIDE 60 MG/1
60 CAPSULE, DELAYED RELEASE ORAL DAILY
Qty: 90 CAPSULE | Refills: 0 | Status: SHIPPED | OUTPATIENT
Start: 2023-10-11

## 2023-10-18 ENCOUNTER — PROCEDURE VISIT (OUTPATIENT)
Dept: FAMILY MEDICINE CLINIC | Facility: CLINIC | Age: 83
End: 2023-10-18
Payer: MEDICARE

## 2023-10-18 VITALS
HEIGHT: 63 IN | HEART RATE: 87 BPM | BODY MASS INDEX: 29.02 KG/M2 | TEMPERATURE: 98.4 F | SYSTOLIC BLOOD PRESSURE: 152 MMHG | DIASTOLIC BLOOD PRESSURE: 86 MMHG | OXYGEN SATURATION: 96 % | RESPIRATION RATE: 18 BRPM | WEIGHT: 163.8 LBS

## 2023-10-18 DIAGNOSIS — D48.9 NEOPLASM, UNCERTAIN WHETHER BENIGN OR MALIGNANT: ICD-10-CM

## 2023-10-18 DIAGNOSIS — I10 HYPERTENSION, BENIGN: Primary | ICD-10-CM

## 2023-10-18 NOTE — ASSESSMENT & PLAN NOTE
Right cheek, raised irritated lesion, need to rule out carcinoma.  Lesion is 4mm x 4 mm  Area cleaned with betadine, anesthestized with 1% epinephrine and removed via shaving with a 15 blade. Lesion sent to pathology. Hemostasis with electrocautery.

## 2023-10-18 NOTE — ASSESSMENT & PLAN NOTE
Worse.   Advised to increase Metoprolol back to 25 mg daily.   Patient tolerated Enalapril and Metoprolol well without side effects. I feel the benefits of the medication outweigh the risks.   Encouraged to watch salt, exercise more and lose weight.

## 2023-10-18 NOTE — PROGRESS NOTES
Subjective   Vida Jamison is a 83 y.o. female.     History of Present Illness  Follow up skin lesion right cheek irritated raised lesion, need to rule out carcinoma  Hypertension  This is a chronic problem. The current episode started more than 1 year ago. The problem has been gradually worsening since onset. The problem is controlled. Pertinent negatives include no chest pain, palpitations or shortness of breath. The current treatment provides no improvement. There are no compliance problems.         The following portions of the patient's history were reviewed and updated as appropriate: allergies, current medications, past family history, past medical history, past social history, past surgical history, and problem list.    Family History   Problem Relation Age of Onset    Ovarian cancer Mother     Arthritis Mother     Heart failure Father     Suicidality Brother     Heart disease Maternal Aunt     Cancer Other         malignant neoplasm of gastrointestinal tract- in her 50's    Arthritis Other     Cervical cancer Other     Arthritis Other     Diabetes Other        Social History     Tobacco Use    Smoking status: Never    Smokeless tobacco: Never   Substance Use Topics    Alcohol use: No    Drug use: No       Past Surgical History:   Procedure Laterality Date    BREAST BIOPSY Right 1974    Dr Briseida Alonso    CATARACT EXTRACTION W/ INTRAOCULAR LENS IMPLANT      7/20/10-rt. eye-Dr. Leon 7/13/10- Lt. eye-Dr. Leon    D & C HYSTEROSCOPY N/A 6/9/2023    Procedure: DILATATION AND CURETTAGE HYSTEROSCOPY;  Surgeon: Jackie Lieberman MD;  Location: [facility] OR;  Service: Obstetrics/Gynecology;  Laterality: N/A;    FINGER SURGERY  2001    Revision of Finger joints. Dr. Brumfield w/Drs. Kleinert & Marlon    HIP ENDOPROSTHESIS Left 05/22/2014    Osteonics endoprostehetic replacement of left hip. Dr. Reza Choudhury.    JOINT REPLACEMENT      KNEE ARTHROSCOPY Left     [1987] Dr Merritt medial meniscus [1995] Dr. Sarbjit tesfaye  medial meniscus    LEEP  2000    Biopsy of cervix. for MAJOR-1 by Dr. Knight    LUMBAR DISCECTOMY  1994    Hemilaminectomy, foraminectomy & discectomy. Dr. Velasquez, L4-L5 w/posterior lateral fusion & screw fixatoin    LUMBAR LAMINECTOMY  12/19/2011    foraminotomies, medial facetectomies L5-M0joscp redo. left L5-S1 lumbar discectomy. [facility]-Dr. De Oliveira- 5 units of blood given to pt.    POSTERIOR LUMBAR/THORACIC SPINE FUSION  2002    Fusion T-5 through L-1. Had T-11 burst fracture. Recuperated at [facility]    SPINAL FUSION      Lower Back. 7/11/2011-[facility] - Hardware removal from L3-L5, Dr. De Oliveira surgeon, Dr. Booker [facility]-Fusion of spine at multi-levels 12/14/11 - [facility] - Dr Gomez and Dr. Momin, Anterior approach Spinal Fusion L5-S1 3--[facility]. Dr. De Oliveira and Dr. Dean. Failed posterior fusion with loose instrumentation. L-4 thru L-5. No complications.    TONSILLECTOMY  1951    Dr Mcginnis    TOTAL HIP ARTHROPLASTY Right 04/10/2017    [facility], Inpatient. Dr. Reinoso    TOTAL KNEE ARTHROPLASTY Left 2002    Dr Sue @ [facility]       Patient Active Problem List   Diagnosis    Gastroesophageal reflux disease without esophagitis    Pain in hip region after total hip replacement    Loose total hip arthroplasty    Mixed hyperlipidemia    Myalgia and myositis    Hypertension, benign    Peripheral vascular disease    Periprosthetic fracture around internal prosthetic left hip joint    Rheumatoid arthritis involving multiple sites with positive rheumatoid factor    S/P lumbar fusion    Spinal stenosis of lumbar region    Carpal tunnel syndrome of right wrist    Anemia    Hyperglycemia    Left hip pain    Vitamin D deficiency    First degree heart block    Chronic pain syndrome    Dependent edema    Medicare annual wellness visit, subsequent    Aortic valve regurgitation    Atypical squamous cell changes of undetermined significance (ASCUS) on vaginal  cytology    Elevated diaphragm    Esophageal hernia    Family history of diabetes mellitus    Fusion of spine of lumbar region    Hypertensive left ventricular hypertrophy, without heart failure    Hyponatremia    Mitral valve disease    Status post hip replacement    Elevated alkaline phosphatase level    Chronic pain of right knee    Left knee pain    Seasonal allergic rhinitis due to pollen    Localized edema    Status post knee replacement    Myalgia    Fracture of left patella    Insomnia, persistent    Irritable bowel syndrome without diarrhea    Left shoulder pain    Constipation    Urinary incontinence    Uterine leiomyoma    Hiatal hernia    Scoliosis (and kyphoscoliosis), idiopathic    Chronic neck pain    Osteoporosis    Allergic rhinitis    Arthritis of right hip    Depressive disorder    Lesion of joint capsule of knee region    Tear of meniscus of knee    Osteoarthritis of right knee    Prosthetic and other implants, materials and accessory orthopedic devices associated with adverse incidents    Seasonal allergies    Family history of colon cancer    At high risk for falls    Cervical cancer screening    Chronic right shoulder pain    Right lower quadrant abdominal pain    Immunization counseling    Tremor    Situational anxiety    Postmenopausal vaginal bleeding    Atrophic vaginitis    Vaginal bleeding    Pelvic and perineal pain    Colon polyps    Neoplasm, uncertain whether benign or malignant    Pure hypercholesterolemia    Back pain       Current Outpatient Medications on File Prior to Visit   Medication Sig Dispense Refill    acetaminophen (TYLENOL) 650 MG 8 hr tablet Take 1 tablet by mouth 2 (Two) Times a Day.      amitriptyline (ELAVIL) 75 MG tablet TAKE 1 TABLET AT BEDTIME 90 tablet 0    atorvastatin (LIPITOR) 10 MG tablet TAKE 1 TABLET EVERY DAY (Patient taking differently: Take 1 tablet by mouth Daily.) 90 tablet 1    Calcium Carbonate-Vitamin D (CALCIUM 600/VITAMIN D PO) Take 1 tablet by  mouth 2 (Two) Times a Day.      celecoxib (CeleBREX) 200 MG capsule TAKE 1 CAPSULE EVERY DAY 90 capsule 0    diphenhydrAMINE (BENADRYL) 25 mg capsule Take 1 capsule by mouth Daily.      DULoxetine (CYMBALTA) 60 MG capsule Take 1 capsule by mouth Daily. 90 capsule 0    enalapril (VASOTEC) 10 MG tablet Take 0.5 tablets by mouth 2 (Two) Times a Day. (Patient taking differently: Take 0.5 tablets by mouth Daily. HOLD 24 hours before surgery) 180 tablet 1    gabapentin (NEURONTIN) 300 MG capsule Take 1 capsule by mouth 2 (Two) Times a Day. 60 capsule 1    Glycerin-Hypromellose- (ARTIFICIAL TEARS) 0.2-0.2-1 % solution ophthalmic solution       HYDROcodone-acetaminophen (NORCO) 5-325 MG per tablet Take 1-2 tablets by mouth Every 4 (Four) Hours As Needed (Pain). 19 tablet 0    loratadine (CLARITIN) 10 MG tablet Take 1 tablet by mouth Daily.      LORazepam (Ativan) 0.5 MG tablet Take 1 tablet by mouth Every 8 (Eight) Hours As Needed for Anxiety. 20 tablet 0    Lutein-Zeaxanthin (OCUVITE LUTEIN 25 PO) Take  by mouth. Last dose 5/30      montelukast (SINGULAIR) 10 MG tablet TAKE 1 TABLET EVERY DAY 90 tablet 0    pantoprazole (PROTONIX) 40 MG EC tablet TAKE 1 TABLET EVERY DAY 90 tablet 1    propranolol (INDERAL) 20 MG/5ML solution Take 5 mL by mouth 3 (Three) Times a Day. 30 mL 5    Wheat Dextrin (BENEFIBER DRINK MIX PO) Take  by mouth As Needed.       No current facility-administered medications on file prior to visit.       Allergies   Allergen Reactions    Morphine And Related Hallucinations     HALLUCINATIONS         Review of Systems   Constitutional:  Negative for fatigue, unexpected weight gain and unexpected weight loss.   Respiratory:  Negative for shortness of breath.    Cardiovascular:  Negative for chest pain, palpitations and leg swelling.   Skin:  Positive for skin lesions.   Neurological:  Negative for headache.       Objective   Visit Vitals  /86 (BP Location: Left arm, Patient Position: Sitting,  "Cuff Size: Adult)   Pulse 87   Temp 98.4 °F (36.9 °C) (Temporal)   Resp 18   Ht 160 cm (63\")   Wt 74.3 kg (163 lb 12.8 oz)   SpO2 96%   BMI 29.02 kg/m²     Physical Exam  Vitals and nursing note reviewed.   Constitutional:       Appearance: She is well-developed.   HENT:      Head: Normocephalic.     Neck:      Thyroid: No thyromegaly.      Vascular: No carotid bruit.      Trachea: Trachea normal.   Cardiovascular:      Rate and Rhythm: Normal rate and regular rhythm.      Heart sounds: No murmur heard.     No friction rub. No gallop.   Pulmonary:      Effort: Pulmonary effort is normal. No respiratory distress.      Breath sounds: Normal breath sounds. No wheezing.   Chest:      Chest wall: No tenderness.   Musculoskeletal:      Cervical back: Neck supple.   Skin:     General: Skin is dry.      Findings: No rash.      Nails: There is no clubbing.   Neurological:      Mental Status: She is alert and oriented to person, place, and time.   Psychiatric:         Behavior: Behavior is cooperative.           Assessment & Plan .  Problem List Items Addressed This Visit          Medium    Hypertension, benign - Primary    Current Assessment & Plan     Worse.   Advised to increase Metoprolol back to 25 mg daily.   Patient tolerated Enalapril and Metoprolol well without side effects. I feel the benefits of the medication outweigh the risks.   Encouraged to watch salt, exercise more and lose weight.           Relevant Medications    metoprolol tartrate (LOPRESSOR) 25 MG tablet       Unprioritized    Neoplasm, uncertain whether benign or malignant    Current Assessment & Plan     Right cheek, raised irritated lesion, need to rule out carcinoma.  Lesion is 4mm x 4 mm  Area cleaned with betadine, anesthestized with 1% epinephrine and removed via shaving with a 15 blade. Lesion sent to pathology. Hemostasis with electrocautery.          Relevant Orders    Reference Histopathology            "

## 2023-10-23 LAB
DX ICD CODE: NORMAL
PATH REPORT.FINAL DX SPEC: NORMAL
PATH REPORT.GROSS SPEC: NORMAL
PATH REPORT.SITE OF ORIGIN SPEC: NORMAL
PATHOLOGIST NAME: NORMAL
PAYMENT PROCEDURE: NORMAL

## 2023-10-24 ENCOUNTER — TELEPHONE (OUTPATIENT)
Dept: FAMILY MEDICINE CLINIC | Facility: CLINIC | Age: 83
End: 2023-10-24
Payer: MEDICARE

## 2023-11-05 DIAGNOSIS — G89.4 PAIN SYNDROME, CHRONIC: ICD-10-CM

## 2023-11-05 DIAGNOSIS — K21.9 GASTROESOPHAGEAL REFLUX DISEASE WITHOUT ESOPHAGITIS: ICD-10-CM

## 2023-11-06 RX ORDER — CELECOXIB 200 MG/1
CAPSULE ORAL
Qty: 90 CAPSULE | Refills: 10 | Status: SHIPPED | OUTPATIENT
Start: 2023-11-06

## 2023-11-06 RX ORDER — PANTOPRAZOLE SODIUM 40 MG/1
TABLET, DELAYED RELEASE ORAL
Qty: 90 TABLET | Refills: 10 | Status: SHIPPED | OUTPATIENT
Start: 2023-11-06

## 2023-11-10 ENCOUNTER — OFFICE VISIT (OUTPATIENT)
Dept: FAMILY MEDICINE CLINIC | Facility: CLINIC | Age: 83
End: 2023-11-10
Payer: MEDICARE

## 2023-11-10 VITALS
RESPIRATION RATE: 20 BRPM | OXYGEN SATURATION: 98 % | TEMPERATURE: 98.7 F | DIASTOLIC BLOOD PRESSURE: 78 MMHG | WEIGHT: 161 LBS | HEART RATE: 78 BPM | HEIGHT: 63 IN | SYSTOLIC BLOOD PRESSURE: 160 MMHG | BODY MASS INDEX: 28.53 KG/M2

## 2023-11-10 DIAGNOSIS — J01.00 ACUTE NON-RECURRENT MAXILLARY SINUSITIS: ICD-10-CM

## 2023-11-10 DIAGNOSIS — R05.1 ACUTE COUGH: Primary | ICD-10-CM

## 2023-11-10 DIAGNOSIS — J30.2 SEASONAL ALLERGIES: ICD-10-CM

## 2023-11-10 LAB
EXPIRATION DATE: NORMAL
FLUAV AG UPPER RESP QL IA.RAPID: NOT DETECTED
FLUBV AG UPPER RESP QL IA.RAPID: NOT DETECTED
INTERNAL CONTROL: NORMAL
Lab: NORMAL
SARS-COV-2 AG UPPER RESP QL IA.RAPID: NOT DETECTED

## 2023-11-10 PROCEDURE — 87428 SARSCOV & INF VIR A&B AG IA: CPT | Performed by: FAMILY MEDICINE

## 2023-11-10 PROCEDURE — 1160F RVW MEDS BY RX/DR IN RCRD: CPT | Performed by: FAMILY MEDICINE

## 2023-11-10 PROCEDURE — 99213 OFFICE O/P EST LOW 20 MIN: CPT | Performed by: FAMILY MEDICINE

## 2023-11-10 PROCEDURE — 3077F SYST BP >= 140 MM HG: CPT | Performed by: FAMILY MEDICINE

## 2023-11-10 PROCEDURE — 1159F MED LIST DOCD IN RCRD: CPT | Performed by: FAMILY MEDICINE

## 2023-11-10 PROCEDURE — 3078F DIAST BP <80 MM HG: CPT | Performed by: FAMILY MEDICINE

## 2023-11-10 RX ORDER — DOXYCYCLINE 100 MG/1
100 CAPSULE ORAL 2 TIMES DAILY
Qty: 20 CAPSULE | Refills: 0 | Status: SHIPPED | OUTPATIENT
Start: 2023-11-10

## 2023-11-10 NOTE — PROGRESS NOTES
Chief Complaint  URI and Hypertension    Subjective     CC  Problem List  Visit Diagnosis   Encounters  Notes  Medications  Labs  Result Review Imaging  Media    Vida Jamison presents to [facility] for   URI   The current episode started 1 to 4 weeks ago. The problem has been unchanged. There has been no fever. Associated symptoms include coughing, headaches, joint pain, joint swelling, a plugged ear sensation, sinus pain and sneezing. Pertinent negatives include no abdominal pain, chest pain, congestion, diarrhea, dysuria, ear pain, nausea, neck pain, rash, rhinorrhea, sore throat, swollen glands, vomiting or wheezing. She has tried acetaminophen (nyquil) for the symptoms. The treatment provided mild relief.   Hypertension  This is a chronic problem. The current episode started more than 1 year ago. The problem has been gradually worsening since onset. The problem is controlled. Associated symptoms include headaches and peripheral edema. Pertinent negatives include no anxiety, blurred vision, chest pain, malaise/fatigue, neck pain, orthopnea, palpitations, shortness of breath or sweats. Current antihypertension treatment includes ACE inhibitors and beta blockers. The current treatment provides no improvement. There are no compliance problems.        Review of Systems   Constitutional:  Negative for malaise/fatigue.   HENT:  Positive for sinus pressure and sneezing. Negative for congestion, ear pain, rhinorrhea, sore throat and swollen glands.    Eyes:  Negative for blurred vision.   Respiratory:  Positive for cough. Negative for shortness of breath and wheezing.    Cardiovascular:  Negative for chest pain, palpitations and orthopnea.   Gastrointestinal:  Negative for abdominal pain, diarrhea, nausea and vomiting.   Genitourinary:  Negative for dysuria.   Musculoskeletal:  Positive for joint pain. Negative for neck pain.   Skin:  Negative for rash.   Hematological:   "Negative for adenopathy. Does not bruise/bleed easily.        Objective   Vital Signs:   /78 (BP Location: Right arm, Patient Position: Sitting, Cuff Size: Adult)   Pulse 78   Temp 98.7 °F (37.1 °C) (Infrared)   Resp 20   Ht 160 cm (63\")   Wt 73 kg (161 lb)   SpO2 98% Comment: room air  BMI 28.52 kg/m²     Physical Exam  Constitutional:       General: She is not in acute distress.  HENT:      Right Ear: Tympanic membrane normal.      Left Ear: Tympanic membrane normal.      Nose:      Right Sinus: Maxillary sinus tenderness present.      Left Sinus: Maxillary sinus tenderness present.      Mouth/Throat:      Pharynx: No oropharyngeal exudate or posterior oropharyngeal erythema (moderate post nasal secretions).   Cardiovascular:      Rate and Rhythm: Normal rate and regular rhythm.      Heart sounds: No murmur heard.  Pulmonary:      Effort: Pulmonary effort is normal.      Breath sounds: Normal breath sounds. No wheezing.   Musculoskeletal:      Cervical back: Neck supple.      Right lower leg: No edema.      Left lower leg: No edema.   Lymphadenopathy:      Cervical: No cervical adenopathy.   Skin:     Findings: No rash.   Neurological:      Mental Status: She is alert.        Result Review :Labs  Result Review  Imaging  Med Tab  Media                 Assessment and Plan CC Problem List  Visit Diagnosis  ROS  Review (Popup)  Health Maintenance  Quality  BestPractice  Medications  SmartSets  SnapShot Encounters  Media  Problem List Items Addressed This Visit          Unprioritized    Seasonal allergies    Overview     Exacerbation, Resume OTC anti histamines and follow          Other Visit Diagnoses       Acute cough    -  Primary    neg flu neg covid    Relevant Orders    POCT SARS-CoV-2 + Flu Antigen DONALD (Completed)    Acute non-recurrent maxillary sinusitis        abx fluids saline flushes and follow up if no improvement over 1 week.    Relevant Medications    doxycycline (MONODOX) " 100 MG capsule            Follow Up Instructions Charge Capture  Follow-up Communications  Return if symptoms worsen or fail to improve.  Patient was given instructions and counseling regarding her condition or for health maintenance advice. Please see specific information pulled into the AVS if appropriate.

## 2023-11-12 DIAGNOSIS — I10 HYPERTENSION, BENIGN: ICD-10-CM

## 2023-11-13 ENCOUNTER — TELEPHONE (OUTPATIENT)
Dept: FAMILY MEDICINE CLINIC | Facility: CLINIC | Age: 83
End: 2023-11-13
Payer: MEDICARE

## 2023-11-13 NOTE — TELEPHONE ENCOUNTER
Called patient and she is doing ok from the ER-  She did fall. She states she is bruised up, no lacerations.  Offered an appt with Dr. Gonzales- She does not feel like she needs to be seen.

## 2023-11-20 DIAGNOSIS — E78.2 MIXED HYPERLIPIDEMIA: ICD-10-CM

## 2023-11-20 RX ORDER — ATORVASTATIN CALCIUM 10 MG/1
TABLET, FILM COATED ORAL
Qty: 90 TABLET | Refills: 0 | Status: SHIPPED | OUTPATIENT
Start: 2023-11-20

## 2023-12-01 DIAGNOSIS — G89.4 PAIN SYNDROME, CHRONIC: ICD-10-CM

## 2023-12-04 RX ORDER — AMITRIPTYLINE HYDROCHLORIDE 75 MG/1
TABLET ORAL
Qty: 90 TABLET | Refills: 0 | Status: SHIPPED | OUTPATIENT
Start: 2023-12-04

## 2023-12-05 DIAGNOSIS — M51.36 LUMBAR DEGENERATIVE DISC DISEASE: ICD-10-CM

## 2023-12-05 RX ORDER — GABAPENTIN 300 MG/1
300 CAPSULE ORAL 2 TIMES DAILY
Qty: 60 CAPSULE | Refills: 1 | Status: SHIPPED | OUTPATIENT
Start: 2023-12-05

## 2023-12-22 DIAGNOSIS — M51.36 LUMBAR DEGENERATIVE DISC DISEASE: ICD-10-CM

## 2023-12-27 RX ORDER — GABAPENTIN 300 MG/1
CAPSULE ORAL
Qty: 60 CAPSULE | Refills: 0 | Status: SHIPPED | OUTPATIENT
Start: 2023-12-27

## 2024-01-08 ENCOUNTER — TELEPHONE (OUTPATIENT)
Dept: FAMILY MEDICINE CLINIC | Facility: CLINIC | Age: 84
End: 2024-01-08
Payer: MEDICARE

## 2024-01-08 NOTE — TELEPHONE ENCOUNTER
Called to follow up from an ER visit. Offered an appointment with Dr. Gonzales- patient has staples to be removed.  She prefers to go to the ER to have them removed. Patient has appt with Dr. Gonzales 2-5-24

## 2024-02-01 DIAGNOSIS — I10 HYPERTENSION, BENIGN: ICD-10-CM

## 2024-02-01 DIAGNOSIS — E78.2 MIXED HYPERLIPIDEMIA: ICD-10-CM

## 2024-02-01 DIAGNOSIS — G89.4 PAIN SYNDROME, CHRONIC: ICD-10-CM

## 2024-02-01 DIAGNOSIS — G89.4 CHRONIC PAIN SYNDROME: ICD-10-CM

## 2024-02-01 RX ORDER — DULOXETIN HYDROCHLORIDE 60 MG/1
60 CAPSULE, DELAYED RELEASE ORAL DAILY
Qty: 90 CAPSULE | Refills: 0 | Status: SHIPPED | OUTPATIENT
Start: 2024-02-01

## 2024-02-01 RX ORDER — ATORVASTATIN CALCIUM 10 MG/1
TABLET, FILM COATED ORAL
Qty: 90 TABLET | Refills: 0 | Status: SHIPPED | OUTPATIENT
Start: 2024-02-01

## 2024-02-01 RX ORDER — AMITRIPTYLINE HYDROCHLORIDE 75 MG/1
TABLET ORAL
Qty: 90 TABLET | Refills: 0 | Status: SHIPPED | OUTPATIENT
Start: 2024-02-01

## 2024-02-02 LAB
25(OH)D3+25(OH)D2 SERPL-MCNC: 56.6 NG/ML (ref 30–100)
ALBUMIN SERPL-MCNC: 4.7 G/DL (ref 3.7–4.7)
ALBUMIN/GLOB SERPL: 2 {RATIO} (ref 1.2–2.2)
ALP SERPL-CCNC: 87 IU/L (ref 44–121)
ALT SERPL-CCNC: 12 IU/L (ref 0–32)
AST SERPL-CCNC: 33 IU/L (ref 0–40)
BASOPHILS # BLD AUTO: 0.1 X10E3/UL (ref 0–0.2)
BASOPHILS NFR BLD AUTO: 1 %
BILIRUB SERPL-MCNC: 0.5 MG/DL (ref 0–1.2)
BUN SERPL-MCNC: 21 MG/DL (ref 8–27)
BUN/CREAT SERPL: 24 (ref 12–28)
CALCIUM SERPL-MCNC: 9 MG/DL (ref 8.7–10.3)
CHLORIDE SERPL-SCNC: 97 MMOL/L (ref 96–106)
CHOLEST SERPL-MCNC: 168 MG/DL (ref 100–199)
CHOLEST/HDLC SERPL: 3.7 RATIO (ref 0–4.4)
CO2 SERPL-SCNC: 24 MMOL/L (ref 20–29)
CREAT SERPL-MCNC: 0.89 MG/DL (ref 0.57–1)
EGFRCR SERPLBLD CKD-EPI 2021: 65 ML/MIN/1.73
EOSINOPHIL # BLD AUTO: 0.2 X10E3/UL (ref 0–0.4)
EOSINOPHIL NFR BLD AUTO: 3 %
ERYTHROCYTE [DISTWIDTH] IN BLOOD BY AUTOMATED COUNT: 12.9 % (ref 11.7–15.4)
GLOBULIN SER CALC-MCNC: 2.3 G/DL (ref 1.5–4.5)
GLUCOSE SERPL-MCNC: 92 MG/DL (ref 70–99)
HBA1C MFR BLD: 5.9 % (ref 4.8–5.6)
HCT VFR BLD AUTO: 42 % (ref 34–46.6)
HDLC SERPL-MCNC: 46 MG/DL
HGB BLD-MCNC: 13.5 G/DL (ref 11.1–15.9)
IMM GRANULOCYTES # BLD AUTO: 0 X10E3/UL (ref 0–0.1)
IMM GRANULOCYTES NFR BLD AUTO: 0 %
LDLC SERPL CALC-MCNC: 100 MG/DL (ref 0–99)
LYMPHOCYTES # BLD AUTO: 1 X10E3/UL (ref 0.7–3.1)
LYMPHOCYTES NFR BLD AUTO: 14 %
MCH RBC QN AUTO: 29.5 PG (ref 26.6–33)
MCHC RBC AUTO-ENTMCNC: 32.1 G/DL (ref 31.5–35.7)
MCV RBC AUTO: 92 FL (ref 79–97)
MONOCYTES # BLD AUTO: 0.4 X10E3/UL (ref 0.1–0.9)
MONOCYTES NFR BLD AUTO: 6 %
NEUTROPHILS # BLD AUTO: 5.5 X10E3/UL (ref 1.4–7)
NEUTROPHILS NFR BLD AUTO: 76 %
PLATELET # BLD AUTO: 298 X10E3/UL (ref 150–450)
POTASSIUM SERPL-SCNC: NORMAL MMOL/L
PROT SERPL-MCNC: 7 G/DL (ref 6–8.5)
RBC # BLD AUTO: 4.58 X10E6/UL (ref 3.77–5.28)
SODIUM SERPL-SCNC: 137 MMOL/L (ref 134–144)
TRIGL SERPL-MCNC: 123 MG/DL (ref 0–149)
VLDLC SERPL CALC-MCNC: 22 MG/DL (ref 5–40)
WBC # BLD AUTO: 7.2 X10E3/UL (ref 3.4–10.8)

## 2024-02-05 ENCOUNTER — OFFICE VISIT (OUTPATIENT)
Dept: FAMILY MEDICINE CLINIC | Facility: CLINIC | Age: 84
End: 2024-02-05
Payer: MEDICARE

## 2024-02-05 VITALS
DIASTOLIC BLOOD PRESSURE: 82 MMHG | HEART RATE: 76 BPM | RESPIRATION RATE: 18 BRPM | HEIGHT: 63 IN | SYSTOLIC BLOOD PRESSURE: 136 MMHG | OXYGEN SATURATION: 98 % | TEMPERATURE: 97.3 F | WEIGHT: 156.6 LBS | BODY MASS INDEX: 27.75 KG/M2

## 2024-02-05 DIAGNOSIS — R73.9 HYPERGLYCEMIA: ICD-10-CM

## 2024-02-05 DIAGNOSIS — E78.2 MIXED HYPERLIPIDEMIA: ICD-10-CM

## 2024-02-05 DIAGNOSIS — Z00.00 MEDICARE ANNUAL WELLNESS VISIT, SUBSEQUENT: ICD-10-CM

## 2024-02-05 DIAGNOSIS — D50.8 OTHER IRON DEFICIENCY ANEMIA: Primary | ICD-10-CM

## 2024-02-05 DIAGNOSIS — M51.36 LUMBAR DEGENERATIVE DISC DISEASE: ICD-10-CM

## 2024-02-05 DIAGNOSIS — Z71.85 IMMUNIZATION COUNSELING: ICD-10-CM

## 2024-02-05 DIAGNOSIS — Z23 PNEUMOCOCCAL VACCINE ADMINISTERED: ICD-10-CM

## 2024-02-05 DIAGNOSIS — Z71.89 ADVANCE CARE PLANNING: Primary | ICD-10-CM

## 2024-02-05 DIAGNOSIS — M48.061 SPINAL STENOSIS OF LUMBAR REGION, UNSPECIFIED WHETHER NEUROGENIC CLAUDICATION PRESENT: ICD-10-CM

## 2024-02-05 DIAGNOSIS — E55.9 VITAMIN D DEFICIENCY: ICD-10-CM

## 2024-02-05 DIAGNOSIS — I10 HYPERTENSION, BENIGN: ICD-10-CM

## 2024-02-05 DIAGNOSIS — Z13.31 DEPRESSION SCREEN: ICD-10-CM

## 2024-02-05 DIAGNOSIS — G89.4 CHRONIC PAIN SYNDROME: ICD-10-CM

## 2024-02-05 DIAGNOSIS — G89.4 PAIN SYNDROME, CHRONIC: ICD-10-CM

## 2024-02-05 RX ORDER — GABAPENTIN 300 MG/1
CAPSULE ORAL
Qty: 180 CAPSULE | Refills: 1 | Status: SHIPPED | OUTPATIENT
Start: 2024-02-05

## 2024-02-05 RX ORDER — GABAPENTIN 300 MG/1
CAPSULE ORAL
Qty: 180 CAPSULE | Refills: 1 | Status: SHIPPED | OUTPATIENT
Start: 2024-02-05 | End: 2024-02-05

## 2024-02-06 VITALS
DIASTOLIC BLOOD PRESSURE: 82 MMHG | OXYGEN SATURATION: 98 % | BODY MASS INDEX: 27.75 KG/M2 | SYSTOLIC BLOOD PRESSURE: 136 MMHG | RESPIRATION RATE: 14 BRPM | TEMPERATURE: 97.3 F | HEIGHT: 63 IN | HEART RATE: 76 BPM | WEIGHT: 156.6 LBS

## 2024-03-06 DIAGNOSIS — M51.36 LUMBAR DEGENERATIVE DISC DISEASE: ICD-10-CM

## 2024-03-06 RX ORDER — GABAPENTIN 300 MG/1
CAPSULE ORAL
Qty: 60 CAPSULE | Refills: 4 | Status: SHIPPED | OUTPATIENT
Start: 2024-03-06

## 2024-04-04 DIAGNOSIS — M51.36 LUMBAR DEGENERATIVE DISC DISEASE: ICD-10-CM

## 2024-04-04 RX ORDER — GABAPENTIN 300 MG/1
CAPSULE ORAL
Qty: 180 CAPSULE | Refills: 0 | Status: SHIPPED | OUTPATIENT
Start: 2024-04-04

## 2024-04-26 DIAGNOSIS — J30.1 SEASONAL ALLERGIC RHINITIS DUE TO POLLEN: ICD-10-CM

## 2024-04-26 DIAGNOSIS — G89.4 CHRONIC PAIN SYNDROME: ICD-10-CM

## 2024-04-29 RX ORDER — DULOXETIN HYDROCHLORIDE 60 MG/1
60 CAPSULE, DELAYED RELEASE ORAL DAILY
Qty: 90 CAPSULE | Refills: 1 | Status: SHIPPED | OUTPATIENT
Start: 2024-04-29

## 2024-04-29 RX ORDER — MONTELUKAST SODIUM 10 MG/1
10 TABLET ORAL DAILY
Qty: 90 TABLET | Refills: 1 | Status: SHIPPED | OUTPATIENT
Start: 2024-04-29

## 2024-05-16 DIAGNOSIS — E78.2 MIXED HYPERLIPIDEMIA: ICD-10-CM

## 2024-05-16 DIAGNOSIS — G89.4 PAIN SYNDROME, CHRONIC: ICD-10-CM

## 2024-05-20 ENCOUNTER — OFFICE VISIT (OUTPATIENT)
Dept: FAMILY MEDICINE CLINIC | Facility: CLINIC | Age: 84
End: 2024-05-20
Payer: MEDICARE

## 2024-05-20 VITALS
HEART RATE: 78 BPM | SYSTOLIC BLOOD PRESSURE: 122 MMHG | BODY MASS INDEX: 27.29 KG/M2 | DIASTOLIC BLOOD PRESSURE: 68 MMHG | OXYGEN SATURATION: 99 % | HEIGHT: 63 IN | WEIGHT: 154 LBS | RESPIRATION RATE: 20 BRPM

## 2024-05-20 DIAGNOSIS — J30.1 SEASONAL ALLERGIC RHINITIS DUE TO POLLEN: Primary | ICD-10-CM

## 2024-05-20 DIAGNOSIS — J45.21 MILD INTERMITTENT REACTIVE AIRWAY DISEASE WITH ACUTE EXACERBATION: ICD-10-CM

## 2024-05-20 PROCEDURE — 3078F DIAST BP <80 MM HG: CPT | Performed by: FAMILY MEDICINE

## 2024-05-20 PROCEDURE — 3074F SYST BP LT 130 MM HG: CPT | Performed by: FAMILY MEDICINE

## 2024-05-20 PROCEDURE — 99213 OFFICE O/P EST LOW 20 MIN: CPT | Performed by: FAMILY MEDICINE

## 2024-05-20 PROCEDURE — 1111F DSCHRG MED/CURRENT MED MERGE: CPT | Performed by: FAMILY MEDICINE

## 2024-05-20 PROCEDURE — 1126F AMNT PAIN NOTED NONE PRSNT: CPT | Performed by: FAMILY MEDICINE

## 2024-05-20 RX ORDER — ALBUTEROL SULFATE 90 UG/1
2 AEROSOL, METERED RESPIRATORY (INHALATION) EVERY 4 HOURS PRN
Qty: 6.7 G | Refills: 0 | Status: SHIPPED | OUTPATIENT
Start: 2024-05-20

## 2024-05-20 RX ORDER — METHYLPREDNISOLONE ACETATE 80 MG/ML
80 INJECTION, SUSPENSION INTRA-ARTICULAR; INTRALESIONAL; INTRAMUSCULAR; SOFT TISSUE ONCE
Status: COMPLETED | OUTPATIENT
Start: 2024-05-20 | End: 2024-05-20

## 2024-05-20 RX ORDER — BUDESONIDE AND FORMOTEROL FUMARATE DIHYDRATE 80; 4.5 UG/1; UG/1
2 AEROSOL RESPIRATORY (INHALATION)
Qty: 10.2 G | Refills: 0 | Status: SHIPPED | OUTPATIENT
Start: 2024-05-20

## 2024-05-20 RX ADMIN — METHYLPREDNISOLONE ACETATE 80 MG: 80 INJECTION, SUSPENSION INTRA-ARTICULAR; INTRALESIONAL; INTRAMUSCULAR; SOFT TISSUE at 14:26

## 2024-05-20 NOTE — PROGRESS NOTES
Subjective   Vida Jamison is a 83 y.o. female.   Chief Complaint   Patient presents with    Shortness of Breath    Rash       Shortness of Breath  This is a new problem. The current episode started 1 to 4 weeks ago. The problem occurs intermittently. Associated symptoms include a rash and wheezing. Pertinent negatives include no abdominal pain, chest pain, ear pain, fever, leg swelling, neck pain, swollen glands or vomiting. The symptoms are aggravated by any activity. The patient has no known risk factors for DVT/PE. She has tried nothing for the symptoms. Her past medical history is significant for allergies. There has been no fever.   Rash  This is a new problem. The current episode started in the past 7 days. The problem is unchanged. The affected locations include the left arm. The rash is characterized by redness, blistering and itchiness. It is unknown if there was an exposure to a precipitant. Associated symptoms include shortness of breath. Pertinent negatives include no diarrhea, fatigue, fever or vomiting. Past treatments include anti-itch cream. The treatment provided no relief. Her past medical history is significant for allergies.        The following portions of the patient's history were reviewed and updated as appropriate: allergies, current medications, past family history, past medical history, past social history, past surgical history, and problem list.    Patient Active Problem List   Diagnosis    Gastroesophageal reflux disease without esophagitis    Pain in hip region after total hip replacement    Loose total hip arthroplasty    Mixed hyperlipidemia    Myalgia and myositis    Hypertension, benign    Peripheral vascular disease    Periprosthetic fracture around internal prosthetic left hip joint    Rheumatoid arthritis involving multiple sites with positive rheumatoid factor    S/P lumbar fusion    Spinal stenosis of lumbar region    Carpal tunnel syndrome of right wrist    Anemia     Hyperglycemia    Left hip pain    Vitamin D deficiency    First degree heart block    Chronic pain syndrome    Dependent edema    Medicare annual wellness visit, subsequent    Aortic valve regurgitation    Atypical squamous cell changes of undetermined significance (ASCUS) on vaginal cytology    Elevated diaphragm    Esophageal hernia    Family history of diabetes mellitus    Fusion of spine of lumbar region    Hypertensive left ventricular hypertrophy, without heart failure    Hyponatremia    Mitral valve disease    Status post hip replacement    Elevated alkaline phosphatase level    Chronic pain of right knee    Left knee pain    Seasonal allergic rhinitis due to pollen    Localized edema    Status post knee replacement    Myalgia    Fracture of left patella    Insomnia, persistent    Irritable bowel syndrome without diarrhea    Left shoulder pain    Constipation    Urinary incontinence    Uterine leiomyoma    Hiatal hernia    Scoliosis (and kyphoscoliosis), idiopathic    Chronic neck pain    Osteoporosis    Allergic rhinitis    Arthritis of right hip    Depressive disorder    Lesion of joint capsule of knee region    Tear of meniscus of knee    Osteoarthritis of right knee    Prosthetic and other implants, materials and accessory orthopedic devices associated with adverse incidents    Seasonal allergies    Family history of colon cancer    At high risk for falls    Cervical cancer screening    Chronic right shoulder pain    Right lower quadrant abdominal pain    Immunization counseling    Tremor    Situational anxiety    Postmenopausal vaginal bleeding    Atrophic vaginitis    Vaginal bleeding    Pelvic and perineal pain    Colon polyps    Neoplasm, uncertain whether benign or malignant    Pure hypercholesterolemia    Back pain    Advance care planning    Depression screen       Current Outpatient Medications on File Prior to Visit   Medication Sig Dispense Refill    acetaminophen (TYLENOL) 650 MG 8 hr tablet  Take 1 tablet by mouth 2 (Two) Times a Day.      amitriptyline (ELAVIL) 75 MG tablet TAKE 1 TABLET AT BEDTIME 90 tablet 0    atorvastatin (LIPITOR) 10 MG tablet TAKE 1 TABLET EVERY DAY 90 tablet 0    Calcium Carbonate-Vitamin D (CALCIUM 600/VITAMIN D PO) Take 1 tablet by mouth 2 (Two) Times a Day.      celecoxib (CeleBREX) 200 MG capsule TAKE 1 CAPSULE EVERY DAY 90 capsule 10    diphenhydrAMINE (BENADRYL) 25 mg capsule Take 1 capsule by mouth Daily.      DULoxetine (CYMBALTA) 60 MG capsule TAKE 1 CAPSULE EVERY DAY 90 capsule 1    enalapril (VASOTEC) 10 MG tablet Take 0.5 tablets by mouth 2 (Two) Times a Day. (Patient taking differently: Take 0.5 tablets by mouth Daily. HOLD 24 hours before surgery) 180 tablet 1    gabapentin (NEURONTIN) 300 MG capsule TAKE 1 CAPSULE TWICE DAILY (NEED AN APPOINTMENT FOR REFILLS) 180 capsule 0    Glycerin-Hypromellose- (ARTIFICIAL TEARS) 0.2-0.2-1 % solution ophthalmic solution       loratadine (CLARITIN) 10 MG tablet Take 1 tablet by mouth Daily.      LORazepam (Ativan) 0.5 MG tablet Take 1 tablet by mouth Every 8 (Eight) Hours As Needed for Anxiety. 20 tablet 0    metoprolol tartrate (LOPRESSOR) 25 MG tablet TAKE 1 TABLET TWICE DAILY 180 tablet 0    montelukast (SINGULAIR) 10 MG tablet TAKE 1 TABLET EVERY DAY (NEED MD APPOINTMENT FOR REFILLS) 90 tablet 1    pantoprazole (PROTONIX) 40 MG EC tablet TAKE 1 TABLET EVERY DAY 90 tablet 10    propranolol (INDERAL) 20 MG/5ML solution Take 5 mL by mouth 3 (Three) Times a Day. 30 mL 5    Wheat Dextrin (BENEFIBER DRINK MIX PO) Take  by mouth As Needed.      [DISCONTINUED] doxycycline (MONODOX) 100 MG capsule Take 1 capsule by mouth 2 (Two) Times a Day. 20 capsule 0     No current facility-administered medications on file prior to visit.     Current outpatient and discharge medications have been reconciled for the patient.  Reviewed by: Trae Elizabeth MD      Allergies   Allergen Reactions    Morphine And Related Hallucinations  "    HALLUCINATIONS         Review of Systems   Constitutional:  Negative for activity change, appetite change, fatigue and fever.   HENT:  Negative for ear pain, swollen glands and voice change.    Eyes:  Negative for visual disturbance.   Respiratory:  Positive for shortness of breath and wheezing.    Cardiovascular:  Negative for chest pain and leg swelling.   Gastrointestinal:  Negative for abdominal pain, blood in stool, constipation, diarrhea, nausea and vomiting.   Endocrine: Negative for polydipsia and polyuria.   Genitourinary:  Negative for dysuria, frequency and hematuria.   Musculoskeletal:  Negative for joint swelling, neck pain and neck stiffness.   Skin:  Positive for rash. Negative for wound.   Neurological:  Negative for weakness, numbness and headache.   Psychiatric/Behavioral:  Negative for suicidal ideas and depressed mood.      I have reviewed and confirmed the accuracy of the ROS as documented by the MA/LPN/RN Trae Elizabeth MD    Objective   Visit Vitals  /68 (BP Location: Right arm, Patient Position: Sitting, Cuff Size: Large Adult)   Pulse 78   Resp 20   Ht 160 cm (62.99\")   Wt 69.9 kg (154 lb)   SpO2 99%   BMI 27.29 kg/m²      **  Physical Exam  Constitutional:       Appearance: She is well-developed.   HENT:      Head: Normocephalic and atraumatic.      Right Ear: External ear normal.      Left Ear: External ear normal.      Nose: Nose normal.   Eyes:      Pupils: Pupils are equal, round, and reactive to light.   Cardiovascular:      Rate and Rhythm: Normal rate and regular rhythm.      Heart sounds: Normal heart sounds.   Pulmonary:      Effort: Pulmonary effort is normal.      Breath sounds: Normal breath sounds.   Abdominal:      General: Bowel sounds are normal.      Palpations: Abdomen is soft.   Musculoskeletal:         General: Normal range of motion.      Cervical back: Normal range of motion and neck supple.   Skin:     General: Skin is warm and dry.   Neurological: "      Mental Status: She is alert and oriented to person, place, and time.   Psychiatric:         Behavior: Behavior normal.         Thought Content: Thought content normal.         Judgment: Judgment normal.       Derm Physical Exam  Ortho Exam   Neurologic Exam     Mental Status   Oriented to person, place, and time.     Cranial Nerves     CN III, IV, VI   Pupils are equal, round, and reactive to light.         Diagnoses and all orders for this visit:    1. Seasonal allergic rhinitis due to pollen (Primary)  Overview:  Continue singulair    Orders:  -     methylPREDNISolone acetate (DEPO-medrol) injection 80 mg    2. Mild intermittent reactive airway disease with acute exacerbation  -     budesonide-formoterol (Symbicort) 80-4.5 MCG/ACT inhaler; Inhale 2 puffs 2 (Two) Times a Day.  Dispense: 10.2 g; Refill: 0  -     albuterol sulfate  (90 Base) MCG/ACT inhaler; Inhale 2 puffs Every 4 (Four) Hours As Needed for Wheezing.  Dispense: 6.7 g; Refill: 0     Findings discussed. All questions answered.  Differential diagnosis discussed.   Medication and medication adverse effects discussed.  Drug education given and explained to patient. Patient verbalized understanding.    BMI is >= 25 and <30. (Overweight) The following options were offered after discussion;: exercise counseling/recommendations      Expected course, medications, and adverse effects discussed as appropriate.  Call or return if worsening or persistent symptoms.     This document is intended for medical professional use only.   Electronically signed by Trae Elizabeth MD on 05/20/2024. This content may not have been proofread.

## 2024-05-22 RX ORDER — AMITRIPTYLINE HYDROCHLORIDE 75 MG/1
TABLET ORAL
Qty: 90 TABLET | Refills: 0 | Status: SHIPPED | OUTPATIENT
Start: 2024-05-22

## 2024-05-22 RX ORDER — ATORVASTATIN CALCIUM 10 MG/1
TABLET, FILM COATED ORAL
Qty: 90 TABLET | Refills: 0 | Status: SHIPPED | OUTPATIENT
Start: 2024-05-22

## 2024-08-02 LAB
ALBUMIN SERPL-MCNC: 4.3 G/DL (ref 3.7–4.7)
ALP SERPL-CCNC: 89 IU/L (ref 44–121)
ALT SERPL-CCNC: 13 IU/L (ref 0–32)
AST SERPL-CCNC: 21 IU/L (ref 0–40)
BASOPHILS # BLD AUTO: 0 X10E3/UL (ref 0–0.2)
BASOPHILS NFR BLD AUTO: 1 %
BILIRUB SERPL-MCNC: 0.7 MG/DL (ref 0–1.2)
BUN SERPL-MCNC: 17 MG/DL (ref 8–27)
BUN/CREAT SERPL: 20 (ref 12–28)
CALCIUM SERPL-MCNC: 9.2 MG/DL (ref 8.7–10.3)
CHLORIDE SERPL-SCNC: 98 MMOL/L (ref 96–106)
CHOLEST SERPL-MCNC: 205 MG/DL (ref 100–199)
CHOLEST/HDLC SERPL: 4.5 RATIO (ref 0–4.4)
CO2 SERPL-SCNC: 26 MMOL/L (ref 20–29)
CREAT SERPL-MCNC: 0.87 MG/DL (ref 0.57–1)
EGFRCR SERPLBLD CKD-EPI 2021: 67 ML/MIN/1.73
EOSINOPHIL # BLD AUTO: 0.2 X10E3/UL (ref 0–0.4)
EOSINOPHIL NFR BLD AUTO: 3 %
ERYTHROCYTE [DISTWIDTH] IN BLOOD BY AUTOMATED COUNT: 12.8 % (ref 11.7–15.4)
GLOBULIN SER CALC-MCNC: 2.6 G/DL (ref 1.5–4.5)
GLUCOSE SERPL-MCNC: 90 MG/DL (ref 70–99)
HBA1C MFR BLD: 5.9 % (ref 4.8–5.6)
HCT VFR BLD AUTO: 40.3 % (ref 34–46.6)
HDLC SERPL-MCNC: 46 MG/DL
HGB BLD-MCNC: 13.1 G/DL (ref 11.1–15.9)
IMM GRANULOCYTES # BLD AUTO: 0 X10E3/UL (ref 0–0.1)
IMM GRANULOCYTES NFR BLD AUTO: 0 %
LDLC SERPL CALC-MCNC: 139 MG/DL (ref 0–99)
LYMPHOCYTES # BLD AUTO: 1.1 X10E3/UL (ref 0.7–3.1)
LYMPHOCYTES NFR BLD AUTO: 16 %
MCH RBC QN AUTO: 30.1 PG (ref 26.6–33)
MCHC RBC AUTO-ENTMCNC: 32.5 G/DL (ref 31.5–35.7)
MCV RBC AUTO: 93 FL (ref 79–97)
MONOCYTES # BLD AUTO: 0.4 X10E3/UL (ref 0.1–0.9)
MONOCYTES NFR BLD AUTO: 6 %
NEUTROPHILS # BLD AUTO: 5.1 X10E3/UL (ref 1.4–7)
NEUTROPHILS NFR BLD AUTO: 74 %
PLATELET # BLD AUTO: 319 X10E3/UL (ref 150–450)
POTASSIUM SERPL-SCNC: 4.7 MMOL/L (ref 3.5–5.2)
PROT SERPL-MCNC: 6.9 G/DL (ref 6–8.5)
RBC # BLD AUTO: 4.35 X10E6/UL (ref 3.77–5.28)
SODIUM SERPL-SCNC: 138 MMOL/L (ref 134–144)
TRIGL SERPL-MCNC: 112 MG/DL (ref 0–149)
VLDLC SERPL CALC-MCNC: 20 MG/DL (ref 5–40)
WBC # BLD AUTO: 6.8 X10E3/UL (ref 3.4–10.8)

## 2024-08-05 ENCOUNTER — OFFICE VISIT (OUTPATIENT)
Dept: FAMILY MEDICINE CLINIC | Facility: CLINIC | Age: 84
End: 2024-08-05
Payer: MEDICARE

## 2024-08-05 VITALS
HEART RATE: 73 BPM | SYSTOLIC BLOOD PRESSURE: 119 MMHG | OXYGEN SATURATION: 98 % | HEIGHT: 63 IN | TEMPERATURE: 98.4 F | RESPIRATION RATE: 15 BRPM | BODY MASS INDEX: 27.29 KG/M2 | DIASTOLIC BLOOD PRESSURE: 63 MMHG | WEIGHT: 154 LBS

## 2024-08-05 DIAGNOSIS — R73.9 HYPERGLYCEMIA: ICD-10-CM

## 2024-08-05 DIAGNOSIS — E78.2 MIXED HYPERLIPIDEMIA: ICD-10-CM

## 2024-08-05 DIAGNOSIS — E66.3 OVERWEIGHT (BMI 25.0-29.9): ICD-10-CM

## 2024-08-05 DIAGNOSIS — D50.8 OTHER IRON DEFICIENCY ANEMIA: ICD-10-CM

## 2024-08-05 DIAGNOSIS — I73.9 PERIPHERAL VASCULAR DISEASE: ICD-10-CM

## 2024-08-05 DIAGNOSIS — I10 HYPERTENSION, BENIGN: Primary | ICD-10-CM

## 2024-08-05 PROCEDURE — 1160F RVW MEDS BY RX/DR IN RCRD: CPT | Performed by: FAMILY MEDICINE

## 2024-08-05 PROCEDURE — 99214 OFFICE O/P EST MOD 30 MIN: CPT | Performed by: FAMILY MEDICINE

## 2024-08-05 PROCEDURE — 1159F MED LIST DOCD IN RCRD: CPT | Performed by: FAMILY MEDICINE

## 2024-08-05 PROCEDURE — 1126F AMNT PAIN NOTED NONE PRSNT: CPT | Performed by: FAMILY MEDICINE

## 2024-08-05 PROCEDURE — 3074F SYST BP LT 130 MM HG: CPT | Performed by: FAMILY MEDICINE

## 2024-08-05 PROCEDURE — G2211 COMPLEX E/M VISIT ADD ON: HCPCS | Performed by: FAMILY MEDICINE

## 2024-08-05 PROCEDURE — 3078F DIAST BP <80 MM HG: CPT | Performed by: FAMILY MEDICINE

## 2024-08-05 RX ORDER — ATORVASTATIN CALCIUM 20 MG/1
20 TABLET, FILM COATED ORAL DAILY
Qty: 90 TABLET | Refills: 1 | Status: SHIPPED | OUTPATIENT
Start: 2024-08-05

## 2024-08-05 NOTE — PROGRESS NOTES
Subjective   Vida Jamiosn is a 83 y.o. female.     Hyperlipidemia  This is a chronic problem. The current episode started more than 1 year ago. The problem is controlled. Pertinent negatives include no chest pain, myalgias or shortness of breath. Current antihyperlipidemic treatment includes statins.   Hypertension  This is a chronic problem. The current episode started more than 1 year ago. The problem has been improved since onset. The problem is controlled. Pertinent negatives include no chest pain, palpitations or shortness of breath.        The following portions of the patient's history were reviewed and updated as appropriate: allergies, current medications, past family history, past medical history, past social history, past surgical history, and problem list.    Family History   Problem Relation Age of Onset    Ovarian cancer Mother     Arthritis Mother     Heart failure Father     Suicidality Brother     Heart disease Maternal Aunt     Cancer Other         malignant neoplasm of gastrointestinal tract- in her 50's    Arthritis Other     Cervical cancer Other     Arthritis Other     Diabetes Other        Social History     Tobacco Use    Smoking status: Never     Passive exposure: Never    Smokeless tobacco: Never   Vaping Use    Vaping status: Never Used   Substance Use Topics    Alcohol use: No    Drug use: No       Past Surgical History:   Procedure Laterality Date    BREAST BIOPSY Right 1974    Dr Briseida Alonso    CATARACT EXTRACTION W/ INTRAOCULAR LENS IMPLANT      7/20/10-rt. eye-Dr. Leon 7/13/10- Lt. eye-Dr. Leon    D & C HYSTEROSCOPY N/A 6/9/2023    Procedure: DILATATION AND CURETTAGE HYSTEROSCOPY;  Surgeon: Jcakie Lieberman MD;  Location: [facility] MAIN OR;  Service: Obstetrics/Gynecology;  Laterality: N/A;    FINGER SURGERY  2001    Revision of Finger joints. Dr. Brumfield w/Drs. Kleinert & Marlon    HIP ENDOPROSTHESIS Left 05/22/2014    Osteonics endoprostehetic replacement of left hip. Dr. Reza Choudhury.     JOINT REPLACEMENT      KNEE ARTHROSCOPY Left     [1987] Dr Poon-torn medial meniscus [1995] Dr. Schaffer -torn medial meniscus    [facility]  2000    Biopsy of cervix. for MAJOR-1 by Dr. Knight    LUMBAR DISCECTOMY  1994    Hemilaminectomy, foraminectomy & discectomy. Dr. Velasquez, L4-L5 w/posterior lateral fusion & screw fixatoin    LUMBAR LAMINECTOMY  12/19/2011    foraminotomies, medial facetectomies [id number] redo. left L5-S1 lumbar discectomy. [facility]-Dr. De Oliveira- 5 units of blood given to pt.    POSTERIOR LUMBAR/THORACIC SPINE FUSION  2002    Fusion T-5 through L-1. Had T-11 burst fracture. Recuperated at [facility]    SPINAL FUSION      Lower Back. 7/11/2011-[facility] - Hardware removal from L3-L5, Dr. De Oliveira surgeon, Dr. Booker [facility]-Fusion of spine at multi-levels 12/14/11 - [facility] - Dr Gomez and Dr. Momin, Anterior approach Spinal Fusion L5-S1 3--[facility]. Dr. De Oliveira and Dr. Dean. Failed posterior fusion with loose instrumentation. L-4 thru L-5. No complications.    TONSILLECTOMY  1951    Dr Mcginnis    TOTAL HIP ARTHROPLASTY Right 04/10/2017    [facility], Inpatient. Dr. Reinoso    TOTAL KNEE ARTHROPLASTY Left 2002    Dr Sue @ [facility]       Patient Active Problem List   Diagnosis    Gastroesophageal reflux disease without esophagitis    Pain in hip region after total hip replacement    Loose total hip arthroplasty    Mixed hyperlipidemia    Myalgia and myositis    Hypertension, benign    Peripheral vascular disease    Periprosthetic fracture around internal prosthetic left hip joint    Rheumatoid arthritis involving multiple sites with positive rheumatoid factor    S/P lumbar fusion    Spinal stenosis of lumbar region    Carpal tunnel syndrome of right wrist    Anemia    Hyperglycemia    Left hip pain    Vitamin D deficiency    First degree heart block    Chronic pain syndrome    Dependent edema    Medicare annual wellness visit, subsequent     Aortic valve regurgitation    Atypical squamous cell changes of undetermined significance (ASCUS) on vaginal cytology    Elevated diaphragm    Esophageal hernia    Family history of diabetes mellitus    Fusion of spine of lumbar region    Hypertensive left ventricular hypertrophy, without heart failure    Hyponatremia    Mitral valve disease    Status post hip replacement    Elevated alkaline phosphatase level    Chronic pain of right knee    Left knee pain    Seasonal allergic rhinitis due to pollen    Localized edema    Status post knee replacement    Myalgia    Fracture of left patella    Insomnia, persistent    Irritable bowel syndrome without diarrhea    Left shoulder pain    Constipation    Urinary incontinence    Uterine leiomyoma    Hiatal hernia    Scoliosis (and kyphoscoliosis), idiopathic    Chronic neck pain    Osteoporosis    Allergic rhinitis    Arthritis of right hip    Depressive disorder    Lesion of joint capsule of knee region    Tear of meniscus of knee    Osteoarthritis of right knee    Prosthetic and other implants, materials and accessory orthopedic devices associated with adverse incidents    Seasonal allergies    Family history of colon cancer    At high risk for falls    Cervical cancer screening    Chronic right shoulder pain    Right lower quadrant abdominal pain    Immunization counseling    Tremor    Situational anxiety    Postmenopausal vaginal bleeding    Atrophic vaginitis    Vaginal bleeding    Pelvic and perineal pain    Colon polyps    Neoplasm, uncertain whether benign or malignant    Pure hypercholesterolemia    Back pain    Advance care planning    Depression screen    Overweight (BMI 25.0-29.9)       Current Outpatient Medications on File Prior to Visit   Medication Sig Dispense Refill    acetaminophen (TYLENOL) 650 MG 8 hr tablet Take 1 tablet by mouth 2 (Two) Times a Day.      amitriptyline (ELAVIL) 75 MG tablet TAKE 1 TABLET AT BEDTIME 90 tablet 0    Calcium  Carbonate-Vitamin D (CALCIUM 600/VITAMIN D PO) Take 1 tablet by mouth 2 (Two) Times a Day.      celecoxib (CeleBREX) 200 MG capsule TAKE 1 CAPSULE EVERY DAY 90 capsule 10    diphenhydrAMINE (BENADRYL) 25 mg capsule Take 1 capsule by mouth Daily.      DULoxetine (CYMBALTA) 60 MG capsule TAKE 1 CAPSULE EVERY DAY 90 capsule 1    enalapril (VASOTEC) 10 MG tablet Take 0.5 tablets by mouth 2 (Two) Times a Day. (Patient taking differently: Take 0.5 tablets by mouth Daily. HOLD 24 hours before surgery) 180 tablet 1    gabapentin (NEURONTIN) 300 MG capsule TAKE 1 CAPSULE TWICE DAILY (NEED AN APPOINTMENT FOR REFILLS) 180 capsule 0    Glycerin-Hypromellose- (ARTIFICIAL TEARS) 0.2-0.2-1 % solution ophthalmic solution       loratadine (CLARITIN) 10 MG tablet Take 1 tablet by mouth Daily.      LORazepam (Ativan) 0.5 MG tablet Take 1 tablet by mouth Every 8 (Eight) Hours As Needed for Anxiety. 20 tablet 0    metoprolol tartrate (LOPRESSOR) 25 MG tablet TAKE 1 TABLET TWICE DAILY 180 tablet 0    montelukast (SINGULAIR) 10 MG tablet TAKE 1 TABLET EVERY DAY (NEED MD APPOINTMENT FOR REFILLS) 90 tablet 1    pantoprazole (PROTONIX) 40 MG EC tablet TAKE 1 TABLET EVERY DAY 90 tablet 10    Wheat Dextrin (BENEFIBER DRINK MIX PO) Take  by mouth As Needed.      albuterol sulfate  (90 Base) MCG/ACT inhaler Inhale 2 puffs Every 4 (Four) Hours As Needed for Wheezing. (Patient not taking: Reported on 8/5/2024) 6.7 g 0    budesonide-formoterol (Symbicort) 80-4.5 MCG/ACT inhaler Inhale 2 puffs 2 (Two) Times a Day. (Patient not taking: Reported on 8/5/2024) 10.2 g 0    propranolol (INDERAL) 20 MG/5ML solution Take 5 mL by mouth 3 (Three) Times a Day. (Patient not taking: Reported on 8/5/2024) 30 mL 5     No current facility-administered medications on file prior to visit.       Allergies   Allergen Reactions    Morphine And Codeine Hallucinations     HALLUCINATIONS         Review of Systems   Constitutional:  Negative for fatigue,  "unexpected weight gain and unexpected weight loss.   Respiratory:  Negative for shortness of breath.    Cardiovascular:  Negative for chest pain, palpitations and leg swelling.   Gastrointestinal:  Negative for nausea.   Musculoskeletal:  Negative for myalgias.   Skin:  Negative for dry skin.   Neurological:  Negative for headache.       Objective   Visit Vitals  /63 (BP Location: Left arm, Patient Position: Sitting, Cuff Size: Adult)   Pulse 73   Temp 98.4 °F (36.9 °C)   Resp 15   Ht 160 cm (63\")   Wt 69.9 kg (154 lb)   SpO2 98%   BMI 27.28 kg/m²     Physical Exam  Vitals and nursing note reviewed.   Constitutional:       Appearance: She is well-developed.   HENT:      Head: Normocephalic.   Neck:      Thyroid: No thyromegaly.      Vascular: No carotid bruit.      Trachea: Trachea normal.   Cardiovascular:      Rate and Rhythm: Normal rate and regular rhythm.      Heart sounds: No murmur heard.     No friction rub. No gallop.   Pulmonary:      Effort: Pulmonary effort is normal. No respiratory distress.      Breath sounds: Normal breath sounds. No wheezing.   Chest:      Chest wall: No tenderness.   Musculoskeletal:      Cervical back: Neck supple.   Skin:     General: Skin is dry.      Findings: No rash.      Nails: There is no clubbing.   Neurological:      Mental Status: She is alert and oriented to person, place, and time.   Psychiatric:         Behavior: Behavior is cooperative.           Assessment & Plan .  Problem List Items Addressed This Visit          High    Peripheral vascular disease    Current Assessment & Plan     Keep risk factors low            Medium    Anemia    Overview     Colonoscopy done September 20, 2023 by Dr. Jackson follow-up polypectomy was done--need to find it pathology         Current Assessment & Plan     Resolved x 2         Relevant Orders    CBC & Differential    Hypertension, benign - Primary    Current Assessment & Plan     Doing well; Encouraged to watch salt, exercise " more and lose weight.  Patient tolerated enalapril, metoprolol well without side effects. I feel the benefits of the medication outweigh the risks.           Mixed hyperlipidemia    Current Assessment & Plan     Worsening-Advised to increase Lipitor to 20 mg daily   Encouraged to watch fatty intake, exercise more, and lose weight.   Non-compliant with medication-Patient states that she could have missed a few pills.    Patient tolerated lipitor well without side effects. I feel the benefits of the medication outweigh the risks.    Is not getting adequate diet and exercise  Goals developed at last visit were not met  Follow up in 2-3  months  Care management needs are self-addressed.  Self-management abilities addressed and patient is capable of managing her own disease.           Relevant Medications    atorvastatin (LIPITOR) 20 MG tablet    Other Relevant Orders    Comprehensive Metabolic Panel    Lipid Panel With / Chol / HDL Ratio       Low    Hyperglycemia    Current Assessment & Plan     Stable- Hgb A1c 5.9  Encourged to watch sugar intake, exercise more, lose weight,   No medication         Relevant Orders    Hemoglobin A1c       Unprioritized    Overweight (BMI 25.0-29.9)    Current Assessment & Plan     Patient's (Body mass index is 27.28 kg/m².) indicates that they are overweight with health conditions that include hypertension and dyslipidemias . Weight is unchanged. BMI is above average; BMI management plan is completed. We discussed low calorie, low carb based diet program, portion control, and increasing exercise.

## 2024-08-05 NOTE — ASSESSMENT & PLAN NOTE
Patient's (Body mass index is 27.28 kg/m².) indicates that they are overweight with health conditions that include hypertension and dyslipidemias . Weight is unchanged. BMI is above average; BMI management plan is completed. We discussed low calorie, low carb based diet program, portion control, and increasing exercise.

## 2024-08-05 NOTE — ASSESSMENT & PLAN NOTE
Worsening-Advised to increase Lipitor to 20 mg daily   Encouraged to watch fatty intake, exercise more, and lose weight.   Non-compliant with medication-Patient states that she could have missed a few pills.    Patient tolerated lipitor well without side effects. I feel the benefits of the medication outweigh the risks.    Is not getting adequate diet and exercise  Goals developed at last visit were not met  Follow up in 2-3  months  Care management needs are self-addressed.  Self-management abilities addressed and patient is capable of managing her own disease.

## 2024-09-03 DIAGNOSIS — G89.4 PAIN SYNDROME, CHRONIC: ICD-10-CM

## 2024-09-03 DIAGNOSIS — M51.36 LUMBAR DEGENERATIVE DISC DISEASE: ICD-10-CM

## 2024-09-05 ENCOUNTER — TELEPHONE (OUTPATIENT)
Dept: FAMILY MEDICINE CLINIC | Facility: CLINIC | Age: 84
End: 2024-09-05
Payer: MEDICARE

## 2024-09-05 RX ORDER — AMITRIPTYLINE HYDROCHLORIDE 75 MG/1
TABLET ORAL
Qty: 90 TABLET | Refills: 0 | Status: SHIPPED | OUTPATIENT
Start: 2024-09-05

## 2024-09-05 RX ORDER — GABAPENTIN 300 MG/1
CAPSULE ORAL
Qty: 180 CAPSULE | Refills: 0 | Status: SHIPPED | OUTPATIENT
Start: 2024-09-05

## 2024-09-05 NOTE — TELEPHONE ENCOUNTER
Called patient to discuss medications interactions and schedule an appointment.  Received no answer on the phone.

## 2024-09-10 ENCOUNTER — OFFICE VISIT (OUTPATIENT)
Dept: FAMILY MEDICINE CLINIC | Facility: CLINIC | Age: 84
End: 2024-09-10
Payer: MEDICARE

## 2024-09-10 VITALS
RESPIRATION RATE: 18 BRPM | WEIGHT: 151.6 LBS | OXYGEN SATURATION: 98 % | TEMPERATURE: 97.1 F | HEART RATE: 114 BPM | HEIGHT: 63 IN | SYSTOLIC BLOOD PRESSURE: 122 MMHG | DIASTOLIC BLOOD PRESSURE: 76 MMHG | BODY MASS INDEX: 26.86 KG/M2

## 2024-09-10 DIAGNOSIS — K58.9 IRRITABLE BOWEL SYNDROME WITHOUT DIARRHEA: ICD-10-CM

## 2024-09-10 DIAGNOSIS — R10.30 LOWER ABDOMINAL PAIN: ICD-10-CM

## 2024-09-10 DIAGNOSIS — J30.1 SEASONAL ALLERGIC RHINITIS DUE TO POLLEN: ICD-10-CM

## 2024-09-10 DIAGNOSIS — E66.3 OVERWEIGHT (BMI 25.0-29.9): ICD-10-CM

## 2024-09-10 DIAGNOSIS — K59.00 CONSTIPATION, UNSPECIFIED CONSTIPATION TYPE: ICD-10-CM

## 2024-09-10 DIAGNOSIS — G89.4 CHRONIC PAIN SYNDROME: ICD-10-CM

## 2024-09-10 DIAGNOSIS — S80.862A INSECT BITE OF LEFT LOWER LEG, INITIAL ENCOUNTER: Primary | ICD-10-CM

## 2024-09-10 DIAGNOSIS — R51.9 FRONTAL HEADACHE: ICD-10-CM
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
## 2024-09-10 DIAGNOSIS — G47.00 INSOMNIA, PERSISTENT: ICD-10-CM

## 2024-09-10 PROBLEM — S80.869A INSECT BITE OF LOWER LEG: Status: ACTIVE | Noted: 2024-09-10

## 2024-09-10 PROBLEM — R10.9 ABDOMINAL PAIN: Status: ACTIVE | Noted: 2024-09-10

## 2024-09-10 RX ORDER — DULOXETIN HYDROCHLORIDE 30 MG/1
30 CAPSULE, DELAYED RELEASE ORAL DAILY
Qty: 30 CAPSULE | Refills: 5 | Status: SHIPPED | OUTPATIENT
Start: 2024-09-10

## 2024-09-10 RX ORDER — FLUTICASONE PROPIONATE 50 MCG
2 SPRAY, SUSPENSION (ML) NASAL DAILY
Start: 2024-09-10

## 2024-09-10 NOTE — ASSESSMENT & PLAN NOTE
Patient's (Body mass index is 26.85 kg/m².) indicates that they are overweight with health conditions that include hypertension and dyslipidemias . Weight is improving with lifestyle modifications. BMI is above average; BMI management plan is completed. We discussed portion control and increasing exercise.

## 2024-09-10 NOTE — ASSESSMENT & PLAN NOTE
Stable but risks of elavil and cymbalta discussed.  Decrease Cymbalta to 30 mg daily.  Pt. Declines decreasing Elavil

## 2024-09-10 NOTE — PROGRESS NOTES
Subjective   Vida Jamison is a 84 y.o. female.     Abdominal Pain  This is a chronic problem. The current episode started more than 1 year ago. The problem occurs intermittently. The problem has been comes and goes. The pain is located in the suprapubic region. Associated symptoms include constipation, headaches and nausea. Pertinent negatives include no diarrhea, fever or vomiting.   Headache  Headache pattern:  Headache sometimes there, sometimes not at all  Initial event:  None  Frequency:  More than 3 per week  Insect Bite  This is a new problem. The current episode started in the past 7 days. The problem occurs constantly. The problem has been unchanged. Associated symptoms include abdominal pain, headaches and nausea. Pertinent negatives include no chills, fever or vomiting.        The following portions of the patient's history were reviewed and updated as appropriate: allergies, current medications, past family history, past medical history, past social history, past surgical history, and problem list.    Family History   Problem Relation Age of Onset    Ovarian cancer Mother     Arthritis Mother     Heart failure Father     Suicidality Brother     Heart disease Maternal Aunt     Cancer Other         malignant neoplasm of gastrointestinal tract- in her 50's    Arthritis Other     Cervical cancer Other     Arthritis Other     Diabetes Other        Social History     Tobacco Use    Smoking status: Never     Passive exposure: Never    Smokeless tobacco: Never   Vaping Use    Vaping status: Never Used   Substance Use Topics    Alcohol use: No    Drug use: No       Past Surgical History:   Procedure Laterality Date    BREAST BIOPSY Right 1974    Dr Briseida Alonso    CATARACT EXTRACTION W/ INTRAOCULAR LENS IMPLANT      7/20/10-rt. eye-Dr. Leon 7/13/10- Lt. eye-Dr. Leon    D & C HYSTEROSCOPY N/A 6/9/2023    Procedure: DILATATION AND CURETTAGE HYSTEROSCOPY;  Surgeon: Jackie Lieberman MD;  Location: [facility] MAIN OR;   Service: Obstetrics/Gynecology;  Laterality: N/A;    FINGER SURGERY  2001    Revision of Finger joints. Dr. Brumfield w/Drs. Kleinert & Marlon    HIP ENDOPROSTHESIS Left 05/22/2014    Osteonics endoprostehetic replacement of left hip. Dr. Reza Choudhury.    JOINT REPLACEMENT      KNEE ARTHROSCOPY Left     [1987] Dr Poon-torumm medial meniscus [1995] Dr. Schaffer -torumm medial meniscus    LEE  2000    Biopsy of cervix. for MAJOR-1 by Dr. Knight    LUMBAR DISCECTOMY  1994    Hemilaminectomy, foraminectomy & discectomy. Dr. Velasquez, L4-L5 w/posterior lateral fusion & screw fixatoin    LUMBAR LAMINECTOMY  12/19/2011    foraminotomies, medial facetectomies L5-[id number] redo. left L5-S1 lumbar discectomy. [facility]-Dr. De Oliveira- 5 units of blood given to pt.    POSTERIOR LUMBAR/THORACIC SPINE FUSION  2002    Fusion T-5 through L-1. Had T-11 burst fracture. Recuperated at [facility]    SPINAL FUSION      Lower Back. 7/11/2011-[facility] - Hardware removal from L3-L5, Dr. De Oliveira surgeon, Dr. Booker [facility]-Fusion of spine at multi-levels 12/14/11 - [facility] - Dr Gomez and Dr. Momin, Anterior approach Spinal Fusion L5-S1 3--[facility]. Dr. De Oliveira and Dr. Dean. Failed posterior fusion with loose instrumentation. L-4 thru L-5. No complications.    TONSILLECTOMY  1951    Dr Mcginnis    TOTAL HIP ARTHROPLASTY Right 04/10/2017    [facility], Inpatient. Dr. Reinoso    TOTAL KNEE ARTHROPLASTY Left 2002    Dr Sue @ [facility]       Patient Active Problem List   Diagnosis    Gastroesophageal reflux disease without esophagitis    Pain in hip region after total hip replacement    Loose total hip arthroplasty    Mixed hyperlipidemia    Myalgia and myositis    Hypertension, benign    Peripheral vascular disease    Periprosthetic fracture around internal prosthetic left hip joint    Rheumatoid arthritis involving multiple sites with positive rheumatoid factor    S/P lumbar fusion    Spinal stenosis of  lumbar region    Carpal tunnel syndrome of right wrist    Anemia    Hyperglycemia    Left hip pain    Vitamin D deficiency    First degree heart block    Chronic pain syndrome    Dependent edema    Medicare annual wellness visit, subsequent    Aortic valve regurgitation    Atypical squamous cell changes of undetermined significance (ASCUS) on vaginal cytology    Elevated diaphragm    Esophageal hernia    Family history of diabetes mellitus    Fusion of spine of lumbar region    Hypertensive left ventricular hypertrophy, without heart failure    Hyponatremia    Mitral valve disease    Status post hip replacement    Elevated alkaline phosphatase level    Chronic pain of right knee    Left knee pain    Seasonal allergic rhinitis due to pollen    Localized edema    Status post knee replacement    Myalgia    Fracture of left patella    Insomnia, persistent    Irritable bowel syndrome without diarrhea    Left shoulder pain    Constipation    Urinary incontinence    Uterine leiomyoma    Hiatal hernia    Scoliosis (and kyphoscoliosis), idiopathic    Chronic neck pain    Osteoporosis    Allergic rhinitis    Arthritis of right hip    Depressive disorder    Lesion of joint capsule of knee region    Tear of meniscus of knee    Osteoarthritis of right knee    Prosthetic and other implants, materials and accessory orthopedic devices associated with adverse incidents    Seasonal allergies    Family history of colon cancer    At high risk for falls    Cervical cancer screening    Chronic right shoulder pain    Right lower quadrant abdominal pain    Immunization counseling    Tremor    Situational anxiety    Postmenopausal vaginal bleeding    Atrophic vaginitis    Vaginal bleeding    Pelvic and perineal pain    Colon polyps    Neoplasm, uncertain whether benign or malignant    Pure hypercholesterolemia    Back pain    Advance care planning    Depression screen    Overweight (BMI 25.0-29.9)    Insect bite of lower leg    Frontal  headache    Abdominal pain       Current Outpatient Medications on File Prior to Visit   Medication Sig Dispense Refill    acetaminophen (TYLENOL) 650 MG 8 hr tablet Take 1 tablet by mouth 2 (Two) Times a Day.      albuterol sulfate  (90 Base) MCG/ACT inhaler Inhale 2 puffs Every 4 (Four) Hours As Needed for Wheezing. 6.7 g 0    amitriptyline (ELAVIL) 75 MG tablet TAKE 1 TABLET AT BEDTIME 90 tablet 0    atorvastatin (LIPITOR) 20 MG tablet Take 1 tablet by mouth Daily. (Patient taking differently: Take 0.5 tablets by mouth Daily.) 90 tablet 1    budesonide-formoterol (Symbicort) 80-4.5 MCG/ACT inhaler Inhale 2 puffs 2 (Two) Times a Day. 10.2 g 0    Calcium Carbonate-Vitamin D (CALCIUM 600/VITAMIN D PO) Take 1 tablet by mouth 2 (Two) Times a Day.      celecoxib (CeleBREX) 200 MG capsule TAKE 1 CAPSULE EVERY DAY 90 capsule 10    diphenhydrAMINE (BENADRYL) 25 mg capsule Take 1 capsule by mouth Daily.      enalapril (VASOTEC) 10 MG tablet Take 0.5 tablets by mouth 2 (Two) Times a Day. (Patient taking differently: Take 0.5 tablets by mouth Daily. HOLD 24 hours before surgery) 180 tablet 1    gabapentin (NEURONTIN) 300 MG capsule TAKE 1 CAPSULE TWICE DAILY (NEED AN APPOINTMENT FOR REFILLS) 180 capsule 0    Glycerin-Hypromellose- (ARTIFICIAL TEARS) 0.2-0.2-1 % solution ophthalmic solution       loratadine (CLARITIN) 10 MG tablet Take 1 tablet by mouth Daily.      LORazepam (Ativan) 0.5 MG tablet Take 1 tablet by mouth Every 8 (Eight) Hours As Needed for Anxiety. 20 tablet 0    metoprolol tartrate (LOPRESSOR) 25 MG tablet TAKE 1 TABLET TWICE DAILY 180 tablet 0    montelukast (SINGULAIR) 10 MG tablet TAKE 1 TABLET EVERY DAY (NEED MD APPOINTMENT FOR REFILLS) 90 tablet 1    pantoprazole (PROTONIX) 40 MG EC tablet TAKE 1 TABLET EVERY DAY 90 tablet 10    propranolol (INDERAL) 20 MG/5ML solution Take 5 mL by mouth 3 (Three) Times a Day. 30 mL 5    Wheat Dextrin (BENEFIBER DRINK MIX PO) Take  by mouth As Needed.    "    No current facility-administered medications on file prior to visit.       Allergies   Allergen Reactions    Morphine And Codeine Hallucinations     HALLUCINATIONS         Review of Systems   Constitutional:  Negative for appetite change, chills and fever.   Gastrointestinal:  Positive for abdominal pain, constipation and nausea. Negative for diarrhea and vomiting.   Skin:  Positive for wound (INSECT BITES).   Neurological:  Positive for headache.   Hematological:  Negative for adenopathy.   Psychiatric/Behavioral:  Positive for sleep disturbance.        Objective   Visit Vitals  /76 (BP Location: Left arm, Patient Position: Sitting, Cuff Size: Adult)   Pulse 114   Temp 97.1 °F (36.2 °C) (Temporal)   Resp 18   Ht 160 cm (63\")   Wt 68.8 kg (151 lb 9.6 oz)   SpO2 98%   BMI 26.85 kg/m²     Physical Exam  Vitals and nursing note reviewed.   Constitutional:       Appearance: She is well-developed.   HENT:      Head: Normocephalic.   Neck:      Thyroid: No thyromegaly.      Vascular: No carotid bruit.      Trachea: Trachea normal.   Cardiovascular:      Rate and Rhythm: Normal rate and regular rhythm.      Heart sounds: No murmur heard.     No friction rub. No gallop.   Pulmonary:      Effort: Pulmonary effort is normal. No respiratory distress.      Breath sounds: Normal breath sounds. No wheezing.   Chest:      Chest wall: No tenderness.   Musculoskeletal:      Cervical back: Neck supple.   Skin:     General: Skin is dry.      Findings: No rash.      Nails: There is no clubbing.      Comments: Multiple insect bites on legs   Neurological:      Mental Status: She is alert and oriented to person, place, and time.   Psychiatric:         Behavior: Behavior is cooperative.           Assessment & Plan .  Problem List Items Addressed This Visit          High    Chronic pain syndrome    Current Assessment & Plan     Stable but risks of elavil and cymbalta discussed.  Decrease Cymbalta to 30 mg daily.  Pt. Declines " decreasing Elavil         Relevant Medications    DULoxetine (CYMBALTA) 30 MG capsule       Low    Seasonal allergic rhinitis due to pollen    Overview     Continue singulair         Current Assessment & Plan     Worse.  Start Flonase daily.         Relevant Medications    fluticasone (FLONASE) 50 MCG/ACT nasal spray       Unprioritized    Abdominal pain    Current Assessment & Plan     New dx.  Probable 2nd to IBS and Constipation         Constipation    Current Assessment & Plan     Worse, increase Metamucil to 2 tablespoon daily daily, start Miralax 1 cap full daily, pt. Declines 1 cap daily of Miralax         Frontal headache    Current Assessment & Plan     New dx.  Possibly 2nd to seasonal allergies, starting Flonase should help.         Insect bite of lower leg - Primary    Current Assessment & Plan     New dx.  Discussed mosquitoe borne illness and lab work up.  Pt. Declines lab work up.         Insomnia, persistent    Current Assessment & Plan     Worse.  Discussed sleep hygiene, advised to spend less time in bed.         Irritable bowel syndrome without diarrhea    Current Assessment & Plan     Worse but hx and exam are benign.  IBS Probable cause of abdominal pain and constipation         Overweight (BMI 25.0-29.9)    Current Assessment & Plan     Patient's (Body mass index is 26.85 kg/m².) indicates that they are overweight with health conditions that include hypertension and dyslipidemias . Weight is improving with lifestyle modifications. BMI is above average; BMI management plan is completed. We discussed portion control and increasing exercise.

## 2024-09-10 NOTE — ASSESSMENT & PLAN NOTE
Worse, increase Metamucil to 2 tablespoon daily daily, start Miralax 1 cap full daily, pt. Declines 1 cap daily of Miralax

## 2024-09-24 ENCOUNTER — OFFICE VISIT (OUTPATIENT)
Dept: FAMILY MEDICINE CLINIC | Facility: CLINIC | Age: 84
End: 2024-09-24
Payer: MEDICARE

## 2024-09-24 VITALS
RESPIRATION RATE: 18 BRPM | TEMPERATURE: 97.1 F | HEIGHT: 63 IN | HEART RATE: 87 BPM | OXYGEN SATURATION: 98 % | WEIGHT: 156.8 LBS | BODY MASS INDEX: 27.78 KG/M2
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
## 2024-09-24 DIAGNOSIS — G89.4 CHRONIC PAIN SYNDROME: ICD-10-CM

## 2024-09-24 DIAGNOSIS — R10.30 LOWER ABDOMINAL PAIN: ICD-10-CM

## 2024-09-24 DIAGNOSIS — K58.9 IRRITABLE BOWEL SYNDROME WITHOUT DIARRHEA: ICD-10-CM

## 2024-09-24 DIAGNOSIS — Z23 NEEDS FLU SHOT: Primary | ICD-10-CM

## 2024-09-24 DIAGNOSIS — J30.1 SEASONAL ALLERGIC RHINITIS DUE TO POLLEN: ICD-10-CM

## 2024-09-24 DIAGNOSIS — K59.00 CONSTIPATION, UNSPECIFIED CONSTIPATION TYPE: ICD-10-CM

## 2024-09-24 DIAGNOSIS — R51.9 FRONTAL HEADACHE: ICD-10-CM

## 2024-09-24 DIAGNOSIS — S80.862A INSECT BITE OF LEFT LOWER LEG, INITIAL ENCOUNTER: ICD-10-CM

## 2024-09-24 PROCEDURE — 1159F MED LIST DOCD IN RCRD: CPT | Performed by: FAMILY MEDICINE

## 2024-09-24 PROCEDURE — 90662 IIV NO PRSV INCREASED AG IM: CPT | Performed by: FAMILY MEDICINE

## 2024-09-24 PROCEDURE — G0008 ADMIN INFLUENZA VIRUS VAC: HCPCS | Performed by: FAMILY MEDICINE

## 2024-09-24 PROCEDURE — 99214 OFFICE O/P EST MOD 30 MIN: CPT | Performed by: FAMILY MEDICINE

## 2024-09-24 PROCEDURE — 1160F RVW MEDS BY RX/DR IN RCRD: CPT | Performed by: FAMILY MEDICINE

## 2024-09-24 PROCEDURE — 1125F AMNT PAIN NOTED PAIN PRSNT: CPT | Performed by: FAMILY MEDICINE

## 2024-09-24 RX ORDER — MONTELUKAST SODIUM 10 MG/1
10 TABLET ORAL DAILY
Start: 2024-09-24

## 2024-09-24 RX ORDER — DULOXETIN HYDROCHLORIDE 30 MG/1
30 CAPSULE, DELAYED RELEASE ORAL DAILY
Qty: 30 CAPSULE | Refills: 5 | Status: SHIPPED | OUTPATIENT
Start: 2024-09-24

## 2024-09-24 RX ORDER — FLUTICASONE PROPIONATE 50 UG/1
2 SPRAY, METERED NASAL DAILY
Start: 2024-09-24

## 2024-10-02 DIAGNOSIS — J30.1 SEASONAL ALLERGIC RHINITIS DUE TO POLLEN: ICD-10-CM

## 2024-10-02 RX ORDER — MONTELUKAST SODIUM 10 MG/1
10 TABLET ORAL DAILY
Qty: 90 TABLET | Refills: 0 | Status: SHIPPED | OUTPATIENT
Start: 2024-10-02

## 2024-10-07 ENCOUNTER — TELEPHONE (OUTPATIENT)
Dept: FAMILY MEDICINE CLINIC | Facility: CLINIC | Age: 84
End: 2024-10-07
Payer: MEDICARE

## 2024-10-07 NOTE — TELEPHONE ENCOUNTER
Called to check on patient since ER visit to [facility], pt. Stated that her abdominal pain and nausea are resolved, I encouraged patient to follow with Dr. Gonzales, patient declined to follow up.

## 2024-10-09 ENCOUNTER — OFFICE VISIT (OUTPATIENT)
Dept: FAMILY MEDICINE CLINIC | Facility: CLINIC | Age: 84
End: 2024-10-09
Payer: MEDICARE

## 2024-10-09 VITALS
OXYGEN SATURATION: 100 % | DIASTOLIC BLOOD PRESSURE: 70 MMHG | HEIGHT: 63 IN | TEMPERATURE: 97.1 F | WEIGHT: 154.8 LBS | RESPIRATION RATE: 18 BRPM | BODY MASS INDEX: 27.43 KG/M2 | HEART RATE: 76 BPM | SYSTOLIC BLOOD PRESSURE: 114 MMHG

## 2024-10-09 DIAGNOSIS — M05.79 RHEUMATOID ARTHRITIS INVOLVING MULTIPLE SITES WITH POSITIVE RHEUMATOID FACTOR: ICD-10-CM

## 2024-10-09 DIAGNOSIS — R10.11 RIGHT UPPER QUADRANT ABDOMINAL PAIN: Primary | ICD-10-CM

## 2024-10-09 DIAGNOSIS — K58.1 IRRITABLE BOWEL SYNDROME WITH CONSTIPATION: ICD-10-CM

## 2024-10-09 DIAGNOSIS — D50.8 OTHER IRON DEFICIENCY ANEMIA: ICD-10-CM

## 2024-10-09 NOTE — PROGRESS NOTES
Mode: Mode: Subjective   Vida Jamison is a 84 y.o. female.     History of Present Illness  Follow up ER vist from [facility] on 10-3-24 for Right quadrant pain.  Abdominal Pain  This is a recurrent problem. The current episode started 1 to 4 weeks ago. The problem occurs intermittently. The problem has been resolved. The pain is located in the RLQ. Associated symptoms include arthralgias. Pertinent negatives include no diarrhea, fever, hematuria, nausea or vomiting. The pain is aggravated by eating.   Anemia  Presents for follow-up visit. Symptoms include abdominal pain. There has been no fever. There are no compliance problems.         The following portions of the patient's history were reviewed and updated as appropriate: allergies, current medications, past family history, past medical history, past social history, past surgical history, and problem list.    Family History   Problem Relation Age of Onset    Ovarian cancer Mother     Arthritis Mother     Heart failure Father     Suicidality Brother     Heart disease Maternal Aunt     Cancer Other         malignant neoplasm of gastrointestinal tract- in her 50's    Arthritis Other     Cervical cancer Other     Arthritis Other     Diabetes Other        Social History     Tobacco Use    Smoking status: Never     Passive exposure: Never    Smokeless tobacco: Never   Vaping Use    Vaping status: Never Used   Substance Use Topics    Alcohol use: No    Drug use: No       Past Surgical History:   Procedure Laterality Date    BREAST BIOPSY Right 1974    Dr Briseida Alonso    CATARACT EXTRACTION W/ INTRAOCULAR LENS IMPLANT      7/20/10-rt. eye-Dr. Leon 7/13/10- Lt. eye-Dr. Leon    D & C HYSTEROSCOPY N/A 6/9/2023    Procedure: DILATATION AND CURETTAGE HYSTEROSCOPY;  Surgeon: Jackie Lieberman MD;  Location: [facility] MAIN OR;  Service: Obstetrics/Gynecology;  Laterality: N/A;    FINGER SURGERY  2001    Revision of Finger joints. Dr. Brumfield w/Drs. Kleinert & Marlon    HIP ENDOPROSTHESIS Left  05/22/2014    Osteonics endoprostehetic replacement of left hip. Dr. Reza Choudhury.    JOINT REPLACEMENT      KNEE ARTHROSCOPY Left     [1987] Dr Poon-torn medial meniscus [1995] Dr. Schaffer -torn medial meniscus    LEE  2000    Biopsy of cervix. for MAJOR-1 by Dr. Knight    LUMBAR DISCECTOMY  1994    Hemilaminectomy, foraminectomy & discectomy. Dr. Velasquez, L4-L5 w/posterior lateral fusion & screw fixatoin    LUMBAR LAMINECTOMY  12/19/2011    foraminotomies, medial facetectomies [id number] redo. left L5-S1 lumbar discectomy. [facility]-Dr. De Oliveira- 5 units of blood given to pt.    POSTERIOR LUMBAR/THORACIC SPINE FUSION  2002    Fusion T-5 through L-1. Had T-11 burst fracture. [facility] at [facility]    SPINAL FUSION      Lower Back. 7/11/2011-[facility] - Hardware removal from L3-L5, Dr. De Oliveira surgeon, Dr. Booker [facility]-Fusion of spine at multi-levels 12/14/11 - [facility] - Dr Gomez and Dr. Momin, Anterior approach Spinal Fusion L5-S1 3--[facility]. Dr. De Oliveira and Dr. Dean. Failed posterior fusion with loose instrumentation. L-4 thru L-5. No complications.    TONSILLECTOMY  1951    Dr Mcginnis    TOTAL HIP ARTHROPLASTY Right 04/10/2017    [facility], Inpatient. Dr. Reinoso    TOTAL KNEE ARTHROPLASTY Left 2002    Dr Sue @ [facility]       Patient Active Problem List   Diagnosis    Gastroesophageal reflux disease without esophagitis    Pain in hip region after total hip replacement    Loose total hip arthroplasty    Mixed hyperlipidemia    Myalgia and myositis    Hypertension, benign    Peripheral vascular disease    Periprosthetic fracture around internal prosthetic left hip joint    Rheumatoid arthritis involving multiple sites with positive rheumatoid factor    S/P lumbar fusion    Spinal stenosis of lumbar region    Carpal tunnel syndrome of right wrist    Anemia    Hyperglycemia    Left hip pain    Vitamin D deficiency    First degree heart block    Chronic  pain syndrome    Dependent edema    Medicare annual wellness visit, subsequent    Aortic valve regurgitation    Atypical squamous cell changes of undetermined significance (ASCUS) on vaginal cytology    Elevated diaphragm    Esophageal hernia    Family history of diabetes mellitus    Fusion of spine of lumbar region    Hypertensive left ventricular hypertrophy, without heart failure    Hyponatremia    Mitral valve disease    Status post hip replacement    Elevated alkaline phosphatase level    Chronic pain of right knee    Left knee pain    Seasonal allergic rhinitis due to pollen    Localized edema    Status post knee replacement    Myalgia    Fracture of left patella    Insomnia, persistent    Irritable bowel syndrome with constipation    Left shoulder pain    Constipation    Urinary incontinence    Uterine leiomyoma    Hiatal hernia    Scoliosis (and kyphoscoliosis), idiopathic    Chronic neck pain    Osteoporosis    Allergic rhinitis    Arthritis of right hip    Depressive disorder    Lesion of joint capsule of knee region    Tear of meniscus of knee    Osteoarthritis of right knee    Prosthetic and other implants, materials and accessory orthopedic devices associated with adverse incidents    Seasonal allergies    Family history of colon cancer    At high risk for falls    Cervical cancer screening    Chronic right shoulder pain    Right lower quadrant abdominal pain    Immunization counseling    Tremor    Situational anxiety    Postmenopausal vaginal bleeding    Atrophic vaginitis    Vaginal bleeding    Pelvic and perineal pain    Colon polyps    Neoplasm, uncertain whether benign or malignant    Pure hypercholesterolemia    Back pain    Advance care planning    Depression screen    Overweight (BMI 25.0-29.9)    Insect bite of lower leg    Frontal headache    Right upper quadrant abdominal pain       Current Outpatient Medications on File Prior to Visit   Medication Sig Dispense Refill    acetaminophen  (TYLENOL) 650 MG 8 hr tablet Take 1 tablet by mouth 2 (Two) Times a Day.      albuterol sulfate  (90 Base) MCG/ACT inhaler Inhale 2 puffs Every 4 (Four) Hours As Needed for Wheezing. 6.7 g 0    amitriptyline (ELAVIL) 75 MG tablet TAKE 1 TABLET AT BEDTIME 90 tablet 0    atorvastatin (LIPITOR) 20 MG tablet Take 1 tablet by mouth Daily. (Patient taking differently: Take 0.5 tablets by mouth Daily.) 90 tablet 1    budesonide-formoterol (Symbicort) 80-4.5 MCG/ACT inhaler Inhale 2 puffs 2 (Two) Times a Day. 10.2 g 0    Calcium Carbonate-Vitamin D (CALCIUM 600/VITAMIN D PO) Take 1 tablet by mouth 2 (Two) Times a Day.      celecoxib (CeleBREX) 200 MG capsule TAKE 1 CAPSULE EVERY DAY 90 capsule 10    diphenhydrAMINE (BENADRYL) 25 mg capsule Take 1 capsule by mouth Daily.      DULoxetine (CYMBALTA) 30 MG capsule Take 1 capsule by mouth Daily. 30 capsule 5    enalapril (VASOTEC) 10 MG tablet Take 0.5 tablets by mouth 2 (Two) Times a Day. (Patient taking differently: Take 0.5 tablets by mouth Daily. HOLD 24 hours before surgery) 180 tablet 1    fluticasone (FLONASE) 50 MCG/ACT nasal spray Administer 2 sprays into the nostril(s) as directed by provider Daily.      gabapentin (NEURONTIN) 300 MG capsule TAKE 1 CAPSULE TWICE DAILY (NEED AN APPOINTMENT FOR REFILLS) 180 capsule 0    Glycerin-Hypromellose- (ARTIFICIAL TEARS) 0.2-0.2-1 % solution ophthalmic solution       loratadine (CLARITIN) 10 MG tablet Take 1 tablet by mouth Daily.      LORazepam (Ativan) 0.5 MG tablet Take 1 tablet by mouth Every 8 (Eight) Hours As Needed for Anxiety. 20 tablet 0    metoprolol tartrate (LOPRESSOR) 25 MG tablet TAKE 1 TABLET TWICE DAILY 180 tablet 0    montelukast (SINGULAIR) 10 MG tablet TAKE 1 TABLET EVERY DAY (NEED MD APPOINTMENT FOR REFILLS) 90 tablet 0    pantoprazole (PROTONIX) 40 MG EC tablet TAKE 1 TABLET EVERY DAY 90 tablet 10    propranolol (INDERAL) 20 MG/5ML solution Take 5 mL by mouth 3 (Three) Times a Day. 30 mL 5     "Wheat Dextrin (BENEFIBER DRINK MIX PO) Take  by mouth As Needed.       No current facility-administered medications on file prior to visit.       Allergies   Allergen Reactions    Morphine And Codeine Hallucinations     HALLUCINATIONS         Review of Systems   Constitutional:  Negative for chills, diaphoresis, fatigue and fever.   HENT:  Negative for nosebleeds.    Gastrointestinal:  Positive for abdominal pain. Negative for blood in stool, diarrhea, nausea, vomiting, GERD and indigestion.   Genitourinary:  Negative for hematuria.   Musculoskeletal:  Positive for arthralgias.       Objective   Visit Vitals  /70 (BP Location: Left arm, Patient Position: Sitting, Cuff Size: Adult)   Pulse 76   Temp 97.1 °F (36.2 °C) (Temporal)   Resp 18   Ht 160 cm (63\")   Wt 70.2 kg (154 lb 12.8 oz)   SpO2 100%   BMI 27.42 kg/m²     Physical Exam  Vitals and nursing note reviewed.   Constitutional:       Appearance: Normal appearance. She is well-developed.   HENT:      Head: Normocephalic.   Neck:      Thyroid: No thyromegaly.      Vascular: No carotid bruit.      Trachea: Trachea normal.   Cardiovascular:      Rate and Rhythm: Normal rate and regular rhythm.      Heart sounds: No murmur heard.     No friction rub. No gallop.   Pulmonary:      Effort: Pulmonary effort is normal. No respiratory distress.      Breath sounds: Normal breath sounds. No wheezing.   Chest:      Chest wall: No tenderness.   Abdominal:      Palpations: Abdomen is soft.      Tenderness: There is no abdominal tenderness.   Musculoskeletal:      Cervical back: Neck supple.   Skin:     General: Skin is dry.      Findings: No rash.      Nails: There is no clubbing.   Neurological:      Mental Status: She is alert and oriented to person, place, and time.   Psychiatric:         Behavior: Behavior is cooperative.           Assessment & Plan .  Problem List Items Addressed This Visit          Medium    Anemia    Overview     Colonoscopy done September 20, " 2023 by Dr. Livingston follow-up polypectomy was done--need to find it pathology         Current Assessment & Plan     Worse, HGB was 11.8 at [facility] on 10-3-24 down from 13.1         Rheumatoid arthritis involving multiple sites with positive rheumatoid factor    Overview     Patient declines Volteran gel due to trying it in the past with no success.         Current Assessment & Plan     Stable.  Patient declines more work up.            Unprioritized    Irritable bowel syndrome with constipation    Overview     Colonoscopy 9-20-23 with Dr. Valdivia who stated no recall         Current Assessment & Plan     Worse.  Advised to increase benefiber to 5 teaspoons daily.         Right upper quadrant abdominal pain - Primary    Overview     Colonoscopy 9-20-23 with Dr. Valdivia who stated no recall         Current Assessment & Plan     Intermittent. Differential diagnosis could be gallbladder, renal stones, IBS, Pancreatitis or Ulcers  Discussed CT with renal stone protocol, pt. Declines CT and agrees to Amylase and Lipase with next labs.  Discussed decreasing Elavil, pt. Declines.  Discussed following with GI, patient declines to follow with GI.         Relevant Orders    Amylase    Lipase

## 2024-10-09 NOTE — ASSESSMENT & PLAN NOTE
Intermittent. Differential diagnosis could be gallbladder, renal stones, IBS, Pancreatitis or Ulcers  Discussed CT with renal stone protocol, pt. Declines CT and agrees to Amylase and Lipase with next labs.  Discussed decreasing Elavil, pt. Declines.  Discussed following with GI, patient declines to follow with GI.

## 2024-10-17 NOTE — ASSESSMENT & PLAN NOTE
Labs done 1-, read by me, reviewed with pt.  Tot. Chol. 146 up from 139, Tot. Chol. 146 up from 139, HDL 41 same as last, LDL 81 up from 79  Doing well.  Patient tolerated Lipitor well without side effects. I feel the benefits of the medication outweigh the risks.  Encouraged to watch fatty intake, exercise more, and lose weight.   Compliant with medication  Is getting adequate diet and exercise  Goals developed at last visit were met  Follow up in 3 months  Care management needs are self-addressed.  Self-management abilities addressed and patient is capable of managing her own disease.     Pain Clinic Progress Note    Subjective:  Pain located in low back area bilaterally without any radiation to the lower extremities.   Pain is 7 on a 0 to 10 numeric scale.  This pain significantly impairs activities of daily living. According to the patient, their percentage of pain relief was 100%. They also expressed they had 100% functional improvement with activities of daily living. The pain and or functional improvement lasted 1 year(s) .    Objective:     has a past medical history of Bradycardia (04/19/2015), Bundle branch block, Bursitis of left hip, CHB (complete heart block)  (CMD) (07/13/2020), Chronic pain, Degenerative arthritis of thumb, Depression, Diverticulosis (10/09/2020), Dysthymia (04/09/2012), Gastroesophageal reflux disease, Hemorrhoids (10/09/2020), Hyperlipidemia, Hypersomnia (07/27/2017), Hypertension, Hypokalemia, Inflammatory bowel disease, Mixed stress and urge urinary incontinence (12/14/2014), MRSA (methicillin resistant staph aureus) culture positive (12/15/2022), Neutropenic (CMD), BISMARK (obstructive sleep apnea) (07/27/2017), Paroxysmal atrial fibrillation  (CMD) (09/05/2016), PONV (postoperative nausea and vomiting), Seasonal allergies, and Urge urinary incontinence (03/08/2015).    She has no past medical history of Cerebral infarction (CMD), Diabetes mellitus  (CMD), Malignant hyperthermia, or Myocardial infarction  (CMD).  Past medical history was reviewed, list of current medications updated.  Previous pain procedures: Patient had radiofrequency ablation of L5-S1 facet joints bilaterally a year ago and was doing quite well until August of this year.  She has exactly the same pain.  Going thing that happened she had the bladder stimulator placed in October of this year    Physical Exam:    Musculoskeletal: No muscle weakness in both lower extremities.       Assessment: Lumbar spondylosis, chronic low back pain without sciatica    I spent at least 30 minutes doing face-to-face  examination of the patient, reviewing previously performed procedures and treatments, explaining further treatment options and documenting in electronic health records.       Plan: Repeat radiofrequency ablation of L5-S1 facet joints bilaterally  Recommendation:Continue with Over the counter medications.          Edagrd Ayoub MD   10/17/2024

## 2024-11-01 LAB
ALBUMIN SERPL-MCNC: 4.2 G/DL (ref 3.7–4.7)
ALP SERPL-CCNC: 81 IU/L (ref 44–121)
ALT SERPL-CCNC: 13 IU/L (ref 0–32)
AMYLASE SERPL-CCNC: 41 U/L (ref 31–110)
AST SERPL-CCNC: 25 IU/L (ref 0–40)
BASOPHILS # BLD AUTO: 0 X10E3/UL (ref 0–0.2)
BASOPHILS NFR BLD AUTO: 1 %
BILIRUB SERPL-MCNC: 0.5 MG/DL (ref 0–1.2)
BUN SERPL-MCNC: 22 MG/DL (ref 8–27)
BUN/CREAT SERPL: 26 (ref 12–28)
CALCIUM SERPL-MCNC: 9.2 MG/DL (ref 8.7–10.3)
CHLORIDE SERPL-SCNC: 100 MMOL/L (ref 96–106)
CHOLEST SERPL-MCNC: 152 MG/DL (ref 100–199)
CHOLEST/HDLC SERPL: 3.6 RATIO (ref 0–4.4)
CO2 SERPL-SCNC: 24 MMOL/L (ref 20–29)
CREAT SERPL-MCNC: 0.86 MG/DL (ref 0.57–1)
EGFRCR SERPLBLD CKD-EPI 2021: 67 ML/MIN/1.73
EOSINOPHIL # BLD AUTO: 0.2 X10E3/UL (ref 0–0.4)
EOSINOPHIL NFR BLD AUTO: 3 %
ERYTHROCYTE [DISTWIDTH] IN BLOOD BY AUTOMATED COUNT: 12.4 % (ref 11.7–15.4)
GLOBULIN SER CALC-MCNC: 2.5 G/DL (ref 1.5–4.5)
GLUCOSE SERPL-MCNC: 88 MG/DL (ref 70–99)
HBA1C MFR BLD: 6.2 % (ref 4.8–5.6)
HCT VFR BLD AUTO: 38 % (ref 34–46.6)
HDLC SERPL-MCNC: 42 MG/DL
HGB BLD-MCNC: 11.9 G/DL (ref 11.1–15.9)
IMM GRANULOCYTES # BLD AUTO: 0 X10E3/UL (ref 0–0.1)
IMM GRANULOCYTES NFR BLD AUTO: 0 %
LDLC SERPL CALC-MCNC: 91 MG/DL (ref 0–99)
LIPASE SERPL-CCNC: 29 U/L (ref 14–85)
LYMPHOCYTES # BLD AUTO: 1.4 X10E3/UL (ref 0.7–3.1)
LYMPHOCYTES NFR BLD AUTO: 21 %
MCH RBC QN AUTO: 29.2 PG (ref 26.6–33)
MCHC RBC AUTO-ENTMCNC: 31.3 G/DL (ref 31.5–35.7)
MCV RBC AUTO: 93 FL (ref 79–97)
MONOCYTES # BLD AUTO: 0.6 X10E3/UL (ref 0.1–0.9)
MONOCYTES NFR BLD AUTO: 9 %
NEUTROPHILS # BLD AUTO: 4.4 X10E3/UL (ref 1.4–7)
NEUTROPHILS NFR BLD AUTO: 66 %
PLATELET # BLD AUTO: 337 X10E3/UL (ref 150–450)
POTASSIUM SERPL-SCNC: 4.8 MMOL/L (ref 3.5–5.2)
PROT SERPL-MCNC: 6.7 G/DL (ref 6–8.5)
RBC # BLD AUTO: 4.08 X10E6/UL (ref 3.77–5.28)
SODIUM SERPL-SCNC: 138 MMOL/L (ref 134–144)
TRIGL SERPL-MCNC: 105 MG/DL (ref 0–149)
VLDLC SERPL CALC-MCNC: 19 MG/DL (ref 5–40)
WBC # BLD AUTO: 6.6 X10E3/UL (ref 3.4–10.8)

## 2024-11-04 NOTE — ASSESSMENT & PLAN NOTE
Improving  Encouraged to watch fatty intake, exercise more, and lose weight.   compliant with medication-    Advised to stop lipitor for a month due to pains which are probably unrelated to the atorvastatin.    Is not getting adequate diet and exercise  Goals developed at last visit were met  Follow up in 3 months  Care management needs are self-addressed.  Self-management abilities addressed and patient is capable of managing her own disease.

## 2024-11-05 ENCOUNTER — OFFICE VISIT (OUTPATIENT)
Dept: FAMILY MEDICINE CLINIC | Facility: CLINIC | Age: 84
End: 2024-11-05
Payer: MEDICARE

## 2024-11-05 VITALS
HEIGHT: 63 IN | RESPIRATION RATE: 14 BRPM | OXYGEN SATURATION: 95 % | DIASTOLIC BLOOD PRESSURE: 81 MMHG | SYSTOLIC BLOOD PRESSURE: 137 MMHG | BODY MASS INDEX: 27.43 KG/M2 | HEART RATE: 68 BPM | WEIGHT: 154.8 LBS | TEMPERATURE: 97.1 F

## 2024-11-05 DIAGNOSIS — E87.1 HYPONATREMIA: ICD-10-CM

## 2024-11-05 DIAGNOSIS — R10.31 RIGHT LOWER QUADRANT ABDOMINAL PAIN: ICD-10-CM

## 2024-11-05 DIAGNOSIS — E66.3 OVERWEIGHT (BMI 25.0-29.9): ICD-10-CM

## 2024-11-05 DIAGNOSIS — I73.9 PERIPHERAL VASCULAR DISEASE: ICD-10-CM

## 2024-11-05 DIAGNOSIS — D50.8 OTHER IRON DEFICIENCY ANEMIA: ICD-10-CM

## 2024-11-05 DIAGNOSIS — E78.2 MIXED HYPERLIPIDEMIA: Primary | ICD-10-CM

## 2024-11-05 DIAGNOSIS — R73.9 HYPERGLYCEMIA: ICD-10-CM

## 2024-11-05 DIAGNOSIS — I10 HYPERTENSION, BENIGN: ICD-10-CM

## 2024-11-05 DIAGNOSIS — R60.0 EDEMA OF BOTH LOWER EXTREMITIES: ICD-10-CM

## 2024-11-05 PROCEDURE — 1125F AMNT PAIN NOTED PAIN PRSNT: CPT | Performed by: FAMILY MEDICINE

## 2024-11-05 PROCEDURE — 99214 OFFICE O/P EST MOD 30 MIN: CPT | Performed by: FAMILY MEDICINE

## 2024-11-05 PROCEDURE — 3079F DIAST BP 80-89 MM HG: CPT | Performed by: FAMILY MEDICINE

## 2024-11-05 PROCEDURE — 3075F SYST BP GE 130 - 139MM HG: CPT | Performed by: FAMILY MEDICINE

## 2024-11-05 PROCEDURE — G2211 COMPLEX E/M VISIT ADD ON: HCPCS | Performed by: FAMILY MEDICINE

## 2024-11-05 PROCEDURE — 1160F RVW MEDS BY RX/DR IN RCRD: CPT | Performed by: FAMILY MEDICINE

## 2024-11-05 PROCEDURE — 1159F MED LIST DOCD IN RCRD: CPT | Performed by: FAMILY MEDICINE

## 2024-11-05 NOTE — ASSESSMENT & PLAN NOTE
Patient states that she still has crampy abdominal pains-Amylase and lipase are normal.  Patient is currently taking Benefiber recommended miralax-patient declines.  Pain is probably due to irritable bowel the patient declines more workup prior GI referral

## 2024-11-05 NOTE — ASSESSMENT & PLAN NOTE
Worsening, probably due to insect bites.- Recommended elevating legs as much as possible-also use neosporin and band-aids on sores- Offered ultrasound of the lower extremities-patient declines

## 2024-11-05 NOTE — ASSESSMENT & PLAN NOTE
Worsening;; Will start Metformin 500 mg daily. Encourged to watch sugar intake, exercise more, lose weight,   Recheck in 3 months.

## 2024-11-05 NOTE — ASSESSMENT & PLAN NOTE
Patient's (Body mass index is 27.42 kg/m².) indicates that they are overweight with health conditions that include hypertension and dyslipidemias . Weight is unchanged. BMI is above average; BMI management plan is completed. We discussed low calorie, low carb based diet program, portion control, and increasing exercise.

## 2024-11-05 NOTE — PROGRESS NOTES
Subjective   Vida Jamison is a 84 y.o. female.   Chief Complaint   Patient presents with    Hypertension    Hyperlipidemia     History of Present Illness  She comes in for follow-up on hyperlipidemia and hypertension but also complains of spots on her legs been there 2 to 3 days after she was working outside.  The legs are slightly swollen and slight erythema small spots on legs right more than left.  To be insect bites with some very mild erythema  Hypertension  This is a chronic problem. The current episode started more than 1 year ago. The problem has been improved since onset. The problem is controlled. Pertinent negatives include no chest pain, palpitations or shortness of breath.   Hyperlipidemia  This is a chronic problem. The current episode started more than 1 year ago. The problem is controlled. Pertinent negatives include no chest pain, myalgias or shortness of breath. Current antihyperlipidemic treatment includes statins.        The following portions of the patient's history were reviewed and updated as appropriate: allergies, current medications, past family history, past medical history, past social history, past surgical history, and problem list.    Past Medical History:   Diagnosis Date    Advanced directives, counseling/discussion     Impression: Discussion w/ pt regarding Advanced directives. POST/Living Will form discussed at length & reviewed with pt. Pt states she does want CPR. Reviewed Medical interventions w/ pt & differences between each Comfort, Limited & Full. Pt opted for full. Discussed use of antibiotics at the end of life, pt chose to use antibiotics consistent w/ treatment goals.    Allergic contact dermatitis due to plants, except food     Impression: Worsening-probably due to poison ivy. Steroid injection given to patient. If not improving, return to office for re-evaluation.    Anemia     unspecified type. Impression: worse Hgb down to 10.7 from 11.5 -denies epitaxis,  hemoptisis, heartburn, hematura, blood in stool or black tarry stool; Advised to start Iron-OTC 1 a day. she declines more aggressive work up at the present but should have a colonoscopy. she wants her hip fixed 1st    Aortic valve insufficiency     etiology of cardiac valve disease unspecified. Impression: Echo done 12/2015 - [facility].    Atypical squamous cells of undetermined significance (ASCUS) on Papanicolaou smear of cervix     Impression: Advised to repeat pap smear yearly. she is reluctent due to cost but I advised her it was covered with a G and Q code but she wanted me to gurentee no charge which I cannot promise. I also encouraged breast exam and mammo gram but again she wanted me to gurentee no cost. I advised her she might consider exam with gyn but that they probably would use the same codes    Breast cancer screening     Impression: agreed to schedule mammo but advised ideally should also have a breast exam - she did not want to schedule at the present    Cervical cancer screening     Impression: Doing excellent. Follow conservatively.    Chronic pain syndrome     Impression: Doing well. Continue with Elavil as presently prescribed, Discussed benifits and side effect of medicine. Patient aware of high risk medication. Follow conservatively. Discussed decreasing celexa from 10mg BID to daily.    Dependent edema     Impression: New dx. Advised patient to elevate lower extremities above the level of the heart as much as possible, patient states she will try as possible. States she probably isn't able to.    Edema extremities     Impression: mild. Patient to return if symptoms worsen or change. Advised to elevate legs above level of heart as needed.    Elevated diaphragm     Impression: New dx. shown on chest xray from 2012, will follow conservatively    Esophageal hernia     Impression: Doing well. Follow conservatively.    First degree atrioventricular block     Impression: Doing excellent.  Follow conservatively.    Gastroesophageal reflux disease without esophagitis     Impression: no sx at present but this could be the cause of her anemia    Hyperglycemia     Impression: Worse: hgb a1c 6.0 was 5.6. Encouraged to watch sugar intake, exercise more and lose weight.    Hypertension, benign     Impression: Doing well; Encouraged to watch salt, exercise more and lose weight    Hypertensive left ventricular hypertrophy, without heart failure     Impression: Echo done 12/2015 - [facility].    Hyponatremia     Impression: worse at 134 - repeat with next labs    Insomnia, persistent     Impression: New dx. Worsening. Advised patient she may try increasing her elavil from 50mg to 75mg. Discussed benefits and side effect of medicine. Patient to return if symptoms worsen or change.    Irritable bowel syndrome without diarrhea     Impression: Doing well. Follow conservatively.    Left hip pain     Impression: Worsening; reviewed xray of the left hip with patient. Advised patient that she needs to see here surgeon, Dr. Lane. Patient prefers to call and make her appointment.    Left knee pain     Impression: Worse, will try to schedule ortho appt. sooner than 10-16-18    Left leg pain     Impression: Worse - discussed addiction potential of ativan femi with narcotic and muscle relax combination    Left shoulder pain     Impression: New dx. Left shoulder pain after fall, pt. sent to Xray. Will call pt. with xray results if broken will schedule pt. with DR. Arredondo or Dr. Fleming. Pt. placed in a small sling.    Lumbar degenerative disc disease     Story: Spinal fusion done 3/24/10 and 8/12/2011. Impression: Worsening; Offered further testing, patient prefers to hold on this until after hip has been repaired.    Medicare annual wellness visit, subsequent     with abnormal findings    Mitral valve disease     Impression: Echo done 12/2015 - [facility].    Mixed hyperlipidemia     Impression: Labs done 04/16/19  Tot 172 up from 156, HDL 46 down from 47, Trig 152 up from 118,  up from 88 Worsening Encouraged to watch fatty intake, exercise more, and lose weight . Compliant with meds Goals developed at last visit were not met because Is not getting adequate diet and exercise( due to hip pain) Follow up in 3 months Care management needs are self addressed.    Onychomycosis     Impression: Stable, pt. declines tx    Other constipation     Impression: New dx. Pain probably due to IBS, Start Citrucel 1tbsp mixed in 8oz of fluid daily, may gradually increase up to three times daily for a goal of 2 BM that float in the commode daily. Advised to increase fiber, beans, rice, fruits, veggies.    Other hypotension     Impression: Improving- Patient states that while in Rehab b/p readings were low and she did not recieve medications on some days.    Overweight     Impression: Stable    Pain due to total knee replacement, sequela     Impression: Right hip-Worsening- will xray today.    Patellar fracture 08/2020    Peripheral vascular disease     Impression: JEROME last year was abnorm on the left last year thus will send for vasc studies    Periprosthetic fracture around internal prosthetic left hip joint     subsequent encounter. Impression: Patient was seen by Dr. Joseph who referred to Linette Weaver.    Rheumatoid arthritis involving multiple sites with positive rheumatoid factor     Impression: stable dc mobic ndue to anemia    Right hip pain     S/P arthroscopic surgery of right knee 03/16/2021    S/P spinal fusion     Impression: Doing well since surgery on 1-9-17.    Screening for depression     Negative Depression Screening (4 or less) ()    Seasonal allergic rhinitis due to pollen     Impression: Doing well.    Status post hip surgery     Impression: Doing well from surgery on 11-14-18- Total left hip prosthesis.    Status post revision of total hip 2016    R EDSON    Total knee replacement status, left 2003    Total knee  replacement status, right 07/07/2020    Uterine leiomyoma     unspecified location. Impression: Discussed following with GYN for a scope, will discuss further after hip surgery    Vitamin D deficiency     Impression: Doing well, patient gets plenty of time in the Sun. Vitamin D. level normal. Follow conservatively.       Past Surgical History:   Procedure Laterality Date    BREAST BIOPSY Right 1974    Dr Briseida Alonso    CATARACT EXTRACTION W/ INTRAOCULAR LENS IMPLANT      7/20/10-rt. eye-Dr. Leon 7/13/10- Lt. eye-Dr. Leon    D & C HYSTEROSCOPY N/A 6/9/2023    Procedure: DILATATION AND CURETTAGE HYSTEROSCOPY;  Surgeon: Jackie Lieberman MD;  Location: [facility] MAIN OR;  Service: Obstetrics/Gynecology;  Laterality: N/A;    FINGER SURGERY  2001    Revision of Finger joints. Dr. Brumfield w/Drs. Kleinert & Marlon    HIP ENDOPROSTHESIS Left 05/22/2014    Osteonics endoprostehetic replacement of left hip. Dr. Reza Choudhury.    JOINT REPLACEMENT      KNEE ARTHROSCOPY Left     [1987] Dr Poon-brandon medial meniscus [1995] Dr. Schaffer -torumm medial meniscus    [facility]  2000    Biopsy of cervix. for MAJOR-1 by Dr. Knight    LUMBAR DISCECTOMY  1994    Hemilaminectomy, foraminectomy & discectomy. Dr. Velasquez, L4-L5 w/posterior lateral fusion & screw fixatoin    LUMBAR LAMINECTOMY  12/19/2011    foraminotomies, medial facetectomies [id number] redo. left L5-S1 lumbar discectomy. [facility]-Dr. De Oliveira- 5 units of blood given to pt.    POSTERIOR LUMBAR/THORACIC SPINE FUSION  2002    Fusion T-5 through L-1. Had T-11 burst fracture. Recuperated at [facility]    SPINAL FUSION      Lower Back. 7/11/2011-[facility] - Hardware removal from L3-L5, Dr. De Oliveira surgeon, Dr. Booker [facility]-Fusion of spine at multi-levels 12/14/11 - [facility] - Dr Gomez and Dr. Momin, Anterior approach Spinal Fusion L5-S1 3--[facility]. Dr. De Oliveira and Dr. Dean. Failed posterior fusion with loose instrumentation. L-4 thru L-5. No complications.     TONSILLECTOMY  1951    Dr Mcginnis    TOTAL HIP ARTHROPLASTY Right 04/10/2017    [facility], Inpatient. Dr. Reinoso    TOTAL KNEE ARTHROPLASTY Left 2002    Dr Sue @ [facility]        Family History   Problem Relation Age of Onset    Ovarian cancer Mother     Arthritis Mother     Heart failure Father     Suicidality Brother     Heart disease Maternal Aunt     Cancer Other         malignant neoplasm of gastrointestinal tract- in her 50's    Arthritis Other     Cervical cancer Other     Arthritis Other     Diabetes Other         Social History     Socioeconomic History    Marital status: Single   Tobacco Use    Smoking status: Never     Passive exposure: Never    Smokeless tobacco: Never   Vaping Use    Vaping status: Never Used   Substance and Sexual Activity    Alcohol use: No    Drug use: No    Sexual activity: Never       Outpatient Medications Prior to Visit   Medication Sig Dispense Refill    acetaminophen (TYLENOL) 650 MG 8 hr tablet Take 1 tablet by mouth 2 (Two) Times a Day.      albuterol sulfate  (90 Base) MCG/ACT inhaler Inhale 2 puffs Every 4 (Four) Hours As Needed for Wheezing. 6.7 g 0    amitriptyline (ELAVIL) 75 MG tablet TAKE 1 TABLET AT BEDTIME 90 tablet 0    budesonide-formoterol (Symbicort) 80-4.5 MCG/ACT inhaler Inhale 2 puffs 2 (Two) Times a Day. 10.2 g 0    Calcium Carbonate-Vitamin D (CALCIUM 600/VITAMIN D PO) Take 1 tablet by mouth 2 (Two) Times a Day.      celecoxib (CeleBREX) 200 MG capsule TAKE 1 CAPSULE EVERY DAY 90 capsule 10    diphenhydrAMINE (BENADRYL) 25 mg capsule Take 1 capsule by mouth Daily.      DULoxetine (CYMBALTA) 30 MG capsule Take 1 capsule by mouth Daily. 30 capsule 5    enalapril (VASOTEC) 10 MG tablet Take 0.5 tablets by mouth 2 (Two) Times a Day. (Patient taking differently: Take 0.5 tablets by mouth Daily. HOLD 24 hours before surgery) 180 tablet 1    fluticasone (FLONASE) 50 MCG/ACT nasal spray Administer 2 sprays into the nostril(s) as directed  "by provider Daily.      gabapentin (NEURONTIN) 300 MG capsule TAKE 1 CAPSULE TWICE DAILY (NEED AN APPOINTMENT FOR REFILLS) 180 capsule 0    Glycerin-Hypromellose- (ARTIFICIAL TEARS) 0.2-0.2-1 % solution ophthalmic solution       loratadine (CLARITIN) 10 MG tablet Take 1 tablet by mouth Daily.      LORazepam (Ativan) 0.5 MG tablet Take 1 tablet by mouth Every 8 (Eight) Hours As Needed for Anxiety. 20 tablet 0    metoprolol tartrate (LOPRESSOR) 25 MG tablet TAKE 1 TABLET TWICE DAILY 180 tablet 0    montelukast (SINGULAIR) 10 MG tablet TAKE 1 TABLET EVERY DAY (NEED MD APPOINTMENT FOR REFILLS) 90 tablet 0    pantoprazole (PROTONIX) 40 MG EC tablet TAKE 1 TABLET EVERY DAY 90 tablet 10    propranolol (INDERAL) 20 MG/5ML solution Take 5 mL by mouth 3 (Three) Times a Day. 30 mL 5    Wheat Dextrin (BENEFIBER DRINK MIX PO) Take  by mouth As Needed.      atorvastatin (LIPITOR) 20 MG tablet Take 1 tablet by mouth Daily. (Patient taking differently: Take 0.5 tablets by mouth Daily.) 90 tablet 1     No facility-administered medications prior to visit.        Review of Systems   Constitutional:  Negative for fatigue, unexpected weight gain and unexpected weight loss.   Respiratory:  Negative for shortness of breath.    Cardiovascular:  Negative for chest pain, palpitations and leg swelling.   Gastrointestinal:  Negative for nausea.   Musculoskeletal:  Negative for myalgias.   Skin:  Negative for dry skin.   Neurological:  Negative for headache.       Objective   Visit Vitals  /81 (BP Location: Left arm, Patient Position: Sitting, Cuff Size: Adult)   Pulse 68   Temp 97.1 °F (36.2 °C)   Resp 14   Ht 160 cm (63\")   Wt 70.2 kg (154 lb 12.8 oz)   SpO2 95%   BMI 27.42 kg/m²     Physical Exam  Vitals and nursing note reviewed.   Constitutional:       Appearance: She is well-developed.   HENT:      Head: Normocephalic.   Neck:      Thyroid: No thyromegaly.      Vascular: No carotid bruit.      Trachea: Trachea normal. "   Cardiovascular:      Rate and Rhythm: Normal rate and regular rhythm.      Heart sounds: No murmur heard.     No friction rub. No gallop.   Pulmonary:      Effort: Pulmonary effort is normal. No respiratory distress.      Breath sounds: Normal breath sounds. No wheezing.   Chest:      Chest wall: No tenderness.   Musculoskeletal:      Cervical back: Neck supple.      Right lower le+ Edema present.      Left lower le+ Edema present.   Skin:     General: Skin is dry.      Findings: No rash.      Nails: There is no clubbing.      Comments: Small spots on legs appear to be insect bites coming by some mild erythema.  Right is worse than the left   Neurological:      Mental Status: She is alert and oriented to person, place, and time.   Psychiatric:         Behavior: Behavior is cooperative.         Assessment & Plan   Diagnoses and all orders for this visit:    1. Mixed hyperlipidemia (Primary)  Assessment & Plan:     Improving  Encouraged to watch fatty intake, exercise more, and lose weight.   compliant with medication-    Advised to stop lipitor for a month due to pains which are probably unrelated to the atorvastatin.    Is not getting adequate diet and exercise  Goals developed at last visit were met  Follow up in 3 months  Care management needs are self-addressed.  Self-management abilities addressed and patient is capable of managing her own disease.            2. Hypertension, benign  Assessment & Plan:  Doing well; Encouraged to watch salt, exercise more and lose weight.  Patient tolerated metoprolol well without side effects. I feel the benefits of the medication outweigh the risks.        3. Other iron deficiency anemia  Overview:  Colonoscopy done 2023 by Dr. Livingston follow-up polypectomy was done--need to find it pathology    Assessment & Plan:  Hgb 11.9 down from 13.1--repeat with next labs      4. Hyponatremia  Assessment & Plan:  Stable-Sodium 138      5. Overweight (BMI  25.0-29.9)  Assessment & Plan:  Patient's (Body mass index is 27.42 kg/m².) indicates that they are overweight with health conditions that include hypertension and dyslipidemias . Weight is unchanged. BMI is above average; BMI management plan is completed. We discussed low calorie, low carb based diet program, portion control, and increasing exercise.       6. Hyperglycemia  Assessment & Plan:  Worsening;; Will start Metformin 500 mg daily. Encourged to watch sugar intake, exercise more, lose weight,   Recheck in 3 months.     Orders:  -     metFORMIN (GLUCOPHAGE) 500 MG tablet; Take 1 tablet by mouth Daily With Breakfast.  Dispense: 90 tablet; Refill: 1    7. Right lower quadrant abdominal pain  Assessment & Plan:  Patient states that she still has crampy abdominal pains-Amylase and lipase are normal.  Patient is currently taking Benefiber recommended miralax-patient declines.  Pain is probably due to irritable bowel the patient declines more workup prior GI referral      8. Edema of both lower extremities  Assessment & Plan:  Worsening, probably due to insect bites.- Recommended elevating legs as much as possible-also use neosporin and band-aids on sores- Offered ultrasound of the lower extremities-patient declines      9. Peripheral vascular disease  Assessment & Plan:  Mild bilaterally March 2023

## 2024-12-18 DIAGNOSIS — J30.1 SEASONAL ALLERGIC RHINITIS DUE TO POLLEN: ICD-10-CM

## 2024-12-18 RX ORDER — MONTELUKAST SODIUM 10 MG/1
10 TABLET ORAL DAILY
Qty: 90 TABLET | Refills: 0 | Status: SHIPPED | OUTPATIENT
Start: 2024-12-18

## 2024-12-23 DIAGNOSIS — G89.4 PAIN SYNDROME, CHRONIC: ICD-10-CM

## 2024-12-23 DIAGNOSIS — E78.2 MIXED HYPERLIPIDEMIA: ICD-10-CM

## 2024-12-23 DIAGNOSIS — I10 HYPERTENSION, BENIGN: ICD-10-CM

## 2024-12-23 DIAGNOSIS — M51.369 LUMBAR DEGENERATIVE DISC DISEASE: ICD-10-CM

## 2024-12-23 RX ORDER — AMITRIPTYLINE HYDROCHLORIDE 75 MG/1
TABLET ORAL
Qty: 90 TABLET | Refills: 0 | Status: SHIPPED | OUTPATIENT
Start: 2024-12-23

## 2024-12-23 RX ORDER — GABAPENTIN 300 MG/1
CAPSULE ORAL
Qty: 180 CAPSULE | Refills: 0 | Status: SHIPPED | OUTPATIENT
Start: 2024-12-23

## 2024-12-23 RX ORDER — ATORVASTATIN CALCIUM 20 MG/1
20 TABLET, FILM COATED ORAL DAILY
Qty: 90 TABLET | Refills: 0 | Status: SHIPPED | OUTPATIENT
Start: 2024-12-23

## 2024-12-23 RX ORDER — METOPROLOL TARTRATE 25 MG/1
25 TABLET, FILM COATED ORAL 2 TIMES DAILY
Qty: 180 TABLET | Refills: 0 | Status: SHIPPED | OUTPATIENT
Start: 2024-12-23

## 2025-01-03 DIAGNOSIS — K21.9 GASTROESOPHAGEAL REFLUX DISEASE WITHOUT ESOPHAGITIS: ICD-10-CM

## 2025-01-08 RX ORDER — PANTOPRAZOLE SODIUM 40 MG/1
TABLET, DELAYED RELEASE ORAL
Qty: 90 TABLET | Refills: 0 | Status: SHIPPED | OUTPATIENT
Start: 2025-01-08

## 2025-01-14 DIAGNOSIS — G89.4 PAIN SYNDROME, CHRONIC: ICD-10-CM

## 2025-01-14 RX ORDER — CELECOXIB 200 MG/1
CAPSULE ORAL
Qty: 90 CAPSULE | Refills: 0 | Status: SHIPPED | OUTPATIENT
Start: 2025-01-14

## 2025-02-25 DIAGNOSIS — G89.4 CHRONIC PAIN SYNDROME: ICD-10-CM

## 2025-02-26 ENCOUNTER — OFFICE VISIT (OUTPATIENT)
Dept: FAMILY MEDICINE CLINIC | Facility: CLINIC | Age: 85
End: 2025-02-26
Payer: MEDICARE

## 2025-02-26 VITALS
DIASTOLIC BLOOD PRESSURE: 78 MMHG | WEIGHT: 147 LBS | SYSTOLIC BLOOD PRESSURE: 118 MMHG | RESPIRATION RATE: 18 BRPM | HEART RATE: 80 BPM | HEIGHT: 63 IN | OXYGEN SATURATION: 96 % | BODY MASS INDEX: 26.05 KG/M2 | TEMPERATURE: 96.9 F

## 2025-02-26 DIAGNOSIS — I35.1 AORTIC VALVE INSUFFICIENCY, ETIOLOGY OF CARDIAC VALVE DISEASE UNSPECIFIED: ICD-10-CM

## 2025-02-26 DIAGNOSIS — E78.2 MIXED HYPERLIPIDEMIA: ICD-10-CM

## 2025-02-26 DIAGNOSIS — R53.83 LETHARGY: ICD-10-CM

## 2025-02-26 DIAGNOSIS — E55.9 VITAMIN D DEFICIENCY: ICD-10-CM

## 2025-02-26 DIAGNOSIS — I11.9 HYPERTENSIVE LEFT VENTRICULAR HYPERTROPHY, WITHOUT HEART FAILURE: ICD-10-CM
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
## 2025-02-26 DIAGNOSIS — Z71.85 IMMUNIZATION COUNSELING: ICD-10-CM

## 2025-02-26 DIAGNOSIS — S80.862A INSECT BITE OF LEFT LOWER LEG, INITIAL ENCOUNTER: ICD-10-CM

## 2025-02-26 DIAGNOSIS — E78.00 PURE HYPERCHOLESTEROLEMIA: ICD-10-CM

## 2025-02-26 DIAGNOSIS — Z71.89 ADVANCE CARE PLANNING: Primary | ICD-10-CM

## 2025-02-26 DIAGNOSIS — R20.2 PARESTHESIA: ICD-10-CM

## 2025-02-26 DIAGNOSIS — Z12.31 SCREENING MAMMOGRAM FOR BREAST CANCER: ICD-10-CM

## 2025-02-26 DIAGNOSIS — D50.8 OTHER IRON DEFICIENCY ANEMIA: ICD-10-CM

## 2025-02-26 DIAGNOSIS — F32.A DEPRESSIVE DISORDER: Primary | ICD-10-CM

## 2025-02-26 DIAGNOSIS — I73.9 PERIPHERAL VASCULAR DISEASE: ICD-10-CM

## 2025-02-26 DIAGNOSIS — R73.9 HYPERGLYCEMIA: ICD-10-CM

## 2025-02-26 DIAGNOSIS — M05.79 RHEUMATOID ARTHRITIS INVOLVING MULTIPLE SITES WITH POSITIVE RHEUMATOID FACTOR: ICD-10-CM

## 2025-02-26 DIAGNOSIS — M79.10 MYALGIA: ICD-10-CM

## 2025-02-26 DIAGNOSIS — Z13.31 DEPRESSION SCREEN: ICD-10-CM

## 2025-02-26 DIAGNOSIS — I10 HYPERTENSION, BENIGN: ICD-10-CM

## 2025-02-26 DIAGNOSIS — I05.9 MITRAL VALVE DISEASE: ICD-10-CM

## 2025-02-26 DIAGNOSIS — I44.0 FIRST DEGREE HEART BLOCK: ICD-10-CM

## 2025-02-26 DIAGNOSIS — Z00.00 MEDICARE ANNUAL WELLNESS VISIT, SUBSEQUENT: ICD-10-CM

## 2025-02-26 PROBLEM — IMO0001 MYALGIA AND MYOSITIS: Status: RESOLVED | Noted: 2019-07-25 | Resolved: 2025-02-26

## 2025-02-26 PROBLEM — Z13.6 SCREENING FOR CARDIOVASCULAR CONDITION: Status: RESOLVED | Noted: 2025-02-26 | Resolved: 2025-02-26

## 2025-02-26 PROBLEM — Z13.6 SCREENING FOR CARDIOVASCULAR CONDITION: Status: ACTIVE | Noted: 2025-02-26

## 2025-02-26 PROBLEM — S80.869A INSECT BITE OF LOWER LEG: Status: RESOLVED | Noted: 2024-09-10 | Resolved: 2025-02-26

## 2025-02-26 RX ORDER — DULOXETIN HYDROCHLORIDE 30 MG/1
30 CAPSULE, DELAYED RELEASE ORAL DAILY
Qty: 90 CAPSULE | Refills: 0 | Status: SHIPPED | OUTPATIENT
Start: 2025-02-26

## 2025-02-26 NOTE — PROGRESS NOTES
Subjective   Vida Jamison is a 84 y.o. female.   Chief Complaint   Patient presents with    Leg Pain    Numbness    Hypertension    Heart Problem    Depression    Cardiac Valve Problem     I have identified multiple medical problems which are significant and separately identifiable that require added work above and beyond the wellness visit. These are not trivial or insignificant and are billed with a 25-modifier    History of Present Illness  Follow up hypertensive LVH.    Follow up aortic valve insufficiency.    Follow up aortic valve disease.    Leg Pain   The incident occurred more than 1 week ago. There was no injury mechanism. The pain is present in the left leg and right leg. The pain is moderate. Associated symptoms include numbness.   Hypertension  This is a chronic problem. The current episode started more than 1 year ago. The problem is unchanged. The problem is controlled. Pertinent negatives include no chest pain, palpitations or shortness of breath.   Additional comments: 1st degree AV block.  Heart Problem  Symptoms are chronic.   Symptoms occur constantly.   Symptoms have been unchanged since onset.   Symptoms include fatigue (mild) and numbness.    Pertinent negative symptoms include no chest pain.   Depression  Presents for follow-up visit. Patient is not experiencing: palpitations, shortness of breath and chest pain.Symptoms occur occasionally.   The quality of sleep is good. Her past medical history is significant for depression. The treatment provides significant relief. Compliance with medications is %.                  Review of Systems   Constitutional:  Positive for fatigue (mild).   HENT:  Negative for hearing loss.    Respiratory:  Negative for shortness of breath.    Cardiovascular:  Negative for chest pain and palpitations.   Genitourinary:  Negative for frequency.        Nocturia   Musculoskeletal:  Positive for arthralgias (all over-no change). Negative for joint swelling.    Neurological:  Positive for numbness. Negative for memory problem.       Objective     Physical Exam  Vitals and nursing note reviewed.   Constitutional:       Appearance: She is well-developed.   HENT:      Head: Normocephalic.   Neck:      Thyroid: No thyromegaly.      Vascular: No carotid bruit.      Trachea: Trachea normal.   Cardiovascular:      Rate and Rhythm: Normal rate and regular rhythm.      Heart sounds: No murmur heard.     No friction rub. No gallop.   Pulmonary:      Effort: Pulmonary effort is normal. No respiratory distress.      Breath sounds: Normal breath sounds. No wheezing.   Chest:      Chest wall: No tenderness.   Musculoskeletal:      Cervical back: Neck supple.   Skin:     General: Skin is dry.      Findings: No rash.      Nails: There is no clubbing.   Neurological:      Mental Status: She is alert and oriented to person, place, and time.   Psychiatric:         Behavior: Behavior is cooperative.         Assessment & Plan   Problem List Items Addressed This Visit          High    Peripheral vascular disease    Current Assessment & Plan     Discussed CTA, pt. declines            Medium    Anemia    Overview     Colonoscopy done September 20, 2023 by Dr. Livingston follow-up polypectomy was done--need to find it pathology         Current Assessment & Plan     Will order labs today and patient will return for results and shared decision making.           Relevant Orders    CBC & Differential (Completed)    Depressive disorder - Primary    Current Assessment & Plan     Doing well.  Patient tolerated Cymbalta well without side effects. I feel the benefits of the medication outweigh the risks.          Hypertension, benign    Current Assessment & Plan     Doing well.  Discussed EKG for HTN and 1st degree AV block.  Pt. Declines EKG.  Patient tolerated Enalapril and Metoprolol well without side effects. I feel the benefits of the medication outweigh the risks.          Mixed hyperlipidemia     Current Assessment & Plan     Will order labs today and patient will return for results and shared decision making.            Relevant Orders    Lipid Panel With / Chol / HDL Ratio (Completed)    Comprehensive Metabolic Panel (Completed)    Paresthesia    Current Assessment & Plan     New dx.  -Discussed nerve conduction studies, pt. Declines.  Discussed increaseing gabapentin and Cymbalta, pt. Declines both.         Rheumatoid arthritis involving multiple sites with positive rheumatoid factor    Overview     Patient declines Volteran gel due to trying it in the past with no success.         Current Assessment & Plan     Will order labs today and patient will return for results and shared decision making.           Relevant Orders    C-reactive protein       Low    First degree heart block    Overview     Last EKG 5-26-23-borderline          Current Assessment & Plan     Pt. Encouraged repeat EKG, pt. Declines EKG.         Hyperglycemia    Current Assessment & Plan     Will order labs today and patient will return for results and shared decision making.           Relevant Orders    Hemoglobin A1c (Completed)    Hypertensive left ventricular hypertrophy, without heart failure    Overview     Echo done 12/2015 - [facility].         Current Assessment & Plan     Discussed repeat ECHO, pt. declined         Vitamin D deficiency    Current Assessment & Plan     Will order labs today and patient will return for results and shared decision making.           Relevant Orders    Vitamin D,25-Hydroxy       Unprioritized    Aortic valve regurgitation    Overview     Echo done 12/2015 - [facility].         Current Assessment & Plan     Discussed ECHO, pt. Declines ECHO         RESOLVED: Insect bite of lower leg    Current Assessment & Plan     Resolved thus delete         Lethargy    Current Assessment & Plan     Will order labs today and patient will return for results and shared decision making.           Relevant  Orders    TSH (Completed)    Mitral valve disease    Overview     Echo done 12/2015 - [facility].         Current Assessment & Plan     Advised repeat ECHO, pt. declines         Myalgia    Current Assessment & Plan     Will order labs today and patient will return for results and shared decision making.           Relevant Orders    CK    RESOLVED: Pure hypercholesterolemia    Current Assessment & Plan     Duplicate, thus delete         Screening mammogram for breast cancer    Current Assessment & Plan     Patient given order for mammogram         Relevant Orders    Mammo Screening Digital Tomosynthesis Bilateral With CAD

## 2025-02-26 NOTE — PROGRESS NOTES
The ABCs of the Annual Wellness Visit  Subsequent Medicare Wellness Visit        Subjective    History of Present Illness:  Vida Jamison is a 84 y.o. female who presents for a Subsequent Medicare Wellness Visit.    The following portions of the patient's history were reviewed and   updated as appropriate: allergies, current medications, past family history, past medical history, past social history, past surgical history, and problem list.      Recent Hospitalizations:  She was not admitted to the hospital during the last year.       Current Medical Providers:  Patient Care Team:  Mustapha Gonzales MD as PCP - General (Family Medicine)  Ulices Sue/MD Johnny as Surgeon (Orthopedic Surgery)  Chris Garcia MD as Consulting Physician (Ophthalmology)      Outpatient Medications Prior to Visit   Medication Sig Dispense Refill    acetaminophen (TYLENOL) 650 MG 8 hr tablet Take 1 tablet by mouth 2 (Two) Times a Day.      albuterol sulfate  (90 Base) MCG/ACT inhaler Inhale 2 puffs Every 4 (Four) Hours As Needed for Wheezing. 6.7 g 0    amitriptyline (ELAVIL) 75 MG tablet TAKE 1 TABLET AT BEDTIME 90 tablet 0    atorvastatin (LIPITOR) 20 MG tablet TAKE 1 TABLET EVERY DAY 90 tablet 0    Calcium Carbonate-Vitamin D (CALCIUM 600/VITAMIN D PO) Take 1 tablet by mouth 2 (Two) Times a Day.      celecoxib (CeleBREX) 200 MG capsule TAKE 1 CAPSULE EVERY DAY 90 capsule 0    diphenhydrAMINE (BENADRYL) 25 mg capsule Take 1 capsule by mouth Daily.      enalapril (VASOTEC) 10 MG tablet Take 0.5 tablets by mouth 2 (Two) Times a Day. (Patient taking differently: Take 0.5 tablets by mouth Daily. HOLD 24 hours before surgery) 180 tablet 1    gabapentin (NEURONTIN) 300 MG capsule TAKE 1 CAPSULE TWICE DAILY (NEED AN APPOINTMENT FOR REFILLS) 180 capsule 0    Glycerin-Hypromellose- (ARTIFICIAL TEARS) 0.2-0.2-1 % solution ophthalmic solution       loratadine (CLARITIN) 10 MG tablet Take 1 tablet by mouth Daily.       LORazepam (Ativan) 0.5 MG tablet Take 1 tablet by mouth Every 8 (Eight) Hours As Needed for Anxiety. 20 tablet 0    metFORMIN (GLUCOPHAGE) 500 MG tablet Take 1 tablet by mouth Daily With Breakfast. 90 tablet 1    metoprolol tartrate (LOPRESSOR) 25 MG tablet TAKE 1 TABLET TWICE DAILY 180 tablet 0    montelukast (SINGULAIR) 10 MG tablet TAKE 1 TABLET EVERY DAY (NEED MD APPOINTMENT FOR REFILLS) 90 tablet 0    pantoprazole (PROTONIX) 40 MG EC tablet TAKE 1 TABLET EVERY DAY 90 tablet 0    Wheat Dextrin (BENEFIBER DRINK MIX PO) Take  by mouth As Needed.      DULoxetine (CYMBALTA) 30 MG capsule Take 1 capsule by mouth Daily. 30 capsule 5    budesonide-formoterol (Symbicort) 80-4.5 MCG/ACT inhaler Inhale 2 puffs 2 (Two) Times a Day. (Patient not taking: Reported on 2/26/2025) 10.2 g 0    fluticasone (FLONASE) 50 MCG/ACT nasal spray Administer 2 sprays into the nostril(s) as directed by provider Daily. (Patient not taking: Reported on 2/26/2025)      propranolol (INDERAL) 20 MG/5ML solution Take 5 mL by mouth 3 (Three) Times a Day. (Patient not taking: Reported on 2/26/2025) 30 mL 5     No facility-administered medications prior to visit.       No opioid medication identified on active medication list. I have reviewed chart for other potential  high risk medication/s and harmful drug interactions in the elderly.        Aspirin is not on active medication list.  Aspirin use is not indicated based on review of current medical condition/s. Risk of harm outweighs potential benefits.  .    Patient Active Problem List   Diagnosis    Gastroesophageal reflux disease without esophagitis    Pain in hip region after total hip replacement    Loose total hip arthroplasty    Mixed hyperlipidemia    Hypertension, benign    Peripheral vascular disease    Periprosthetic fracture around internal prosthetic left hip joint    Rheumatoid arthritis involving multiple sites with positive rheumatoid factor    S/P lumbar fusion    Spinal stenosis of  lumbar region    Carpal tunnel syndrome of right wrist    Anemia    Hyperglycemia    Left hip pain    Vitamin D deficiency    First degree heart block    Chronic pain syndrome    Dependent edema    Medicare annual wellness visit, subsequent    Aortic valve regurgitation    Atypical squamous cell changes of undetermined significance (ASCUS) on vaginal cytology    Elevated diaphragm    Esophageal hernia    Family history of diabetes mellitus    Fusion of spine of lumbar region    Hypertensive left ventricular hypertrophy, without heart failure    Hyponatremia    Mitral valve disease    Status post hip replacement    Elevated alkaline phosphatase level    Chronic pain of right knee    Left knee pain    Seasonal allergic rhinitis due to pollen    Localized edema    Status post knee replacement    Myalgia    Fracture of left patella    Insomnia, persistent    Irritable bowel syndrome with constipation    Left shoulder pain    Constipation    Urinary incontinence    Uterine leiomyoma    Hiatal hernia    Scoliosis (and kyphoscoliosis), idiopathic    Chronic neck pain    Osteoporosis    Allergic rhinitis    Arthritis of right hip    Depressive disorder    Lesion of joint capsule of knee region    Tear of meniscus of knee    Osteoarthritis of right knee    Prosthetic and other implants, materials and accessory orthopedic devices associated with adverse incidents    Seasonal allergies    Family history of colon cancer    At high risk for falls    Cervical cancer screening    Chronic right shoulder pain    Right lower quadrant abdominal pain    Immunization counseling    Tremor    Situational anxiety    Postmenopausal vaginal bleeding    Atrophic vaginitis    Vaginal bleeding    Pelvic and perineal pain    Colon polyps    Neoplasm, uncertain whether benign or malignant    Back pain    Advance care planning    Depression screen    Overweight (BMI 25.0-29.9)    Frontal headache    Right upper quadrant abdominal pain    Edema of  "both lower extremities    Screening mammogram for breast cancer    Paresthesia    Lethargy            Above medical problems all reviewed and significant problems identified and reviewed in the E and M visit.   Objective          Vitals:    02/26/25 1322   BP: 118/78   BP Location: Left arm   Patient Position: Sitting   Cuff Size: Adult   Pulse: 80   Resp: 18   Temp: 96.9 °F (36.1 °C)   TempSrc: Temporal   SpO2: 96%   Weight: 66.7 kg (147 lb)   Height: 160 cm (63\")   PainSc: 4      Estimated body mass index is 26.04 kg/m² as calculated from the following:    Height as of this encounter: 160 cm (63\").    Weight as of this encounter: 66.7 kg (147 lb).           Does the patient have evidence of cognitive impairment? No           Family History   Problem Relation Age of Onset    Ovarian cancer Mother     Arthritis Mother     Heart failure Father     Suicidality Brother     Heart disease Maternal Aunt     Cancer Other         malignant neoplasm of gastrointestinal tract- in her 50's    Arthritis Other     Cervical cancer Other     Arthritis Other     Diabetes Other        Compared to one year ago, the patient feels her physical   health is the same.    Compared to one year ago, the patient feels her mental   health is the same.    HEALTH RISK ASSESSMENT    Smoking Status:  Social History     Tobacco Use   Smoking Status Never    Passive exposure: Never   Smokeless Tobacco Never     Alcohol Consumption:  Social History     Substance and Sexual Activity   Alcohol Use No     Fall Risk Screen:    STEADI Fall Risk Assessment was completed, and patient is at HIGH risk for falls. Assessment completed on:2/26/2025    Depression Screening:  Depression screen reviewed and discussed with patient. Recommendations were needed. Medications were discussed, counseling was discussed. We spent 5 minutes with the screen and discussion of depression and options.         2/26/2025     1:35 PM   PHQ-2/PHQ-9 Depression Screening   Little " interest or pleasure in doing things Not at all   Feeling down, depressed, or hopeless Not at all   How difficult have these problems made it for you to do your work, take care of things at home, or get along with other people? Not difficult at all       Health Habits and Functional and Cognitive Screenin/26/2025     1:29 PM   Functional & Cognitive Status   Do you have difficulty preparing food and eating? No   Do you have difficulty bathing yourself, getting dressed or grooming yourself? No   Do you have difficulty using the toilet? No   Do you have difficulty moving around from place to place? No   Do you have trouble with steps or getting out of a bed or a chair? No   Current Diet Well Balanced Diet   Dental Exam Not up to date        Dental Exam Comment advised to follow   Eye Exam Not up to date        Eye Exam Comment advised to follow   Exercise (times per week) 3 times per week   Current Exercises Include Other        Exercise Comment walking and jumping in the pool   Do you need help using the phone?  No   Are you deaf or do you have serious difficulty hearing?  No   Do you need help to go to places out of walking distance? No   Do you need help shopping? No   Do you need help preparing meals?  No   Do you need help with housework?  No   Do you need help with laundry? No   Do you need help taking your medications? No   Do you need help managing money? No   Do you ever drive or ride in a car without wearing a seat belt? Yes   Have you felt unusual stress, anger or loneliness in the last month? No   Who do you live with? Alone   If you need help, do you have trouble finding someone available to you? No   Have you been bothered in the last four weeks by sexual problems? No   Do you have difficulty concentrating, remembering or making decisions? No   She is encouraged to wear seatbelt    Age-appropriate Screening Schedule:  Refer to the list below for future screening recommendations based on  patient's age, sex and/or medical conditions. Orders for these recommended tests are listed in the plan section. The patient has been provided with a written plan.    Health Maintenance   Topic Date Due    DXA SCAN  01/13/2023    COVID-19 Vaccine (6 - 2024-25 season) 09/01/2024    ANNUAL WELLNESS VISIT  02/05/2025    BMI FOLLOWUP  11/05/2025    LIPID PANEL  02/28/2026    TDAP/TD VACCINES (4 - Td or Tdap) 05/05/2031    RSV Vaccine - Adults  Completed    INFLUENZA VACCINE  Completed    Pneumococcal Vaccine 50+  Completed    ZOSTER VACCINE  Completed       Immunizations:  Vida Jamison is up to date on immunizations    Colorectal Screening  Vida Jamison last colonoscopy was 9-20-23 with Dr. Valdivia-who advised no recall  Last Completed Colonoscopy       This patient has no relevant Health Maintenance data.               Assessment & Plan   CMS Preventative Services Quick Reference    Risk Factors Identified During Encounter      Chronic Pain: Chronic Pain Educational material Discussed and Printed for Patient.   Depression/Dysphoria: Current medication is effective, no change recommended  Fall Risk-High or Moderate: Discussed Fall Prevention in the home and Sit to Stand Exercise Information Shared in After Visit Summary, discussed PT, pt. Declines. Does [facility] to [facility].  Dental Screening Recommended advised to follow with dentist  Vision Screening Recommended advised to follow with eye doctor  Advised to follow with dentist and eye doctor  The above risks/problems have been discussed with the patient.  Follow up actions/plans if indicated are seen below in the Assessment/Plan Section.  Pertinent information has been shared with the patient in the After Visit Summary.    I have identified multiple medical problems which are significant and separately identifiable that require added work above and beyond the wellness visit. These are not trivial or insignificant and are billed with a  25-modifier  These are in a separate E/M note      Sit-to-Stand Exercise    The sit-to-stand exercise (also known as the chair stand or chair rise exercise) strengthens your lower body and helps you maintain or improve your mobility and independence. The goal is to do the sit-to-stand exercise without using your hands. This will be easier as you become stronger. You should always talk with your health care provider before starting any exercise program, especially if you have had recent surgery.  Do the exercise exactly as told by your health care provider and adjust it as directed. It is normal to feel mild stretching, pulling, tightness, or discomfort as you do this exercise, but you should stop right away if you feel sudden pain or your pain gets worse. Do not begin doing this exercise until told by your health care provider.  What the sit-to-stand exercise does  The sit-to-stand exercise helps to strengthen the muscles in your thighs and the muscles in the center of your body that give you stability (core muscles). This exercise is especially helpful if:  You have had knee or hip surgery.  You have trouble getting up from a chair, out of a car, or off the toilet.  How to do the sit-to-stand exercise  Sit toward the front edge of a sturdy chair without armrests. Your knees should be bent and your feet should be flat on the floor and shoulder-width apart.  Place your hands lightly on each side of the seat. Keep your back and neck as straight as possible, with your chest slightly forward.  Breathe in slowly. Lean forward and slightly shift your weight to the front of your feet.  Breathe out as you slowly stand up. Use your hands as little as possible.  Stand and pause for a full breath in and out.  Breathe in as you sit down slowly. Tighten your core and abdominal muscles to control your lowering as much as possible.  Breathe out slowly.  Do this exercise 10-15 times. If needed, do it fewer times until you build up  strength.  Rest for 1 minute, then do another set of 10-15 repetitions.  To change the difficulty of the sit-to-stand exercise  If the exercise is too difficult, use a chair with sturdy armrests, and push off the armrests to help you come to the standing position. You can also use the armrests to help slowly lower yourself back to sitting. As this gets easier, try to use your arms less. You can also place a firm cushion or pillow on the chair to make the surface higher.  If this exercise is too easy, do not use your arms to help raise or lower yourself. You can also wear a weighted vest, use hand weights, increase your repetitions, or try a lower chair.  General tips  You may feel tired when starting an exercise routine. This is normal.  You may have muscle soreness that lasts a few days. This is normal. As you get stronger, you may not feel muscle soreness.  Use smooth, steady movements.  Do not  hold your breath during strength exercises. This can cause unsafe changes in your blood pressure.  Breathe in slowly through your nose, and breathe out slowly through your mouth.  Summary  Strengthening your lower body is an important step to help you move safely and independently.  The sit-to-stand exercise helps strengthen the muscles in your thighs and core.  You should always talk with your health care provider before starting any exercise program, especially if you have had recent surgery.  This information is not intended to replace advice given to you by your health care provider. Make sure you discuss any questions you have with your health care provider.  Document Revised: 10/16/2019 Document Reviewed: 02/08/2018  Elsevier Patient Education © 2021 [employer]      Fall Prevention in the Home, Adult  Falls can cause injuries and can happen to people of all ages. There are many things you can do to make your home safe and to help prevent falls. Ask for help when making these changes.  What actions can I take to  prevent falls?  General Instructions  Use good lighting in all rooms. Replace any light bulbs that burn out.  Turn on the lights in dark areas. Use night-lights.  Keep items that you use often in easy-to-reach places. Lower the shelves around your home if needed.  Set up your furniture so you have a clear path. Avoid moving your furniture around.  Do not have throw rugs or other things on the floor that can make you trip.  Avoid walking on wet floors.  If any of your floors are uneven, fix them.  Add color or contrast paint or tape to clearly jerrod and help you see:  Grab bars or handrails.  First and last steps of staircases.  Where the edge of each step is.  If you use a stepladder:  Make sure that it is fully opened. Do not climb a closed stepladder.  Make sure the sides of the stepladder are locked in place.  Ask someone to hold the stepladder while you use it.  Know where your pets are when moving through your home.  What can I do in the bathroom?         Keep the floor dry. Clean up any water on the floor right away.  Remove soap buildup in the tub or shower.  Use nonskid mats or decals on the floor of the tub or shower.  Attach bath mats securely with double-sided, nonslip rug tape.  If you need to sit down in the shower, use a plastic, nonslip stool.  Install grab bars by the toilet and in the tub and shower. Do not use towel bars as grab bars.  What can I do in the bedroom?  Make sure that you have a light by your bed that is easy to reach.  Do not use any sheets or blankets for your bed that hang to the floor.  Have a firm chair with side arms that you can use for support when you get dressed.  What can I do in the kitchen?  Clean up any spills right away.  If you need to reach something above you, use a step stool with a grab bar.  Keep electrical cords out of the way.  Do not use floor polish or wax that makes floors slippery.  What can I do with my stairs?  Do not leave any items on the stairs.  Make  sure that you have a light switch at the top and the bottom of the stairs.  Make sure that there are handrails on both sides of the stairs. Fix handrails that are broken or loose.  Install nonslip stair treads on all your stairs.  Avoid having throw rugs at the top or bottom of the stairs.  Choose a carpet that does not hide the edge of the steps on the stairs.  Check carpeting to make sure that it is firmly attached to the stairs. Fix carpet that is loose or worn.  What can I do on the outside of my home?  Use bright outdoor lighting.  Fix the edges of walkways and driveways and fix any cracks.  Remove anything that might make you trip as you walk through a door, such as a raised step or threshold.  Trim any bushes or trees on paths to your home.  Check to see if handrails are loose or broken and that both sides of all steps have handrails.  Install guardrails along the edges of any raised decks and porches.  Clear paths of anything that can make you trip, such as tools or rocks.  Have leaves, snow, or ice cleared regularly.  Use sand or salt on paths during winter.  Clean up any spills in your garage right away. This includes grease or oil spills.  What other actions can I take?  Wear shoes that:  Have a low heel. Do not wear high heels.  Have rubber bottoms.  Feel good on your feet and fit well.  Are closed at the toe. Do not wear open-toe sandals.  Use tools that help you move around if needed. These include:  Canes.  Walkers.  Scooters.  Crutches.  Review your medicines with your doctor. Some medicines can make you feel dizzy. This can increase your chance of falling.  Ask your doctor what else you can do to help prevent falls.  Where to find more information  Centers for Disease Control and Prevention, STEADI: www.cdc.gov  National [place] on Aging: www.emelina.nih.gov  Contact a doctor if:  You are afraid of falling at home.  You feel weak, drowsy, or dizzy at home.  You fall at home.  Summary  There are many  simple things that you can do to make your home safe and to help prevent falls.  Ways to make your home safe include removing things that can make you trip and installing grab bars in the bathroom.  Ask for help when making these changes in your home.  This information is not intended to replace advice given to you by your health care provider. Make sure you discuss any questions you have with your health care provider.  Document Revised: 07/21/2021 Document Reviewed: 07/21/2021  [employer] Patient Education © 2021 [employer]            Advance Care Planning    Advance Directive is on file.  ACP discussion was held with the patient during this visit. Patient has an advance directive in EMR which is still valid.   Discussion with Patient regarding advanced directives. POST form discussed at length and reviewed with patient. POST reviewed and updated today. I spent 18 minutes  with patient reviewing information and documenting  in the chart.  Patient states she does want CPR. Reviewed medical interventions with patient and the differences between each: Comfort, Limited and Full. Patient opted for Full. Discussed the use of antibiotics at the end of life. She chose to use antibiotics consistent with treatment goals. Discussed artificially administered nutrition, patient is aware that if she is alert and oriented they can change their mind at any time. However, they have elected to have no artificial nutrition. Patient has identified her friend Uma Diaz as her healthcare representative. Advised to discuss with healthcare representative if they can comply with wishes. Patient encouraged to have a meeting to discuss his decision regarding advanced care directives and goals of care with extended family and significant  friends  In regard to the POST form:The patient opted to complete POST while in the office and copy scanned into the chart. and advised to give to family members, place in an easily accessible place and  take with them if going to the hospital or Emergency room.      Follow Up:   Future Appointments         Provider Department Center    3/13/2025 1:00 PM Mustapha Gonzales MD [facility] [place]            An After Visit Summary and PPPS were made available to the patient.      Diagnoses and all orders for this visit:    1. Advance care planning (Primary)  Assessment & Plan:  Completed 2-26-25 and scanned to chart      2. Immunization counseling  Overview:  UTD as of 2-26-25    Prevnar 20  2-5-24    Flu vaccine  9-24-24    T-Dap  5-5-21    Shingrix  5-6-23 and 2-14-23    Covid   11-19-21, 3-21-21, 2-1-21, 5-11-22      10-25-22     RSV                        1-12-24    Assessment & Plan:  UTD as of 2-26-25      3. Medicare annual wellness visit, subsequent  Assessment & Plan:  -Completed 2-26-25       4. Depression screen  Assessment & Plan:  Completed 2-26-25 and addressed in problem visit.

## 2025-02-26 NOTE — ASSESSMENT & PLAN NOTE
Doing well.  Discussed EKG for HTN and 1st degree AV block.  Pt. Declines EKG.  Patient tolerated Enalapril and Metoprolol well without side effects. I feel the benefits of the medication outweigh the risks.

## 2025-02-26 NOTE — ASSESSMENT & PLAN NOTE
New dx.  -Discussed nerve conduction studies, pt. Declines.  Discussed increaseing gabapentin and Cymbalta, pt. Declines both.

## 2025-02-26 NOTE — ASSESSMENT & PLAN NOTE
Doing well.  Patient tolerated Cymbalta well without side effects. I feel the benefits of the medication outweigh the risks.

## 2025-02-28 ENCOUNTER — HOSPITAL ENCOUNTER (OUTPATIENT)
Dept: MAMMOGRAPHY | Facility: HOSPITAL | Age: 85
Discharge: HOME | End: 2025-02-28
Admitting: FAMILY MEDICINE
Payer: MEDICARE

## 2025-02-28 PROCEDURE — 77063 BREAST TOMOSYNTHESIS BI: CPT

## 2025-02-28 PROCEDURE — 77067 SCR MAMMO BI INCL CAD: CPT

## 2025-03-01 LAB
25(OH)D3+25(OH)D2 SERPL-MCNC: 57.3 NG/ML (ref 30–100)
ALBUMIN SERPL-MCNC: 4.2 G/DL (ref 3.7–4.7)
ALP SERPL-CCNC: 85 IU/L (ref 44–121)
ALT SERPL-CCNC: 11 IU/L (ref 0–32)
AST SERPL-CCNC: 21 IU/L (ref 0–40)
BASOPHILS # BLD AUTO: 0 X10E3/UL (ref 0–0.2)
BASOPHILS NFR BLD AUTO: 1 %
BILIRUB SERPL-MCNC: 0.6 MG/DL (ref 0–1.2)
BUN SERPL-MCNC: 17 MG/DL (ref 8–27)
BUN/CREAT SERPL: 21 (ref 12–28)
CALCIUM SERPL-MCNC: 9 MG/DL (ref 8.7–10.3)
CHLORIDE SERPL-SCNC: 97 MMOL/L (ref 96–106)
CHOLEST SERPL-MCNC: 141 MG/DL (ref 100–199)
CHOLEST/HDLC SERPL: 3.7 RATIO (ref 0–4.4)
CK SERPL-CCNC: 55 U/L (ref 26–161)
CO2 SERPL-SCNC: 22 MMOL/L (ref 20–29)
CREAT SERPL-MCNC: 0.8 MG/DL (ref 0.57–1)
CRP SERPL-MCNC: 2 MG/L (ref 0–10)
EGFRCR SERPLBLD CKD-EPI 2021: 74 ML/MIN/1.73
EOSINOPHIL # BLD AUTO: 0.1 X10E3/UL (ref 0–0.4)
EOSINOPHIL NFR BLD AUTO: 2 %
ERYTHROCYTE [DISTWIDTH] IN BLOOD BY AUTOMATED COUNT: 12.8 % (ref 11.7–15.4)
GLOBULIN SER CALC-MCNC: 2.4 G/DL (ref 1.5–4.5)
GLUCOSE SERPL-MCNC: 90 MG/DL (ref 70–99)
HBA1C MFR BLD: 6.2 % (ref 4.8–5.6)
HCT VFR BLD AUTO: 39 % (ref 34–46.6)
HDLC SERPL-MCNC: 38 MG/DL
HGB BLD-MCNC: 12.3 G/DL (ref 11.1–15.9)
IMM GRANULOCYTES # BLD AUTO: 0 X10E3/UL (ref 0–0.1)
IMM GRANULOCYTES NFR BLD AUTO: 0 %
LDLC SERPL CALC-MCNC: 81 MG/DL (ref 0–99)
LYMPHOCYTES # BLD AUTO: 1 X10E3/UL (ref 0.7–3.1)
LYMPHOCYTES NFR BLD AUTO: 18 %
MCH RBC QN AUTO: 28.8 PG (ref 26.6–33)
MCHC RBC AUTO-ENTMCNC: 31.5 G/DL (ref 31.5–35.7)
MCV RBC AUTO: 91 FL (ref 79–97)
MONOCYTES # BLD AUTO: 0.4 X10E3/UL (ref 0.1–0.9)
MONOCYTES NFR BLD AUTO: 8 %
NEUTROPHILS # BLD AUTO: 4 X10E3/UL (ref 1.4–7)
NEUTROPHILS NFR BLD AUTO: 71 %
PLATELET # BLD AUTO: 302 X10E3/UL (ref 150–450)
POTASSIUM SERPL-SCNC: 4.9 MMOL/L (ref 3.5–5.2)
PROT SERPL-MCNC: 6.6 G/DL (ref 6–8.5)
RBC # BLD AUTO: 4.27 X10E6/UL (ref 3.77–5.28)
SODIUM SERPL-SCNC: 136 MMOL/L (ref 134–144)
TRIGL SERPL-MCNC: 122 MG/DL (ref 0–149)
TSH SERPL DL<=0.005 MIU/L-ACNC: 1.44 UIU/ML (ref 0.45–4.5)
VLDLC SERPL CALC-MCNC: 22 MG/DL (ref 5–40)
WBC # BLD AUTO: 5.7 X10E3/UL (ref 3.4–10.8)

## 2025-03-06 DIAGNOSIS — J30.1 SEASONAL ALLERGIC RHINITIS DUE TO POLLEN: ICD-10-CM

## 2025-03-06 RX ORDER — MONTELUKAST SODIUM 10 MG/1
10 TABLET ORAL DAILY
Qty: 90 TABLET | Refills: 0 | Status: SHIPPED | OUTPATIENT
Start: 2025-03-06

## 2025-03-11 NOTE — ASSESSMENT & PLAN NOTE
Mild, normal for age thus delete.  TSH  done 2-, read by me, reviewed with pt.   TSH was 1.440 down from 1.930

## 2025-03-11 NOTE — ASSESSMENT & PLAN NOTE
Stable.  A1c done 2-, read by me, reviewed with pt.  A1c was 6.2 same as last.  Patient tolerated Metformin well without side effects. I feel the benefits of the medication outweigh the risks.   Encourged to watch sugar intake, exercise more, lose weight,

## 2025-03-11 NOTE — ASSESSMENT & PLAN NOTE
Lipid and CMP done 2-, read by me, reviewed with pt.   Trig. 122 up from 105, Tot. Chol. 141 down from 152, HDL 38 down from 42, LDL 81 down from 91  Worse due to HDL being low  Encouraged to watch fatty intake, exercise more, and lose weight.   Compliant with medication.  Patient tolerated Lipitor well without side effects. I feel the benefits of the medication outweigh the risks.   Is not getting adequate diet and exercise  Goals developed at last visit were not met due to low HDL, close to goal.  Follow up in 6  months  Care management needs are self-addressed.  Self-management abilities addressed and patient is capable of managing her own disease.

## 2025-03-11 NOTE — ASSESSMENT & PLAN NOTE
Stable.  CRP done 2-, read by me, reviewed with pt.   CRP was 2 down from 3.  Patient tolerated Celebrex well without side effects.  Risk discussed and she will try and skip some doses.

## 2025-03-13 ENCOUNTER — OFFICE VISIT (OUTPATIENT)
Dept: FAMILY MEDICINE CLINIC | Facility: CLINIC | Age: 85
End: 2025-03-13
Payer: MEDICARE

## 2025-03-13 VITALS
HEART RATE: 98 BPM | BODY MASS INDEX: 25.62 KG/M2 | TEMPERATURE: 97.1 F | HEIGHT: 63 IN | DIASTOLIC BLOOD PRESSURE: 76 MMHG | WEIGHT: 144.6 LBS | RESPIRATION RATE: 18 BRPM | OXYGEN SATURATION: 98 % | SYSTOLIC BLOOD PRESSURE: 112 MMHG

## 2025-03-13 DIAGNOSIS — R60.0 LOCALIZED EDEMA: ICD-10-CM

## 2025-03-13 DIAGNOSIS — M25.552 LEFT HIP PAIN: ICD-10-CM

## 2025-03-13 DIAGNOSIS — K58.1 IRRITABLE BOWEL SYNDROME WITH CONSTIPATION: ICD-10-CM

## 2025-03-13 DIAGNOSIS — Z83.3 FAMILY HISTORY OF DIABETES MELLITUS: ICD-10-CM

## 2025-03-13 DIAGNOSIS — N93.9 VAGINAL BLEEDING: ICD-10-CM

## 2025-03-13 DIAGNOSIS — R10.2 PELVIC AND PERINEAL PAIN: ICD-10-CM

## 2025-03-13 DIAGNOSIS — K59.00 CONSTIPATION, UNSPECIFIED CONSTIPATION TYPE: ICD-10-CM

## 2025-03-13 DIAGNOSIS — G89.29 CHRONIC PAIN OF RIGHT KNEE: ICD-10-CM

## 2025-03-13 DIAGNOSIS — K44.9 ESOPHAGEAL HERNIA: ICD-10-CM

## 2025-03-13 DIAGNOSIS — Z91.81 AT HIGH RISK FOR FALLS: ICD-10-CM

## 2025-03-13 DIAGNOSIS — M97.02XD PERIPROSTHETIC FRACTURE AROUND INTERNAL PROSTHETIC LEFT HIP JOINT, SUBSEQUENT ENCOUNTER: ICD-10-CM

## 2025-03-13 DIAGNOSIS — R60.9 DEPENDENT EDEMA: ICD-10-CM

## 2025-03-13 DIAGNOSIS — J30.1 SEASONAL ALLERGIC RHINITIS DUE TO POLLEN: ICD-10-CM

## 2025-03-13 DIAGNOSIS — Z12.4 CERVICAL CANCER SCREENING: ICD-10-CM

## 2025-03-13 DIAGNOSIS — M48.061 SPINAL STENOSIS OF LUMBAR REGION, UNSPECIFIED WHETHER NEUROGENIC CLAUDICATION PRESENT: ICD-10-CM

## 2025-03-13 DIAGNOSIS — Z98.1 S/P LUMBAR FUSION: ICD-10-CM

## 2025-03-13 DIAGNOSIS — Z96.649 PAIN IN HIP REGION AFTER TOTAL HIP REPLACEMENT, INITIAL ENCOUNTER: ICD-10-CM

## 2025-03-13 DIAGNOSIS — M43.26 FUSION OF SPINE OF LUMBAR REGION: ICD-10-CM

## 2025-03-13 DIAGNOSIS — F41.8 SITUATIONAL ANXIETY: ICD-10-CM

## 2025-03-13 DIAGNOSIS — R73.9 HYPERGLYCEMIA: ICD-10-CM

## 2025-03-13 DIAGNOSIS — F32.A DEPRESSIVE DISORDER: ICD-10-CM

## 2025-03-13 DIAGNOSIS — I05.9 MITRAL VALVE DISEASE: ICD-10-CM

## 2025-03-13 DIAGNOSIS — M25.562 LEFT KNEE PAIN, UNSPECIFIED CHRONICITY: ICD-10-CM

## 2025-03-13 DIAGNOSIS — Z96.649 LOOSE TOTAL HIP ARTHROPLASTY, SUBSEQUENT ENCOUNTER: ICD-10-CM

## 2025-03-13 DIAGNOSIS — R74.8 ELEVATED ALKALINE PHOSPHATASE LEVEL: ICD-10-CM

## 2025-03-13 DIAGNOSIS — M54.9 BACK PAIN, UNSPECIFIED BACK LOCATION, UNSPECIFIED BACK PAIN LATERALITY, UNSPECIFIED CHRONICITY: ICD-10-CM

## 2025-03-13 DIAGNOSIS — K44.9 HIATAL HERNIA: ICD-10-CM

## 2025-03-13 DIAGNOSIS — D25.9 UTERINE LEIOMYOMA, UNSPECIFIED LOCATION: ICD-10-CM

## 2025-03-13 DIAGNOSIS — M25.511 BILATERAL SHOULDER PAIN, UNSPECIFIED CHRONICITY: ICD-10-CM

## 2025-03-13 DIAGNOSIS — D50.8 OTHER IRON DEFICIENCY ANEMIA: ICD-10-CM

## 2025-03-13 DIAGNOSIS — Z71.85 IMMUNIZATION COUNSELING: ICD-10-CM

## 2025-03-13 DIAGNOSIS — M25.561 CHRONIC PAIN OF RIGHT KNEE: ICD-10-CM

## 2025-03-13 DIAGNOSIS — M81.0 OSTEOPOROSIS, UNSPECIFIED OSTEOPOROSIS TYPE, UNSPECIFIED PATHOLOGICAL FRACTURE PRESENCE: ICD-10-CM

## 2025-03-13 DIAGNOSIS — I35.1 AORTIC VALVE INSUFFICIENCY, ETIOLOGY OF CARDIAC VALVE DISEASE UNSPECIFIED: ICD-10-CM

## 2025-03-13 DIAGNOSIS — R25.1 TREMOR: ICD-10-CM

## 2025-03-13 DIAGNOSIS — M41.20 IDIOPATHIC SCOLIOSIS AND KYPHOSCOLIOSIS: ICD-10-CM

## 2025-03-13 DIAGNOSIS — D48.9 NEOPLASM, UNCERTAIN WHETHER BENIGN OR MALIGNANT: ICD-10-CM

## 2025-03-13 DIAGNOSIS — E87.1 HYPONATREMIA: ICD-10-CM

## 2025-03-13 DIAGNOSIS — J98.6 ELEVATED DIAPHRAGM: ICD-10-CM

## 2025-03-13 DIAGNOSIS — R51.9 FRONTAL HEADACHE: ICD-10-CM

## 2025-03-13 DIAGNOSIS — J30.9 ALLERGIC RHINITIS, UNSPECIFIED SEASONALITY, UNSPECIFIED TRIGGER: ICD-10-CM

## 2025-03-13 DIAGNOSIS — R20.2 PARESTHESIA: ICD-10-CM

## 2025-03-13 DIAGNOSIS — M05.79 RHEUMATOID ARTHRITIS INVOLVING MULTIPLE SITES WITH POSITIVE RHEUMATOID FACTOR: ICD-10-CM

## 2025-03-13 DIAGNOSIS — Z96.649 STATUS POST HIP REPLACEMENT, UNSPECIFIED LATERALITY: ICD-10-CM

## 2025-03-13 DIAGNOSIS — M17.11 OSTEOARTHRITIS OF RIGHT KNEE, UNSPECIFIED OSTEOARTHRITIS TYPE: ICD-10-CM

## 2025-03-13 DIAGNOSIS — M25.511 CHRONIC RIGHT SHOULDER PAIN: ICD-10-CM

## 2025-03-13 DIAGNOSIS — E78.2 MIXED HYPERLIPIDEMIA: Primary | ICD-10-CM

## 2025-03-13 DIAGNOSIS — G56.01 CARPAL TUNNEL SYNDROME OF RIGHT WRIST: ICD-10-CM

## 2025-03-13 DIAGNOSIS — R32 URINARY INCONTINENCE, UNSPECIFIED TYPE: ICD-10-CM

## 2025-03-13 DIAGNOSIS — R53.83 LETHARGY: ICD-10-CM

## 2025-03-13 DIAGNOSIS — M16.11 ARTHRITIS OF RIGHT HIP: ICD-10-CM

## 2025-03-13 DIAGNOSIS — G89.29 CHRONIC NECK PAIN: ICD-10-CM

## 2025-03-13 DIAGNOSIS — Z80.0 FAMILY HISTORY OF COLON CANCER: ICD-10-CM

## 2025-03-13 DIAGNOSIS — R10.11 RIGHT UPPER QUADRANT ABDOMINAL PAIN: ICD-10-CM

## 2025-03-13 DIAGNOSIS — K63.5 POLYP OF COLON, UNSPECIFIED PART OF COLON, UNSPECIFIED TYPE: ICD-10-CM

## 2025-03-13 DIAGNOSIS — N95.2 ATROPHIC VAGINITIS: ICD-10-CM

## 2025-03-13 DIAGNOSIS — I10 HYPERTENSION, BENIGN: ICD-10-CM

## 2025-03-13 DIAGNOSIS — J30.2 SEASONAL ALLERGIES: ICD-10-CM

## 2025-03-13 DIAGNOSIS — M54.2 CHRONIC NECK PAIN: ICD-10-CM

## 2025-03-13 DIAGNOSIS — R87.620 ATYPICAL SQUAMOUS CELL CHANGES OF UNDETERMINED SIGNIFICANCE (ASCUS) ON VAGINAL CYTOLOGY: ICD-10-CM

## 2025-03-13 DIAGNOSIS — R60.0 EDEMA OF BOTH LOWER EXTREMITIES: ICD-10-CM

## 2025-03-13 DIAGNOSIS — E55.9 VITAMIN D DEFICIENCY: ICD-10-CM

## 2025-03-13 DIAGNOSIS — G89.4 PAIN SYNDROME, CHRONIC: ICD-10-CM

## 2025-03-13 DIAGNOSIS — M51.369 LUMBAR DEGENERATIVE DISC DISEASE: ICD-10-CM

## 2025-03-13 DIAGNOSIS — S82.002S CLOSED NONDISPLACED FRACTURE OF LEFT PATELLA, UNSPECIFIED FRACTURE MORPHOLOGY, SEQUELA: ICD-10-CM

## 2025-03-13 DIAGNOSIS — G47.00 INSOMNIA, PERSISTENT: ICD-10-CM

## 2025-03-13 DIAGNOSIS — Z96.651 STATUS POST RIGHT KNEE REPLACEMENT: ICD-10-CM

## 2025-03-13 DIAGNOSIS — T84.84XA PAIN IN HIP REGION AFTER TOTAL HIP REPLACEMENT, INITIAL ENCOUNTER: ICD-10-CM

## 2025-03-13 DIAGNOSIS — Z00.01 ENCOUNTER FOR ROUTINE ADULT PHYSICAL EXAM WITH ABNORMAL FINDINGS: ICD-10-CM

## 2025-03-13 DIAGNOSIS — T84.038D LOOSE TOTAL HIP ARTHROPLASTY, SUBSEQUENT ENCOUNTER: ICD-10-CM

## 2025-03-13 DIAGNOSIS — M79.10 MYALGIA: ICD-10-CM

## 2025-03-13 DIAGNOSIS — R10.31 RIGHT LOWER QUADRANT ABDOMINAL PAIN: ICD-10-CM

## 2025-03-13 DIAGNOSIS — I44.0 FIRST DEGREE HEART BLOCK: ICD-10-CM

## 2025-03-13 DIAGNOSIS — M25.9 LESION OF JOINT CAPSULE OF KNEE REGION: ICD-10-CM

## 2025-03-13 DIAGNOSIS — M25.512 BILATERAL SHOULDER PAIN, UNSPECIFIED CHRONICITY: ICD-10-CM

## 2025-03-13 DIAGNOSIS — G89.29 CHRONIC RIGHT SHOULDER PAIN: ICD-10-CM

## 2025-03-13 DIAGNOSIS — E66.3 OVERWEIGHT (BMI 25.0-29.9): ICD-10-CM

## 2025-03-13 DIAGNOSIS — S83.209S TEAR OF MENISCUS OF KNEE, UNSPECIFIED LATERALITY, UNSPECIFIED MENISCUS, UNSPECIFIED TEAR TYPE, UNSPECIFIED WHETHER OLD OR CURRENT TEAR, SEQUELA: ICD-10-CM

## 2025-03-13 DIAGNOSIS — Z12.31 SCREENING MAMMOGRAM FOR BREAST CANCER: ICD-10-CM

## 2025-03-13 DIAGNOSIS — I11.9 HYPERTENSIVE LEFT VENTRICULAR HYPERTROPHY, WITHOUT HEART FAILURE: ICD-10-CM

## 2025-03-13 DIAGNOSIS — I73.9 PERIPHERAL VASCULAR DISEASE: ICD-10-CM

## 2025-03-13 DIAGNOSIS — K21.9 GASTROESOPHAGEAL REFLUX DISEASE WITHOUT ESOPHAGITIS: ICD-10-CM
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
## 2025-03-13 DIAGNOSIS — N95.0 POSTMENOPAUSAL VAGINAL BLEEDING: ICD-10-CM

## 2025-03-13 DIAGNOSIS — G89.4 CHRONIC PAIN SYNDROME: ICD-10-CM

## 2025-03-13 PROBLEM — M97.02XA PERIPROSTHETIC FRACTURE AROUND INTERNAL PROSTHETIC LEFT HIP JOINT: Status: RESOLVED | Noted: 2019-07-25 | Resolved: 2025-03-13

## 2025-03-13 PROBLEM — D64.9 ANEMIA: Status: RESOLVED | Noted: 2021-02-24 | Resolved: 2025-03-13

## 2025-03-13 PROBLEM — S83.209A TEAR OF MENISCUS OF KNEE: Status: RESOLVED | Noted: 2020-03-25 | Resolved: 2025-03-13

## 2025-03-13 PROBLEM — Z96.659 STATUS POST KNEE REPLACEMENT: Status: RESOLVED | Noted: 2020-07-29 | Resolved: 2025-03-13

## 2025-03-13 PROBLEM — S82.002A FRACTURE OF LEFT PATELLA: Status: RESOLVED | Noted: 2020-08-08 | Resolved: 2025-03-13

## 2025-03-13 RX ORDER — ATORVASTATIN CALCIUM 20 MG/1
20 TABLET, FILM COATED ORAL DAILY
Qty: 90 TABLET | Refills: 3 | Status: SHIPPED | OUTPATIENT
Start: 2025-03-13

## 2025-03-13 RX ORDER — AMITRIPTYLINE HYDROCHLORIDE 75 MG/1
75 TABLET ORAL
Qty: 90 TABLET | Refills: 3 | Status: SHIPPED | OUTPATIENT
Start: 2025-03-13

## 2025-03-13 RX ORDER — CELECOXIB 200 MG/1
200 CAPSULE ORAL DAILY
Qty: 90 CAPSULE | Refills: 3 | Status: SHIPPED | OUTPATIENT
Start: 2025-03-13 | End: 2025-03-17

## 2025-03-13 RX ORDER — WHEAT DEXTRIN/ASPARTAME 3 G/6 G
2 POWDER IN PACKET (EA) ORAL AS NEEDED
Start: 2025-03-13

## 2025-03-13 RX ORDER — GABAPENTIN 300 MG/1
CAPSULE ORAL
Qty: 180 CAPSULE | Refills: 3 | Status: SHIPPED | OUTPATIENT
Start: 2025-03-13 | End: 2025-03-17

## 2025-03-13 RX ORDER — ENALAPRIL MALEATE 10 MG/1
5 TABLET ORAL 2 TIMES DAILY
Qty: 180 TABLET | Refills: 3 | Status: SHIPPED | OUTPATIENT
Start: 2025-03-13

## 2025-03-13 RX ORDER — PANTOPRAZOLE SODIUM 40 MG/1
40 TABLET, DELAYED RELEASE ORAL DAILY
Qty: 90 TABLET | Refills: 3 | Status: SHIPPED | OUTPATIENT
Start: 2025-03-13 | End: 2025-03-17

## 2025-03-13 RX ORDER — MONTELUKAST SODIUM 10 MG/1
10 TABLET ORAL DAILY
Qty: 90 TABLET | Refills: 3 | Status: SHIPPED | OUTPATIENT
Start: 2025-03-13

## 2025-03-13 RX ORDER — LORATADINE 10 MG/1
10 TABLET ORAL DAILY
Start: 2025-03-13

## 2025-03-13 RX ORDER — FLUTICASONE PROPIONATE 50 MCG
2 SPRAY, SUSPENSION (ML) NASAL DAILY
Start: 2025-03-13

## 2025-03-13 RX ORDER — METOPROLOL TARTRATE 25 MG/1
25 TABLET, FILM COATED ORAL 2 TIMES DAILY
Qty: 180 TABLET | Refills: 3 | Status: SHIPPED | OUTPATIENT
Start: 2025-03-13

## 2025-03-13 NOTE — ASSESSMENT & PLAN NOTE
Doing well.  Patient tolerated Celebrex, Gabapentin well without side effects. I feel the benefits of the medication outweigh the risks.

## 2025-03-13 NOTE — ASSESSMENT & PLAN NOTE
Being worked up by Dr. Zhao and Dr. Barone and scheduled for JEROME's 3-14-25.  She will have records transferred to us

## 2025-03-13 NOTE — ASSESSMENT & PLAN NOTE
Doing well.  Patient tolerated Enalapril and Metoprolol well without side effects. I feel the benefits of the medication outweigh the risks.   Encouraged to watch salt, exercise more and lose weight.

## 2025-03-13 NOTE — PROGRESS NOTES
Normal Subjective   Vida Jamison is a 84 y.o. female here for her annual physical with me. Vida is here for coordination of medical care, to discuss health maintenance, disease prevention as well as to followup on medical problems. Patient has been followed by me since around 1985. Patient's last CPE was never. Activity level is minimal. Exercises 4 per week. Appetite is good. Feels well with many complaints. Energy level is good. Sleeps well. Patient's last colonoscopy was 9- with Dr. Valdivia. Discussed repeat colonoscopy,  Patient declines due to age however you 1-9 metformin. Patient's last mammogram was 8-28-25. Patient is doing routine self skin exam monthly. Patient is doing routine self-breast exams monthly.    History of Present Illness  Follow up hyperglycemia.    Follow up allergies  Hyperlipidemia  This is a chronic problem. The current episode started more than 1 year ago. The problem is controlled. Factors aggravating her hyperlipidemia include fatty foods. Pertinent negatives include no chest pain, myalgias or shortness of breath. Current antihyperlipidemic treatment includes statins. The current treatment provides no improvement of lipids.   Heartburn  She reports no abdominal pain, no chest pain, no coughing, no nausea, no sore throat or no wheezing. This is a chronic problem. The current episode started more than 1 year ago. The problem occurs occasionally. Pertinent negatives include no fatigue or weight loss. She has tried a PPI for the symptoms.   Arthritis  Presents for follow-up visit. She reports no joint swelling. Pertinent negatives include no diarrhea, dysuria, fatigue, fever, rash or weight loss. Compliance with medications is %.        The following portions of the patient's history were reviewed and updated as appropriate: allergies, current medications, past family history, past medical history, past social history, past surgical history, and problem list.    Past  Medical History:   Diagnosis Date    Advanced directives, counseling/discussion     Impression: Discussion w/ pt regarding Advanced directives. POST/Living Will form discussed at length & reviewed with pt. Pt states she does want CPR. Reviewed Medical interventions w/ pt & differences between each Comfort, Limited & Full. Pt opted for full. Discussed use of antibiotics at the end of life, pt chose to use antibiotics consistent w/ treatment goals.    Allergic contact dermatitis due to plants, except food     Impression: Worsening-probably due to poison ivy. Steroid injection given to patient. If not improving, return to office for re-evaluation.    Anemia     unspecified type. Impression: worse Hgb down to 10.7 from 11.5 -denies epitaxis, hemoptisis, heartburn, hematura, blood in stool or black tarry stool; Advised to start Iron-OTC 1 a day. she declines more aggressive work up at the present but should have a colonoscopy. she wants her hip fixed 1st    Aortic valve insufficiency     etiology of cardiac valve disease unspecified. Impression: Echo done 12/2015 - [facility].    Atypical squamous cells of undetermined significance (ASCUS) on Papanicolaou smear of cervix     Impression: Advised to repeat pap smear yearly. she is reluctent due to cost but I advised her it was covered with a G and Q code but she wanted me to gurentee no charge which I cannot promise. I also encouraged breast exam and mammo gram but again she wanted me to gurentee no cost. I advised her she might consider exam with gyn but that they probably would use the same codes    Breast cancer screening     Impression: agreed to schedule mammo but advised ideally should also have a breast exam - she did not want to schedule at the present    Cervical cancer screening     Impression: Doing excellent. Follow conservatively.    Chronic pain syndrome     Impression: Doing well. Continue with Elavil as presently prescribed, Discussed benifits and  side effect of medicine. Patient aware of high risk medication. Follow conservatively. Discussed decreasing celexa from 10mg BID to daily.    Dependent edema     Impression: New dx. Advised patient to elevate lower extremities above the level of the heart as much as possible, patient states she will try as possible. States she probably isn't able to.    Edema extremities     Impression: mild. Patient to return if symptoms worsen or change. Advised to elevate legs above level of heart as needed.    Elevated diaphragm     Impression: New dx. shown on chest xray from 2012, will follow conservatively    Esophageal hernia     Impression: Doing well. Follow conservatively.    First degree atrioventricular block     Impression: Doing excellent. Follow conservatively.    Gastroesophageal reflux disease without esophagitis     Impression: no sx at present but this could be the cause of her anemia    Hyperglycemia     Impression: Worse: hgb a1c 6.0 was 5.6. Encouraged to watch sugar intake, exercise more and lose weight.    Hypertension, benign     Impression: Doing well; Encouraged to watch salt, exercise more and lose weight    Hypertensive left ventricular hypertrophy, without heart failure     Impression: Echo done 12/2015 - [facility].    Hyponatremia     Impression: worse at 134 - repeat with next labs    Insomnia, persistent     Impression: New dx. Worsening. Advised patient she may try increasing her elavil from 50mg to 75mg. Discussed benefits and side effect of medicine. Patient to return if symptoms worsen or change.    Irritable bowel syndrome without diarrhea     Impression: Doing well. Follow conservatively.    Left hip pain     Impression: Worsening; reviewed xray of the left hip with patient. Advised patient that she needs to see here surgeon, Dr. Lane. Patient prefers to call and make her appointment.    Left knee pain     Impression: Worse, will try to schedule ortho appt. sooner than 10-16-18    Left  leg pain     Impression: Worse - discussed addiction potential of ativan femi with narcotic and muscle relax combination    Left shoulder pain     Impression: New dx. Left shoulder pain after fall, pt. sent to Xray. Will call pt. with xray results if broken will schedule pt. with DR. Arredondo or Dr. Fleming. Pt. placed in a small sling.    Lumbar degenerative disc disease     Story: Spinal fusion done 3/24/10 and 8/12/2011. Impression: Worsening; Offered further testing, patient prefers to hold on this until after hip has been repaired.    Medicare annual wellness visit, subsequent     with abnormal findings    Mitral valve disease     Impression: Echo done 12/2015 - [facility].    Mixed hyperlipidemia     Impression: Labs done 04/16/19 Tot 172 up from 156, HDL 46 down from 47, Trig 152 up from 118,  up from 88 Worsening Encouraged to watch fatty intake, exercise more, and lose weight . Compliant with meds Goals developed at last visit were not met because Is not getting adequate diet and exercise( due to hip pain) Follow up in 3 months Care management needs are self addressed.    Onychomycosis     Impression: Stable, pt. declines tx    Other constipation     Impression: New dx. Pain probably due to IBS, Start Citrucel 1tbsp mixed in 8oz of fluid daily, may gradually increase up to three times daily for a goal of 2 BM that float in the commode daily. Advised to increase fiber, beans, rice, fruits, veggies.    Other hypotension     Impression: Improving- Patient states that while in Rehab b/p readings were low and she did not recieve medications on some days.    Overweight     Impression: Stable    Pain due to total knee replacement, sequela     Impression: Right hip-Worsening- will xray today.    Patellar fracture 08/2020    Peripheral vascular disease     Impression: JEROME last year was abnorm on the left last year thus will send for vasc studies    Periprosthetic fracture around internal prosthetic left hip  joint     subsequent encounter. Impression: Patient was seen by Dr. Joseph who referred to Linette Weaver.    Rheumatoid arthritis involving multiple sites with positive rheumatoid factor     Impression: stable dc mobic ndue to anemia    Right hip pain     S/P arthroscopic surgery of right knee 2021    S/P spinal fusion     Impression: Doing well since surgery on 17.    Screening for depression     Negative Depression Screening (4 or less) ()    Seasonal allergic rhinitis due to pollen     Impression: Doing well.    Status post hip surgery     Impression: Doing well from surgery on 18- Total left hip prosthesis.    Status post revision of total hip     R EDSON    Total knee replacement status, left     Total knee replacement status, right 2020    Uterine leiomyoma     unspecified location. Impression: Discussed following with GYN for a scope, will discuss further after hip surgery    Vitamin D deficiency     Impression: Doing well, patient gets plenty of time in the Sun. Vitamin D. level normal. Follow conservatively.       Family History   Problem Relation Age of Onset    Ovarian cancer Mother     Arthritis Mother     Heart failure Father     Suicidality Brother     Heart disease Maternal Aunt     Cancer Other         malignant neoplasm of gastrointestinal tract- in her 50's    Arthritis Other     Cervical cancer Other     Arthritis Other     Diabetes Other        Social History     Socioeconomic History    Marital status: Single   Tobacco Use    Smoking status: Never     Passive exposure: Never    Smokeless tobacco: Never   Vaping Use    Vaping status: Never Used   Substance and Sexual Activity    Alcohol use: No    Drug use: No    Sexual activity: Never       Social History     Social History Narrative    Never .  Nephew lived with her for 13 years until he  in , then she lived alone for almost 1 year until she offered her neighbors to live with her.  Caffeine 1 cup  coffee daily and 2-3 sodas weekly.  Always wears seatbelts.  Exercise 3-4x weekly at the [employer] in the pool. No alcohol.  Hobbies watching TV, [employer] and being outdoors.        Past Surgical History:   Procedure Laterality Date    BREAST BIOPSY Right 1974    Dr Briseida Alonso    CATARACT EXTRACTION W/ INTRAOCULAR LENS IMPLANT      7/20/10-rt. eye-Dr. Leon 7/13/10- Lt. eye-Dr. Leon    D & C HYSTEROSCOPY N/A 6/9/2023    Procedure: DILATATION AND CURETTAGE HYSTEROSCOPY;  Surgeon: Jackie Lieberman MD;  Location: [facility];  Service: Obstetrics/Gynecology;  Laterality: N/A;    FINGER SURGERY  2001    Revision of Finger joints. Dr. Brumfield w/Drs. Kleinert & Marlon    HIP ENDOPROSTHESIS Left 05/22/2014    Osteonics endoprostehetic replacement of left hip. Dr. Reza Choudhury.    JOINT REPLACEMENT      KNEE ARTHROSCOPY Left     [1987] Dr Poon-torumm medial meniscus [1995] Dr. Schaffer -torn medial meniscus    [facility]  2000    Biopsy of cervix. for MAJOR-1 by Dr. Knight    LUMBAR DISCECTOMY  1994    Hemilaminectomy, foraminectomy & discectomy. Dr. Velasquez, L4-L5 w/posterior lateral fusion & screw fixatoin    LUMBAR LAMINECTOMY  12/19/2011    foraminotomies, medial facetectomies L5-B0wiuvo redo. left L5-S1 lumbar discectomy. [facility]-Dr. De Oliveira- 5 units of blood given to pt.    POSTERIOR LUMBAR/THORACIC SPINE FUSION  2002    Fusion T-5 through L-1. Had T-11 burst fracture. Recuperated at [facility]    SPINAL FUSION      Lower Back. 7/11/2011-[facility] - Hardware removal from L3-L5, Dr. De Oliveira surgeon, Dr. Booker [facility]-Fusion of spine at multi-levels 12/14/11 - [facility] - Dr Gomez and Dr. Momin, Anterior approach Spinal Fusion L5-S1 3--[facility]. Dr. De Oliveira and Dr. Dean. Failed posterior fusion with loose instrumentation. L-4 thru L-5. No complications.    TONSILLECTOMY  1951    Dr Mcginnis    TOTAL HIP ARTHROPLASTY Right 04/10/2017    [facility], Inpatient. Dr. Reinoso    TOTAL KNEE ARTHROPLASTY  Left 2002    Dr Sue @ [facility]       Current Outpatient Medications on File Prior to Visit   Medication Sig Dispense Refill    acetaminophen (TYLENOL) 650 MG 8 hr tablet Take 1 tablet by mouth 2 (Two) Times a Day.      Calcium Carbonate-Vitamin D (CALCIUM 600/VITAMIN D PO) Take 1 tablet by mouth 2 (Two) Times a Day.      CALCIUM MAGNESIUM ZINC PO Take  by mouth.      diphenhydrAMINE (BENADRYL) 25 mg capsule Take 1 capsule by mouth Daily.      Glycerin-Hypromellose- (ARTIFICIAL TEARS) 0.2-0.2-1 % solution ophthalmic solution       Multiple Vitamins-Minerals (OCUVITE ADULT 50+ PO) Take  by mouth.       No current facility-administered medications on file prior to visit.       Allergies   Allergen Reactions    Morphine And Codeine Hallucinations     HALLUCINATIONS         Review of Systems   Constitutional:  Negative for appetite change, chills, fatigue, fever, unexpected weight gain and unexpected weight loss.   HENT:  Negative for congestion, dental problem, ear discharge, ear pain, hearing loss, nosebleeds, postnasal drip, rhinorrhea, sinus pressure, sneezing, sore throat, tinnitus and voice change.         Last dental exam approx. 2021-advised to follow   Eyes:  Negative for blurred vision, double vision, pain, redness and visual disturbance.        Last vision exam approx 2022 and is scheduled for eye appt at Dr. Jackson office 4-30-25   Respiratory:  Negative for cough, shortness of breath, wheezing and stridor.    Cardiovascular:  Negative for chest pain, palpitations and leg swelling.   Gastrointestinal:  Positive for GERD. Negative for abdominal pain, anal bleeding, blood in stool, constipation, diarrhea, nausea, rectal pain, vomiting and indigestion.        7 BM weekly   Endocrine: Negative for cold intolerance, heat intolerance, polydipsia, polyphagia and polyuria.   Genitourinary:  Negative for difficulty urinating, dysuria, frequency, hematuria and urgency.   Musculoskeletal:  Positive for  "arthralgias and arthritis. Negative for back pain, joint swelling, myalgias, neck pain and neck stiffness.        Bilateral leg pain-scheduled for JEROME's with Dr. Zhao tomorrow   Skin:  Negative for color change, dry skin and rash.   Neurological:  Positive for numbness (fingers). Negative for dizziness, syncope, speech difficulty, weakness, light-headedness, headache and memory problem.   Hematological:  Does not bruise/bleed easily.   Psychiatric/Behavioral:  Negative for decreased concentration, sleep disturbance, depressed mood and stress. The patient is not nervous/anxious.        Objective   Visit Vitals  /76 (BP Location: Left arm, Patient Position: Sitting, Cuff Size: Adult)   Pulse 98   Temp 97.1 °F (36.2 °C) (Temporal)   Resp 18   Ht 160 cm (63\")   Wt 65.6 kg (144 lb 9.6 oz)   SpO2 98%   BMI 25.61 kg/m²     Physical Exam  Exam conducted with a chaperone present.   Constitutional:       General: She is not in acute distress.     Appearance: She is well-developed. She is not diaphoretic.   HENT:      Head: Normocephalic and atraumatic.      Right Ear: Tympanic membrane and external ear normal. Decreased hearing (moderate) noted. There is impacted cerumen (mild).      Left Ear: Tympanic membrane and external ear normal. Decreased hearing (moderate) noted. There is impacted cerumen (mild).      Nose: Nose normal.      Mouth/Throat:      Lips: Pink.      Mouth: Mucous membranes are moist.      Dentition: Abnormal dentition (only 3 teeth on the bottom). Has dentures (uppers).      Pharynx: Oropharynx is clear. No oropharyngeal exudate.   Eyes:      General: Lids are normal. No scleral icterus.        Right eye: No discharge.         Left eye: No discharge.      Extraocular Movements: Extraocular movements intact.      Conjunctiva/sclera: Conjunctivae normal.      Pupils: Pupils are equal, round, and reactive to light.      Comments: Aj's bilateral  Wears glasses.   Neck:      Thyroid: No thyromegaly.      " Trachea: Trachea normal.   Cardiovascular:      Rate and Rhythm: Normal rate and regular rhythm.      Pulses:           Dorsalis pedis pulses are 0 on the right side and 0 on the left side.        Posterior tibial pulses are 0 on the right side and 0 on the left side.      Heart sounds: No murmur heard.  Pulmonary:      Effort: Pulmonary effort is normal. No respiratory distress.      Breath sounds: Normal breath sounds. No wheezing or rales.   Chest:      Chest wall: No tenderness.   Breasts:     Right: Normal. No swelling, bleeding, inverted nipple, mass, nipple discharge, skin change or tenderness.      Left: Normal. No swelling, bleeding, inverted nipple, mass, nipple discharge, skin change or tenderness.   Abdominal:      General: Bowel sounds are normal. There is no distension.      Palpations: Abdomen is soft.      Tenderness: There is no abdominal tenderness. There is no guarding.      Hernia: No hernia is present. There is no hernia in the left inguinal area or right inguinal area.   Genitourinary:     General: Normal vulva.      Exam position: Lithotomy position.      Pubic Area: No rash.       Labia:         Right: No rash, tenderness, lesion or injury.         Left: No rash, tenderness, lesion or injury.       Urethra: No prolapse, urethral pain, urethral swelling or urethral lesion.      Vagina: Normal. No signs of injury. No vaginal discharge, erythema, tenderness, bleeding, lesions or prolapsed vaginal walls.      Cervix: Normal. No lesion or cervical bleeding.      Uterus: Normal.       Adnexa: Right adnexa normal and left adnexa normal.      Rectum: Normal.   Musculoskeletal:         General: No tenderness or deformity. Normal range of motion.      Cervical back: Full passive range of motion without pain, normal range of motion and neck supple.      Right foot: Deformity (severe on the 3rd toe, mild of the 4th toe-hyperflexion deformity.) present.   Lymphadenopathy:      Cervical: No cervical  adenopathy.      Lower Body: No right inguinal adenopathy. No left inguinal adenopathy.   Skin:     General: Skin is warm and dry.      Findings: No erythema or rash.      Comments: Varicosities moderate of the left leg and mild on the right.  Scars bilateral knees.  Scar left hip.  Scar upper thoracic to the sacrum.  Seb. Keratosis scattered over the back.  Irritated lesion of the left breast-probable Seb. Keratosis.     Neurological:      General: No focal deficit present.      Mental Status: She is alert and oriented to person, place, and time.      Cranial Nerves: Cranial nerves 2-12 are intact. No cranial nerve deficit.      Sensory: Sensation is intact. No sensory deficit.      Motor: Motor function is intact. No abnormal muscle tone.      Coordination: Coordination is intact. Coordination normal.      Gait: Gait abnormal.   Psychiatric:         Behavior: Behavior normal.         Thought Content: Thought content normal.         Judgment: Judgment normal.         Assessment & Plan   Problem List Items Addressed This Visit          High    Chronic pain syndrome    Overview   9-24-24 Discussed with patient that she should not be on Cymbalta and Elavil together, pt. Declines discontinuing Elavil and has agreed to decrease Cymbalta to 30 mg.         Current Assessment & Plan   Stable, patient will stop Cymbalta and continue Amitriptyline.  Patient tolerated Amitriptyline well without side effects.  Risk of amitriptyline discussed especially death from overdose but she wants to continue.  Continue Neurontin.         Peripheral vascular disease    Current Assessment & Plan   Being worked up by Dr. Zhao and Dr. Barone and scheduled for JEROME's 3-14-25.  She will have records transferred to us         RESOLVED: Periprosthetic fracture around internal prosthetic left hip joint    Overview   Patient was seen by Dr. Joseph who referred to Linette Weaver.         Current Assessment & Plan   Resolved thus delete.             Medium    RESOLVED: Anemia    Overview   Colonoscopy done September 20, 2023 by Dr. Livingston follow-up polypectomy was done--need to find it pathology         Current Assessment & Plan   Resolved x 3, thus delete.  CBC done 2-, read by me, reviewed with pt. CBC was normal         Atypical squamous cell changes of undetermined significance (ASCUS) on vaginal cytology    Overview   Patient stated she has a pelvic and pap last year and declines further pelvic exams         Current Assessment & Plan   Pt. Declined pelvic and pap smear         Depressive disorder    Current Assessment & Plan   Stable on amitriptyline and Cymbalta.  Due to the combination will decrease the dose of Cymbalta         Relevant Medications    amitriptyline (ELAVIL) 75 MG tablet    Gastroesophageal reflux disease without esophagitis    Current Assessment & Plan   Doing well.  Patient tolerated Protonix well without side effects. I feel the benefits of the medication outweigh the risks.          Relevant Medications    pantoprazole (PROTONIX) 40 MG EC tablet    Hypertension, benign    Current Assessment & Plan   Doing well.  Patient tolerated Enalapril and Metoprolol well without side effects. I feel the benefits of the medication outweigh the risks.   Encouraged to watch salt, exercise more and lose weight.           Relevant Medications    enalapril (VASOTEC) 10 MG tablet    metoprolol tartrate (LOPRESSOR) 25 MG tablet    Loose total hip arthroplasty    Overview   Right         Current Assessment & Plan   Intermittent and mild.  Offered referral to Orthopedic surgeon         Mixed hyperlipidemia - Primary    Current Assessment & Plan   Lipid and CMP done 2-, read by me, reviewed with pt.   Trig. 122 up from 105, Tot. Chol. 141 down from 152, HDL 38 down from 42, LDL 81 down from 91  Worse due to HDL being low  Encouraged to watch fatty intake, exercise more, and lose weight.   Compliant with medication.  Patient tolerated  Lipitor well without side effects. I feel the benefits of the medication outweigh the risks.   Is not getting adequate diet and exercise  Goals developed at last visit were not met due to low HDL, close to goal.  Follow up in 6  months  Care management needs are self-addressed.  Self-management abilities addressed and patient is capable of managing her own disease.           Relevant Medications    atorvastatin (LIPITOR) 20 MG tablet    Other Relevant Orders    Lipid Panel With / Chol / HDL Ratio    Comprehensive Metabolic Panel    RESOLVED: Paresthesia    Current Assessment & Plan   Duplicate thus delete         Rheumatoid arthritis involving multiple sites with positive rheumatoid factor    Overview   Patient declines Volteran gel due to trying it in the past with no success.         Current Assessment & Plan   Stable.  CRP done 2-, read by me, reviewed with pt.   CRP was 2 down from 3.  Patient tolerated Celebrex well without side effects.  Risk discussed and she will try and skip some doses.          Relevant Medications    celecoxib (CeleBREX) 200 MG capsule    RESOLVED: Situational anxiety    Current Assessment & Plan   Resolved  thus delete.         Spinal stenosis of lumbar region    Current Assessment & Plan   Doing well.  Patient tolerated Celebrex, Gabapentin well without side effects. I feel the benefits of the medication outweigh the risks.             Low    First degree heart block    Overview   Last EKG 5-26-23-borderline          Current Assessment & Plan   Doing well, advised repeat EKG with next PE         Relevant Medications    metoprolol tartrate (LOPRESSOR) 25 MG tablet    Hyperglycemia    Current Assessment & Plan   Stable.  A1c done 2-, read by me, reviewed with pt.  A1c was 6.2 same as last.  Patient tolerated Metformin well without side effects. I feel the benefits of the medication outweigh the risks.   Encourged to watch sugar intake, exercise more, lose weight,             Relevant Medications    metFORMIN (GLUCOPHAGE) 500 MG tablet    Other Relevant Orders    Hemoglobin A1c    Hypertensive left ventricular hypertrophy, without heart failure    Overview   Echo done 12/2015 - [facility].         Current Assessment & Plan   Discussed repeat ECHO, pt. Declined.  Keep B/P in good control.         Relevant Medications    metoprolol tartrate (LOPRESSOR) 25 MG tablet    RESOLVED: Hyponatremia    Current Assessment & Plan   Resolved x 3, thus delete.  CMP done 2-, read by me, reviewed with pt.   Sodium was normal x 3         RESOLVED: Pain in hip region after total hip replacement    Current Assessment & Plan   Duplicate thus delete.          Seasonal allergic rhinitis due to pollen    Current Assessment & Plan   Stable.  Patient tolerated Claritin, Flonase and Singulair well without side effects. I feel the benefits of the medication outweigh the risks.          Relevant Medications    celecoxib (CeleBREX) 200 MG capsule    fluticasone (FLONASE) 50 MCG/ACT nasal spray    montelukast (SINGULAIR) 10 MG tablet    loratadine (CLARITIN) 10 MG tablet    Vitamin D deficiency    Current Assessment & Plan   Doing well.  Vit, D done 2-, read by me, reviewed with pt.   Vit. D was 57.3 up from 56.6            Unprioritized    RESOLVED: Allergic rhinitis    Overview   Description: (PL)         Current Assessment & Plan   Duplicate thus delete.            Relevant Medications    celecoxib (CeleBREX) 200 MG capsule    Aortic valve regurgitation    Overview   Echo done 12/2015 - [facility].         Current Assessment & Plan   Stable.    Discussed ECHO, pt. Declines ECHO            Relevant Medications    metoprolol tartrate (LOPRESSOR) 25 MG tablet    RESOLVED: Arthritis of right hip    Current Assessment & Plan   Duplicate thus delete.         At high risk for falls    Current Assessment & Plan   Discussed at risk for falls. Advised physical therapy. Pt. Declines physical therapy.    Advised to continue using walker.         RESOLVED: Atrophic vaginitis    Current Assessment & Plan   Duplicate thus delete.         RESOLVED: Back pain    Current Assessment & Plan   Duplicate thus delete         Bilateral shoulder pain    Current Assessment & Plan   Stable, pt. Declines more work up and treatment.         Carpal tunnel syndrome of right wrist    Overview   Causes paresthesia of the hands.  Declines more work up or treatment         Current Assessment & Plan   Stable, pt. Declines nerve conduction studies.         RESOLVED: Cervical cancer screening    RESOLVED: Chronic neck pain    Current Assessment & Plan   Resolved thus delete.            Chronic pain of right knee    Overview   Surgery by Dr. Silva         Current Assessment & Plan   Stable, follow conservatively         RESOLVED: Chronic right shoulder pain    Current Assessment & Plan   Duplicate thus delete.            Colon polyps    Overview   Colonoscopy done 9-20-23 by Dr. Alva -pt. Declines further colonoscopies         Current Assessment & Plan   Colonoscopy done 9-20-23 by Dr. Alva -3 polyps removed         RESOLVED: Constipation    Current Assessment & Plan   Duplicate thus delete.            Relevant Medications    Wheat Dextrin (Benefiber Drink Mix) pack    RESOLVED: Dependent edema    Current Assessment & Plan   Duplicate thus delete         Edema of both lower extremities    Current Assessment & Plan   Mild, advised to elevate legs and wear support hose         RESOLVED: Elevated alkaline phosphatase level    Current Assessment & Plan   Resolved x 3, thus delete.  CMP done 2-, read by me, reviewed with pt. Alk. Phos was normal x 3         RESOLVED: Elevated diaphragm    Overview   New dx.  shown on chest xray from 2012, will follow conservatively         Current Assessment & Plan   Resolved thus delete         Encounter for routine adult physical exam with abnormal findings    Current Assessment & Plan    Encouraged to do self breast and self derm exams.  Congratulated on using seat belts.  Encouraged to do annual physical exams. Immunizations status reviewed.           RESOLVED: Esophageal hernia    Overview   Doing well. Follow conservatively.         Current Assessment & Plan   Duplicate thus delete         Relevant Medications    pantoprazole (PROTONIX) 40 MG EC tablet    Family history of colon cancer    Current Assessment & Plan   Discussed repeat colonoscopy, pt. Declines repeat colonoscopy          RESOLVED: Family history of diabetes mellitus    Current Assessment & Plan   Resolved thus delete         RESOLVED: Fracture of left patella    Current Assessment & Plan   Resolved thus delete.            RESOLVED: Frontal headache    Current Assessment & Plan   Resolved thus delete         RESOLVED: Fusion of spine of lumbar region    Overview   Overview:   T4-S1 PSF         Current Assessment & Plan   Duplicate thus delete         Hiatal hernia    Current Assessment & Plan   Stable, follow conservatively         Relevant Medications    pantoprazole (PROTONIX) 40 MG EC tablet    Immunization counseling    Overview   UTD as of 2-26-25    Prevnar 20  2-5-24    Flu vaccine  9-24-24    T-Dap  5-5-21    Shingrix  5-6-23 and 2-14-23    Covid   11-19-21, 3-21-21, 2-1-21, 5-11-22      10-25-22     RSV                        1-12-24         Insomnia, persistent    Current Assessment & Plan   Doing well.           Irritable bowel syndrome with constipation    Overview   Colonoscopy 9-20-23 with Dr. Valdivia who stated no recall  Causes intermittent pain.         Current Assessment & Plan   Intermittent and mild.  Treated with Benafiber.  Patient tolerated Benafiber well without side effects. I feel the benefits of the medication outweigh the risks.          RESOLVED: Left hip pain    Current Assessment & Plan   Duplicate thus delete.         RESOLVED: Left knee pain    Overview   Worse, will try to schedule ortho appt.  sooner than 10-16-18         Current Assessment & Plan   Duplicate thus delete.            RESOLVED: Lesion of joint capsule of knee region    Current Assessment & Plan   Duplicate thus delete.         RESOLVED: Lethargy    Current Assessment & Plan   Mild, normal for age thus delete.  TSH  done 2-, read by me, reviewed with pt.   TSH was 1.440 down from 1.930         RESOLVED: Localized edema    Current Assessment & Plan   Duplicate thus delete         Mitral valve disease    Overview   Echo done 12/2015 - [facility].         Current Assessment & Plan   Stable, follow conservatively.  Pt. Declines repeat ECHO         Relevant Medications    metoprolol tartrate (LOPRESSOR) 25 MG tablet    RESOLVED: Myalgia    Current Assessment & Plan   Resolved  thus delete.   .  CPK done 2-, read by me, reviewed with pt.   CPK was 55         RESOLVED: Neoplasm, uncertain whether benign or malignant    Current Assessment & Plan   Resolved thus delete.         RESOLVED: Osteoarthritis of right knee    Current Assessment & Plan   Duplicate thus delete.         Osteoporosis    Current Assessment & Plan   Pt. Declined DEXA, follow conservatively         RESOLVED: Overweight (BMI 25.0-29.9)    Current Assessment & Plan   Resolved  thus delete.         RESOLVED: Pelvic and perineal pain    Current Assessment & Plan   Resolved  thus delete.         RESOLVED: Postmenopausal vaginal bleeding    Current Assessment & Plan   Resolved  thus delete.         RESOLVED: Prosthetic and other implants, materials and accessory orthopedic devices associated with adverse incidents    Overview   Last Assessment & Plan:   Followed by janell         Current Assessment & Plan   Resolved thus delete.         RESOLVED: Right lower quadrant abdominal pain    Current Assessment & Plan   Intermittent and mild thus delete.         RESOLVED: Right upper quadrant abdominal pain    Overview   Colonoscopy 9-20-23 with Dr. Valdivia who stated  no recall         Current Assessment & Plan   Resolved  thus delete.         RESOLVED: S/P lumbar fusion    Overview   Doing well since surgery on 1-9-17.         Current Assessment & Plan   Resolved thus delete.         RESOLVED: Scoliosis (and kyphoscoliosis), idiopathic    Overview   2021 Regulatory DX Update         Current Assessment & Plan   Resolved thus delete.            Screening mammogram for breast cancer    Current Assessment & Plan   Mammogram results reviewed with pt.  Offered vascular screening, pt. Declines.         Seasonal allergies    Overview   Exacerbation, Resume OTC anti histamines and follow         RESOLVED: Status post hip replacement    Current Assessment & Plan   Duplicate thus delete.         RESOLVED: Status post knee replacement    Current Assessment & Plan   Duplicate thus delete.            RESOLVED: Tear of meniscus of knee    Current Assessment & Plan   Duplicate thus delete.         RESOLVED: Tremor    Current Assessment & Plan   Resolved  thus delete.         Urinary incontinence    Current Assessment & Plan   Stable         Uterine leiomyoma    Overview   Discussed following with GYN for a scope, will discuss further after hip surgery         Current Assessment & Plan   Followed with GYN last year         RESOLVED: Vaginal bleeding    Current Assessment & Plan   Resolved  thus delete.          Other Visit Diagnoses         Pain syndrome, chronic        Relevant Medications    celecoxib (CeleBREX) 200 MG capsule    amitriptyline (ELAVIL) 75 MG tablet      Lumbar degenerative disc disease        Relevant Medications    gabapentin (NEURONTIN) 300 MG capsule               Encouraged to check her skin and breasts monthly. Reviewed exercising regularly, eating a balanced diet, having regular mammograms, immunizations and if due, patient counselled to check with insurance company for coverage;, importance of bone density screening, and regularly checking skin and breasts.

## 2025-03-13 NOTE — ASSESSMENT & PLAN NOTE
Stable.  Patient tolerated Claritin, Flonase and Singulair well without side effects. I feel the benefits of the medication outweigh the risks.

## 2025-03-13 NOTE — ASSESSMENT & PLAN NOTE
Discussed at risk for falls. Advised physical therapy. Pt. Declines physical therapy.   Advised to continue using walker.

## 2025-03-13 NOTE — ASSESSMENT & PLAN NOTE
Intermittent and mild.  Treated with Benafiber.  Patient tolerated Benafiber well without side effects. I feel the benefits of the medication outweigh the risks.

## 2025-03-13 NOTE — ASSESSMENT & PLAN NOTE
Stable, patient will stop Cymbalta and continue Amitriptyline.  Patient tolerated Amitriptyline well without side effects.  Risk of amitriptyline discussed especially death from overdose but she wants to continue.  Continue Neurontin.

## 2025-03-13 NOTE — ASSESSMENT & PLAN NOTE
Doing well.  Patient tolerated Protonix well without side effects. I feel the benefits of the medication outweigh the risks.

## 2025-03-14 DIAGNOSIS — M51.369 LUMBAR DEGENERATIVE DISC DISEASE: ICD-10-CM

## 2025-03-14 DIAGNOSIS — G89.4 PAIN SYNDROME, CHRONIC: ICD-10-CM

## 2025-03-14 DIAGNOSIS — K21.9 GASTROESOPHAGEAL REFLUX DISEASE WITHOUT ESOPHAGITIS: ICD-10-CM

## 2025-03-16 PROBLEM — R92.30 DENSE BREASTS: Status: ACTIVE | Noted: 2025-03-16

## 2025-03-17 RX ORDER — GABAPENTIN 300 MG/1
CAPSULE ORAL
Qty: 180 CAPSULE | Refills: 3 | Status: SHIPPED | OUTPATIENT
Start: 2025-03-17

## 2025-03-17 RX ORDER — CELECOXIB 200 MG/1
200 CAPSULE ORAL DAILY
Qty: 90 CAPSULE | Refills: 3 | Status: SHIPPED | OUTPATIENT
Start: 2025-03-17

## 2025-03-17 RX ORDER — PANTOPRAZOLE SODIUM 40 MG/1
40 TABLET, DELAYED RELEASE ORAL DAILY
Qty: 90 TABLET | Refills: 3 | Status: SHIPPED | OUTPATIENT
Start: 2025-03-17

## 2025-03-25 DIAGNOSIS — G89.4 PAIN SYNDROME, CHRONIC: ICD-10-CM

## 2025-03-25 RX ORDER — AMITRIPTYLINE HYDROCHLORIDE 75 MG/1
75 TABLET ORAL
Qty: 90 TABLET | Refills: 3 | Status: SHIPPED | OUTPATIENT
Start: 2025-03-25

## 2025-03-25 RX ORDER — AMITRIPTYLINE HYDROCHLORIDE 75 MG/1
75 TABLET ORAL
Qty: 90 TABLET | Refills: 3 | Status: SHIPPED | OUTPATIENT
Start: 2025-03-25 | End: 2025-03-25 | Stop reason: SDUPTHER

## 2025-06-16 ENCOUNTER — TELEPHONE (OUTPATIENT)
Dept: FAMILY MEDICINE CLINIC | Facility: CLINIC | Age: 85
End: 2025-06-16
Payer: MEDICARE

## 2025-06-16 NOTE — TELEPHONE ENCOUNTER
Called and spoke with Vida- She is improving on antibiotics.  Offered a follow up visit and she declined

## 2025-08-14 ENCOUNTER — OFFICE VISIT (OUTPATIENT)
Dept: FAMILY MEDICINE CLINIC | Facility: CLINIC | Age: 85
End: 2025-08-14
Payer: MEDICARE

## 2025-08-14 VITALS
OXYGEN SATURATION: 95 % | BODY MASS INDEX: 25.48 KG/M2 | HEART RATE: 101 BPM | WEIGHT: 143.8 LBS | HEIGHT: 63 IN | RESPIRATION RATE: 18 BRPM | TEMPERATURE: 96.9 F

## 2025-08-14 DIAGNOSIS — I95.1 ORTHOSTATIC HYPOTENSION: ICD-10-CM

## 2025-08-14 DIAGNOSIS — R55 NEAR SYNCOPE: Primary | ICD-10-CM

## 2025-08-14 DIAGNOSIS — R53.83 LETHARGY: ICD-10-CM

## 2025-08-14 DIAGNOSIS — R73.9 HYPERGLYCEMIA: ICD-10-CM

## 2025-08-14 DIAGNOSIS — E78.2 MIXED HYPERLIPIDEMIA: ICD-10-CM

## 2025-08-15 LAB
ALBUMIN SERPL-MCNC: 4.3 G/DL (ref 3.7–4.7)
ALP SERPL-CCNC: 94 IU/L (ref 44–121)
ALT SERPL-CCNC: 11 IU/L (ref 0–32)
AST SERPL-CCNC: 21 IU/L (ref 0–40)
BASOPHILS # BLD AUTO: 0.1 X10E3/UL (ref 0–0.2)
BASOPHILS NFR BLD AUTO: 1 %
BILIRUB SERPL-MCNC: 0.3 MG/DL (ref 0–1.2)
BUN SERPL-MCNC: 41 MG/DL (ref 8–27)
BUN/CREAT SERPL: 26 (ref 12–28)
CALCIUM SERPL-MCNC: 9.2 MG/DL (ref 8.7–10.3)
CHLORIDE SERPL-SCNC: 99 MMOL/L (ref 96–106)
CHOLEST SERPL-MCNC: 129 MG/DL (ref 100–199)
CHOLEST/HDLC SERPL: 3.4 RATIO (ref 0–4.4)
CO2 SERPL-SCNC: 23 MMOL/L (ref 20–29)
CREAT SERPL-MCNC: 1.55 MG/DL (ref 0.57–1)
EGFRCR SERPLBLD CKD-EPI 2021: 33 ML/MIN/1.73
EOSINOPHIL # BLD AUTO: 0.1 X10E3/UL (ref 0–0.4)
EOSINOPHIL NFR BLD AUTO: 2 %
ERYTHROCYTE [DISTWIDTH] IN BLOOD BY AUTOMATED COUNT: 12.9 % (ref 11.7–15.4)
GLOBULIN SER CALC-MCNC: 2.6 G/DL (ref 1.5–4.5)
GLUCOSE SERPL-MCNC: 113 MG/DL (ref 70–99)
HBA1C MFR BLD: 6 % (ref 4.8–5.6)
HCT VFR BLD AUTO: 37.3 % (ref 34–46.6)
HDLC SERPL-MCNC: 38 MG/DL
HGB BLD-MCNC: 12.1 G/DL (ref 11.1–15.9)
IMM GRANULOCYTES # BLD AUTO: 0.1 X10E3/UL (ref 0–0.1)
IMM GRANULOCYTES NFR BLD AUTO: 1 %
LDLC SERPL CALC-MCNC: 61 MG/DL (ref 0–99)
LYMPHOCYTES # BLD AUTO: 1.1 X10E3/UL (ref 0.7–3.1)
LYMPHOCYTES NFR BLD AUTO: 15 %
MCH RBC QN AUTO: 30.3 PG (ref 26.6–33)
MCHC RBC AUTO-ENTMCNC: 32.4 G/DL (ref 31.5–35.7)
MCV RBC AUTO: 94 FL (ref 79–97)
MONOCYTES # BLD AUTO: 0.7 X10E3/UL (ref 0.1–0.9)
MONOCYTES NFR BLD AUTO: 10 %
NEUTROPHILS # BLD AUTO: 5.3 X10E3/UL (ref 1.4–7)
NEUTROPHILS NFR BLD AUTO: 71 %
PLATELET # BLD AUTO: 305 X10E3/UL (ref 150–450)
POTASSIUM SERPL-SCNC: 5.2 MMOL/L (ref 3.5–5.2)
PROT SERPL-MCNC: 6.9 G/DL (ref 6–8.5)
RBC # BLD AUTO: 3.99 X10E6/UL (ref 3.77–5.28)
SODIUM SERPL-SCNC: 139 MMOL/L (ref 134–144)
TRIGL SERPL-MCNC: 181 MG/DL (ref 0–149)
TSH SERPL DL<=0.005 MIU/L-ACNC: 1.68 UIU/ML (ref 0.45–4.5)
VLDLC SERPL CALC-MCNC: 30 MG/DL (ref 5–40)
WBC # BLD AUTO: 7.4 X10E3/UL (ref 3.4–10.8)

## 2025-08-18 ENCOUNTER — OFFICE VISIT (OUTPATIENT)
Dept: FAMILY MEDICINE CLINIC | Facility: CLINIC | Age: 85
End: 2025-08-18
Payer: MEDICARE

## 2025-08-18 VITALS
DIASTOLIC BLOOD PRESSURE: 72 MMHG | TEMPERATURE: 96.9 F | RESPIRATION RATE: 14 BRPM | HEART RATE: 94 BPM | WEIGHT: 143 LBS | BODY MASS INDEX: 25.34 KG/M2 | HEIGHT: 63 IN | SYSTOLIC BLOOD PRESSURE: 138 MMHG | OXYGEN SATURATION: 96 %

## 2025-08-18 DIAGNOSIS — G89.4 CHRONIC PAIN SYNDROME: Primary | ICD-10-CM

## 2025-08-18 DIAGNOSIS — E78.2 MIXED HYPERLIPIDEMIA: ICD-10-CM

## 2025-08-18 DIAGNOSIS — I95.1 ORTHOSTATIC HYPOTENSION: ICD-10-CM

## 2025-08-18 DIAGNOSIS — N18.31 CHRONIC RENAL FAILURE, STAGE 3A: ICD-10-CM

## 2025-08-18 DIAGNOSIS — R53.83 LETHARGY: ICD-10-CM

## 2025-08-18 DIAGNOSIS — R73.9 HYPERGLYCEMIA: ICD-10-CM

## 2025-08-18 DIAGNOSIS — R55 NEAR SYNCOPE: ICD-10-CM

## 2025-08-18 DIAGNOSIS — I10 HYPERTENSION, BENIGN: ICD-10-CM

## 2025-08-18 PROBLEM — N18.9 CHRONIC RENAL FAILURE: Status: ACTIVE | Noted: 2025-08-18

## 2025-08-18 RX ORDER — PREDNISONE 10 MG/1
10 TABLET ORAL TAKE AS DIRECTED
Qty: 16 TABLET | Refills: 0 | Status: SHIPPED | OUTPATIENT
Start: 2025-08-18 | End: 2025-09-02

## 2025-08-26 LAB
ALBUMIN SERPL-MCNC: 4.1 G/DL (ref 3.7–4.7)
ALP SERPL-CCNC: 79 IU/L (ref 44–121)
ALT SERPL-CCNC: 16 IU/L (ref 0–32)
AST SERPL-CCNC: 22 IU/L (ref 0–40)
BILIRUB SERPL-MCNC: 0.5 MG/DL (ref 0–1.2)
BUN SERPL-MCNC: 14 MG/DL (ref 8–27)
BUN/CREAT SERPL: 18 (ref 12–28)
CALCIUM SERPL-MCNC: 9.4 MG/DL (ref 8.7–10.3)
CHLORIDE SERPL-SCNC: 99 MMOL/L (ref 96–106)
CO2 SERPL-SCNC: 25 MMOL/L (ref 20–29)
CREAT SERPL-MCNC: 0.78 MG/DL (ref 0.57–1)
EGFRCR SERPLBLD CKD-EPI 2021: 76 ML/MIN/1.73
GLOBULIN SER CALC-MCNC: 2.3 G/DL (ref 1.5–4.5)
GLUCOSE SERPL-MCNC: 94 MG/DL (ref 70–99)
POTASSIUM SERPL-SCNC: 4.4 MMOL/L (ref 3.5–5.2)
PROT SERPL-MCNC: 6.4 G/DL (ref 6–8.5)
SODIUM SERPL-SCNC: 138 MMOL/L (ref 134–144)

## 2025-08-27 ENCOUNTER — OFFICE VISIT (OUTPATIENT)
Dept: FAMILY MEDICINE CLINIC | Facility: CLINIC | Age: 85
End: 2025-08-27
Payer: MEDICARE

## 2025-08-27 VITALS
TEMPERATURE: 97.1 F | WEIGHT: 143.8 LBS | HEIGHT: 63 IN | RESPIRATION RATE: 18 BRPM | OXYGEN SATURATION: 99 % | DIASTOLIC BLOOD PRESSURE: 80 MMHG | HEART RATE: 99 BPM | SYSTOLIC BLOOD PRESSURE: 152 MMHG | BODY MASS INDEX: 25.48 KG/M2

## 2025-08-27 DIAGNOSIS — Z91.81 AT HIGH RISK FOR FALLS: ICD-10-CM

## 2025-08-27 DIAGNOSIS — I95.1 ORTHOSTATIC HYPOTENSION: ICD-10-CM

## 2025-08-27 DIAGNOSIS — R73.9 HYPERGLYCEMIA: ICD-10-CM

## 2025-08-27 DIAGNOSIS — E78.2 MIXED HYPERLIPIDEMIA: ICD-10-CM

## 2025-08-27 DIAGNOSIS — M48.061 SPINAL STENOSIS OF LUMBAR REGION, UNSPECIFIED WHETHER NEUROGENIC CLAUDICATION PRESENT: ICD-10-CM

## 2025-08-27 DIAGNOSIS — G89.4 CHRONIC PAIN SYNDROME: ICD-10-CM

## 2025-08-27 DIAGNOSIS — R29.6 RECURRENT FALLS: ICD-10-CM

## 2025-08-27 DIAGNOSIS — N18.31 CHRONIC RENAL FAILURE, STAGE 3A: ICD-10-CM

## 2025-08-27 DIAGNOSIS — I10 HYPERTENSION, BENIGN: Primary | ICD-10-CM

## 2025-08-27 DIAGNOSIS — M51.369 LUMBAR DEGENERATIVE DISC DISEASE: ICD-10-CM

## 2025-08-27 RX ORDER — ENALAPRIL MALEATE 10 MG/1
TABLET ORAL
Qty: 45 TABLET | Refills: 3 | Status: SHIPPED | OUTPATIENT
Start: 2025-08-27 | End: 2025-08-28

## 2025-08-27 RX ORDER — GABAPENTIN 300 MG/1
300 CAPSULE ORAL 3 TIMES DAILY
Qty: 270 CAPSULE | Refills: 3 | Status: SHIPPED | OUTPATIENT
Start: 2025-08-27 | End: 2025-08-28

## 2025-08-28 RX ORDER — GABAPENTIN 300 MG/1
300 CAPSULE ORAL 3 TIMES DAILY
Qty: 270 CAPSULE | Refills: 3 | Status: SHIPPED | OUTPATIENT
Start: 2025-08-28

## 2025-08-28 RX ORDER — ENALAPRIL MALEATE 10 MG/1
TABLET ORAL
Qty: 45 TABLET | Refills: 1 | Status: SHIPPED | OUTPATIENT
Start: 2025-08-28

## (undated) DEVICE — DRSNG TELFA PAD NONADH STR 1S 3X4IN

## (undated) DEVICE — WET SKIN PREP TRAY: Brand: MEDLINE INDUSTRIES, INC.

## (undated) DEVICE — GLV SURG BIOGEL M LTX PF 6 1/2

## (undated) DEVICE — KT SURG TURNOVER 050

## (undated) DEVICE — GLV SURG SENSICARE PI PF LF 7 GRN STRL

## (undated) DEVICE — CVR HNDL LT SURG ACCSSRY BLU STRL

## (undated) DEVICE — ST TBG ENDOMAT HYSTSCPY

## (undated) DEVICE — TBG IRRI TUR Y/TYP NONVENT 98IN LF

## (undated) DEVICE — DRSNG TELFA PAD NONADH STR 1S 3X8IN

## (undated) DEVICE — SLV SCD CALF HEMOFORCE DVT THERP REPROC MD

## (undated) DEVICE — PK MINOR LITHOTOMY 50

## (undated) DEVICE — SOLUTION,WATER,IRRIGATION,1000ML,STERILE: Brand: MEDLINE